# Patient Record
Sex: MALE | Race: WHITE | Employment: OTHER | URBAN - METROPOLITAN AREA
[De-identification: names, ages, dates, MRNs, and addresses within clinical notes are randomized per-mention and may not be internally consistent; named-entity substitution may affect disease eponyms.]

---

## 2021-08-03 ENCOUNTER — HOSPITAL ENCOUNTER (INPATIENT)
Age: 81
LOS: 10 days | Discharge: NURSING, REHAB OR LONG-TERM CARE | DRG: 981 | End: 2021-08-13
Attending: EMERGENCY MEDICINE | Admitting: INTERNAL MEDICINE
Payer: MEDICARE

## 2021-08-03 ENCOUNTER — APPOINTMENT (OUTPATIENT)
Dept: GENERAL RADIOLOGY | Age: 81
DRG: 981 | End: 2021-08-03
Payer: MEDICARE

## 2021-08-03 ENCOUNTER — APPOINTMENT (OUTPATIENT)
Dept: CT IMAGING | Age: 81
DRG: 981 | End: 2021-08-03
Payer: MEDICARE

## 2021-08-03 DIAGNOSIS — T79.6XXA TRAUMATIC RHABDOMYOLYSIS, INITIAL ENCOUNTER (HCC): ICD-10-CM

## 2021-08-03 DIAGNOSIS — E86.0 DEHYDRATION: ICD-10-CM

## 2021-08-03 DIAGNOSIS — S72.002A CLOSED FRACTURE OF LEFT HIP, INITIAL ENCOUNTER (HCC): ICD-10-CM

## 2021-08-03 DIAGNOSIS — I48.91 ATRIAL FIBRILLATION WITH RVR (HCC): Primary | ICD-10-CM

## 2021-08-03 LAB
ABSOLUTE EOS #: 0 K/UL (ref 0–0.4)
ABSOLUTE IMMATURE GRANULOCYTE: ABNORMAL K/UL (ref 0–0.3)
ABSOLUTE LYMPH #: 0.9 K/UL (ref 1–4.8)
ABSOLUTE MONO #: 0.8 K/UL (ref 0.1–1.3)
ALBUMIN SERPL-MCNC: 3.8 G/DL (ref 3.5–5.2)
ALBUMIN/GLOBULIN RATIO: ABNORMAL (ref 1–2.5)
ALP BLD-CCNC: 110 U/L (ref 40–129)
ALT SERPL-CCNC: 114 U/L (ref 5–41)
ANION GAP SERPL CALCULATED.3IONS-SCNC: 16 MMOL/L (ref 9–17)
AST SERPL-CCNC: 85 U/L
BASOPHILS # BLD: 0 % (ref 0–2)
BASOPHILS ABSOLUTE: 0 K/UL (ref 0–0.2)
BILIRUB SERPL-MCNC: 1.74 MG/DL (ref 0.3–1.2)
BNP INTERPRETATION: ABNORMAL
BUN BLDV-MCNC: 71 MG/DL (ref 8–23)
BUN/CREAT BLD: ABNORMAL (ref 9–20)
C-REACTIVE PROTEIN: 38.7 MG/L (ref 0–5)
CALCIUM SERPL-MCNC: 9.4 MG/DL (ref 8.6–10.4)
CHLORIDE BLD-SCNC: 100 MMOL/L (ref 98–107)
CO2: 21 MMOL/L (ref 20–31)
CREAT SERPL-MCNC: 1.11 MG/DL (ref 0.7–1.2)
DIFFERENTIAL TYPE: ABNORMAL
EKG ATRIAL RATE: 131 BPM
EKG Q-T INTERVAL: 278 MS
EKG QRS DURATION: 88 MS
EKG QTC CALCULATION (BAZETT): 470 MS
EKG R AXIS: -25 DEGREES
EKG T AXIS: 78 DEGREES
EKG VENTRICULAR RATE: 172 BPM
EOSINOPHILS RELATIVE PERCENT: 0 % (ref 0–4)
GFR AFRICAN AMERICAN: >60 ML/MIN
GFR NON-AFRICAN AMERICAN: >60 ML/MIN
GFR SERPL CREATININE-BSD FRML MDRD: ABNORMAL ML/MIN/{1.73_M2}
GFR SERPL CREATININE-BSD FRML MDRD: ABNORMAL ML/MIN/{1.73_M2}
GLUCOSE BLD-MCNC: 113 MG/DL (ref 70–99)
HCT VFR BLD CALC: 33.9 % (ref 41–53)
HCT VFR BLD CALC: 35.4 % (ref 41–53)
HCT VFR BLD CALC: 41 % (ref 41–53)
HEMOGLOBIN: 11.8 G/DL (ref 13.5–17.5)
HEMOGLOBIN: 12.3 G/DL (ref 13.5–17.5)
HEMOGLOBIN: 13.6 G/DL (ref 13.5–17.5)
IMMATURE GRANULOCYTES: ABNORMAL %
INR BLD: 1.1
INR BLD: 1.2
INR BLD: 1.2
LACTIC ACID, WHOLE BLOOD: ABNORMAL MMOL/L (ref 0.7–2.1)
LACTIC ACID, WHOLE BLOOD: ABNORMAL MMOL/L (ref 0.7–2.1)
LACTIC ACID, WHOLE BLOOD: NORMAL MMOL/L (ref 0.7–2.1)
LACTIC ACID: 1.7 MMOL/L (ref 0.5–2.2)
LACTIC ACID: 2.3 MMOL/L (ref 0.5–2.2)
LACTIC ACID: 2.3 MMOL/L (ref 0.5–2.2)
LYMPHOCYTES # BLD: 8 % (ref 24–44)
MCH RBC QN AUTO: 32.2 PG (ref 26–34)
MCH RBC QN AUTO: 33.7 PG (ref 26–34)
MCH RBC QN AUTO: 33.7 PG (ref 26–34)
MCHC RBC AUTO-ENTMCNC: 33.2 G/DL (ref 31–37)
MCHC RBC AUTO-ENTMCNC: 34.7 G/DL (ref 31–37)
MCHC RBC AUTO-ENTMCNC: 34.9 G/DL (ref 31–37)
MCV RBC AUTO: 96.6 FL (ref 80–100)
MCV RBC AUTO: 96.9 FL (ref 80–100)
MCV RBC AUTO: 97.1 FL (ref 80–100)
MONOCYTES # BLD: 7 % (ref 1–7)
MYOGLOBIN: 1409 NG/ML (ref 28–72)
NRBC AUTOMATED: ABNORMAL PER 100 WBC
PARTIAL THROMBOPLASTIN TIME: 28.1 SEC (ref 24–36)
PARTIAL THROMBOPLASTIN TIME: >150 SEC (ref 24–36)
PARTIAL THROMBOPLASTIN TIME: >150 SEC (ref 24–36)
PDW BLD-RTO: 14.1 % (ref 11.5–14.9)
PDW BLD-RTO: 14.1 % (ref 11.5–14.9)
PDW BLD-RTO: 14.3 % (ref 11.5–14.9)
PLATELET # BLD: 203 K/UL (ref 150–450)
PLATELET # BLD: 209 K/UL (ref 150–450)
PLATELET # BLD: 287 K/UL (ref 150–450)
PLATELET ESTIMATE: ABNORMAL
PMV BLD AUTO: 8.9 FL (ref 6–12)
PMV BLD AUTO: 9.2 FL (ref 6–12)
PMV BLD AUTO: 9.7 FL (ref 6–12)
POTASSIUM SERPL-SCNC: 3.6 MMOL/L (ref 3.7–5.3)
PRO-BNP: 5290 PG/ML
PROTHROMBIN TIME: 14.3 SEC (ref 11.8–14.6)
PROTHROMBIN TIME: 15.4 SEC (ref 11.8–14.6)
PROTHROMBIN TIME: 15.6 SEC (ref 11.8–14.6)
RBC # BLD: 3.5 M/UL (ref 4.5–5.9)
RBC # BLD: 3.65 M/UL (ref 4.5–5.9)
RBC # BLD: 4.23 M/UL (ref 4.5–5.9)
RBC # BLD: ABNORMAL 10*6/UL
SARS-COV-2, RAPID: NOT DETECTED
SEDIMENTATION RATE, ERYTHROCYTE: 7 MM (ref 0–20)
SEG NEUTROPHILS: 85 % (ref 36–66)
SEGMENTED NEUTROPHILS ABSOLUTE COUNT: 10 K/UL (ref 1.3–9.1)
SODIUM BLD-SCNC: 137 MMOL/L (ref 135–144)
SPECIMEN DESCRIPTION: NORMAL
TOTAL CK: 4071 U/L (ref 39–308)
TOTAL PROTEIN: 6.8 G/DL (ref 6.4–8.3)
TROPONIN INTERP: ABNORMAL
TROPONIN T: ABNORMAL NG/ML
TROPONIN, HIGH SENSITIVITY: 101 NG/L (ref 0–22)
TROPONIN, HIGH SENSITIVITY: 70 NG/L (ref 0–22)
TROPONIN, HIGH SENSITIVITY: 76 NG/L (ref 0–22)
TSH SERPL DL<=0.05 MIU/L-ACNC: 1.44 MIU/L (ref 0.3–5)
WBC # BLD: 11.7 K/UL (ref 3.5–11)
WBC # BLD: 9.5 K/UL (ref 3.5–11)
WBC # BLD: 9.9 K/UL (ref 3.5–11)
WBC # BLD: ABNORMAL 10*3/UL

## 2021-08-03 PROCEDURE — 2580000003 HC RX 258: Performed by: EMERGENCY MEDICINE

## 2021-08-03 PROCEDURE — 80053 COMPREHEN METABOLIC PANEL: CPT

## 2021-08-03 PROCEDURE — 93005 ELECTROCARDIOGRAM TRACING: CPT | Performed by: EMERGENCY MEDICINE

## 2021-08-03 PROCEDURE — 84484 ASSAY OF TROPONIN QUANT: CPT

## 2021-08-03 PROCEDURE — 84443 ASSAY THYROID STIM HORMONE: CPT

## 2021-08-03 PROCEDURE — 36415 COLL VENOUS BLD VENIPUNCTURE: CPT

## 2021-08-03 PROCEDURE — 85652 RBC SED RATE AUTOMATED: CPT

## 2021-08-03 PROCEDURE — 87635 SARS-COV-2 COVID-19 AMP PRB: CPT

## 2021-08-03 PROCEDURE — 0BH18EZ INSERTION OF ENDOTRACHEAL AIRWAY INTO TRACHEA, VIA NATURAL OR ARTIFICIAL OPENING ENDOSCOPIC: ICD-10-PCS | Performed by: INTERNAL MEDICINE

## 2021-08-03 PROCEDURE — 73502 X-RAY EXAM HIP UNI 2-3 VIEWS: CPT

## 2021-08-03 PROCEDURE — 96365 THER/PROPH/DIAG IV INF INIT: CPT

## 2021-08-03 PROCEDURE — 83880 ASSAY OF NATRIURETIC PEPTIDE: CPT

## 2021-08-03 PROCEDURE — 93010 ELECTROCARDIOGRAM REPORT: CPT | Performed by: INTERNAL MEDICINE

## 2021-08-03 PROCEDURE — 73700 CT LOWER EXTREMITY W/O DYE: CPT

## 2021-08-03 PROCEDURE — 96361 HYDRATE IV INFUSION ADD-ON: CPT

## 2021-08-03 PROCEDURE — 86140 C-REACTIVE PROTEIN: CPT

## 2021-08-03 PROCEDURE — 85027 COMPLETE CBC AUTOMATED: CPT

## 2021-08-03 PROCEDURE — 72125 CT NECK SPINE W/O DYE: CPT

## 2021-08-03 PROCEDURE — 6360000002 HC RX W HCPCS

## 2021-08-03 PROCEDURE — 99223 1ST HOSP IP/OBS HIGH 75: CPT | Performed by: INTERNAL MEDICINE

## 2021-08-03 PROCEDURE — 85610 PROTHROMBIN TIME: CPT

## 2021-08-03 PROCEDURE — 2500000003 HC RX 250 WO HCPCS: Performed by: EMERGENCY MEDICINE

## 2021-08-03 PROCEDURE — 83874 ASSAY OF MYOGLOBIN: CPT

## 2021-08-03 PROCEDURE — 85025 COMPLETE CBC W/AUTO DIFF WBC: CPT

## 2021-08-03 PROCEDURE — 87040 BLOOD CULTURE FOR BACTERIA: CPT

## 2021-08-03 PROCEDURE — 2060000000 HC ICU INTERMEDIATE R&B

## 2021-08-03 PROCEDURE — 83605 ASSAY OF LACTIC ACID: CPT

## 2021-08-03 PROCEDURE — 71045 X-RAY EXAM CHEST 1 VIEW: CPT

## 2021-08-03 PROCEDURE — 5A1945Z RESPIRATORY VENTILATION, 24-96 CONSECUTIVE HOURS: ICD-10-PCS | Performed by: INTERNAL MEDICINE

## 2021-08-03 PROCEDURE — 2580000003 HC RX 258

## 2021-08-03 PROCEDURE — 96367 TX/PROPH/DG ADDL SEQ IV INF: CPT

## 2021-08-03 PROCEDURE — 6370000000 HC RX 637 (ALT 250 FOR IP): Performed by: EMERGENCY MEDICINE

## 2021-08-03 PROCEDURE — 99285 EMERGENCY DEPT VISIT HI MDM: CPT

## 2021-08-03 PROCEDURE — 96366 THER/PROPH/DIAG IV INF ADDON: CPT

## 2021-08-03 PROCEDURE — 85730 THROMBOPLASTIN TIME PARTIAL: CPT

## 2021-08-03 PROCEDURE — 70450 CT HEAD/BRAIN W/O DYE: CPT

## 2021-08-03 PROCEDURE — 82550 ASSAY OF CK (CPK): CPT

## 2021-08-03 PROCEDURE — 2500000003 HC RX 250 WO HCPCS: Performed by: INTERNAL MEDICINE

## 2021-08-03 PROCEDURE — 6360000002 HC RX W HCPCS: Performed by: EMERGENCY MEDICINE

## 2021-08-03 RX ORDER — SODIUM CHLORIDE 9 MG/ML
INJECTION, SOLUTION INTRAVENOUS CONTINUOUS
Status: DISCONTINUED | OUTPATIENT
Start: 2021-08-03 | End: 2021-08-10

## 2021-08-03 RX ORDER — M-VIT,TX,IRON,MINS/CALC/FOLIC 27MG-0.4MG
1 TABLET ORAL DAILY
COMMUNITY

## 2021-08-03 RX ORDER — SODIUM CHLORIDE 9 MG/ML
25 INJECTION, SOLUTION INTRAVENOUS PRN
Status: DISCONTINUED | OUTPATIENT
Start: 2021-08-03 | End: 2021-08-13 | Stop reason: HOSPADM

## 2021-08-03 RX ORDER — HEPARIN SODIUM 1000 [USP'U]/ML
60 INJECTION, SOLUTION INTRAVENOUS; SUBCUTANEOUS PRN
Status: DISCONTINUED | OUTPATIENT
Start: 2021-08-03 | End: 2021-08-03

## 2021-08-03 RX ORDER — HEPARIN SODIUM 1000 [USP'U]/ML
80 INJECTION, SOLUTION INTRAVENOUS; SUBCUTANEOUS ONCE
Status: DISCONTINUED | OUTPATIENT
Start: 2021-08-03 | End: 2021-08-03

## 2021-08-03 RX ORDER — POTASSIUM CHLORIDE 7.45 MG/ML
10 INJECTION INTRAVENOUS ONCE
Status: COMPLETED | OUTPATIENT
Start: 2021-08-03 | End: 2021-08-03

## 2021-08-03 RX ORDER — HEPARIN SODIUM 1000 [USP'U]/ML
40 INJECTION, SOLUTION INTRAVENOUS; SUBCUTANEOUS PRN
Status: DISCONTINUED | OUTPATIENT
Start: 2021-08-03 | End: 2021-08-07

## 2021-08-03 RX ORDER — ACETAMINOPHEN 650 MG/1
650 SUPPOSITORY RECTAL EVERY 6 HOURS PRN
Status: DISCONTINUED | OUTPATIENT
Start: 2021-08-03 | End: 2021-08-10 | Stop reason: SDUPTHER

## 2021-08-03 RX ORDER — HEPARIN SODIUM 1000 [USP'U]/ML
80 INJECTION, SOLUTION INTRAVENOUS; SUBCUTANEOUS PRN
Status: DISCONTINUED | OUTPATIENT
Start: 2021-08-03 | End: 2021-08-07

## 2021-08-03 RX ORDER — HEPARIN SODIUM 10000 [USP'U]/100ML
5-30 INJECTION, SOLUTION INTRAVENOUS CONTINUOUS
Status: DISCONTINUED | OUTPATIENT
Start: 2021-08-03 | End: 2021-08-03

## 2021-08-03 RX ORDER — SODIUM CHLORIDE, SODIUM LACTATE, POTASSIUM CHLORIDE, AND CALCIUM CHLORIDE .6; .31; .03; .02 G/100ML; G/100ML; G/100ML; G/100ML
2000 INJECTION, SOLUTION INTRAVENOUS ONCE
Status: COMPLETED | OUTPATIENT
Start: 2021-08-03 | End: 2021-08-03

## 2021-08-03 RX ORDER — DIGOXIN 0.25 MG/ML
125 INJECTION INTRAMUSCULAR; INTRAVENOUS ONCE
Status: COMPLETED | OUTPATIENT
Start: 2021-08-03 | End: 2021-08-03

## 2021-08-03 RX ORDER — HEPARIN SODIUM 1000 [USP'U]/ML
30 INJECTION, SOLUTION INTRAVENOUS; SUBCUTANEOUS PRN
Status: DISCONTINUED | OUTPATIENT
Start: 2021-08-03 | End: 2021-08-03

## 2021-08-03 RX ORDER — ONDANSETRON 4 MG/1
4 TABLET, ORALLY DISINTEGRATING ORAL EVERY 8 HOURS PRN
Status: DISCONTINUED | OUTPATIENT
Start: 2021-08-03 | End: 2021-08-13 | Stop reason: HOSPADM

## 2021-08-03 RX ORDER — HEPARIN SODIUM 10000 [USP'U]/100ML
329-1000 INJECTION, SOLUTION INTRAVENOUS CONTINUOUS
Status: DISCONTINUED | OUTPATIENT
Start: 2021-08-03 | End: 2021-08-03

## 2021-08-03 RX ORDER — POLYETHYLENE GLYCOL 3350 17 G/17G
17 POWDER, FOR SOLUTION ORAL DAILY PRN
Status: DISCONTINUED | OUTPATIENT
Start: 2021-08-03 | End: 2021-08-13 | Stop reason: HOSPADM

## 2021-08-03 RX ORDER — ONDANSETRON 2 MG/ML
4 INJECTION INTRAMUSCULAR; INTRAVENOUS EVERY 6 HOURS PRN
Status: DISCONTINUED | OUTPATIENT
Start: 2021-08-03 | End: 2021-08-13 | Stop reason: HOSPADM

## 2021-08-03 RX ORDER — SODIUM CHLORIDE 0.9 % (FLUSH) 0.9 %
5-40 SYRINGE (ML) INJECTION EVERY 12 HOURS SCHEDULED
Status: DISCONTINUED | OUTPATIENT
Start: 2021-08-03 | End: 2021-08-13 | Stop reason: HOSPADM

## 2021-08-03 RX ORDER — POTASSIUM CHLORIDE 20 MEQ/1
40 TABLET, EXTENDED RELEASE ORAL PRN
Status: DISCONTINUED | OUTPATIENT
Start: 2021-08-03 | End: 2021-08-13 | Stop reason: HOSPADM

## 2021-08-03 RX ORDER — IBUPROFEN 200 MG
400 TABLET ORAL EVERY 6 HOURS PRN
Status: ON HOLD | COMMUNITY
End: 2021-08-26 | Stop reason: HOSPADM

## 2021-08-03 RX ORDER — DILTIAZEM HYDROCHLORIDE 5 MG/ML
10 INJECTION INTRAVENOUS ONCE
Status: COMPLETED | OUTPATIENT
Start: 2021-08-03 | End: 2021-08-03

## 2021-08-03 RX ORDER — METOPROLOL TARTRATE 5 MG/5ML
2.5 INJECTION INTRAVENOUS ONCE
Status: COMPLETED | OUTPATIENT
Start: 2021-08-03 | End: 2021-08-03

## 2021-08-03 RX ORDER — HEPARIN SODIUM 1000 [USP'U]/ML
60 INJECTION, SOLUTION INTRAVENOUS; SUBCUTANEOUS ONCE
Status: DISCONTINUED | OUTPATIENT
Start: 2021-08-03 | End: 2021-08-03

## 2021-08-03 RX ORDER — HEPARIN SODIUM 1000 [USP'U]/ML
60 INJECTION, SOLUTION INTRAVENOUS; SUBCUTANEOUS ONCE
Status: COMPLETED | OUTPATIENT
Start: 2021-08-03 | End: 2021-08-03

## 2021-08-03 RX ORDER — ACETAMINOPHEN 325 MG/1
650 TABLET ORAL EVERY 6 HOURS PRN
Status: DISCONTINUED | OUTPATIENT
Start: 2021-08-03 | End: 2021-08-10 | Stop reason: SDUPTHER

## 2021-08-03 RX ORDER — SODIUM CHLORIDE 0.9 % (FLUSH) 0.9 %
5-40 SYRINGE (ML) INJECTION PRN
Status: DISCONTINUED | OUTPATIENT
Start: 2021-08-03 | End: 2021-08-13 | Stop reason: HOSPADM

## 2021-08-03 RX ORDER — HEPARIN SODIUM 10000 [USP'U]/100ML
18 INJECTION, SOLUTION INTRAVENOUS CONTINUOUS
Status: DISCONTINUED | OUTPATIENT
Start: 2021-08-03 | End: 2021-08-05

## 2021-08-03 RX ORDER — POTASSIUM CHLORIDE 7.45 MG/ML
10 INJECTION INTRAVENOUS PRN
Status: DISCONTINUED | OUTPATIENT
Start: 2021-08-03 | End: 2021-08-13 | Stop reason: HOSPADM

## 2021-08-03 RX ORDER — ASPIRIN 81 MG/1
324 TABLET, CHEWABLE ORAL ONCE
Status: COMPLETED | OUTPATIENT
Start: 2021-08-03 | End: 2021-08-03

## 2021-08-03 RX ADMIN — HEPARIN SODIUM 12 UNITS/KG/HR: 10000 INJECTION, SOLUTION INTRAVENOUS at 14:23

## 2021-08-03 RX ADMIN — SODIUM CHLORIDE, POTASSIUM CHLORIDE, SODIUM LACTATE AND CALCIUM CHLORIDE 2000 ML: 600; 310; 30; 20 INJECTION, SOLUTION INTRAVENOUS at 11:28

## 2021-08-03 RX ADMIN — POTASSIUM CHLORIDE 10 MEQ: 7.46 INJECTION, SOLUTION INTRAVENOUS at 12:10

## 2021-08-03 RX ADMIN — DILTIAZEM HYDROCHLORIDE 10 MG: 5 INJECTION INTRAVENOUS at 11:34

## 2021-08-03 RX ADMIN — METOPROLOL TARTRATE 2.5 MG: 1 INJECTION, SOLUTION INTRAVENOUS at 21:30

## 2021-08-03 RX ADMIN — HEPARIN SODIUM 12 UNITS/KG/HR: 10000 INJECTION, SOLUTION INTRAVENOUS at 16:33

## 2021-08-03 RX ADMIN — HEPARIN SODIUM 3950 UNITS: 1000 INJECTION INTRAVENOUS; SUBCUTANEOUS at 14:19

## 2021-08-03 RX ADMIN — ASPIRIN 324 MG: 81 TABLET, CHEWABLE ORAL at 12:10

## 2021-08-03 RX ADMIN — SODIUM CHLORIDE, PRESERVATIVE FREE 10 ML: 5 INJECTION INTRAVENOUS at 21:30

## 2021-08-03 RX ADMIN — CEFAZOLIN 2000 MG: 10 INJECTION, POWDER, FOR SOLUTION INTRAVENOUS at 21:30

## 2021-08-03 RX ADMIN — SODIUM CHLORIDE: 9 INJECTION, SOLUTION INTRAVENOUS at 15:49

## 2021-08-03 RX ADMIN — DILTIAZEM HYDROCHLORIDE 5 MG/HR: 5 INJECTION INTRAVENOUS at 12:11

## 2021-08-03 RX ADMIN — HEPARIN SODIUM 18 UNITS/KG/HR: 10000 INJECTION, SOLUTION INTRAVENOUS at 20:02

## 2021-08-03 RX ADMIN — DIGOXIN 125 MCG: 0.25 INJECTION INTRAMUSCULAR; INTRAVENOUS at 15:55

## 2021-08-03 ASSESSMENT — PAIN DESCRIPTION - DESCRIPTORS
DESCRIPTORS: ACHING
DESCRIPTORS: ACHING;THROBBING

## 2021-08-03 ASSESSMENT — PAIN DESCRIPTION - ORIENTATION
ORIENTATION: RIGHT
ORIENTATION: LEFT

## 2021-08-03 ASSESSMENT — PAIN DESCRIPTION - LOCATION
LOCATION: HIP
LOCATION: HIP

## 2021-08-03 ASSESSMENT — PAIN SCALES - GENERAL
PAINLEVEL_OUTOF10: 8
PAINLEVEL_OUTOF10: 5

## 2021-08-03 ASSESSMENT — ENCOUNTER SYMPTOMS
ABDOMINAL PAIN: 0
BACK PAIN: 0
PHOTOPHOBIA: 0
NAUSEA: 0
COUGH: 0
CONSTIPATION: 0
DIARRHEA: 0
SORE THROAT: 0
CHEST TIGHTNESS: 0
COLOR CHANGE: 0
WHEEZING: 0
VOMITING: 0
EYE PAIN: 0
TROUBLE SWALLOWING: 0
RHINORRHEA: 0
BLOOD IN STOOL: 0
SHORTNESS OF BREATH: 0

## 2021-08-03 ASSESSMENT — PAIN DESCRIPTION - PAIN TYPE
TYPE: ACUTE PAIN
TYPE: ACUTE PAIN

## 2021-08-03 ASSESSMENT — PAIN DESCRIPTION - ONSET
ONSET: ON-GOING
ONSET: ON-GOING

## 2021-08-03 ASSESSMENT — PAIN - FUNCTIONAL ASSESSMENT: PAIN_FUNCTIONAL_ASSESSMENT: ACTIVITIES ARE NOT PREVENTED

## 2021-08-03 ASSESSMENT — PAIN DESCRIPTION - FREQUENCY
FREQUENCY: CONTINUOUS
FREQUENCY: CONTINUOUS

## 2021-08-03 ASSESSMENT — PAIN DESCRIPTION - PROGRESSION
CLINICAL_PROGRESSION: NOT CHANGED
CLINICAL_PROGRESSION: NOT CHANGED

## 2021-08-03 NOTE — ED NOTES
Comfort measures provided, patient denies any pain or discomfort at this time. Repositioned patient for comfort. Lights dimmed per request. External condom catheter put in place on continuous low suction. Bed in lowest position, wheels locks, IV lines patent, no s/s of infiltration, induration, or infection noted. Dressing clean dry and intact. Call bell within reach, patient verbalized understanding to use his call button for assistance.       Vasyl Glynn RN  08/03/21 7727

## 2021-08-03 NOTE — ED NOTES
Spoke with Resident, he wants to continue to Heparin infusion and to have it stop once he gets to the floor. Also asked resident to verify the digoxin order that was recently placed. He will look into this.       Emily Cabrera RN  08/03/21 [id number]

## 2021-08-03 NOTE — ED NOTES
Report called to [facility].  Patient is ready to go [address], RN  08/03/21 1919

## 2021-08-03 NOTE — PROGRESS NOTES
Medication History completed:    New medications: ibuprofen, multivitamin    Medications discontinued: none    Changes to dosing: none    Stated allergies: NKDA    Other pertinent information: Medications confirmed with patient. He does not take prescription medications.      Thank you,  John Tomas, PharmD, BCPS  [phone]

## 2021-08-03 NOTE — ED NOTES
Returned from [address]. Patient placed back on cardiac monitor, and zoll still on patient.       Vanessa Lazo RN  08/03/21 4859

## 2021-08-03 NOTE — ED PROVIDER NOTES
[address]  EMERGENCY DEPARTMENT ENCOUNTER    Pt Name: John Padron  MRN: [id number]  YOB: 1940  Date of evaluation:8/3/21  PCP: No primary care provider on file. CHIEF COMPLAINT       Chief Complaint   Patient presents with    Leg Pain    Irregular Heart Beat       HISTORY OF PRESENT ILLNESS    John Padron is a 79 y.o. male who presents with a chief complaint of left hip pain. Patient states 2 months ago he hurt his left hip. He has been having difficulty walking around on it at home. Several days ago he had a mechanical fall in his house. He apparently laid on the ground for about 5 days without any food or water. His neighbor who essentially is his only caretaker found him and brought him here. He denies any real specific complaints right now. He has no chest pain, difficulty breathing, recent cough, fevers or chills. Only complaint is left leg mostly in his left hip that is worse with ambulation. Pain is sharp. Nothing alleviates the pain. He states he has no past medical history. He does not go to the doctor. Has never been to the hospital.  No history of any cardiac abnormalities. Symptoms are acute. Symptoms are moderate. Nothing makes symptoms better or worse. Patient has no other complaints at this time. REVIEW OF SYSTEMS       Review of Systems   Constitutional: Positive for activity change and appetite change. Negative for chills, fatigue and fever. HENT: Negative for congestion, ear pain, sore throat and trouble swallowing. Eyes: Negative for visual disturbance. Respiratory: Negative for cough and shortness of breath. Cardiovascular: Negative for chest pain, palpitations and leg swelling. Gastrointestinal: Negative for abdominal pain, blood in stool, constipation, diarrhea, nausea and vomiting. Genitourinary: Negative for dysuria and flank pain. Musculoskeletal: Positive for arthralgias and myalgias. Negative for back pain and neck pain.    Skin: Negative for color change, rash and wound. Neurological: Negative for dizziness, weakness, light-headedness, numbness and headaches. Psychiatric/Behavioral: Negative for confusion. All other systems reviewed and are negative. Negative in 10 essential Systems except as mentioned above and in the HPI. PAST MEDICAL HISTORY   History reviewed. No pertinent past medical history. None    SURGICAL HISTORY      has no past surgical history on file. None    CURRENT MEDICATIONS       Previous Medications    No medications on file       ALLERGIES     has No Known Allergies. FAMILY HISTORY     has no family status information on file. family history is not on file. SOCIAL HISTORY      reports that he has never smoked. He has never used smokeless tobacco. He reports that he does not drink alcohol. PHYSICAL EXAM     INITIAL VITALS:  height is 5' 7\" (1.702 m) and weight is 145 lb (65.8 kg). His oral temperature is 97.7 °F (36.5 °C). His blood pressure is 110/85 and his pulse is 150. His respiration is 22 and oxygen saturation is 92%. Physical Exam  Vitals and nursing note reviewed. Constitutional:       General: He is not in acute distress. HENT:      Head: Normocephalic and atraumatic. Eyes:      Conjunctiva/sclera: Conjunctivae normal.      Pupils: Pupils are equal, round, and reactive to light. Cardiovascular:      Rate and Rhythm: Tachycardia present. Rhythm irregular. Heart sounds: Normal heart sounds. No murmur heard. Pulmonary:      Effort: Pulmonary effort is normal. No respiratory distress. Breath sounds: Normal breath sounds. Abdominal:      General: Bowel sounds are normal. There is no distension. Palpations: Abdomen is soft. Tenderness: There is no abdominal tenderness. Musculoskeletal:         General: Tenderness (Left hip) present. No swelling, deformity or signs of injury. Cervical back: Neck supple.    Lymphadenopathy:      Cervical: No cervical adenopathy. Skin:     General: Skin is warm and dry. Findings: Lesion (Chronic scabbed type lesions right lower leg) present. No rash. Neurological:      Mental Status: He is alert and oriented to person, place, and time. Psychiatric:         Judgment: Judgment normal.           DIFFERENTIAL DIAGNOSIS/MDM:   57-year-old male presents complaining about left leg pain that he originally injured about 2 months ago however was recently found on the ground and was on the floor for several days possibly up to 5 days without any food or water. Currently has very minimal complaints. Only complaint is in regards to his leg. When I was initially in the room patient was found to be significantly tachycardic in the 170s to 180s. EKG shows atrial fibrillation like with rapid ventricular response. No history of A. fib that he knows of. We will give large fluid bolus as I think he most likely is very dehydrated from laying on the ground for so long. We will get broad work-up including cardiac work-up, electrolytes, imaging of the left leg. Patient most likely will need to be admitted to the hospital.    DIAGNOSTIC RESULTS     EKG: All EKG's are interpreted by the Emergency Department Physician who either signs or Co-signs this chart in the absence of a cardiologist.    EKG Interpretation    Interpreted by me    Rhythm: Atrial fibrillation  Rate: 172  Axis: normal  Ectopy: none  Conduction: normal  ST Segments: Nonspecific changes  T Waves: Nonspecific changes  Q Waves: none    Clinical Impression: Atrial fibrillation with RVR    RADIOLOGY:   I directly visualized the following  images and reviewed the radiologist interpretations:  CT HIP LEFT WO CONTRAST   Final Result   1. Displaced fracture of the left transcervical femoral neck which is   subacute in appearance. 2. No additional fractures are identified. 3. Mild-to-moderate degenerative change of the left hip.   Chondrocalcinosis   also present. CT CERVICAL SPINE WO CONTRAST   Final Result   No acute abnormality of the cervical spine. CT HEAD WO CONTRAST   Final Result   No acute intracranial abnormality. XR HIP 2-3 VW W PELVIS LEFT   Final Result   Left femoral neck fracture with impaction, varus angulation, and proximal   migration of the greater trochanter. Possible lucency through the medial left acetabulum, though radiographic   evaluation is limited by osseous demineralization. Consider CT. XR CHEST PORTABLE   Final Result   No acute cardiopulmonary process suspected. ED BEDSIDE ULTRASOUND:      LABS:  Labs Reviewed   TROPONIN - Abnormal; Notable for the following components:       Result Value    Troponin, High Sensitivity 101 (*)     All other components within normal limits   TROPONIN - Abnormal; Notable for the following components:    Troponin, High Sensitivity 76 (*)     All other components within normal limits   CBC WITH AUTO DIFFERENTIAL - Abnormal; Notable for the following components:    WBC 11.7 (*)     RBC 4.23 (*)     Seg Neutrophils 85 (*)     Lymphocytes 8 (*)     Segs Absolute 10.00 (*)     Absolute Lymph # 0.90 (*)     All other components within normal limits   COMPREHENSIVE METABOLIC PANEL - Abnormal; Notable for the following components:    Glucose 113 (*)     BUN 71 (*)     Potassium 3.6 (*)      (*)     AST 85 (*)     Total Bilirubin 1.74 (*)     All other components within normal limits   BRAIN NATRIURETIC PEPTIDE - Abnormal; Notable for the following components:    Pro-BNP 5,290 (*)     All other components within normal limits   MYOGLOBIN, SERUM - Abnormal; Notable for the following components:    Myoglobin 1,409 (*)     All other components within normal limits   CK - Abnormal; Notable for the following components:     Total CK 4,071 (*)     All other components within normal limits   LACTIC ACID, PLASMA - Abnormal; Notable for the following components: Lactic Acid 2.3 (*)     All other components within normal limits   LACTIC ACID, PLASMA - Abnormal; Notable for the following components:    Lactic Acid 2.3 (*)     All other components within normal limits   COVID-19, RAPID   CULTURE, BLOOD 1   CULTURE, BLOOD 1   PROTIME-INR   APTT   TSH WITH REFLEX   HEPARIN LEVEL/ANTI-XA   HEPARIN LEVEL/ANTI-XA         EMERGENCY DEPARTMENT COURSE:   Vitals:    Vitals:    08/03/21 1414 08/03/21 1425 08/03/21 1430 08/03/21 1440   BP: (!) 132/110 92/79 117/82 110/85   Pulse: 156 169 137 150   Resp: 14 18 19 22   Temp:       TempSrc:       SpO2: 100% 100% 100% 92%   Weight:       Height:         12:21 PM EDT  Heart rate improving. I think he was severely dehydrated. His labs are resulting. Initial troponin is elevated at 100. Likely due to demand ischemia.      1:01 PM EDT  Patient's heart rate is improving somewhat on Cardizem infusion. Blood pressure is more normal at this time. Patient was found to have left-sided femoral neck fracture with questional medial left acetabulum fracture. We will contact orthopedic surgery, internal medicine and cardiology. We will hold off on anticoagulation until we talk with orthopedic surgery as well as cardiology. 2:00 PM EDT  Spoke with orthopedic surgery Dr. Chepe Reynolds and discussed case. He is recommending getting a CT scan of the left hip. If there is no acetabulum fracture he is recommending transfer to [facility] for higher level of care. I spoke with Dr. Eileen Ortiz who accepted patient for admission if orthopedic surgery is okay with it. She is recommending that we start heparin infusion. Also recommending if he gets admitted here to consult with cardiology Dr. Marce Garcia.      2:16 PM EDT  Troponin is in the 76s. Low suspicion for ACS with decreasing troponin. I think troponin leak likely secondary to demand ischemia from being in RVR.      2:36 PM EDT  CT left hip shows no evidence of acetabular fracture.   Will admit here under Dr. Keegan Enriquez with orthopedic consult. The CT scan does read the hip fracture is subacute. We will also consult cardiology. 2:52 PM EDT  Spoke with residents will place admission orders. 3:17 PM EDT  I spoke with Dr. Trip Dominguez cardiology. He is recommending giving a dose of IV digoxin at this time. Recommending holding off on any anticoagulation at this time. We will cancel heparin infusion. CRITICALCARE:  CRITICAL CARE: There was a high probability of clinically significant/life threatening deterioration in this patient's condition which required my urgent intervention. Total critical care time was 35 minutes. This excludes any time for separately reportable procedures. CONSULTS:  IP CONSULT TO ORTHOPEDIC SURGERY  IP CONSULT TO INTERNAL MEDICINE  IP CONSULT TO CARDIOLOGY      PROCEDURES:      FINAL IMPRESSION      1. Atrial fibrillation with RVR (HCC)    2. Dehydration    3. Traumatic rhabdomyolysis, initial encounter ([facility] [address]    4. Closed fracture of left hip, initial encounter ([facility] [address]            DISPOSITION/PLAN   DISPOSITION      Admission    PATIENT REFERRED TO:  No follow-up provider specified.     DISCHARGE MEDICATIONS:  New Prescriptions    No medications on file       (Please note that portions ofthis note were completed with a voice recognition program.  Efforts were made to edit the dictations but occasionally words are mis-transcribed.)    Joselyn Baltazar,   Attending Emergency Physician          Joselyn Baltazar, DO  08/03/21 [place],   08/03/21 [id number],   08/03/21 1605

## 2021-08-03 NOTE — H&P
[address] [place] [address]     HISTORY AND PHYSICAL EXAMINATION            Date:   8/3/2021  Patient name:  Charlie Biggs  Date of admission:  8/3/2021 10:59 AM  MRN:   [id number]  Account:  [de-identified]  YOB: 1940  PCP:    No primary care provider on file. Room:   [facility]  Code Status:    Full Code    Chief Complaint:     Chief Complaint   Patient presents with    Leg Pain    Irregular Heart Beat       History Obtained From:     patient    History of Present Illness: The patient is a 79 y.o. Non- / non  male who presents withLeg Pain and Irregular Heart Beat   and he is admitted to the hospital for the management of atrial fibrillation with RVR. The patient was brought to the ED by EMS. The patient says that he had leg pain and fell down, he said that he was laying on the floor for 5 days without eating or drinking. The patient denies chest pain, palpitation, dizziness, visual disturbances. Patient mentioned loss of appetite but has not noticed any change in weight. No abnormal movement, tremors, loss of consciousness. Patient has not seen any physician for long time and denies any heart diseases,  hypertension and diabetes. He was evaluated in the ED and had atrial fibrillation with RVR was given diltiazem, and heparin. Cardiology were consulted. The patient also had hip pain and his hip x-ray showed left femoral fracture and orthopedic surgery was consulted. HR: 150   CK: 4071   Myoglobin: 1409  K 3.6  BUN: 71  Lactic 2.3 -->1.2  CRP: 38.7  Troponin 76 --> 70  BNP: 5290      Past Medical History:     History reviewed. No pertinent past medical history. Past SurgicalHistory:     History reviewed. No pertinent surgical history.      Medications Prior to Admission:        Prior to Admission medications    Not on File Allergies:     Patient has no known allergies. Social History:     Tobacco:    reports that he has never smoked. He has never used smokeless tobacco.  Alcohol:      reports no history of alcohol use. Drug Use:  has no history on file for drug use. Family History:     History reviewed. No pertinent family history. Review of Systems:     Positive and Negative as described in HPI. Review of Systems   Constitutional: Positive for appetite change. Negative for fatigue, fever and unexpected weight change. HENT: Negative for ear discharge, ear pain, hearing loss, rhinorrhea and sore throat. Eyes: Negative for photophobia, pain and visual disturbance. Respiratory: Negative for cough, chest tightness, shortness of breath and wheezing. Cardiovascular: Negative for chest pain, palpitations and leg swelling. Gastrointestinal: Negative for abdominal pain, constipation, diarrhea, nausea and vomiting. Genitourinary: Negative for difficulty urinating, dysuria, enuresis and frequency. Neurological: Negative for dizziness, weakness, light-headedness, numbness and headaches. Physical Exam:   /69   Pulse 119   Temp 97.7 °F (36.5 °C) (Oral)   Resp 17   Ht 5' 7\" (1.702 m)   Wt 145 lb (65.8 kg)   SpO2 100%   BMI 22.71 kg/m²   Temp (24hrs), Av.7 °F (36.5 °C), Min:97.7 °F (36.5 °C), Max:97.7 °F (36.5 °C)    No results for input(s): POCGLU in the last 72 hours. Intake/Output Summary (Last 24 hours) at 8/3/2021 1807  Last data filed at 8/3/2021 1649  Gross per 24 hour   Intake 2000 ml   Output 450 ml   Net 1550 ml       Physical Exam  Constitutional:       General: He is in acute distress. Appearance: Normal appearance. He is not ill-appearing. HENT:      Head: Normocephalic. Mouth/Throat:      Mouth: Mucous membranes are dry. Pharynx: No oropharyngeal exudate or posterior oropharyngeal erythema. Eyes:      General: No visual field deficit or scleral icterus. Right eye: No discharge. Left eye: No discharge. Extraocular Movements: Extraocular movements intact. Conjunctiva/sclera: Conjunctivae normal.      Pupils: Pupils are equal, round, and reactive to light. Cardiovascular:      Rate and Rhythm: Tachycardia present. Rhythm irregular. Pulses:           Carotid pulses are 2+ on the right side and 2+ on the left side. Posterior tibial pulses are 2+ on the right side and 2+ on the left side. Heart sounds: No murmur heard. No friction rub. No gallop. Pulmonary:      Effort: Pulmonary effort is normal. No tachypnea, bradypnea or respiratory distress. Breath sounds: Normal breath sounds and air entry. No stridor or transmitted upper airway sounds. No wheezing, rhonchi or rales. Chest:      Chest wall: No tenderness. Abdominal:      General: Abdomen is scaphoid. Bowel sounds are normal. There is no abdominal bruit. There are no signs of injury. Palpations: Abdomen is soft. There is no shifting dullness, hepatomegaly, splenomegaly, mass or pulsatile mass. Tenderness: There is no abdominal tenderness. There is no guarding or rebound. Negative signs include Carrasco's sign. Musculoskeletal:      Right lower leg: No edema. Left lower leg: No edema. Skin:     General: Skin is cool and dry. Capillary Refill: Capillary refill takes 2 to 3 seconds. Findings: Signs of injury (circular lesion, 3cm diameter), rash and wound (Infected cercumfirential wound on the left leg) present. Rash is scaling (of both feet). Rash is not crusting. Neurological:      General: No focal deficit present. Mental Status: He is alert and oriented to person, place, and time. Cranial Nerves: No cranial nerve deficit, dysarthria or facial asymmetry. Sensory: Sensation is intact. No sensory deficit. Motor: Motor function is intact. No weakness, tremor, abnormal muscle tone or seizure activity.       Deep Tendon - 8.3 g/dL    Albumin 3.8 3.5 - 5.2 g/dL    Albumin/Globulin Ratio NOT REPORTED 1.0 - 2.5    GFR Non-African American >60 >60 mL/min    GFR African American >60 >60 mL/min    GFR Comment          GFR Staging NOT REPORTED    Brain Natriuretic Peptide    Collection Time: 08/03/21 11:13 AM   Result Value Ref Range    Pro-BNP 5,290 (H) <300 pg/mL    BNP Interpretation Pro-BNP Reference Range:    Protime-INR    Collection Time: 08/03/21 11:13 AM   Result Value Ref Range    Protime 14.3 11.8 - 14.6 sec    INR 1.1    APTT    Collection Time: 08/03/21 11:13 AM   Result Value Ref Range    PTT 28.1 24.0 - 36.0 sec   Myoglobin    Collection Time: 08/03/21 11:13 AM   Result Value Ref Range    Myoglobin 1,409 (H) 28 - 72 ng/mL   Creatine Kinase    Collection Time: 08/03/21 11:13 AM   Result Value Ref Range    Total CK 4,071 (H) 39 - 308 U/L   Lactic Acid, Plasma    Collection Time: 08/03/21 11:13 AM   Result Value Ref Range    Lactic Acid 2.3 (H) 0.5 - 2.2 mmol/L    Lactic Acid, Whole Blood NOT REPORTED 0.7 - 2.1 mmol/L   TSH w/reflex to FT4    Collection Time: 08/03/21 11:13 AM   Result Value Ref Range    TSH 1.44 0.30 - 5.00 mIU/L   EKG 12 Lead    Collection Time: 08/03/21 11:13 AM   Result Value Ref Range    Ventricular Rate 172 BPM    Atrial Rate 131 BPM    QRS Duration 88 ms    Q-T Interval 278 ms    QTc Calculation (Bazett) 470 ms    R Axis -25 degrees    T Axis 78 degrees   COVID-19, Rapid    Collection Time: 08/03/21 12:56 PM    Specimen: Nasopharyngeal Swab   Result Value Ref Range    Specimen Description . NASOPHARYNGEAL SWAB     SARS-CoV-2, Rapid Not Detected Not Detected   Troponin    Collection Time: 08/03/21  1:00 PM   Result Value Ref Range    Troponin, High Sensitivity 76 (HH) 0 - 22 ng/L    Troponin T NOT REPORTED <0.03 ng/mL    Troponin Interp NOT REPORTED    Lactic Acid, Plasma    Collection Time: 08/03/21  1:00 PM   Result Value Ref Range    Lactic Acid 2.3 (H) 0.5 - 2.2 mmol/L    Lactic Acid, Whole Blood NOT REPORTED 0.7 - 2.1 mmol/L   Lactic acid, plasma    Collection Time: 08/03/21  3:13 PM   Result Value Ref Range    Lactic Acid 1.7 0.5 - 2.2 mmol/L    Lactic Acid, Whole Blood NOT REPORTED 0.7 - 2.1 mmol/L   CBC    Collection Time: 08/03/21  4:50 PM   Result Value Ref Range    WBC 9.9 3.5 - 11.0 k/uL    RBC 3.65 (L) 4.5 - 5.9 m/uL    Hemoglobin 12.3 (L) 13.5 - 17.5 g/dL    Hematocrit 35.4 (L) 41 - 53 %    MCV 97.1 80 - 100 fL    MCH 33.7 26 - 34 pg    MCHC 34.7 31 - 37 g/dL    RDW 14.1 11.5 - 14.9 %    Platelets 153 985 - 241 k/uL    MPV 8.9 6.0 - 12.0 fL    NRBC Automated NOT REPORTED per 100 WBC   Protime-INR    Collection Time: 08/03/21  4:50 PM   Result Value Ref Range    Protime 15.4 (H) 11.8 - 14.6 sec    INR 1.2    CBC    Collection Time: 08/03/21  5:30 PM   Result Value Ref Range    WBC 9.5 3.5 - 11.0 k/uL    RBC 3.50 (L) 4.5 - 5.9 m/uL    Hemoglobin 11.8 (L) 13.5 - 17.5 g/dL    Hematocrit 33.9 (L) 41 - 53 %    MCV 96.6 80 - 100 fL    MCH 33.7 26 - 34 pg    MCHC 34.9 31 - 37 g/dL    RDW 14.1 11.5 - 14.9 %    Platelets 111 097 - 449 k/uL    MPV 9.2 6.0 - 12.0 fL    NRBC Automated NOT REPORTED per 100 WBC   Protime-INR    Collection Time: 08/03/21  5:30 PM   Result Value Ref Range    Protime 15.6 (H) 11.8 - 14.6 sec    INR 1.2        Imaging/Diagnostics:  CT HEAD WO CONTRAST    Result Date: 8/3/2021  EXAMINATION: CT OF THE HEAD WITHOUT CONTRAST  8/3/2021 1:55 pm TECHNIQUE: CT of the head was performed without the administration of intravenous contrast. Dose modulation, iterative reconstruction, and/or weight based adjustment of the mA/kV was utilized to reduce the radiation dose to as low as reasonably achievable. COMPARISON: None.  HISTORY: ORDERING SYSTEM PROVIDED HISTORY: Fall TECHNOLOGIST PROVIDED HISTORY: Fall Decision Support Exception - unselect if not a suspected or confirmed emergency medical condition->Emergency Medical Condition (MA) Reason for Exam: pt fell 5 days ago, in afib Acuity: Unknown Type of Exam: Unknown FINDINGS: BRAIN/VENTRICLES: There is no acute intracranial hemorrhage, mass effect or midline shift. No abnormal extra-axial fluid collection. The gray-white differentiation is maintained without evidence of an acute infarct. There is no evidence of hydrocephalus. ORBITS: The visualized portion of the orbits demonstrate no acute abnormality. SINUSES: The visualized paranasal sinuses and mastoid air cells demonstrate no acute abnormality. SOFT TISSUES/SKULL:  No acute abnormality of the visualized skull or soft tissues. No acute intracranial abnormality. CT CERVICAL SPINE WO CONTRAST    Result Date: 8/3/2021  EXAMINATION: CT OF THE CERVICAL SPINE WITHOUT CONTRAST 8/3/2021 1:55 pm TECHNIQUE: CT of the cervical spine was performed without the administration of intravenous contrast. Multiplanar reformatted images are provided for review. Dose modulation, iterative reconstruction, and/or weight based adjustment of the mA/kV was utilized to reduce the radiation dose to as low as reasonably achievable. COMPARISON: None. HISTORY: ORDERING SYSTEM PROVIDED HISTORY: Fall TECHNOLOGIST PROVIDED HISTORY: Fall Decision Support Exception - unselect if not a suspected or confirmed emergency medical condition->Emergency Medical Condition (MA) Reason for Exam: pt fell 5 days ago, in afib Acuity: Unknown Type of Exam: Unknown FINDINGS: BONES/ALIGNMENT: There is no acute fracture or traumatic malalignment. DEGENERATIVE CHANGES: No significant degenerative changes. SOFT TISSUES: There is no prevertebral soft tissue swelling. No acute abnormality of the cervical spine. XR CHEST PORTABLE    Result Date: 8/3/2021  EXAMINATION: ONE XRAY VIEW OF THE CHEST 8/3/2021 11:41 am COMPARISON: None. HISTORY: ORDERING SYSTEM PROVIDED HISTORY: AMS TECHNOLOGIST PROVIDED HISTORY: AMS Reason for Exam: AMS Acuity: Unknown Type of Exam: Unknown FINDINGS: Patient is slightly rotated.   Heart size within normal limits without evidence of vascular congestion. Aside from minor chronic appearing changes lungs are grossly clear. Probable skin fold projects in the left lower chest.  No focal lung consolidation is seen. No significant pleural effusions. Gas distended bowel loops beneath the left hemidiaphragm. Monitor leads overlie the chest.  The bones are osteopenic with degenerative changes; questionable age-indeterminate slight loss of height of lower thoracic vertebral bodies. No acute cardiopulmonary process suspected. CT HIP LEFT WO CONTRAST    Result Date: 8/3/2021  EXAMINATION: CT OF THE LEFT HIP WITHOUT CONTRAST 8/3/2021 1:55 pm TECHNIQUE: CT of the left hip was performed without the administration of intravenous contrast.  Multiplanar reformatted images are provided for review. Dose modulation, iterative reconstruction, and/or weight based adjustment of the mA/kV was utilized to reduce the radiation dose to as low as reasonably achievable. COMPARISON: Pelvis and left hip radiographs August 3, 2021 HISTORY ORDERING SYSTEM PROVIDED HISTORY: ?acetabulum fx TECHNOLOGIST PROVIDED HISTORY: ?acetabulum fx Decision Support Exception - unselect if not a suspected or confirmed emergency medical condition->Emergency Medical Condition (MA) Reason for Exam: pt fell 5 days ago, in afib, lt hip pain Acuity: Unknown Type of Exam: Unknown FINDINGS: Bones: Redemonstration of displaced transcervical left femoral neck fracture which is subacute in appearance. No additional fractures are identified. The bones are osteopenic. No suspicious lytic or sclerotic osseous lesions are evident. Soft Tissue: Subcutaneous and intramuscular edema adjacent to the fracture site. No organized collections are identified. No subcutaneous gas. Visualized intrapelvic structures appear grossly unremarkable. Moderate amount of stool present at the rectal vault. Joint: Anatomic alignment of the left hip.   Mild-to-moderate degenerative changes of the left hip.  Chondrocalcinosis noted. 1. Displaced fracture of the left transcervical femoral neck which is subacute in appearance. 2. No additional fractures are identified. 3. Mild-to-moderate degenerative change of the left hip. Chondrocalcinosis also present. XR HIP 2-3 VW W PELVIS LEFT    Result Date: 8/3/2021  EXAMINATION: ONE XRAY VIEW OF THE PELVIS AND TWO XRAY VIEWS LEFT HIP 8/3/2021 8:41 am COMPARISON: None. HISTORY: ORDERING SYSTEM PROVIDED HISTORY: fall TECHNOLOGIST PROVIDED HISTORY: fall Reason for Exam: fall, left hip pain Acuity: Unknown Type of Exam: Unknown FINDINGS: There is a left femoral neck fracture with impaction, varus angulation, and proximal migration of the greater trochanter which is essentially at the level of the acetabulum. The femoral head appears to remain appropriately seated in the acetabular fossa. Possible transverse lucency through the medial left acetabulum. 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 Left femoral neck fracture with impaction, varus angulation, and proximal migration of the greater trochanter. Possible lucency through the medial left acetabulum, though radiographic evaluation is limited by osseous demineralization. Consider CT. 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 Dehydration:  - CK 4071, Myoglobin 1409  -Patient was given lactated Ringer's bolus 2000 mL, and is on normal saline at 125 mL/h.     Hip pain:   -Hip x-ray was ordered and showed left femoral neck fracture  -Orthopedic surgery was consulted    Right foot wound:  - Ordered Wound ostomy evaluation   - Started patient on cefazolin  Consultations:   [address] [facility] CONSULT TO INTERNAL MEDICINE  IP CONSULT TO CARDIOLOGY  IP CONSULT TO SOCIAL WORK     Patient is admitted as inpatient status because of co-morbiditieslisted above, severity of signs and symptoms as outlined, requirement for current medical therapies and most importantly because of direct risk to patient if care not provided in a hospital setting. Dinorah Dominguez MD  8/3/2021  6:07 PM    Copy sent to Dr. Kirby primary care provider on file. Attestation and add on       I have discussed the care of John Padron , including pertinent history and exam findings,      8/3/21    with the resident. I have seen and examined the patient and the key elements of all parts of the encounter have been performed by me . I agree with the assessment, plan and orders as documented by the resident. --Active Problems:    A-fib ([address]    Closed fracture of left hip with routine healing  Resolved Problems:    * No resolved hospital problems. *    -- ;     MD ALIE Hanks [address], [address] [place] [address].    Phone [phone]   Fax: [phone]  Answering Service: [phone]

## 2021-08-03 NOTE — ED NOTES
Provider called into room d/t patien't HR. Patient is awake, alert and oriented, patient denies any CP, SOB, dizziness, or HA. VS stable, oxygen applied 2L NC. Patient's only C/O is left hip pain. Provider in at bedside to assess patient. IV lines established. Patient placed on zoll, crash cart at bedside.  Charge Nurse made aware     Ebony Padgett RN  08/03/21 1200

## 2021-08-04 ENCOUNTER — ANESTHESIA EVENT (OUTPATIENT)
Dept: OPERATING ROOM | Age: 81
DRG: 981 | End: 2021-08-04
Payer: MEDICARE

## 2021-08-04 PROBLEM — S72.002D CLOSED FRACTURE OF LEFT HIP WITH ROUTINE HEALING: Status: ACTIVE | Noted: 2021-08-04

## 2021-08-04 PROBLEM — M62.82 RHABDOMYOLYSIS: Status: ACTIVE | Noted: 2021-08-04

## 2021-08-04 LAB
ANTI-XA UNFRAC HEPARIN: 0.51 IU/L (ref 0.3–0.7)
ANTI-XA UNFRAC HEPARIN: 0.52 IU/L (ref 0.3–0.7)
ANTI-XA UNFRAC HEPARIN: 0.67 IU/L (ref 0.3–0.7)
LV EF: 55 %
LVEF MODALITY: NORMAL
TOTAL CK: 826 U/L (ref 39–308)
TROPONIN INTERP: ABNORMAL
TROPONIN T: ABNORMAL NG/ML
TROPONIN, HIGH SENSITIVITY: 91 NG/L (ref 0–22)

## 2021-08-04 PROCEDURE — 84484 ASSAY OF TROPONIN QUANT: CPT

## 2021-08-04 PROCEDURE — 99221 1ST HOSP IP/OBS SF/LOW 40: CPT | Performed by: ORTHOPAEDIC SURGERY

## 2021-08-04 PROCEDURE — 93306 TTE W/DOPPLER COMPLETE: CPT

## 2021-08-04 PROCEDURE — 6360000002 HC RX W HCPCS

## 2021-08-04 PROCEDURE — 99233 SBSQ HOSP IP/OBS HIGH 50: CPT | Performed by: INTERNAL MEDICINE

## 2021-08-04 PROCEDURE — 36415 COLL VENOUS BLD VENIPUNCTURE: CPT

## 2021-08-04 PROCEDURE — 2580000003 HC RX 258

## 2021-08-04 PROCEDURE — 85520 HEPARIN ASSAY: CPT

## 2021-08-04 PROCEDURE — 2060000000 HC ICU INTERMEDIATE R&B

## 2021-08-04 PROCEDURE — 82550 ASSAY OF CK (CPK): CPT

## 2021-08-04 PROCEDURE — 6370000000 HC RX 637 (ALT 250 FOR IP): Performed by: INTERNAL MEDICINE

## 2021-08-04 PROCEDURE — 2500000003 HC RX 250 WO HCPCS

## 2021-08-04 RX ORDER — METOPROLOL TARTRATE 50 MG/1
50 TABLET, FILM COATED ORAL 2 TIMES DAILY
Status: DISCONTINUED | OUTPATIENT
Start: 2021-08-04 | End: 2021-08-13 | Stop reason: HOSPADM

## 2021-08-04 RX ORDER — DIGOXIN 125 MCG
125 TABLET ORAL DAILY
Status: DISCONTINUED | OUTPATIENT
Start: 2021-08-04 | End: 2021-08-11

## 2021-08-04 RX ADMIN — DILTIAZEM HYDROCHLORIDE 5 MG/HR: 5 INJECTION INTRAVENOUS at 06:15

## 2021-08-04 RX ADMIN — HEPARIN SODIUM 18 UNITS/KG/HR: 10000 INJECTION, SOLUTION INTRAVENOUS at 16:05

## 2021-08-04 RX ADMIN — SODIUM CHLORIDE: 9 INJECTION, SOLUTION INTRAVENOUS at 20:43

## 2021-08-04 RX ADMIN — METOPROLOL TARTRATE 50 MG: 50 TABLET, FILM COATED ORAL at 12:32

## 2021-08-04 RX ADMIN — SODIUM CHLORIDE, PRESERVATIVE FREE 10 ML: 5 INJECTION INTRAVENOUS at 20:45

## 2021-08-04 RX ADMIN — DIGOXIN 125 MCG: 125 TABLET ORAL at 12:32

## 2021-08-04 RX ADMIN — SODIUM CHLORIDE: 9 INJECTION, SOLUTION INTRAVENOUS at 12:41

## 2021-08-04 RX ADMIN — SODIUM CHLORIDE: 9 INJECTION, SOLUTION INTRAVENOUS at 01:52

## 2021-08-04 RX ADMIN — CEFAZOLIN 2000 MG: 10 INJECTION, POWDER, FOR SOLUTION INTRAVENOUS at 05:57

## 2021-08-04 RX ADMIN — CEFAZOLIN 2000 MG: 10 INJECTION, POWDER, FOR SOLUTION INTRAVENOUS at 12:32

## 2021-08-04 RX ADMIN — CEFAZOLIN 2000 MG: 10 INJECTION, POWDER, FOR SOLUTION INTRAVENOUS at 21:18

## 2021-08-04 RX ADMIN — METOPROLOL TARTRATE 50 MG: 50 TABLET, FILM COATED ORAL at 20:44

## 2021-08-04 ASSESSMENT — ENCOUNTER SYMPTOMS
EYE PAIN: 0
EYE DISCHARGE: 0
WHEEZING: 0
VOMITING: 0
DIARRHEA: 0
SHORTNESS OF BREATH: 0
CONSTIPATION: 0
CHEST TIGHTNESS: 0
SORE THROAT: 0
NAUSEA: 0
COUGH: 0
ABDOMINAL PAIN: 0

## 2021-08-04 NOTE — PROGRESS NOTES
2301:  I called and spoke to Dr. Juan C Key and notified him that patient was admitted tonight with a  Left femoral neck fracture and that there is a consult paced to him, Dr. Juan C Key.   Dr. Corrie Leger said that he will add the patient to his list.

## 2021-08-04 NOTE — CONSULTS
Consult noted. Patient with left hip fracture and possible surgical intervention tomorrow, per notes. Will follow up regarding plan and therapy evaluations once appropriate.

## 2021-08-04 NOTE — PROGRESS NOTES
[address]   OCCUPATIONAL THERAPY MISSED TREATMENT NOTE   INPATIENT   Date: 21  Patient Name: Desiree Fishman       Room:   MRN: [id number]   Account #: [de-identified]    : 1940  (79 y.o.)  Gender: male                 REASON FOR MISSED TREATMENT:  Other   -   Hold Occupational therapy evaluation until patient is seen by ortho for left hip fracture. Dr. Lakshmi Lindo consulted. Occupational therapy will continue to follow.           Bruno Fuller OT

## 2021-08-04 NOTE — PLAN OF CARE
Acute A fib . Rate controlled . Ok to proceed with surgery 8/5/2021 . Stop heparin on day of surgery .

## 2021-08-04 NOTE — ANESTHESIA PRE PROCEDURE
Department of Anesthesiology  Preprocedure Note       Name:  Francois Ho   Age:  79 y.o.  :  1940                                          MRN:  [id number]         Date:  2021      Surgeon: Kelli Mendoza):  Nael Floyd MD    Procedure: Procedure(s):  HIP HEMIARTHROPLASTY    Medications prior to admission:   Prior to Admission medications    Medication Sig Start Date End Date Taking?  Authorizing Provider   ibuprofen (ADVIL;MOTRIN) 200 MG tablet Take 400 mg by mouth every 6 hours as needed for Pain   Yes Historical Provider, MD   Multiple Vitamins-Minerals (THERAPEUTIC MULTIVITAMIN-MINERALS) tablet Take 1 tablet by mouth daily   Yes Historical Provider, MD       Current medications:    Current Facility-Administered Medications   Medication Dose Route Frequency Provider Last Rate Last Admin    perflutren lipid microspheres (DEFINITY) injection 2.2 mg  2 mL Intravenous ONCE PRN Nico Yobani, DO        digoxin (LANOXIN) tablet 125 mcg  125 mcg Oral Daily Joi S Yobani, DO   125 mcg at 21 1232    metoprolol tartrate (LOPRESSOR) tablet 50 mg  50 mg Oral BID Joi S Yobani, DO   50 mg at 21 1232    dilTIAZem 125 mg in dextrose 5 % 125 mL infusion  5-15 mg/hr Intravenous Continuous Harvey Kwan MD 5 mL/hr at 21 0835 5 mg/hr at 21 0835    sodium chloride flush 0.9 % injection 5-40 mL  5-40 mL Intravenous 2 times per day Harvey Kwan MD   10 mL at 21 2130    sodium chloride flush 0.9 % injection 5-40 mL  5-40 mL Intravenous PRN Harvey Kwan MD        0.9 % sodium chloride infusion  25 mL Intravenous PRN Harvey Kwan MD        ondansetron (ZOFRAN-ODT) disintegrating tablet 4 mg  4 mg Oral Q8H PRN Harvey Kwan MD        Or    [facility] 4 mg  4 mg Intravenous Q6H PRN Harvey Kwan MD        polyethylene glycol (GLYCOLAX) packet 17 g  17 g Oral Daily PRN Harvey Kwan MD        acetaminophen (TYLENOL) tablet 650 mg  650 mg Oral Q6H PRN Harvey Kwan MD Or    acetaminophen (TYLENOL) suppository 650 mg  650 mg Rectal Q6H PRN Zoya Parkinson MD        0.9 % sodium chloride infusion   Intravenous Continuous Gracel MD Deann 125 mL/hr at 08/04/21 1241 New Bag at 08/04/21 1241    potassium chloride (KLOR-CON M) extended release tablet 40 mEq  40 mEq Oral PRN Zoya Parkinson MD        Or    potassium bicarb-citric acid (EFFER-K) effervescent tablet 40 mEq  40 mEq Oral PRN Zoya Parkinson MD        Or    potassium chloride 10 mEq/100 mL IVPB (Peripheral Line)  10 mEq Intravenous PRN Zoya Parkinson MD        heparin (porcine) injection 5,260 Units  80 Units/kg Intravenous PRN Zoya Parkinson MD        heparin (porcine) injection 2,630 Units  40 Units/kg Intravenous PRN Zoya Parkinson MD        heparin 25,000 units in dextrose 5% 250 mL (premix) infusion  18 Units/kg/hr Intravenous Continuous Zoya Parkinson MD 11.8 mL/hr at 08/03/21 2002 18 Units/kg/hr at 08/03/21 2002    ceFAZolin (ANCEF) 2000 mg in dextrose 5 % 50 mL IVPB  2,000 mg Intravenous Cleo Aaron MD   Stopped at 08/04/21 1302       Allergies:  No Known Allergies    Problem List:    Patient Active Problem List   Diagnosis Code    A-fib (RUST 75.) I48.91       Past Medical History:  History reviewed. No pertinent past medical history. Past Surgical History:  History reviewed. No pertinent surgical history.     Social History:    Social History     Tobacco Use    Smoking status: Never Smoker    Smokeless tobacco: Never Used   Substance Use Topics    Alcohol use: Never                                Counseling given: Not Answered      Vital Signs (Current):   Vitals:    08/04/21 1126 08/04/21 1145 08/04/21 1200 08/04/21 1215   BP: 109/62 117/71 103/74 (!) 111/55   Pulse:       Resp:       Temp:       TempSrc:       SpO2:       Weight:       Height:                                                  BP Readings from Last 3 Encounters:   08/04/21 (!) 111/55       NPO Status: BMI:   Wt Readings from Last 3 Encounters:   08/04/21 142 lb 10.2 oz (64.7 kg)     Body mass index is 22.34 kg/m². CBC:   Lab Results   Component Value Date    WBC 9.5 08/03/2021    RBC 3.50 08/03/2021    HGB 11.8 08/03/2021    HCT 33.9 08/03/2021    MCV 96.6 08/03/2021    RDW 14.1 08/03/2021     08/03/2021       CMP:   Lab Results   Component Value Date     08/03/2021    K 3.6 08/03/2021     08/03/2021    CO2 21 08/03/2021    BUN 71 08/03/2021    CREATININE 1.11 08/03/2021    GFRAA >60 08/03/2021    LABGLOM >60 08/03/2021    GLUCOSE 113 08/03/2021    PROT 6.8 08/03/2021    CALCIUM 9.4 08/03/2021    BILITOT 1.74 08/03/2021    ALKPHOS 110 08/03/2021    AST 85 08/03/2021     08/03/2021       POC Tests: No results for input(s): POCGLU, POCNA, POCK, POCCL, POCBUN, POCHEMO, POCHCT in the last 72 hours.     Coags:   Lab Results   Component Value Date    PROTIME 15.6 08/03/2021    INR 1.2 08/03/2021    APTT >150.0 08/03/2021       HCG (If Applicable): No results found for: PREGTESTUR, PREGSERUM, HCG, HCGQUANT     ABGs: No results found for: PHART, PO2ART, BRG9YOJ, XDU4ONZ, BEART, N3AYAUUK     Type & Screen (If Applicable):  No results found for: LABABO, LABRH    Drug/Infectious Status (If Applicable):  No results found for: HIV, HEPCAB    COVID-19 Screening (If Applicable):   Lab Results   Component Value Date    COVID19 Not Detected 08/03/2021           Anesthesia Evaluation  Patient summary reviewed and Nursing notes reviewed no history of anesthetic complications:   Airway: Mallampati: II  TM distance: >3 FB   Neck ROM: full  Mouth opening: > = 3 FB Dental:    (+) upper dentures and lower dentures      Pulmonary:Negative Pulmonary ROS and normal exam  breath sounds clear to auscultation                             Cardiovascular:    (+) valvular problems/murmurs: AI and MR, dysrhythmias: atrial fibrillation,       ECG reviewed  Rhythm: irregular  Rate: normal  Echocardiogram reviewed    Cleared by cardiology           ROS comment: Normal left ventricle size and function with an estimated EF > 55%. Mild left ventricular hypertrophy. Left atrial dilatation. Aortic valve was not well visualized. Mild to moderate aortic insufficiency. Mild mitral regurgitation. Mild tricuspid regurgitation. Normal right ventricular systolic pressure. IVC Increased diameter, but still has inspiratory variation. Neuro/Psych:   Negative Neuro/Psych ROS              GI/Hepatic/Renal: Neg GI/Hepatic/Renal ROS            Endo/Other:    (+) blood dyscrasia (heparin gtt): anticoagulation therapy and anemia:., .                  ROS comment: LEFT HIP FRACTURE   Rhabdomyolysis Abdominal:             Vascular: Other Findings:           Anesthesia Plan      general     ASA 3       Induction: intravenous. MIPS: Postoperative opioids intended and Prophylactic antiemetics administered. Anesthetic plan and risks discussed with patient.                       Suki Coto MD   8/4/2021

## 2021-08-04 NOTE — CARE COORDINATION
CASE MANAGEMENT NOTE:    Admission Date:  8/3/2021 Ebonie Pugh is a 79 y.o.  male    Admitted for : A-fib [facility]) [I48.91]    Met with:  Patient and neighbor Rosamaria Ayoub assists with all needs. PCP:  States he has not seen MD since being in the [facility] in [place] [id number]:  Medicare      Is patient alert and oriented at time of discussion:  Yes    Current Residence/ Living Arrangements:  independently at home             Current Services PTA:  No    Does patient go to outpatient dialysis: No  If yes, location and chair time: N/A    Is patient agreeable to VNS: Yes    Freedom of choice provided:  Yes    List of [address] provided: No    VNS chosen:  No    DME:  straight cane and walker    Home Oxygen: No    Nebulizer: No    CPAP/BIPAP: No    Supplier: N/A    Potential Assistance Needed: Yes    SNF needed: Yes    Freedom of choice and list provided: Yes - Interested in [address]:  Shireen        Does Patient want to use MEDS to BEDS? No    Is patient currently receiving oral anticoagulation therapy? No - Most likely not a candidate per cardiology    Is the Patient an ROSA MARIE [facility] with Readmission Risk Score greater than 14%? No  If yes, pt needs a follow up appointment made within 7 days. Family Members/Caregivers that pt would like involved in their care:    Yes    If yes, list name here:  Roya White and carmine Jin    Transportation Provider:  Lisa Almodovar             Discharge Plan:  8/4: MEDICARE - From 2-story home with 2nd floor bed/bath alone. He is normally independent and depends on neighbor/friend Nilton for transportation. DME - cane and walker. Has not seen MD since 1963 when in [employer]. Open to VNS, however most likely needs rehab - Open to Remy 9 notified. Possible hip fracture surgery - Awaiting ortho input. On IV Ancef, cardizem gtt, heparin gtt (Afib - Not a candidate for POAC per cardio).  SARA NEEDS SIGNED/COMPLETED. Lex Cobb                 Electronically signed by: Chace Hinds RN on 8/4/2021 at 12:19 PM

## 2021-08-04 NOTE — CONSULTS
[facility]  In Patient Cardiology Consult             Date:   8/4/2021  Patient name: Shy Sumner  Date of admission:  8/3/2021 10:59 AM  MRN:   [id number]  YOB: 1940    Reason for Admission: Fall, on floor for 5 days. CHIEF COMPLAINT:  Fall, on floor for 5 days. History Obtained From:  Pt and chart    HISTORY OF PRESENT ILLNESS:    This is a 65-year-old male who presented due to pain. Apparently had a fall. He was laying on the floor for 5 days. In the emergency room he was found to be in A. fib with RVR. He was given digoxin, Cardizem drip, heparin drip. He was diagnosed with left hip fracture. Orthopedics has been consulted. We are consulted for elevated troponins as well. He is seen today and he denies complaints of chest pain, shortness of breath orthopnea, PND, lower extremity edema. He remains on Cardizem 5 mg/h and a heparin drip. Past Medical History:  History reviewed. No pertinent past medical history. Past Surgical History:  History reviewed. No pertinent surgical history. Home Medications:    Outpatient Medications Marked as Taking for the 8/3/21 encounter [facility] Encounter)   Medication Sig Dispense Refill    ibuprofen (ADVIL;MOTRIN) 200 MG tablet Take 400 mg by mouth every 6 hours as needed for Pain      Multiple Vitamins-Minerals (THERAPEUTIC MULTIVITAMIN-MINERALS) tablet Take 1 tablet by mouth daily          Allergies:  Patient has no known allergies.     Social History:    Social History     Socioeconomic History    Marital status: Single     Spouse name: None    Number of children: None    Years of education: None    Highest education level: None   Occupational History    None   Tobacco Use    Smoking status: Never Smoker    Smokeless tobacco: Never Used   Substance and Sexual Activity    Alcohol use: Never    Drug use: None    Sexual activity: None   Other Topics Concern    None   Social History Narrative    None     Social Determinants of Health     Financial Resource Strain:     Difficulty of Paying Living Expenses:    Food Insecurity:     Worried About Running Out of Food in the Last Year:     [address] in the Last Year:    Transportation Needs:     Lack of Transportation (Medical):  Lack of Transportation (Non-Medical):    Physical Activity:     Days of Exercise per Week:     Minutes of Exercise per Session:    Stress:     Feeling of Stress :    Social Connections:     Frequency of Communication with Friends and Family:     Frequency of Social Gatherings with Friends and Family:     Attends [employer] Services:     Active Member of Clubs or Organizations:     Attends Club or Organization Meetings:     Marital Status:    Intimate Partner Violence:     Fear of Current or Ex-Partner:     Emotionally Abused:     Physically Abused:     Sexually Abused:         Family History:   History reviewed. No pertinent family history. REVIEW OF SYSTEMS:    · Constitutional: there has been no unanticipated weight loss. There's been No change in energy level, No change in activity level. · Eyes: No visual changes or diplopia. No scleral icterus. · ENT: No Headaches, hearing loss or vertigo. No mouth sores or sore throat. · Cardiovascular: As HPI  · Respiratory: As HPI  · Gastrointestinal: No abdominal pain, appetite loss, blood in stools. No change in bowel or bladder habits. · Genitourinary: No dysuria, trouble voiding, or hematuria. · Musculoskeletal:  No gait disturbance, No weakness or joint complaints. · Integumentary: No rash or pruritis. · Neurological: No headache, diplopia, change in muscle strength, numbness or tingling. No change in gait, balance, coordination, mood, affect, memory, mentation, behavior. · Psychiatric: No anxiety, or depression. · Endocrine: No temperature intolerance. No excessive thirst, fluid intake, or urination. No tremor.   · Hematologic/Lymphatic: No abnormal bruising or bleeding, blood clots or swollen lymph nodes. · Allergic/Immunologic: No nasal congestion or hives. PHYSICAL EXAM:    Physical Examination:    BP (!) 98/56   Pulse 109   Temp 97.3 °F (36.3 °C) (Oral)   Resp 20   Ht 5' 7\" (1.702 m)   Wt 142 lb 10.2 oz (64.7 kg)   SpO2 100%   BMI 22.34 kg/m²    Constitutional and General Appearance: alert, cooperative, no distress and appears stated age  HEENT: PERRL, no cervical lymphadenopathy. No masses palpable. Normal oral mucosa  Respiratory:  · Normal excursion and expansion without use of accessory muscles  · Resp Auscultation: Good respiratory effort. No for increased work of breathing. On auscultation: Clear  Cardiovascular:  · Heart tones are crisp and normal. irregular S1 and S2. Murmurs: none  · Jugular venous pulsation Normal  Abdomen:  · No masses or tenderness  · Bowel sounds present  Extremities:  ·  No Cyanosis or Clubbing  ·  Lower extremity edema: none  ·  Skin: Warm and dry  Neurological:  · Alert and oriented. · Moves all extremities well  · No abnormalities of mood, affect, memory, mentation, or behavior are noted    DATA:    Diagnostics:      EKG:   Results for orders placed or performed during the hospital encounter of 08/03/21   EKG 12 Lead   Result Value Ref Range    Ventricular Rate 172 BPM    Atrial Rate 131 BPM    QRS Duration 88 ms    Q-T Interval 278 ms    QTc Calculation (Bazett) 470 ms    R Axis -25 degrees    T Axis 78 degrees    Narrative    Atrial fibrillation with rapid ventricular response  Repolarization abnormalities  Septal infarct , age undetermined  Abnormal ECG  No previous ECGs available     Labs:     CBC:   Recent Labs     08/03/21  1650 08/03/21  1730   WBC 9.9 9.5   HGB 12.3* 11.8*   HCT 35.4* 33.9*    209     BMP:   Recent Labs     08/03/21  1113      K 3.6*   CO2 21   BUN 71*   CREATININE 1.11   LABGLOM >60   GLUCOSE 113*     BNP: No results for input(s): BNP in the last 72 hours.   PT/INR:   Recent Labs 08/03/21  1650 08/03/21  1730   PROTIME 15.4* 15.6*   INR 1.2 1.2     APTT:  Recent Labs     08/03/21  1650 08/03/21  1730   APTT >150.0* >150.0*     CARDIAC ENZYMES:  Recent Labs     08/03/21  1300 08/03/21  1842 08/04/21  0748   TROPHS 76* 70* 91*     FASTING LIPID PANEL:No results found for: HDL, LDLDIRECT, LDLCALC, TRIG  LIVER PROFILE:  Recent Labs     08/03/21  1113   AST 85*   *   LABALBU 3.8         IMPRESSION:    Patient Active Problem List   Diagnosis    A-fib (HCC)     - Elevated Troponin- due to Rhabdo  - Rhabdomyolysis with prolonged time spent on floor - 5 days  - New AF with RVR- now rate controlled  - Falls  - Acute Hip Fracture    RECOMMENDATIONS:  - agree with IVF  - on cardizem and heparin drips  - start digoxin 125 mcg qd  - start lopressor 50 mg po bid  - then wean cardizem  - likely not good candidate for long term AC (due to falls) unless he is placed in a NH  - check 2d Echo  - if 2d Echo is low risk, can proceed with orthopedic surgery at mod/high (but not prohibitive) risk  - further recs to follow     Discussed with patient and nursing. Thank you for allowing me to participate in the care of this patient, please do not hesitate to call if you have any questions. Robyn Mayo DO, [facility], [address], [address] [facility]  [email or web address]  [phone]

## 2021-08-04 NOTE — PROGRESS NOTES
Verbal orders received from Dr. Wendie Epps for Dig and Lopressor. See orders for details. As long ECHO is ok the pt is ok for surgery per Cardiology. Plan is also to try and wean the pt from the Cardizem drip.

## 2021-08-04 NOTE — CARE COORDINATION
ELIZABETH learned that this patient would be interested in the ARU. ELIZABETH noted that therapy still needs to see the patient but ELIZABETH did ask Mayi FLYNN Clinical lead for a PM&R consult. ELIZABETH called Lydia in the ARU and left message for a return call.

## 2021-08-04 NOTE — PROGRESS NOTES
Ayad iWlcox , [facility] resident (TY)    This 79 Y. O. male patient with no know medical history, presented with weakness  that caused him to fall down on minimal clear floor for for 5 days. Was evaluated in the ER and found to have atrial fibrillation with RVR. He was given IV fluid, diltiazem infusion, heparin. Orthopedic surgery was consulted for a left hip fracture. Cardiology consulted and recommended adding beta blocker, then digoxin if needed. Wound on right foot was order and patient was given cefazolin    Patient was hypotensive midnight, now is stable.    HR in ER was 150 now is 113     BUN 71  Cr 1.11   CK 4071  Myoglobin 1409  BNP   Troponin 76 ==>70     WBC 11.7 ==> 9.5  CRP 48.7  ESR 7        AST 85  BILIRUBIN 1.74  PT 15.6  PTT >150

## 2021-08-04 NOTE — PROGRESS NOTES
[facility] [employer]    PROGRESS NOTE             8/4/2021    4:50 PM    Name:   Shy Sumner  MRN:     [id number]     Acct:      [de-identified]   Room:   2087/2087-01  IP Day:  1  Admit Date:  8/3/2021 10:59 AM    PCP:  No primary care provider on file. Code Status:  Full Code    Subjective:     C/C:   Chief Complaint   Patient presents with    Leg Pain    Irregular Heart Beat     Interval History Status: Patient is doing better than yesterday    Patient was seen on bedside, no acute events overnight. Patient heart rate was fluctuating but this morning was 113, patient was given beta-blocker based on cardiology recommendation. Hold heparin if surgery was required. Brief History: This 79 Y. O. male patient with no know medical history, presented with lower limb weakness  that caused him to fall down. He says he stayed on floor for for 5 days. Was evaluated in the ER and found to have atrial fibrillation with RVR. He was given IV fluid, diltiazem infusion, heparin. Orthopedic surgery was consulted for a left hip fracture. Cardiology consulted and recommended adding beta blocker, then digoxin if needed. Wound on right foot evaluation was order and patient was given cefazolin.      Patient was hypotensive midnight, now is stable. HR in ER was 150 now is 113      BUN 71  Cr 1.11   CK 4071  Myoglobin 1409  BNP   Troponin 76 ==>70 ==> 91   Following Troponin     WBC 11.7 ==> 9.5  CRP 48.7  ESR 7          AST 85  BILIRUBIN 1.74  PT 15.6  PTT >150    Echo:  Normal left ventricle size and function with an estimated EF > 55%. Mild left ventricular hypertrophy. Left atrial dilatation. Aortic valve was not well visualized. Mild to moderate aortic insufficiency. Mild mitral regurgitation. Mild tricuspid regurgitation. Normal right ventricular systolic pressure. IVC Increased diameter, but still has inspiratory variation.         Review of °F (36.7 °C)    No results for input(s): POCGLU in the last 72 hours. I/O(24Hr):     Intake/Output Summary (Last 24 hours) at 8/4/2021 1650  Last data filed at 8/4/2021 0551  Gross per 24 hour   Intake --   Output 650 ml   Net -650 ml       Labs:    CBC with Differential:    Lab Results   Component Value Date    WBC 9.5 08/03/2021    RBC 3.50 08/03/2021    HGB 11.8 08/03/2021    HCT 33.9 08/03/2021     08/03/2021    MCV 96.6 08/03/2021    MCH 33.7 08/03/2021    MCHC 34.9 08/03/2021    RDW 14.1 08/03/2021    LYMPHOPCT 8 08/03/2021    MONOPCT 7 08/03/2021    BASOPCT 0 08/03/2021    MONOSABS 0.80 08/03/2021    LYMPHSABS 0.90 08/03/2021    EOSABS 0.00 08/03/2021    BASOSABS 0.00 08/03/2021    DIFFTYPE NOT REPORTED 08/03/2021     CMP:    Lab Results   Component Value Date     08/03/2021    K 3.6 08/03/2021     08/03/2021    CO2 21 08/03/2021    BUN 71 08/03/2021    CREATININE 1.11 08/03/2021    GFRAA >60 08/03/2021    LABGLOM >60 08/03/2021    GLUCOSE 113 08/03/2021    PROT 6.8 08/03/2021    LABALBU 3.8 08/03/2021    CALCIUM 9.4 08/03/2021    BILITOT 1.74 08/03/2021    ALKPHOS 110 08/03/2021    AST 85 08/03/2021     08/03/2021     BMP:    Lab Results   Component Value Date     08/03/2021    K 3.6 08/03/2021     08/03/2021    CO2 21 08/03/2021    BUN 71 08/03/2021    LABALBU 3.8 08/03/2021    CREATININE 1.11 08/03/2021    CALCIUM 9.4 08/03/2021    GFRAA >60 08/03/2021    LABGLOM >60 08/03/2021    GLUCOSE 113 08/03/2021     BUN/Creatinine:    Lab Results   Component Value Date    BUN 71 08/03/2021    CREATININE 1.11 08/03/2021     PT/INR:    Lab Results   Component Value Date    PROTIME 15.6 08/03/2021    INR 1.2 08/03/2021     Troponin:  No results found for: TROPONINI    Lab Results   Component Value Date/Time    SPECIAL NOT REPORTED 08/03/2021 11:41 AM     Lab Results   Component Value Date/Time    CULTURE NO GROWTH 1 DAY 08/03/2021 11:41 AM         Radiology:    ECHO Complete 2D W Doppler W Color    Result Date: 8/4/2021  [facility] Transthoracic Echocardiography Report (TTE)  Patient Name Radnor Lipoma  Date of Study                 08/04/2021   Date of      1940  Gender                        Male  Birth   Age          79 year(s)  Race                             Room Number  2087        Height:                       66.93 inch, 170 cm   Corporate ID [id number]    Weight:                       141 pounds, 64 kg  #   Patient Acct [de-identified]   BSA:           1.74 m^2       BMI:      22.15  #                                                                kg/m^2   MR #         [id number]      Sonographer                   Eva Gan   Accession #  [id number]  Interpreting Physician        [address] [place] [address]   Fellow                   Referring Nurse Practitioner   Interpreting             Referring Physician           [address] [place] [address]  Fellow  Type of Study   TTE procedure:2D Echocardiogram, M-Mode, Doppler, Color Doppler. Procedure Date Date: 08/04/2021 Start: 10:15 AM Study Location: [address] [place] [address] Technical Quality: Fair visualization Indications:Elevated troponin and Atrial fibrillation. Patient Status: Inpatient Height: 66.93 inches Weight: 141. 11 pounds BSA: 1.74 m^2 BMI: 22.15 kg/m^2 Rhythm: Atrial fibrillation HR: 129 bpm BP: 98/56 mmHg CONCLUSIONS Summary Normal left ventricle size and function with an estimated EF > 55%. Mild left ventricular hypertrophy. Left atrial dilatation. Aortic valve was not well visualized. Mild to moderate aortic insufficiency. Mild mitral regurgitation. Mild tricuspid regurgitation. Normal right ventricular systolic pressure. IVC Increased diameter, but still has inspiratory variation.  Signature ----------------------------------------------------------------------------  Electronically signed by Eva Gan(Sonographer) on 08/04/2021 10:48  AM ---------------------------------------------------------------------------- ----------------------------------------------------------------------------  Electronically signed by Cortes HiltonInterpreting physician) on 2021  11:46 AM ---------------------------------------------------------------------------- FINDINGS Left Atrium Left atrial dilatation. Left Ventricle Normal left ventricle size and function with an estimated EF > 55%. No segmental wall motion abnormalities seen. Mild left ventricular hypertrophy. Right Atrium Right atrium is normal in size. Right Ventricle Normal right ventricular size and function. Mitral Valve Normal mitral valve structure and function. Mild mitral regurgitation. Aortic Valve Aortic valve was not well visualized. Mild to moderate aortic insufficiency. No aortic stenosis. Tricuspid Valve Normal tricuspid valve structure and function. Mild tricuspid regurgitation. Normal right ventricular systolic pressure. Pulmonic Valve Pulmonic valve not well visualized but Doppler velocities are normal. No pulmonic insufficiency. Pericardial Effusion No significant pericardial effusion is seen. Miscellaneous Normal aortic root dimension. E/e' average 5.3 IVC Increased diameter, but still has inspiratory variation.  M-mode / 2D Measurements & Calculations:   LVIDd:4.82 cm(3.7 - 5.6 cm)      Diastolic GURZLY:455 ml  QCCZY:1.02 cm(2.2 - 4.0 cm)      Systolic SYJHBO:89.5 ml  WQSS:7.31 cm(0.6 - 1.1 cm)       Aortic Root:3.4 cm(2.0 - 3.7 cm)  LVPWd:1.22 cm(0.6 - 1.1 cm)      LA Dimension: 4.9 cm(1.9 - 4.0 cm)  Fractional Shortenin.72 %    LA volume/Index: 43.8 ml /25m^2  Calculated LVEF (%): 71.5 %      LVOT:2.4 cm   Mitral:                                Aortic   Peak E-Wave: 0.84 m/s                  Peak Velocity: 1.20 m/s                                         Mean Velocity: 0.88 m/s  Peak Gradient: 2.82 mmHg               Peak Gradient: 5.76 mmHg  Deceleration Time: 173 msec            Mean Gradient: 5 mmHg                                         AI P1/2t: 441 msec                                          Area (continuity): 1.83 cm^2                                         AV VTI: 24.9 cm   Tricuspid:                             Pulmonic:   Estimated RVSP: 19 mmHg  Peak TR Velocity: 1.69 m/s  Peak TR Gradient: 11.4244 mmHg  Estimated RA Pressure: 8 mmHg                                         Estimated PASP: 19.42 mmHg  Septal Wall E' velocity:0.12 m/s Lateral Wall E' velocity:0.12 m/s    CT HEAD WO CONTRAST    Result Date: 8/3/2021  EXAMINATION: CT OF THE HEAD WITHOUT CONTRAST  8/3/2021 1:55 pm TECHNIQUE: CT of the head was performed without the administration of intravenous contrast. Dose modulation, iterative reconstruction, and/or weight based adjustment of the mA/kV was utilized to reduce the radiation dose to as low as reasonably achievable. COMPARISON: None. HISTORY: ORDERING SYSTEM PROVIDED HISTORY: Fall TECHNOLOGIST PROVIDED HISTORY: Fall Decision Support Exception - unselect if not a suspected or confirmed emergency medical condition->Emergency Medical Condition (MA) Reason for Exam: pt fell 5 days ago, in afib Acuity: Unknown Type of Exam: Unknown FINDINGS: BRAIN/VENTRICLES: There is no acute intracranial hemorrhage, mass effect or midline shift. No abnormal extra-axial fluid collection. The gray-white differentiation is maintained without evidence of an acute infarct. There is no evidence of hydrocephalus. ORBITS: The visualized portion of the orbits demonstrate no acute abnormality. SINUSES: The visualized paranasal sinuses and mastoid air cells demonstrate no acute abnormality. SOFT TISSUES/SKULL:  No acute abnormality of the visualized skull or soft tissues. No acute intracranial abnormality.      CT CERVICAL SPINE WO CONTRAST    Result Date: 8/3/2021  EXAMINATION: CT OF THE CERVICAL SPINE WITHOUT CONTRAST 8/3/2021 1:55 pm TECHNIQUE: CT of the cervical spine was performed without the administration of intravenous contrast. Multiplanar reformatted images are provided for review. Dose modulation, iterative reconstruction, and/or weight based adjustment of the mA/kV was utilized to reduce the radiation dose to as low as reasonably achievable. COMPARISON: None. HISTORY: ORDERING SYSTEM PROVIDED HISTORY: Fall TECHNOLOGIST PROVIDED HISTORY: Fall Decision Support Exception - unselect if not a suspected or confirmed emergency medical condition->Emergency Medical Condition (MA) Reason for Exam: pt fell 5 days ago, in afib Acuity: Unknown Type of Exam: Unknown FINDINGS: BONES/ALIGNMENT: There is no acute fracture or traumatic malalignment. DEGENERATIVE CHANGES: No significant degenerative changes. SOFT TISSUES: There is no prevertebral soft tissue swelling. No acute abnormality of the cervical spine. XR CHEST PORTABLE    Result Date: 8/3/2021  EXAMINATION: ONE XRAY VIEW OF THE CHEST 8/3/2021 11:41 am COMPARISON: None. HISTORY: ORDERING SYSTEM PROVIDED HISTORY: AMS TECHNOLOGIST PROVIDED HISTORY: AMS Reason for Exam: AMS Acuity: Unknown Type of Exam: Unknown FINDINGS: Patient is slightly rotated. Heart size within normal limits without evidence of vascular congestion. Aside from minor chronic appearing changes lungs are grossly clear. Probable skin fold projects in the left lower chest.  No focal lung consolidation is seen. No significant pleural effusions. Gas distended bowel loops beneath the left hemidiaphragm. Monitor leads overlie the chest.  The bones are osteopenic with degenerative changes; questionable age-indeterminate slight loss of height of lower thoracic vertebral bodies. No acute cardiopulmonary process suspected. CT HIP LEFT WO CONTRAST    Result Date: 8/3/2021  EXAMINATION: CT OF THE LEFT HIP WITHOUT CONTRAST 8/3/2021 1:55 pm TECHNIQUE: CT of the left hip was performed without the administration of intravenous contrast.  Multiplanar reformatted images are provided for review.  Dose modulation, iterative reconstruction, and/or weight based adjustment of the mA/kV was utilized to reduce the radiation dose to as low as reasonably achievable. COMPARISON: Pelvis and left hip radiographs August 3, 2021 HISTORY ORDERING SYSTEM PROVIDED HISTORY: ?acetabulum fx TECHNOLOGIST PROVIDED HISTORY: ?acetabulum fx Decision Support Exception - unselect if not a suspected or confirmed emergency medical condition->Emergency Medical Condition (MA) Reason for Exam: pt fell 5 days ago, in afib, lt hip pain Acuity: Unknown Type of Exam: Unknown FINDINGS: Bones: Redemonstration of displaced transcervical left femoral neck fracture which is subacute in appearance. No additional fractures are identified. The bones are osteopenic. No suspicious lytic or sclerotic osseous lesions are evident. Soft Tissue: Subcutaneous and intramuscular edema adjacent to the fracture site. No organized collections are identified. No subcutaneous gas. Visualized intrapelvic structures appear grossly unremarkable. Moderate amount of stool present at the rectal vault. Joint: Anatomic alignment of the left hip. Mild-to-moderate degenerative changes of the left hip. Chondrocalcinosis noted. 1. Displaced fracture of the left transcervical femoral neck which is subacute in appearance. 2. No additional fractures are identified. 3. Mild-to-moderate degenerative change of the left hip. Chondrocalcinosis also present. XR HIP 2-3 VW W PELVIS LEFT    Result Date: 8/3/2021  EXAMINATION: ONE XRAY VIEW OF THE PELVIS AND TWO XRAY VIEWS LEFT HIP 8/3/2021 8:41 am COMPARISON: None. HISTORY: ORDERING SYSTEM PROVIDED HISTORY: fall TECHNOLOGIST PROVIDED HISTORY: fall Reason for Exam: fall, left hip pain Acuity: Unknown Type of Exam: Unknown FINDINGS: There is a left femoral neck fracture with impaction, varus angulation, and proximal migration of the greater trochanter which is essentially at the level of the acetabulum.   The femoral head appears to remain appropriately seated in the acetabular fossa. Possible transverse lucency through the medial left acetabulum. 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 Left femoral neck fracture with impaction, varus angulation, and proximal migration of the greater trochanter. Possible lucency through the medial left acetabulum, though radiographic evaluation is limited by osseous demineralization. Consider CT. Physical Examination:        Physical Exam  Constitutional:       General: He is not in acute distress. Appearance: Normal appearance. He is normal weight. He is not ill-appearing. HENT:      Head: Normocephalic and atraumatic. Mouth/Throat:      Mouth: Mucous membranes are dry. Eyes:      General: No scleral icterus. Extraocular Movements: Extraocular movements intact. Conjunctiva/sclera: Conjunctivae normal.      Pupils: Pupils are equal, round, and reactive to light. Neck:      Vascular: No carotid bruit. Cardiovascular:      Rate and Rhythm: Tachycardia present. Rhythm irregular. Heart sounds: Normal heart sounds. No murmur heard. No friction rub. No gallop. Pulmonary:      Effort: Pulmonary effort is normal. No respiratory distress. Breath sounds: Normal breath sounds. No stridor. No wheezing, rhonchi or rales. Chest:      Chest wall: No tenderness. Abdominal:      General: Bowel sounds are normal. There is no distension. Tenderness: There is no abdominal tenderness. There is no rebound. Musculoskeletal:         General: No swelling or signs of injury (of the right knee. ). Skin:     General: Skin is dry. Capillary Refill: Capillary refill takes less than 2 seconds. Coloration: Skin is not jaundiced. Neurological:      General: No focal deficit present. Mental Status: He is alert and oriented to person, place, and time.       Cranial Nerves: No cranial nerve deficit. Sensory: No sensory deficit. Motor: No weakness. Assessment:        Primary Problem  <principal problem not specified>    Active Hospital Problems    Diagnosis Date Noted    Closed fracture of left hip with routine healing [S72.002D] 08/04/2021    A-fib [facility]) [I48.91] 08/03/2021       Plan:        Atrial Fibrillation with RVR  -  HR was 150 in the ED and was given 10mg IV diltiazem and currently on 125 mg diltiazem infusion  -Heparin injection 3950 units, currently on heparin 25,000 units infusion.     Hip pain:   -Hip x-ray was ordered and showed left femoral neck fracture  -Orthopedic surgery was consulted  -Possible surgery tomorrow, Hold Heparin drip 6 hours before the surgery      Right foot wound:  - Ordered Wound ostomy evaluation   - Started patient on cefazolin    Rhabdomyolysis:   - CK 4071, Myoglobin 1409  -Patient was given lactated Ringer's bolus 2000 mL, and is on normal saline at 125 mL/h.  - Follow CK level     Shelly Houston MD  8/4/2021  4:50 PM     Attestation and add on       I have discussed the care of Iliana Serrano , including pertinent history and exam findings,      8/4/21    with the resident. I have seen and examined the patient and the key elements of all parts of the encounter have been performed by me . I agree with the assessment, plan and orders as documented by the resident.     ---- ;     Marjorie aPyne MD      [address], [address] [place] [address].    Phone [phone]   Fax: [phone]  Answering Service: [phone]

## 2021-08-04 NOTE — PLAN OF CARE
Problem: Falls - Risk of:  Goal: Will remain free from falls  Description: Will remain free from falls  Outcome: Ongoing     Problem: Falls - Risk of:  Goal: Absence of physical injury  Description: Absence of physical injury  Outcome: Ongoing     Problem: Skin Integrity:  Goal: Will show no infection signs and symptoms  Description: Will show no infection signs and symptoms  Outcome: Ongoing     Problem: Skin Integrity:  Goal: Absence of new skin breakdown  Description: Absence of new skin breakdown  Outcome: Ongoing     Problem: Cardiac:  Goal: Ability to maintain an adequate cardiac output will improve  Description: Ability to maintain an adequate cardiac output will improve  Outcome: Ongoing     Problem: Cardiac:  Goal: Hemodynamic stability will improve  Description: Hemodynamic stability will improve  Outcome: Ongoing     Problem: Fluid Volume:  Goal: Ability to achieve and maintain adequate urine output will improve  Description: Ability to achieve and maintain adequate urine output will improve  Outcome: Ongoing     Problem: Respiratory:  Goal: Respiratory status will improve  Description: Respiratory status will improve  Outcome: Ongoing

## 2021-08-04 NOTE — PROGRESS NOTES
Attestation and add on       I have discussed the care of Ebonie Pugh , including pertinent history and exam findings,    8/4/21   with the resident. I have seen and examined the patient and the key elements of all parts of the encounter have been performed by me . I agree with the assessment, plan and orders as documented by the resident. Active Problems:    A-fib (HCC)    Closed fracture of left hip with routine healing  Resolved Problems:    * No resolved hospital problems. *        --  This 79 Y. O. male patient with no know medical history, presented with weakness  that caused him to fall down on minimal clear floor for for 5 days. Was evaluated in the ER and found to have atrial fibrillation with RVR. He was given IV fluid, diltiazem infusion, heparin. Orthopedic surgery was consulted for a left hip fracture. Cardiology consulted and recommended adding beta blocker, then digoxin if needed. Wound on right foot was order and patient was given cefazolin     Patient was hypotensive midnight, now is stable. HR in ER was 150 now is 113      BUN 71  Cr 1.11   CK 4071  Myoglobin 1409  BNP   Troponin 76 ==>70     WBC 11.7 ==> 9.5  CRP 48.7  ESR 7          AST 85  BILIRUBIN 1.74  PT 15.6  PTT >150                 CLINICAL COURSE ---          clinical course has improved,    -   [x]Kai Florence MD    []Lidia for details  Acute A fib . Rate controlled . Ok to proceed with surgery 8/5/2021 . Stop heparin on day of surgery .                               Medications:      Allergies:  No Known Allergies    Current Meds:   Scheduled Meds:    digoxin  125 mcg Oral Daily    metoprolol tartrate  50 mg Oral BID    sodium chloride flush  5-40 mL Intravenous 2 times per day    ceFAZolin  2,000 mg Intravenous Q8H     Continuous Infusions:    dilTIAZem (CARDIZEM) 125 mg in dextrose 5% 125 mL infusion 5 mg/hr (08/04/21 0835)    sodium chloride      sodium chloride 125 mL/hr at 08/04/21 1241    heparin (PORCINE) Infusion 18 Units/kg/hr (08/03/21 2002)     PRN Meds: perflutren lipid microspheres, sodium chloride flush, sodium chloride, ondansetron **OR** ondansetron, polyethylene glycol, acetaminophen **OR** acetaminophen, potassium chloride **OR** potassium alternative oral replacement **OR** potassium chloride, heparin (porcine), heparin (porcine)      ---- ;     MD ALIE Crowe [address].    Phone [phone]   Fax: [phone]  Answering Service: [phone]

## 2021-08-04 NOTE — PROGRESS NOTES
Physical Therapy        Physical Therapy Cancel Note      DATE: 2021    NAME: Hailee Cuevas  MRN: [id number]   : 1940      Patient not seen this date for Physical Therapy due to:    2021 at 25- HOLD PT evaluation until pt is seen by ortho for left hip fracture. Dr. Marcio Mccoy consulted.       Electronically signed by Joann Ramos, PT on 2021 at 8:37 AM

## 2021-08-05 ENCOUNTER — APPOINTMENT (OUTPATIENT)
Dept: GENERAL RADIOLOGY | Age: 81
DRG: 981 | End: 2021-08-05
Payer: MEDICARE

## 2021-08-05 ENCOUNTER — ANESTHESIA (OUTPATIENT)
Dept: OPERATING ROOM | Age: 81
DRG: 981 | End: 2021-08-05
Payer: MEDICARE

## 2021-08-05 VITALS — DIASTOLIC BLOOD PRESSURE: 66 MMHG | TEMPERATURE: 97.5 F | SYSTOLIC BLOOD PRESSURE: 81 MMHG | OXYGEN SATURATION: 100 %

## 2021-08-05 LAB
ABSOLUTE EOS #: 0.1 K/UL (ref 0–0.4)
ABSOLUTE EOS #: 0.1 K/UL (ref 0–0.4)
ABSOLUTE IMMATURE GRANULOCYTE: ABNORMAL K/UL (ref 0–0.3)
ABSOLUTE IMMATURE GRANULOCYTE: ABNORMAL K/UL (ref 0–0.3)
ABSOLUTE LYMPH #: 0.6 K/UL (ref 1–4.8)
ABSOLUTE LYMPH #: 0.9 K/UL (ref 1–4.8)
ABSOLUTE MONO #: 0.3 K/UL (ref 0.1–1.3)
ABSOLUTE MONO #: 0.5 K/UL (ref 0.1–1.3)
ALLEN TEST: ABNORMAL
ANION GAP SERPL CALCULATED.3IONS-SCNC: 6 MMOL/L (ref 9–17)
ANION GAP SERPL CALCULATED.3IONS-SCNC: 8 MMOL/L (ref 9–17)
ANTI-XA UNFRAC HEPARIN: 0.6 IU/L (ref 0.3–0.7)
BASOPHILS # BLD: 0 % (ref 0–2)
BASOPHILS # BLD: 0 % (ref 0–2)
BASOPHILS ABSOLUTE: 0 K/UL (ref 0–0.2)
BASOPHILS ABSOLUTE: 0 K/UL (ref 0–0.2)
BILIRUBIN URINE: NEGATIVE
BUN BLDV-MCNC: 12 MG/DL (ref 8–23)
BUN BLDV-MCNC: 16 MG/DL (ref 8–23)
BUN/CREAT BLD: ABNORMAL (ref 9–20)
BUN/CREAT BLD: ABNORMAL (ref 9–20)
CALCIUM IONIZED: 1.15 MMOL/L (ref 1.13–1.33)
CALCIUM SERPL-MCNC: 7.7 MG/DL (ref 8.6–10.4)
CALCIUM SERPL-MCNC: 7.9 MG/DL (ref 8.6–10.4)
CARBOXYHEMOGLOBIN: 0.6 % (ref 0–5)
CHLORIDE BLD-SCNC: 109 MMOL/L (ref 98–107)
CHLORIDE BLD-SCNC: 110 MMOL/L (ref 98–107)
CO2: 26 MMOL/L (ref 20–31)
CO2: 27 MMOL/L (ref 20–31)
COLOR: YELLOW
COMMENT UA: ABNORMAL
CREAT SERPL-MCNC: 0.52 MG/DL (ref 0.7–1.2)
CREAT SERPL-MCNC: 0.58 MG/DL (ref 0.7–1.2)
DIFFERENTIAL TYPE: ABNORMAL
DIFFERENTIAL TYPE: ABNORMAL
EOSINOPHILS RELATIVE PERCENT: 1 % (ref 0–4)
EOSINOPHILS RELATIVE PERCENT: 2 % (ref 0–4)
FIO2: 100
GFR AFRICAN AMERICAN: >60 ML/MIN
GFR AFRICAN AMERICAN: >60 ML/MIN
GFR NON-AFRICAN AMERICAN: >60 ML/MIN
GFR NON-AFRICAN AMERICAN: >60 ML/MIN
GFR SERPL CREATININE-BSD FRML MDRD: ABNORMAL ML/MIN/{1.73_M2}
GLUCOSE BLD-MCNC: 102 MG/DL (ref 70–99)
GLUCOSE BLD-MCNC: 124 MG/DL (ref 70–99)
GLUCOSE URINE: NEGATIVE
HCO3 ARTERIAL: 24.1 MMOL/L (ref 22–26)
HCT VFR BLD CALC: 30.2 % (ref 41–53)
HCT VFR BLD CALC: 33.3 % (ref 41–53)
HEMOGLOBIN: 10.4 G/DL (ref 13.5–17.5)
HEMOGLOBIN: 11 G/DL (ref 13.5–17.5)
IMMATURE GRANULOCYTES: ABNORMAL %
IMMATURE GRANULOCYTES: ABNORMAL %
KETONES, URINE: ABNORMAL
LEUKOCYTE ESTERASE, URINE: NEGATIVE
LYMPHOCYTES # BLD: 15 % (ref 24–44)
LYMPHOCYTES # BLD: 5 % (ref 24–44)
MAGNESIUM: 1.7 MG/DL (ref 1.6–2.6)
MAGNESIUM: 1.7 MG/DL (ref 1.6–2.6)
MCH RBC QN AUTO: 32.4 PG (ref 26–34)
MCH RBC QN AUTO: 33.2 PG (ref 26–34)
MCHC RBC AUTO-ENTMCNC: 32.8 G/DL (ref 31–37)
MCHC RBC AUTO-ENTMCNC: 34.5 G/DL (ref 31–37)
MCV RBC AUTO: 96.3 FL (ref 80–100)
MCV RBC AUTO: 98.8 FL (ref 80–100)
METHEMOGLOBIN: 0.7 % (ref 0–1.9)
MODE: ABNORMAL
MONOCYTES # BLD: 3 % (ref 1–7)
MONOCYTES # BLD: 9 % (ref 1–7)
NEGATIVE BASE EXCESS, ART: 0.9 MMOL/L (ref 0–2)
NITRITE, URINE: NEGATIVE
NOTIFICATION TIME: ABNORMAL
NOTIFICATION: ABNORMAL
NRBC AUTOMATED: ABNORMAL PER 100 WBC
NRBC AUTOMATED: ABNORMAL PER 100 WBC
O2 DEVICE/FLOW/%: ABNORMAL
O2 SAT, ARTERIAL: 98.4 % (ref 95–98)
OXYHEMOGLOBIN: ABNORMAL % (ref 95–98)
PATIENT TEMP: 37
PCO2 ARTERIAL: 40.2 MMHG (ref 35–45)
PCO2, ART, TEMP ADJ: ABNORMAL (ref 35–45)
PDW BLD-RTO: 13.9 % (ref 11.5–14.9)
PDW BLD-RTO: 14.7 % (ref 11.5–14.9)
PEEP/CPAP: 5
PH ARTERIAL: 7.39 (ref 7.35–7.45)
PH UA: 6 (ref 5–8)
PH, ART, TEMP ADJ: ABNORMAL (ref 7.35–7.45)
PHOSPHORUS: 2.3 MG/DL (ref 2.5–4.5)
PLATELET # BLD: 175 K/UL (ref 150–450)
PLATELET # BLD: 220 K/UL (ref 150–450)
PLATELET ESTIMATE: ABNORMAL
PLATELET ESTIMATE: ABNORMAL
PMV BLD AUTO: 9.2 FL (ref 6–12)
PMV BLD AUTO: 9.6 FL (ref 6–12)
PO2 ARTERIAL: 311 MMHG (ref 80–100)
PO2, ART, TEMP ADJ: ABNORMAL MMHG (ref 80–100)
POSITIVE BASE EXCESS, ART: ABNORMAL MMOL/L (ref 0–2)
POTASSIUM SERPL-SCNC: 3.2 MMOL/L (ref 3.7–5.3)
POTASSIUM SERPL-SCNC: 3.6 MMOL/L (ref 3.7–5.3)
PROTEIN UA: NEGATIVE
PSV: ABNORMAL
PT. POSITION: ABNORMAL
RBC # BLD: 3.13 M/UL (ref 4.5–5.9)
RBC # BLD: 3.37 M/UL (ref 4.5–5.9)
RBC # BLD: ABNORMAL 10*6/UL
RBC # BLD: ABNORMAL 10*6/UL
RESPIRATORY RATE: 16
SAMPLE SITE: ABNORMAL
SEG NEUTROPHILS: 74 % (ref 36–66)
SEG NEUTROPHILS: 91 % (ref 36–66)
SEGMENTED NEUTROPHILS ABSOLUTE COUNT: 10.1 K/UL (ref 1.3–9.1)
SEGMENTED NEUTROPHILS ABSOLUTE COUNT: 4.5 K/UL (ref 1.3–9.1)
SET RATE: 16
SODIUM BLD-SCNC: 143 MMOL/L (ref 135–144)
SODIUM BLD-SCNC: 143 MMOL/L (ref 135–144)
SPECIFIC GRAVITY UA: 1.02 (ref 1–1.03)
TEXT FOR RESPIRATORY: ABNORMAL
TOTAL HB: ABNORMAL G/DL (ref 12–16)
TOTAL RATE: 19
TURBIDITY: CLEAR
URINE HGB: NEGATIVE
UROBILINOGEN, URINE: NORMAL
VT: 500
WBC # BLD: 11 K/UL (ref 3.5–11)
WBC # BLD: 6.1 K/UL (ref 3.5–11)
WBC # BLD: ABNORMAL 10*3/UL
WBC # BLD: ABNORMAL 10*3/UL

## 2021-08-05 PROCEDURE — 99233 SBSQ HOSP IP/OBS HIGH 50: CPT | Performed by: INTERNAL MEDICINE

## 2021-08-05 PROCEDURE — 0SRS019 REPLACEMENT OF LEFT HIP JOINT, FEMORAL SURFACE WITH METAL SYNTHETIC SUBSTITUTE, CEMENTED, OPEN APPROACH: ICD-10-PCS | Performed by: ORTHOPAEDIC SURGERY

## 2021-08-05 PROCEDURE — 2580000003 HC RX 258: Performed by: ANESTHESIOLOGY

## 2021-08-05 PROCEDURE — 3700000000 HC ANESTHESIA ATTENDED CARE: Performed by: ORTHOPAEDIC SURGERY

## 2021-08-05 PROCEDURE — 36600 WITHDRAWAL OF ARTERIAL BLOOD: CPT

## 2021-08-05 PROCEDURE — 2000000000 HC ICU R&B

## 2021-08-05 PROCEDURE — 6370000000 HC RX 637 (ALT 250 FOR IP)

## 2021-08-05 PROCEDURE — 94002 VENT MGMT INPAT INIT DAY: CPT

## 2021-08-05 PROCEDURE — 2580000003 HC RX 258: Performed by: ORTHOPAEDIC SURGERY

## 2021-08-05 PROCEDURE — 2500000003 HC RX 250 WO HCPCS: Performed by: ANESTHESIOLOGY

## 2021-08-05 PROCEDURE — 85025 COMPLETE CBC W/AUTO DIFF WBC: CPT

## 2021-08-05 PROCEDURE — 84100 ASSAY OF PHOSPHORUS: CPT

## 2021-08-05 PROCEDURE — 2500000003 HC RX 250 WO HCPCS: Performed by: INTERNAL MEDICINE

## 2021-08-05 PROCEDURE — 85520 HEPARIN ASSAY: CPT

## 2021-08-05 PROCEDURE — 82330 ASSAY OF CALCIUM: CPT

## 2021-08-05 PROCEDURE — 3600000004 HC SURGERY LEVEL 4 BASE: Performed by: ORTHOPAEDIC SURGERY

## 2021-08-05 PROCEDURE — 71045 X-RAY EXAM CHEST 1 VIEW: CPT

## 2021-08-05 PROCEDURE — 6360000002 HC RX W HCPCS: Performed by: ANESTHESIOLOGY

## 2021-08-05 PROCEDURE — 7100000000 HC PACU RECOVERY - FIRST 15 MIN: Performed by: ORTHOPAEDIC SURGERY

## 2021-08-05 PROCEDURE — C1776 JOINT DEVICE (IMPLANTABLE): HCPCS | Performed by: ORTHOPAEDIC SURGERY

## 2021-08-05 PROCEDURE — 2709999900 HC NON-CHARGEABLE SUPPLY: Performed by: ORTHOPAEDIC SURGERY

## 2021-08-05 PROCEDURE — 27236 TREAT THIGH FRACTURE: CPT | Performed by: ORTHOPAEDIC SURGERY

## 2021-08-05 PROCEDURE — 83735 ASSAY OF MAGNESIUM: CPT

## 2021-08-05 PROCEDURE — 36415 COLL VENOUS BLD VENIPUNCTURE: CPT

## 2021-08-05 PROCEDURE — 73501 X-RAY EXAM HIP UNI 1 VIEW: CPT

## 2021-08-05 PROCEDURE — 6370000000 HC RX 637 (ALT 250 FOR IP): Performed by: INTERNAL MEDICINE

## 2021-08-05 PROCEDURE — 7100000001 HC PACU RECOVERY - ADDTL 15 MIN: Performed by: ORTHOPAEDIC SURGERY

## 2021-08-05 PROCEDURE — 3600000014 HC SURGERY LEVEL 4 ADDTL 15MIN: Performed by: ORTHOPAEDIC SURGERY

## 2021-08-05 PROCEDURE — C1713 ANCHOR/SCREW BN/BN,TIS/BN: HCPCS | Performed by: ORTHOPAEDIC SURGERY

## 2021-08-05 PROCEDURE — 82805 BLOOD GASES W/O2 SATURATION: CPT

## 2021-08-05 PROCEDURE — 6360000002 HC RX W HCPCS: Performed by: INTERNAL MEDICINE

## 2021-08-05 PROCEDURE — 80048 BASIC METABOLIC PNL TOTAL CA: CPT

## 2021-08-05 PROCEDURE — 3700000001 HC ADD 15 MINUTES (ANESTHESIA): Performed by: ORTHOPAEDIC SURGERY

## 2021-08-05 PROCEDURE — 99213 OFFICE O/P EST LOW 20 MIN: CPT

## 2021-08-05 PROCEDURE — 89220 SPUTUM SPECIMEN COLLECTION: CPT

## 2021-08-05 PROCEDURE — 81003 URINALYSIS AUTO W/O SCOPE: CPT

## 2021-08-05 PROCEDURE — 2580000003 HC RX 258

## 2021-08-05 PROCEDURE — 6360000002 HC RX W HCPCS

## 2021-08-05 DEVICE — INSERT FEM NK L+3MM HIP CO CHROM TAPR ENDO II: Type: IMPLANTABLE DEVICE | Site: HIP | Status: FUNCTIONAL

## 2021-08-05 DEVICE — IMPLANTABLE DEVICE: Type: IMPLANTABLE DEVICE | Site: HIP | Status: FUNCTIONAL

## 2021-08-05 DEVICE — STEM FEM DIA9MM STD OFFSET HIP CO CHROM ECHO FX: Type: IMPLANTABLE DEVICE | Site: HIP | Status: FUNCTIONAL

## 2021-08-05 DEVICE — VERSYS DISTAL CENTRALIZER 11MM: Type: IMPLANTABLE DEVICE | Site: HIP | Status: FUNCTIONAL

## 2021-08-05 DEVICE — CEMENT BNE 40GM W/ GENT HI VISC RADPQ FOR REV SURG: Type: IMPLANTABLE DEVICE | Site: HIP | Status: FUNCTIONAL

## 2021-08-05 RX ORDER — ETOMIDATE 2 MG/ML
INJECTION INTRAVENOUS PRN
Status: DISCONTINUED | OUTPATIENT
Start: 2021-08-05 | End: 2021-08-05 | Stop reason: SDUPTHER

## 2021-08-05 RX ORDER — DIPHENHYDRAMINE HYDROCHLORIDE 50 MG/ML
12.5 INJECTION INTRAMUSCULAR; INTRAVENOUS
Status: COMPLETED | OUTPATIENT
Start: 2021-08-05 | End: 2021-08-05

## 2021-08-05 RX ORDER — MINERAL OIL AND WHITE PETROLATUM 150; 830 MG/G; MG/G
OINTMENT OPHTHALMIC EVERY 4 HOURS
Status: DISCONTINUED | OUTPATIENT
Start: 2021-08-06 | End: 2021-08-06

## 2021-08-05 RX ORDER — FENTANYL CITRATE 50 UG/ML
50 INJECTION, SOLUTION INTRAMUSCULAR; INTRAVENOUS EVERY 30 MIN PRN
Status: DISCONTINUED | OUTPATIENT
Start: 2021-08-05 | End: 2021-08-06

## 2021-08-05 RX ORDER — POLYVINYL ALCOHOL 14 MG/ML
1 SOLUTION/ DROPS OPHTHALMIC EVERY 4 HOURS
Status: DISCONTINUED | OUTPATIENT
Start: 2021-08-05 | End: 2021-08-06

## 2021-08-05 RX ORDER — PHENYLEPHRINE HYDROCHLORIDE 10 MG/ML
INJECTION INTRAVENOUS PRN
Status: DISCONTINUED | OUTPATIENT
Start: 2021-08-05 | End: 2021-08-05 | Stop reason: SDUPTHER

## 2021-08-05 RX ORDER — MORPHINE SULFATE 2 MG/ML
2 INJECTION, SOLUTION INTRAMUSCULAR; INTRAVENOUS EVERY 5 MIN PRN
Status: DISCONTINUED | OUTPATIENT
Start: 2021-08-05 | End: 2021-08-05 | Stop reason: HOSPADM

## 2021-08-05 RX ORDER — OXYCODONE HYDROCHLORIDE 5 MG/1
5 TABLET ORAL EVERY 4 HOURS PRN
Status: DISCONTINUED | OUTPATIENT
Start: 2021-08-05 | End: 2021-08-13

## 2021-08-05 RX ORDER — LIDOCAINE HYDROCHLORIDE 10 MG/ML
INJECTION, SOLUTION EPIDURAL; INFILTRATION; INTRACAUDAL; PERINEURAL PRN
Status: DISCONTINUED | OUTPATIENT
Start: 2021-08-05 | End: 2021-08-05 | Stop reason: SDUPTHER

## 2021-08-05 RX ORDER — MEPERIDINE HYDROCHLORIDE 25 MG/ML
12.5 INJECTION INTRAMUSCULAR; INTRAVENOUS; SUBCUTANEOUS EVERY 5 MIN PRN
Status: DISCONTINUED | OUTPATIENT
Start: 2021-08-05 | End: 2021-08-05 | Stop reason: HOSPADM

## 2021-08-05 RX ORDER — FENTANYL CITRATE 50 UG/ML
50 INJECTION, SOLUTION INTRAMUSCULAR; INTRAVENOUS EVERY 5 MIN PRN
Status: DISCONTINUED | OUTPATIENT
Start: 2021-08-05 | End: 2021-08-05 | Stop reason: HOSPADM

## 2021-08-05 RX ORDER — ESMOLOL HYDROCHLORIDE 10 MG/ML
50-300 INJECTION, SOLUTION INTRAVENOUS CONTINUOUS
Status: DISCONTINUED | OUTPATIENT
Start: 2021-08-05 | End: 2021-08-08

## 2021-08-05 RX ORDER — FENTANYL CITRATE 50 UG/ML
INJECTION, SOLUTION INTRAMUSCULAR; INTRAVENOUS PRN
Status: DISCONTINUED | OUTPATIENT
Start: 2021-08-05 | End: 2021-08-05 | Stop reason: SDUPTHER

## 2021-08-05 RX ORDER — DIGOXIN 0.25 MG/ML
125 INJECTION INTRAMUSCULAR; INTRAVENOUS ONCE
Status: COMPLETED | OUTPATIENT
Start: 2021-08-05 | End: 2021-08-05

## 2021-08-05 RX ORDER — HYDRALAZINE HYDROCHLORIDE 20 MG/ML
5 INJECTION INTRAMUSCULAR; INTRAVENOUS EVERY 10 MIN PRN
Status: DISCONTINUED | OUTPATIENT
Start: 2021-08-05 | End: 2021-08-05 | Stop reason: HOSPADM

## 2021-08-05 RX ORDER — FENTANYL CITRATE 50 UG/ML
25 INJECTION, SOLUTION INTRAMUSCULAR; INTRAVENOUS EVERY 5 MIN PRN
Status: DISCONTINUED | OUTPATIENT
Start: 2021-08-05 | End: 2021-08-05 | Stop reason: HOSPADM

## 2021-08-05 RX ORDER — ESMOLOL HYDROCHLORIDE 10 MG/ML
INJECTION INTRAVENOUS PRN
Status: DISCONTINUED | OUTPATIENT
Start: 2021-08-05 | End: 2021-08-05 | Stop reason: SDUPTHER

## 2021-08-05 RX ORDER — DEXAMETHASONE SODIUM PHOSPHATE 4 MG/ML
INJECTION, SOLUTION INTRA-ARTICULAR; INTRALESIONAL; INTRAMUSCULAR; INTRAVENOUS; SOFT TISSUE PRN
Status: DISCONTINUED | OUTPATIENT
Start: 2021-08-05 | End: 2021-08-05 | Stop reason: SDUPTHER

## 2021-08-05 RX ORDER — LABETALOL HYDROCHLORIDE 5 MG/ML
5 INJECTION, SOLUTION INTRAVENOUS EVERY 10 MIN PRN
Status: DISCONTINUED | OUTPATIENT
Start: 2021-08-05 | End: 2021-08-05 | Stop reason: HOSPADM

## 2021-08-05 RX ORDER — HYDROCODONE BITARTRATE AND ACETAMINOPHEN 5; 325 MG/1; MG/1
2 TABLET ORAL PRN
Status: DISCONTINUED | OUTPATIENT
Start: 2021-08-05 | End: 2021-08-05 | Stop reason: HOSPADM

## 2021-08-05 RX ORDER — PROPOFOL 10 MG/ML
10 INJECTION, EMULSION INTRAVENOUS CONTINUOUS
Status: DISCONTINUED | OUTPATIENT
Start: 2021-08-05 | End: 2021-08-06

## 2021-08-05 RX ORDER — SODIUM CHLORIDE, SODIUM LACTATE, POTASSIUM CHLORIDE, CALCIUM CHLORIDE 600; 310; 30; 20 MG/100ML; MG/100ML; MG/100ML; MG/100ML
INJECTION, SOLUTION INTRAVENOUS CONTINUOUS PRN
Status: DISCONTINUED | OUTPATIENT
Start: 2021-08-05 | End: 2021-08-05 | Stop reason: SDUPTHER

## 2021-08-05 RX ORDER — HEPARIN SODIUM 10000 [USP'U]/100ML
18 INJECTION, SOLUTION INTRAVENOUS CONTINUOUS
Status: DISCONTINUED | OUTPATIENT
Start: 2021-08-06 | End: 2021-08-07

## 2021-08-05 RX ORDER — MORPHINE SULFATE 2 MG/ML
2 INJECTION, SOLUTION INTRAMUSCULAR; INTRAVENOUS
Status: DISCONTINUED | OUTPATIENT
Start: 2021-08-05 | End: 2021-08-13 | Stop reason: HOSPADM

## 2021-08-05 RX ORDER — PROPOFOL 10 MG/ML
5-50 INJECTION, EMULSION INTRAVENOUS
Status: DISCONTINUED | OUTPATIENT
Start: 2021-08-05 | End: 2021-08-06

## 2021-08-05 RX ORDER — ONDANSETRON 2 MG/ML
INJECTION INTRAMUSCULAR; INTRAVENOUS PRN
Status: DISCONTINUED | OUTPATIENT
Start: 2021-08-05 | End: 2021-08-05 | Stop reason: SDUPTHER

## 2021-08-05 RX ORDER — METOCLOPRAMIDE HYDROCHLORIDE 5 MG/ML
10 INJECTION INTRAMUSCULAR; INTRAVENOUS
Status: DISCONTINUED | OUTPATIENT
Start: 2021-08-05 | End: 2021-08-05 | Stop reason: HOSPADM

## 2021-08-05 RX ORDER — CHLORHEXIDINE GLUCONATE 0.12 MG/ML
15 RINSE ORAL 2 TIMES DAILY
Status: DISCONTINUED | OUTPATIENT
Start: 2021-08-05 | End: 2021-08-06

## 2021-08-05 RX ORDER — ONDANSETRON 2 MG/ML
4 INJECTION INTRAMUSCULAR; INTRAVENOUS
Status: DISCONTINUED | OUTPATIENT
Start: 2021-08-05 | End: 2021-08-05 | Stop reason: HOSPADM

## 2021-08-05 RX ORDER — HEPARIN SODIUM 10000 [USP'U]/100ML
18 INJECTION, SOLUTION INTRAVENOUS CONTINUOUS
Status: DISCONTINUED | OUTPATIENT
Start: 2021-08-06 | End: 2021-08-05

## 2021-08-05 RX ORDER — CEFAZOLIN SODIUM 1 G/3ML
INJECTION, POWDER, FOR SOLUTION INTRAMUSCULAR; INTRAVENOUS PRN
Status: DISCONTINUED | OUTPATIENT
Start: 2021-08-05 | End: 2021-08-05 | Stop reason: SDUPTHER

## 2021-08-05 RX ORDER — ROCURONIUM BROMIDE 10 MG/ML
INJECTION, SOLUTION INTRAVENOUS PRN
Status: DISCONTINUED | OUTPATIENT
Start: 2021-08-05 | End: 2021-08-05 | Stop reason: SDUPTHER

## 2021-08-05 RX ORDER — ACETAMINOPHEN 500 MG
1000 TABLET ORAL EVERY 6 HOURS SCHEDULED
Status: DISCONTINUED | OUTPATIENT
Start: 2021-08-06 | End: 2021-08-13

## 2021-08-05 RX ORDER — DILTIAZEM HYDROCHLORIDE 5 MG/ML
20 INJECTION INTRAVENOUS ONCE
Status: COMPLETED | OUTPATIENT
Start: 2021-08-05 | End: 2021-08-05

## 2021-08-05 RX ORDER — HYDROCODONE BITARTRATE AND ACETAMINOPHEN 5; 325 MG/1; MG/1
1 TABLET ORAL PRN
Status: DISCONTINUED | OUTPATIENT
Start: 2021-08-05 | End: 2021-08-05 | Stop reason: HOSPADM

## 2021-08-05 RX ADMIN — POTASSIUM CHLORIDE 40 MEQ: 1500 TABLET, EXTENDED RELEASE ORAL at 07:57

## 2021-08-05 RX ADMIN — PHENYLEPHRINE HYDROCHLORIDE 200 MCG: 10 INJECTION INTRAVENOUS at 18:22

## 2021-08-05 RX ADMIN — DILTIAZEM HYDROCHLORIDE 20 MG: 5 INJECTION INTRAVENOUS at 20:40

## 2021-08-05 RX ADMIN — ESMOLOL HYDROCHLORIDE 10 MG: 10 INJECTION, SOLUTION INTRAVENOUS at 17:49

## 2021-08-05 RX ADMIN — SODIUM CHLORIDE: 9 INJECTION, SOLUTION INTRAVENOUS at 22:16

## 2021-08-05 RX ADMIN — ONDANSETRON 4 MG: 2 INJECTION, SOLUTION INTRAMUSCULAR; INTRAVENOUS at 19:16

## 2021-08-05 RX ADMIN — FENTANYL CITRATE 25 MCG: 50 INJECTION, SOLUTION INTRAMUSCULAR; INTRAVENOUS at 19:21

## 2021-08-05 RX ADMIN — ESMOLOL HYDROCHLORIDE 10 MG: 10 INJECTION, SOLUTION INTRAVENOUS at 19:16

## 2021-08-05 RX ADMIN — PHENYLEPHRINE HYDROCHLORIDE 200 MCG: 10 INJECTION INTRAVENOUS at 17:50

## 2021-08-05 RX ADMIN — PHENYLEPHRINE HYDROCHLORIDE 100 MCG: 10 INJECTION INTRAVENOUS at 18:16

## 2021-08-05 RX ADMIN — PHENYLEPHRINE HYDROCHLORIDE 200 MCG: 10 INJECTION INTRAVENOUS at 18:29

## 2021-08-05 RX ADMIN — ESMOLOL HYDROCHLORIDE 200 MCG/KG/MIN: 10 INJECTION INTRAVENOUS at 18:05

## 2021-08-05 RX ADMIN — PHENYLEPHRINE HYDROCHLORIDE 100 MCG: 10 INJECTION INTRAVENOUS at 17:27

## 2021-08-05 RX ADMIN — PHENYLEPHRINE HYDROCHLORIDE 100 MCG: 10 INJECTION INTRAVENOUS at 19:22

## 2021-08-05 RX ADMIN — SODIUM CHLORIDE, POTASSIUM CHLORIDE, SODIUM LACTATE AND CALCIUM CHLORIDE: 600; 310; 30; 20 INJECTION, SOLUTION INTRAVENOUS at 19:14

## 2021-08-05 RX ADMIN — CEFAZOLIN 2000 MG: 10 INJECTION, POWDER, FOR SOLUTION INTRAVENOUS at 05:49

## 2021-08-05 RX ADMIN — PHENYLEPHRINE HYDROCHLORIDE 100 MCG: 10 INJECTION INTRAVENOUS at 18:37

## 2021-08-05 RX ADMIN — FENTANYL CITRATE 25 MCG: 50 INJECTION, SOLUTION INTRAMUSCULAR; INTRAVENOUS at 17:47

## 2021-08-05 RX ADMIN — PHENYLEPHRINE HYDROCHLORIDE 100 MCG: 10 INJECTION INTRAVENOUS at 18:49

## 2021-08-05 RX ADMIN — SODIUM CHLORIDE, POTASSIUM CHLORIDE, SODIUM LACTATE AND CALCIUM CHLORIDE: 600; 310; 30; 20 INJECTION, SOLUTION INTRAVENOUS at 17:22

## 2021-08-05 RX ADMIN — DIGOXIN 125 MCG: 125 TABLET ORAL at 07:57

## 2021-08-05 RX ADMIN — LIDOCAINE HYDROCHLORIDE 50 MG: 10 INJECTION, SOLUTION EPIDURAL; INFILTRATION; INTRACAUDAL; PERINEURAL at 17:28

## 2021-08-05 RX ADMIN — PROPOFOL 20 MCG/KG/MIN: 10 INJECTION, EMULSION INTRAVENOUS at 19:53

## 2021-08-05 RX ADMIN — ESMOLOL HYDROCHLORIDE 50 MCG/KG/MIN: 10 INJECTION INTRAVENOUS at 14:31

## 2021-08-05 RX ADMIN — ESMOLOL HYDROCHLORIDE 10 MG: 10 INJECTION, SOLUTION INTRAVENOUS at 17:37

## 2021-08-05 RX ADMIN — ROCURONIUM BROMIDE 30 MG: 10 INJECTION, SOLUTION INTRAVENOUS at 19:20

## 2021-08-05 RX ADMIN — CEFAZOLIN 2000 MG: 10 INJECTION, POWDER, FOR SOLUTION INTRAVENOUS at 14:56

## 2021-08-05 RX ADMIN — DIPHENHYDRAMINE HYDROCHLORIDE 12.5 MG: 50 INJECTION, SOLUTION INTRAMUSCULAR; INTRAVENOUS at 20:10

## 2021-08-05 RX ADMIN — PHENYLEPHRINE HYDROCHLORIDE 100 MCG: 10 INJECTION INTRAVENOUS at 17:59

## 2021-08-05 RX ADMIN — PHENYLEPHRINE HYDROCHLORIDE 100 MCG: 10 INJECTION INTRAVENOUS at 18:27

## 2021-08-05 RX ADMIN — FENTANYL CITRATE 25 MCG: 50 INJECTION, SOLUTION INTRAMUSCULAR; INTRAVENOUS at 18:03

## 2021-08-05 RX ADMIN — FENTANYL CITRATE 50 MCG: 0.05 INJECTION, SOLUTION INTRAMUSCULAR; INTRAVENOUS at 20:37

## 2021-08-05 RX ADMIN — PHENYLEPHRINE HYDROCHLORIDE 200 MCG: 10 INJECTION INTRAVENOUS at 18:42

## 2021-08-05 RX ADMIN — FENTANYL CITRATE 25 MCG: 50 INJECTION, SOLUTION INTRAMUSCULAR; INTRAVENOUS at 18:12

## 2021-08-05 RX ADMIN — ESMOLOL HYDROCHLORIDE 10 MG: 10 INJECTION, SOLUTION INTRAVENOUS at 17:44

## 2021-08-05 RX ADMIN — DIGOXIN 125 MCG: 0.25 INJECTION INTRAMUSCULAR; INTRAVENOUS at 08:57

## 2021-08-05 RX ADMIN — DEXAMETHASONE SODIUM PHOSPHATE 4 MG: 4 INJECTION, SOLUTION INTRAMUSCULAR; INTRAVENOUS at 18:54

## 2021-08-05 RX ADMIN — METOPROLOL TARTRATE 50 MG: 50 TABLET, FILM COATED ORAL at 07:57

## 2021-08-05 RX ADMIN — CEFAZOLIN 2000 MG: 1 INJECTION, POWDER, FOR SOLUTION INTRAMUSCULAR; INTRAVENOUS at 17:54

## 2021-08-05 RX ADMIN — ROCURONIUM BROMIDE 50 MG: 10 INJECTION, SOLUTION INTRAVENOUS at 17:28

## 2021-08-05 RX ADMIN — PHENYLEPHRINE HYDROCHLORIDE 100 MCG: 10 INJECTION INTRAVENOUS at 18:14

## 2021-08-05 RX ADMIN — PHENYLEPHRINE HYDROCHLORIDE 100 MCG: 10 INJECTION INTRAVENOUS at 19:25

## 2021-08-05 RX ADMIN — ROCURONIUM BROMIDE 20 MG: 10 INJECTION, SOLUTION INTRAVENOUS at 18:14

## 2021-08-05 RX ADMIN — SODIUM CHLORIDE, PRESERVATIVE FREE 10 ML: 5 INJECTION INTRAVENOUS at 08:03

## 2021-08-05 RX ADMIN — PHENYLEPHRINE HYDROCHLORIDE 100 MCG: 10 INJECTION INTRAVENOUS at 18:54

## 2021-08-05 RX ADMIN — FENTANYL CITRATE 25 MCG: 50 INJECTION, SOLUTION INTRAMUSCULAR; INTRAVENOUS at 19:09

## 2021-08-05 RX ADMIN — FENTANYL CITRATE 50 MCG: 50 INJECTION, SOLUTION INTRAMUSCULAR; INTRAVENOUS at 17:28

## 2021-08-05 RX ADMIN — DILTIAZEM HYDROCHLORIDE 5 MG/HR: 5 INJECTION INTRAVENOUS at 18:43

## 2021-08-05 RX ADMIN — FENTANYL CITRATE 25 MCG: 50 INJECTION, SOLUTION INTRAMUSCULAR; INTRAVENOUS at 19:04

## 2021-08-05 RX ADMIN — ETOMIDATE INJECTION 20 MG: 2 SOLUTION INTRAVENOUS at 17:28

## 2021-08-05 ASSESSMENT — ENCOUNTER SYMPTOMS
RHINORRHEA: 0
SHORTNESS OF BREATH: 0
CONSTIPATION: 0
NAUSEA: 0
VOMITING: 0
EYE DISCHARGE: 0
CHEST TIGHTNESS: 0
EYE PAIN: 0
SORE THROAT: 0
ABDOMINAL PAIN: 0
COUGH: 0
WHEEZING: 0
DIARRHEA: 0

## 2021-08-05 ASSESSMENT — PULMONARY FUNCTION TESTS
PIF_VALUE: 24
PIF_VALUE: 18
PIF_VALUE: 14
PIF_VALUE: 10
PIF_VALUE: 17
PIF_VALUE: 18
PIF_VALUE: 14
PIF_VALUE: 18
PIF_VALUE: 14
PIF_VALUE: 10
PIF_VALUE: 18
PIF_VALUE: 17
PIF_VALUE: 18
PIF_VALUE: 9
PIF_VALUE: 18
PIF_VALUE: 18
PIF_VALUE: 17
PIF_VALUE: 18
PIF_VALUE: 17
PIF_VALUE: 18
PIF_VALUE: 17
PIF_VALUE: 18
PIF_VALUE: 11
PIF_VALUE: 15
PIF_VALUE: 13
PIF_VALUE: 17
PIF_VALUE: 17
PIF_VALUE: 18
PIF_VALUE: 18
PIF_VALUE: 23
PIF_VALUE: 17
PIF_VALUE: 14
PIF_VALUE: 18
PIF_VALUE: 10
PIF_VALUE: 1
PIF_VALUE: 14
PIF_VALUE: 17
PIF_VALUE: 19
PIF_VALUE: 10
PIF_VALUE: 17
PIF_VALUE: 17
PIF_VALUE: 31
PIF_VALUE: 18
PIF_VALUE: 17
PIF_VALUE: 19
PIF_VALUE: 18
PIF_VALUE: 16
PIF_VALUE: 10
PIF_VALUE: 18
PIF_VALUE: 18
PIF_VALUE: 10
PIF_VALUE: 18
PIF_VALUE: 8
PIF_VALUE: 6
PIF_VALUE: 15
PIF_VALUE: 10
PIF_VALUE: 9
PIF_VALUE: 18
PIF_VALUE: 14
PIF_VALUE: 18
PIF_VALUE: 1
PIF_VALUE: 18
PIF_VALUE: 11
PIF_VALUE: 18
PIF_VALUE: 1
PIF_VALUE: 18
PIF_VALUE: 19
PIF_VALUE: 10
PIF_VALUE: 18
PIF_VALUE: 10
PIF_VALUE: 18
PIF_VALUE: 17
PIF_VALUE: 13
PIF_VALUE: 18
PIF_VALUE: 15
PIF_VALUE: 18
PIF_VALUE: 17
PIF_VALUE: 17
PIF_VALUE: 2
PIF_VALUE: 17
PIF_VALUE: 12
PIF_VALUE: 17
PIF_VALUE: 18
PIF_VALUE: 2
PIF_VALUE: 14
PIF_VALUE: 17
PIF_VALUE: 17
PIF_VALUE: 19
PIF_VALUE: 18
PIF_VALUE: 18
PIF_VALUE: 0
PIF_VALUE: 10
PIF_VALUE: 18
PIF_VALUE: 18
PIF_VALUE: 10
PIF_VALUE: 13
PIF_VALUE: 16
PIF_VALUE: 10
PIF_VALUE: 1
PIF_VALUE: 3
PIF_VALUE: 17
PIF_VALUE: 17
PIF_VALUE: 18
PIF_VALUE: 14
PIF_VALUE: 18
PIF_VALUE: 22
PIF_VALUE: 19
PIF_VALUE: 1
PIF_VALUE: 23
PIF_VALUE: 18
PIF_VALUE: 17
PIF_VALUE: 18
PIF_VALUE: 17
PIF_VALUE: 12
PIF_VALUE: 14
PIF_VALUE: 0
PIF_VALUE: 10
PIF_VALUE: 10
PIF_VALUE: 14
PIF_VALUE: 18
PIF_VALUE: 0
PIF_VALUE: 17
PIF_VALUE: 18
PIF_VALUE: 14
PIF_VALUE: 17
PIF_VALUE: 1
PIF_VALUE: 10
PIF_VALUE: 19
PIF_VALUE: 11
PIF_VALUE: 22
PIF_VALUE: 14
PIF_VALUE: 1
PIF_VALUE: 17
PIF_VALUE: 33
PIF_VALUE: 18
PIF_VALUE: 17
PIF_VALUE: 14
PIF_VALUE: 20
PIF_VALUE: 19
PIF_VALUE: 1
PIF_VALUE: 10

## 2021-08-05 ASSESSMENT — PAIN DESCRIPTION - PROGRESSION
CLINICAL_PROGRESSION: NOT CHANGED

## 2021-08-05 ASSESSMENT — PAIN DESCRIPTION - ONSET: ONSET: ON-GOING

## 2021-08-05 ASSESSMENT — PAIN DESCRIPTION - LOCATION
LOCATION: HIP
LOCATION: HIP

## 2021-08-05 ASSESSMENT — PAIN SCALES - GENERAL
PAINLEVEL_OUTOF10: 7
PAINLEVEL_OUTOF10: 3
PAINLEVEL_OUTOF10: 3

## 2021-08-05 ASSESSMENT — PAIN DESCRIPTION - DESCRIPTORS: DESCRIPTORS: ACHING;THROBBING

## 2021-08-05 ASSESSMENT — PAIN DESCRIPTION - PAIN TYPE
TYPE: ACUTE PAIN
TYPE: ACUTE PAIN

## 2021-08-05 ASSESSMENT — PAIN SCALES - WONG BAKER: WONGBAKER_NUMERICALRESPONSE: 0

## 2021-08-05 ASSESSMENT — PAIN DESCRIPTION - ORIENTATION
ORIENTATION: LEFT
ORIENTATION: LEFT

## 2021-08-05 ASSESSMENT — PAIN DESCRIPTION - FREQUENCY: FREQUENCY: CONTINUOUS

## 2021-08-05 NOTE — PROGRESS NOTES
RN notified Dr. Linnea aBptiste that pts heparin gtt is still running. Dr. Linnea Baptiste requested we stop it as soon as possible. Heparin gtt stopped at this time.  Electronically signed by Natasha Monique RN on 8/5/2021 at 7:47 AM

## 2021-08-05 NOTE — PROGRESS NOTES
RN rounded with Dr. Fermin Calvin. He requests RN ask Dr. Ricardo Mendoza if there is anything more we can do for rate control as pt is still getting up into the 140s. Dr. Fermin Calvin said 100-110s would suffice. RN spoke with Dr. Ricardo Mendoza and informed him of Dr. Uriel Zimmer request. Dr. Ricardo Mendoza would like to start an esmolol gtt and keep SBP>90 and HR around 100. RN informed him that we cannot administer that gtt on PCU. Dr. Ricardo Mendoza said to transfer pt to higher level of care and start esmolol gtt.  Electronically signed by Ruben Faria RN on 8/5/2021 at 12:57 PM

## 2021-08-05 NOTE — OP NOTE
OPERATIVE REPORT    Date of Surgery: 8/5/2021     Pre-operative Diagnosis: Left femoral neck fracture (S72.002A)    Post-operative Diagnosis: Left femoral neck fracture (S72.002A)    Procedure: Left hip hemiarthroplasty (29930)     Surgeon(s): Alexei Gómez MD    Assistant(s): Wilbur Gunter DO (PGY 4); Denae Woodson    Anesthesia: General    Fluids: See anesthesia record    Urine output: See anesthesia record    Estimated blood loss: 100 mL    Findings: Subacute transcervical femoral neck fractuer    Specimen: None    Tourniquet time: NA    Implants: [employer] size 9 cemented Echo fracture stem with 52 mm head, - 3 neck adapter and size 11 cement restrictor    Surgical Indications:  Desiree Fishamn is a 79 y.o. old male who presented with a left femoral neck fracture. Following a discussion with the patient regarding both non-operative and operative treatment options, they consented to proceed with a left hip hemiarthrosplasty. he came to this decision after demonstrating a good understanding of our discussion regarding details of the procedure, risks and benefits, expected outcome, and postoperative course. Operative technique: Following appropriate identification of the patient and his operative extremity, consent was reviewed with the patient and his operative extremity was signed. he was wheeled to the operating room where he finished a course of pre-operative antibiotic prophylaxis by way of 2 G IV ancef. The anesthesia service administered a general anesthetic and established/secured his airway using an endotracheal tube. All bony prominences were appropriately padded and the patient was secured to the operative table in a Lateral decubitus position with the operative side up. An axillary roll and a peroneal nerve pad were placed.  The patient's operative extremity was prepped and draped in a standard sterile fashion and a time out was performed during which the correct patient, operative extremity, and procedure were verified. A longitudinal incision centered on the patient's greater trochanter was made sharply through skin. Dissection was carried through subcutaneous tissue and the IT band was identified and split in line with the skin incision starting distally and carrying the split proximally to and through the gluteus ginger raphe. The greater trochanteric bursa was excised. A standard anterolateral approach to the hip was performed taking care to limit proximal incision through the gluteus medius. Once the arthrotomy was made, and the anterior 3rd of the vastus lateralis elevated, the hip was dislocated through the fracture in the femoral neck. Measured resection of the femoral neck was performed referencing the lesser trochanter, and the femoral head and neck excised. Debris was evacuated from the acetabulum. The femoral head was sized to be 52 mm. A trial 52 mm head was placed in the acetabulum and demonstrated appropriate fit. Attention was then redirected to the proximal femur and a box osteotome and then canal finder were used to create a path and starting  hole for canal reaming and broaching. The proximal femur was then reamed and then broached sequentially up to a size 11 broach which demonstrated appropriate fit and fill. Care was taken during the broaching process to follow the patient's native femoral neck anteversion and lateralize each broach as much as possible. With a size 11 broach in place, a standard trial femoral neck and a size 52 mm femoral head and -3 neck adapter were assembled on the broach and the hip reduced gently. The hip was then taken through a range of motion. The patient's leg length was appropriately restored and the hip was shown to be stable with a standard offset neck, 52 mm head, and -3 neck adapter. Consequently the hip was dislocated and all trial implants removed. The femoral canal was sized and a cement restrictor was inserted at appropriate depth.  The femoral canal was prepped by thoroughly irrigating/brushing and drying. Once cement was mixed on the back table it was inserted under pressure in retrograde fashion. The final size 9 femoral stem was inserted to appropriate depth and in appropriate rotation. It was held in place while the cement cured. Excess cement was removed. Once the cement had cured, the final femoral head with neck adapter as determined by previous trialing were assembled on the stem and the hip was then reduced and again taken through a range of motion. It demonstrated appropriate stability with no impingement. The hip was thoroughly irrigated once more, evacuating all debris. Layered closure was performed using #2 ethibond for capsule closure and repair of the gluteus medius and vastus lateralis to the greater trochanter. This repair was over sewn with a running #1 vicryl stitch. The IT band and Gluteus ginger were repaired similarly with a running #1-vicryl stitch and deep subcutaneous tissue repair with interrupted 0-vicryl stitches. Superficial subQ tissue was repaired with interrupted buried 2-0 vicryl and staples were used on skin. Sterile dressings were then applied using xeroform, 4x4 gauze, ABD pads and tape. he was then transferred to his bed and wheeled to recovery in stable condition.     Complications: None    Condition: Stable

## 2021-08-05 NOTE — PROGRESS NOTES
[address]   OCCUPATIONAL THERAPY MISSED TREATMENT NOTE   INPATIENT   Date: 21  Patient Name: Olimpia Medina       Room:   MRN: [id number]   Account #: [de-identified]    : 1940  (79 y.o.)  Gender: male                 REASON FOR MISSED TREATMENT:  Patient unable to participate   -   Hold occupational therapy eval at this time due to left hip fracture. Pt is scheduled for left hip hemiarthroplasty surgery later today. Occupational therapy will continue to follow.           Mirian Brandon OT

## 2021-08-05 NOTE — CONSULTS
[facility] Wound Ostomy Continence Nurse  Consult Note       NAME:  Johnnye Schirmer  MEDICAL RECORD NUMBER:  [id number]  AGE: 79 y.o. GENDER: male  : 1940  TODAY'S DATE:  2021    Subjective:     Reason for Wound Aaron Carter Care and Assessment: multiple wounds      Johnnye Schirmer is a 79 y.o. male referred by:   [x] Physician  [] Nursing  [] Other:       Wound Identification:  Wound Type: pressure and traumatic  Contributing Factors: chronic pressure, decreased mobility, shear force and decreased tissue oxygenation    Wound History: pt had fall at home and laid on floor for several days before he was able to get help; pressure injuries to sacrum/buttocks, traumatic injuries to elbows and knees from dragging self across floor for help  Current Wound Care Treatment:  foam    Patient Goal of Care:  [x] Wound Healing  [] Odor Control  [] Palliative Care  [] Pain Control   [] Other:         PAST MEDICAL HISTORY    History reviewed. No pertinent past medical history. PAST SURGICAL HISTORY    History reviewed. No pertinent surgical history. FAMILY HISTORY    History reviewed. No pertinent family history.     SOCIAL HISTORY    Social History     Tobacco Use    Smoking status: Never Smoker    Smokeless tobacco: Never Used   Substance Use Topics    Alcohol use: Never    Drug use: Not on file       ALLERGIES    No Known Allergies        Objective:      BP (!) 107/59   Pulse 118   Temp 97.6 °F (36.4 °C) (Oral)   Resp 16   Ht 5' 7\" (1.702 m)   Wt 142 lb 10.2 oz (64.7 kg)   SpO2 100%   BMI 22.34 kg/m²       LABS    CBC:   Lab Results   Component Value Date    WBC 6.1 2021    RBC 3.13 2021    HGB 10.4 2021     CMP:  Albumin:    Lab Results   Component Value Date    LABALBU 3.8 2021     PT/INR:    Lab Results   Component Value Date    PROTIME 15.6 2021    INR 1.2 2021     HgBA1c:  No results found for: LABA1C  PTT: No components found for: LABPTT    Review of Systems    Assessment: Physical Exam      Marcos Risk Score: Marcos Scale Score: 16    Patient Active Problem List   Diagnosis Code    A-fib [facility]) I48.91    Closed fracture of left hip with routine healing S72.002D    Rhabdomyolysis M62.82       Patient Active Problem List   Diagnosis    A-fib ([facility] [address]    Closed fracture of left hip with routine healing    Rhabdomyolysis       Measurements:  Wound 08/05/21 Buttocks Right (Active)   Wound Image   08/05/21 1033   Wound Etiology Pressure Stage  3 08/05/21 1033   Dressing Status Old drainage noted;New dressing applied 08/05/21 1033   Wound Cleansed Cleansed with saline 08/05/21 1033   Wound Length (cm) 2.8 cm 08/05/21 1033   Wound Width (cm) 0.9 cm 08/05/21 1033   Wound Depth (cm) 0.1 cm 08/05/21 1033   Wound Surface Area (cm^2) 2.52 cm^2 08/05/21 1033   Wound Volume (cm^3) 0.252 cm^3 08/05/21 1033   Wound Assessment Pink/red;Slough 08/05/21 1033   Drainage Amount Moderate 08/05/21 1033   Drainage Description Serosanguinous 08/05/21 1033   Odor None 08/05/21 1033   Shira-wound Assessment Blanchable erythema 08/05/21 1033   Margins Defined edges 08/05/21 1033   Number of days: 0       Wound 08/05/21 Sacrum (Active)   Wound Image   08/05/21 1033   Wound Etiology Pressure Unstageable 08/05/21 1033   Dressing Status Old drainage noted;New dressing applied 08/05/21 1033   Wound Cleansed Cleansed with saline 08/05/21 1033   Wound Length (cm) 3.7 cm 08/05/21 1033   Wound Width (cm) 3 cm 08/05/21 1033   Wound Depth (cm) 0.1 cm 08/05/21 1033   Wound Surface Area (cm^2) 11.1 cm^2 08/05/21 1033   Wound Volume (cm^3) 1.11 cm^3 08/05/21 1033   Wound Assessment Eschar dry;Slough;Pink/red 08/05/21 1033   Drainage Amount Moderate 08/05/21 1033   Drainage Description Serosanguinous 08/05/21 1033   Odor None 08/05/21 1033   Shira-wound Assessment Blanchable erythema;Fragile 08/05/21 1033   Margins Defined edges 08/05/21 1033   Number of days: 0       Wound 08/05/21 Elbow Right cluster (Active)   Wound Image   08/05/21 1033   Wound Etiology Traumatic 08/05/21 1033   Dressing Status Old drainage noted 08/05/21 1033   Dressing/Treatment Foam 08/05/21 1033   Wound Length (cm) 7 cm 08/05/21 1033   Wound Width (cm) 7 cm 08/05/21 1033   Wound Depth (cm) 0.1 cm 08/05/21 1033   Wound Surface Area (cm^2) 49 cm^2 08/05/21 1033   Wound Volume (cm^3) 4.9 cm^3 08/05/21 1033   Wound Assessment Pink/red 08/05/21 1033   Drainage Amount Moderate 08/05/21 1033   Drainage Description Serosanguinous 08/05/21 1033   Odor None 08/05/21 1033   Shira-wound Assessment Blanchable erythema;Fragile 08/05/21 1033   Margins Defined edges 08/05/21 1033   Number of days: 0       Wound 08/05/21 Elbow Left cluster (Active)   Wound Image   08/05/21 1033   Wound Etiology Traumatic 08/05/21 1033   Dressing Status Old drainage noted 08/05/21 1033   Dressing/Treatment Foam 08/05/21 1033   Wound Length (cm) 5 cm 08/05/21 1033   Wound Width (cm) 3.5 cm 08/05/21 1033   Wound Depth (cm) 0.1 cm 08/05/21 1033   Wound Surface Area (cm^2) 17.5 cm^2 08/05/21 1033   Wound Volume (cm^3) 1.75 cm^3 08/05/21 1033   Wound Assessment Pink/red 08/05/21 1033   Drainage Amount Moderate 08/05/21 1033   Drainage Description Serosanguinous 08/05/21 1033   Odor None 08/05/21 1033   Shira-wound Assessment Blanchable erythema;Fragile 08/05/21 1033   Margins Defined edges 08/05/21 1033   Number of days: 0       Wound 08/05/21 Knee Right (Active)   Wound Image   08/05/21 1033   Wound Etiology Traumatic 08/05/21 1033   Dressing Status Old drainage noted 08/05/21 1033   Dressing/Treatment Foam 08/05/21 1033   Wound Length (cm) 3.5 cm 08/05/21 1033   Wound Width (cm) 6 cm 08/05/21 1033   Wound Depth (cm) 0.1 cm 08/05/21 1033   Wound Surface Area (cm^2) 21 cm^2 08/05/21 1033   Wound Volume (cm^3) 2.1 cm^3 08/05/21 1033   Wound Assessment Pink/red;Fibrin 08/05/21 1033   Drainage Amount Moderate 08/05/21 1033   Drainage Description Serosanguinous 08/05/21 1033   Odor None 08/05/21 1033 Shira-wound Assessment Blanchable erythema;Fragile 08/05/21 1033   Margins Defined edges 08/05/21 1033   Number of days: 0       Wound 08/05/21 Knee Left (Active)   Wound Image   08/05/21 1033   Wound Etiology Traumatic 08/05/21 1033   Dressing Status Old drainage noted 08/05/21 1033   Dressing/Treatment Foam 08/05/21 1033   Wound Length (cm) 2.8 cm 08/05/21 1033   Wound Width (cm) 1.1 cm 08/05/21 1033   Wound Depth (cm) 0.1 cm 08/05/21 1033   Wound Surface Area (cm^2) 3.08 cm^2 08/05/21 1033   Wound Volume (cm^3) 0.308 cm^3 08/05/21 1033   Wound Assessment Pink/red;Fibrin 08/05/21 1033   Drainage Amount Moderate 08/05/21 1033   Drainage Description Serosanguinous 08/05/21 1033   Odor None 08/05/21 1033   Shira-wound Assessment Blanchable erythema;Fragile 08/05/21 1033   Margins Defined edges 08/05/21 1033   Number of days: 0     WOC nurse consult for multiple wounds. Pt had a fall at home and laid on the floor for several days before he was able to crawl across the floor and get help. He has open areas to bilateral elbows and knees from dragging himself across the floor in order to get help. All areas to elbows/knees superficial and healthy looking. Some fibrinous tissue noted. Recommend foam dressings to protect. Buttocks red and blanchable. Pt has very prominent sacrum, and unstageable wound is present to the sacrum. There is dry black eschar to the middle of the wound and the edges are red. The eschar is firmly adherent to the wound base. Pt also has pressure/moisture injury to right buttock. The left buttock is closed, but red, and appears as though it was also open. There is some slough to the right buttock wound. Recommend triad cream to sacral and buttock wounds to help soften eschar and assist in autolytic debridement. Response to treatment:  Well tolerated by patient. Plan:     Plan of Care:     Buttocks/sacrum: Cleanse with foam cleanser, pat dry.  Apply triad cream to wounds, cover with foam dressing. Change daily and as needed if loose or soiled. Bilateral elbows/knees: Cleanse with saline, pat dry. Apply foam dressing. Change every 3 days and as needed if loose or soiled. -Turn every 2 hours    -Float heels off of bed with pillows under calves    -Sacral foam dressing to sacrococcygeal area. Peel back dressing, inspect skin beneath, re-secure. Change every 72 hours and prn wrinkles, soilage. Discontinue if repeatedly soiled by incontinence.     -Static air overlay. Check inflation each shift by sliding hand under the air overlay. Feel for the patient's heaviest/ most dependant body part. Ideally 1/2 to 1\" of air will be between your hand and the patient's body. Add air prn.     Specialty Bed Required : Yes   [] Low Air Loss   [x] Pressure Redistribution  [] Fluid Immersion  [] Bariatric  [] Total Pressure Relief  [] Other:     Current Diet: Diet NPO  Dietician consult:  Yes    Discharge Plan:  Placement for patient upon discharge: unsure at this time- possibly acute rehab  Patient appropriate for Outpatient [address]: Yes    Patient/Caregiver Teaching:  Level of patientunderstanding able to:     [x] Indicates understanding       [] Needs reinforcement  [] Unsuccessful      [x] Verbal Understanding  [] Demonstrated understanding       [] No evidence of learning  [] Refused teaching         [] N/A       Electronically signed by Cate Montano RN on  8/5/2021 at 10:40 AM

## 2021-08-05 NOTE — FLOWSHEET NOTE
Marcio Rodgers from the lab called to clarify when the Myoglobin should have been drawn. It was supposed to be drawn 8/4/21 @ 0600. Sample is greater thank 8 hours old, so it cannot be added to existing blood from this a.m. A new order for 8/6/21 @ 0600 was entered into Epic. The residents for Dr. Jimenez Police notified.

## 2021-08-05 NOTE — PLAN OF CARE
Problem: Falls - Risk of:  Goal: Will remain free from falls  Description: Will remain free from falls  8/5/2021 1547 by Oksana Coleman RN  Outcome: Met This Shift  8/5/2021 1547 by Oksana Coleman RN  Outcome: Met This Shift  8/5/2021 0408 by Apolinar Tavarez RN  Outcome: Ongoing  Goal: Absence of physical injury  Description: Absence of physical injury  8/5/2021 1547 by Oksana Coleman RN  Outcome: Met This Shift  8/5/2021 1547 by Oksana Coleman RN  Outcome: Met This Shift  8/5/2021 0408 by Apolinar Tavarez RN  Outcome: Ongoing     Problem: Skin Integrity:  Goal: Will show no infection signs and symptoms  Description: Will show no infection signs and symptoms  8/5/2021 1547 by Oksana Coleman RN  Outcome: Met This Shift  8/5/2021 1547 by Oksana Coleman RN  Outcome: Met This Shift  8/5/2021 0408 by Apolinar Tavarez RN  Outcome: Ongoing  Goal: Absence of new skin breakdown  Description: Absence of new skin breakdown  8/5/2021 1547 by Oksana Coleman RN  Outcome: Met This Shift  8/5/2021 1547 by Oksana Coleman RN  Outcome: Met This Shift  8/5/2021 0408 by Apolinar Tavarez RN  Outcome: Ongoing     Problem: Fluid Volume:  Goal: Ability to achieve and maintain adequate urine output will improve  Description: Ability to achieve and maintain adequate urine output will improve  8/5/2021 1547 by Oksana Coleman RN  Outcome: Met This Shift  8/5/2021 1547 by Oksana Coleman RN  Outcome: Met This Shift  8/5/2021 0408 by Apolinar Tavarez RN  Outcome: Ongoing     Problem: Respiratory:  Goal: Respiratory status will improve  Description: Respiratory status will improve  8/5/2021 1547 by Oksana Coleman RN  Outcome: Met This Shift  8/5/2021 1547 by Oksana Coleman RN  Outcome: Met This Shift  8/5/2021 0408 by Apolinar Tavarez RN  Outcome: Ongoing     Problem: Pain:  Goal: Pain level will decrease  Description: Pain level will decrease  8/5/2021 1547 by Oksana Coleman RN  Outcome: Met This Shift  8/5/2021 1547 by Wanda Dent Tanisha Nguyen RN  Outcome: Met This Shift  Goal: Control of chronic pain  Description: Control of chronic pain  8/5/2021 1547 by Aneesh Galeas, RN  Outcome: Met This Shift  8/5/2021 1547 by Aneesh Galeas RN  Outcome: Met This Shift

## 2021-08-05 NOTE — PROGRESS NOTES
Pt arrived to bed 2013 from PCU via bed, staff present. Vitals taken. Continuous monitoring applied. Bed in lowest position, wheels locked. Bed alarm on. Call light in reach. See flowsheet for assessment details. Safety  Maintained.

## 2021-08-05 NOTE — PROGRESS NOTES
[address] [facility]    PROGRESS NOTE             8/5/2021    9:23 AM    Name:   Robbie Padilal  MRN:     [id number]     Acct:      [de-identified]   Room:   2087/2087-01   Day:  2  Admit Date:  8/3/2021 10:59 AM    PCP:  No primary care provider on file. Code Status:  Full Code    Subjective:     C/C:   Chief Complaint   Patient presents with    Leg Pain    Irregular Heart Beat     Interval History Status: is doing better than yesterday. Patient was seen on bedside, no acute events overnight, he is on O2 NC satting at 100, , His CK is 826. Heparin was stopped for hip arthroplasty later today. Brief History:     58-year-old male patient presented to the ED via EMS after falling down. The patient was on floor for 5 days. ED evaluation showed atrial fibrillation with RVR was given diltiazem drip and admitted to progressive unit. An x-ray was done for the left hip and showed a fracture and chronic orthopedics was consulted. Patient also has a wound on his right leg and foot, patient says he has been using topical OTC cortisone for 10 years, wound evaluation has not been done yet, wound seems less erythematous than seen first day, patient currently is on cefazolin. Patient CK initially was in the 4071 and currently 826 is on IV fluids. Orthopedics approved for our performing left hip arthroplasty on the patient later today. Heparin is was stopped at 8 AM.   Heart rate today fluctuating last seen was 120, patient was not on diltiazem drip, cardiology stopped Lopressor and started digoxin 125 mcg           BUN 71 ==> 16  Cr 1.11 ==> 0.58  CK 4071 ==> 826  Myoglobin 1409  BNP   Troponin 76 ==>70 ==> 91        WBC 11.7 ==> 9.5 ==> 6  HGB today : 10.4           Review of Systems:     Review of Systems   Constitutional: Negative for fatigue and fever. HENT: Negative for congestion, ear discharge, ear pain, rhinorrhea and sore throat.     Eyes: Negative for pain, discharge and visual disturbance. Respiratory: Negative for cough, chest tightness, shortness of breath and wheezing. Cardiovascular: Negative for chest pain, palpitations and leg swelling. Gastrointestinal: Negative for abdominal pain, constipation, diarrhea, nausea and vomiting. Genitourinary: Negative for difficulty urinating, dysuria and hematuria. Skin: Positive for wound (Right leg and foot). Neurological: Negative for dizziness, light-headedness, numbness and headaches. Medications: Allergies:  No Known Allergies    Current Meds:   Scheduled Meds:    digoxin  125 mcg Oral Daily    metoprolol tartrate  50 mg Oral BID    sodium chloride flush  5-40 mL Intravenous 2 times per day    ceFAZolin  2,000 mg Intravenous Q8H     Continuous Infusions:    dilTIAZem (CARDIZEM) 125 mg in dextrose 5% 125 mL infusion 5 mg/hr (21 0835)    sodium chloride      sodium chloride 125 mL/hr at 21    heparin (PORCINE) Infusion Stopped (21 0746)     PRN Meds: perflutren lipid microspheres, sodium chloride flush, sodium chloride, ondansetron **OR** ondansetron, polyethylene glycol, acetaminophen **OR** acetaminophen, potassium chloride **OR** potassium alternative oral replacement **OR** potassium chloride, heparin (porcine), heparin (porcine)    Data:     Past Medical History:   has no past medical history on file. Social History:   reports that he has never smoked. He has never used smokeless tobacco. He reports that he does not drink alcohol. Family History: History reviewed. No pertinent family history. Vitals:  BP (!) 107/59   Pulse 118   Temp 97.6 °F (36.4 °C) (Oral)   Resp 16   Ht 5' 7\" (1.702 m)   Wt 142 lb 10.2 oz (64.7 kg)   SpO2 100%   BMI 22.34 kg/m²   Temp (24hrs), Av.7 °F (36.5 °C), Min:97.3 °F (36.3 °C), Max:98.2 °F (36.8 °C)    No results for input(s): POCGLU in the last 72 hours. I/O(24Hr):     Intake/Output Summary (Last 24 hours) at 8/5/2021 6014  Last data filed at 8/5/2021 0836  Gross per 24 hour   Intake 3431 ml   Output 200 ml   Net 3231 ml       Labs:    CBC with Differential:    Lab Results   Component Value Date    WBC 6.1 08/05/2021    RBC 3.13 08/05/2021    HGB 10.4 08/05/2021    HCT 30.2 08/05/2021     08/05/2021    MCV 96.3 08/05/2021    MCH 33.2 08/05/2021    MCHC 34.5 08/05/2021    RDW 13.9 08/05/2021    LYMPHOPCT 15 08/05/2021    MONOPCT 9 08/05/2021    BASOPCT 0 08/05/2021    MONOSABS 0.50 08/05/2021    LYMPHSABS 0.90 08/05/2021    EOSABS 0.10 08/05/2021    BASOSABS 0.00 08/05/2021    DIFFTYPE NOT REPORTED 08/05/2021     CMP:    Lab Results   Component Value Date     08/05/2021    K 3.2 08/05/2021     08/05/2021    CO2 27 08/05/2021    BUN 16 08/05/2021    CREATININE 0.58 08/05/2021    GFRAA >60 08/05/2021    LABGLOM >60 08/05/2021    GLUCOSE 102 08/05/2021    PROT 6.8 08/03/2021    LABALBU 3.8 08/03/2021    CALCIUM 7.7 08/05/2021    BILITOT 1.74 08/03/2021    ALKPHOS 110 08/03/2021    AST 85 08/03/2021     08/03/2021       Lab Results   Component Value Date/Time    SPECIAL NOT REPORTED 08/03/2021 11:41 AM     Lab Results   Component Value Date/Time    CULTURE NO GROWTH 2 DAYS 08/03/2021 11:41 AM         Radiology:    ECHO Complete 2D W Doppler W Color    Result Date: 8/4/2021  [facility] Transthoracic Echocardiography Report (TTE)  Patient Name Trey No  Date of Study                 08/04/2021   Date of      1940  Gender                        Male  Birth   Age          79 year(s)  Race                             Room Number  2087        Height:                       66.93 inch, 170 cm   Corporate ID [id number]    Weight:                       141 pounds, 64 kg  #   Patient Acct [de-identified]   BSA:           1.74 m^2       BMI:      22.15  #                                                                kg/m^2   MR #         [id number]      Sonographer Eva Gan   Accession #  [id number]  Interpreting Physician        [address]   Fellow                   Referring Nurse Practitioner   Interpreting             Referring Physician           [address] [facility]  Fellow  Type of Study   TTE procedure:2D Echocardiogram, M-Mode, Doppler, Color Doppler. Procedure Date Date: 08/04/2021 Start: 10:15 AM Study Location: [facility] Technical Quality: Fair visualization Indications:Elevated troponin and Atrial fibrillation. Patient Status: Inpatient Height: 66.93 inches Weight: 141. 11 pounds BSA: 1.74 m^2 BMI: 22.15 kg/m^2 Rhythm: Atrial fibrillation HR: 129 bpm BP: 98/56 mmHg CONCLUSIONS Summary Normal left ventricle size and function with an estimated EF > 55%. Mild left ventricular hypertrophy. Left atrial dilatation. Aortic valve was not well visualized. Mild to moderate aortic insufficiency. Mild mitral regurgitation. Mild tricuspid regurgitation. Normal right ventricular systolic pressure. IVC Increased diameter, but still has inspiratory variation. Signature ----------------------------------------------------------------------------  Electronically signed by Eva Gan(Sonographer) on 08/04/2021 10:48  AM ---------------------------------------------------------------------------- ----------------------------------------------------------------------------  Electronically signed by Nico Hilton(Interpreting physician) on 08/04/2021  11:46 AM ---------------------------------------------------------------------------- FINDINGS Left Atrium Left atrial dilatation. Left Ventricle Normal left ventricle size and function with an estimated EF > 55%. No segmental wall motion abnormalities seen. Mild left ventricular hypertrophy. Right Atrium Right atrium is normal in size. Right Ventricle Normal right ventricular size and function. Mitral Valve Normal mitral valve structure and function. Mild mitral regurgitation.  Aortic Valve Aortic valve was not well visualized. Mild to moderate aortic insufficiency. No aortic stenosis. Tricuspid Valve Normal tricuspid valve structure and function. Mild tricuspid regurgitation. Normal right ventricular systolic pressure. Pulmonic Valve Pulmonic valve not well visualized but Doppler velocities are normal. No pulmonic insufficiency. Pericardial Effusion No significant pericardial effusion is seen. Miscellaneous Normal aortic root dimension. E/e' average 5.3 IVC Increased diameter, but still has inspiratory variation.  M-mode / 2D Measurements & Calculations:   LVIDd:4.82 cm(3.7 - 5.6 cm)      Diastolic YHBFSA:544 ml  ACBLK:8.39 cm(2.2 - 4.0 cm)      Systolic ISOKSE:75.6 ml  UHBJ:5.79 cm(0.6 - 1.1 cm)       Aortic Root:3.4 cm(2.0 - 3.7 cm)  LVPWd:1.22 cm(0.6 - 1.1 cm)      LA Dimension: 4.9 cm(1.9 - 4.0 cm)  Fractional Shortenin.72 %    LA volume/Index: 43.8 ml /25m^2  Calculated LVEF (%): 71.5 %      LVOT:2.4 cm   Mitral:                                Aortic   Peak E-Wave: 0.84 m/s                  Peak Velocity: 1.20 m/s                                         Mean Velocity: 0.88 m/s  Peak Gradient: 2.82 mmHg               Peak Gradient: 5.76 mmHg  Deceleration Time: 173 msec            Mean Gradient: 5 mmHg                                         AI P1/2t: 441 msec                                          Area (continuity): 1.83 cm^2                                         AV VTI: 24.9 cm   Tricuspid:                             Pulmonic:   Estimated RVSP: 19 mmHg  Peak TR Velocity: 1.69 m/s  Peak TR Gradient: 11.4244 mmHg  Estimated RA Pressure: 8 mmHg                                         Estimated PASP: 19.42 mmHg  Septal Wall E' velocity:0.12 m/s Lateral Wall E' velocity:0.12 m/s    CT HEAD WO CONTRAST    Result Date: 8/3/2021  EXAMINATION: CT OF THE HEAD WITHOUT CONTRAST  8/3/2021 1:55 pm TECHNIQUE: CT of the head was performed without the administration of intravenous contrast. Dose modulation, iterative reconstruction, and/or weight based adjustment of the mA/kV was utilized to reduce the radiation dose to as low as reasonably achievable. COMPARISON: None. HISTORY: ORDERING SYSTEM PROVIDED HISTORY: Fall TECHNOLOGIST PROVIDED HISTORY: Fall Decision Support Exception - unselect if not a suspected or confirmed emergency medical condition->Emergency Medical Condition (MA) Reason for Exam: pt fell 5 days ago, in afib Acuity: Unknown Type of Exam: Unknown FINDINGS: BRAIN/VENTRICLES: There is no acute intracranial hemorrhage, mass effect or midline shift. No abnormal extra-axial fluid collection. The gray-white differentiation is maintained without evidence of an acute infarct. There is no evidence of hydrocephalus. ORBITS: The visualized portion of the orbits demonstrate no acute abnormality. SINUSES: The visualized paranasal sinuses and mastoid air cells demonstrate no acute abnormality. SOFT TISSUES/SKULL:  No acute abnormality of the visualized skull or soft tissues. No acute intracranial abnormality. CT CERVICAL SPINE WO CONTRAST    Result Date: 8/3/2021  EXAMINATION: CT OF THE CERVICAL SPINE WITHOUT CONTRAST 8/3/2021 1:55 pm TECHNIQUE: CT of the cervical spine was performed without the administration of intravenous contrast. Multiplanar reformatted images are provided for review. Dose modulation, iterative reconstruction, and/or weight based adjustment of the mA/kV was utilized to reduce the radiation dose to as low as reasonably achievable. COMPARISON: None. HISTORY: ORDERING SYSTEM PROVIDED HISTORY: Fall TECHNOLOGIST PROVIDED HISTORY: Fall Decision Support Exception - unselect if not a suspected or confirmed emergency medical condition->Emergency Medical Condition (MA) Reason for Exam: pt fell 5 days ago, in afib Acuity: Unknown Type of Exam: Unknown FINDINGS: BONES/ALIGNMENT: There is no acute fracture or traumatic malalignment. DEGENERATIVE CHANGES: No significant degenerative changes.  SOFT TISSUES: There is no prevertebral soft tissue swelling. No acute abnormality of the cervical spine. XR CHEST PORTABLE    Result Date: 8/3/2021  EXAMINATION: ONE XRAY VIEW OF THE CHEST 8/3/2021 11:41 am COMPARISON: None. HISTORY: ORDERING SYSTEM PROVIDED HISTORY: AMS TECHNOLOGIST PROVIDED HISTORY: AMS Reason for Exam: AMS Acuity: Unknown Type of Exam: Unknown FINDINGS: Patient is slightly rotated. Heart size within normal limits without evidence of vascular congestion. Aside from minor chronic appearing changes lungs are grossly clear. Probable skin fold projects in the left lower chest.  No focal lung consolidation is seen. No significant pleural effusions. Gas distended bowel loops beneath the left hemidiaphragm. Monitor leads overlie the chest.  The bones are osteopenic with degenerative changes; questionable age-indeterminate slight loss of height of lower thoracic vertebral bodies. No acute cardiopulmonary process suspected. CT HIP LEFT WO CONTRAST    Result Date: 8/3/2021  EXAMINATION: CT OF THE LEFT HIP WITHOUT CONTRAST 8/3/2021 1:55 pm TECHNIQUE: CT of the left hip was performed without the administration of intravenous contrast.  Multiplanar reformatted images are provided for review. Dose modulation, iterative reconstruction, and/or weight based adjustment of the mA/kV was utilized to reduce the radiation dose to as low as reasonably achievable. COMPARISON: Pelvis and left hip radiographs August 3, 2021 HISTORY ORDERING SYSTEM PROVIDED HISTORY: ?acetabulum fx TECHNOLOGIST PROVIDED HISTORY: ?acetabulum fx Decision Support Exception - unselect if not a suspected or confirmed emergency medical condition->Emergency Medical Condition (MA) Reason for Exam: pt fell 5 days ago, in afib, lt hip pain Acuity: Unknown Type of Exam: Unknown FINDINGS: Bones: Redemonstration of displaced transcervical left femoral neck fracture which is subacute in appearance. No additional fractures are identified.  The bones are osteopenic. No suspicious lytic or sclerotic osseous lesions are evident. Soft Tissue: Subcutaneous and intramuscular edema adjacent to the fracture site. No organized collections are identified. No subcutaneous gas. Visualized intrapelvic structures appear grossly unremarkable. Moderate amount of stool present at the rectal vault. Joint: Anatomic alignment of the left hip. Mild-to-moderate degenerative changes of the left hip. Chondrocalcinosis noted. 1. Displaced fracture of the left transcervical femoral neck which is subacute in appearance. 2. No additional fractures are identified. 3. Mild-to-moderate degenerative change of the left hip. Chondrocalcinosis also present. XR HIP 2-3 VW W PELVIS LEFT    Result Date: 8/3/2021  EXAMINATION: ONE XRAY VIEW OF THE PELVIS AND TWO XRAY VIEWS LEFT HIP 8/3/2021 8:41 am COMPARISON: None. HISTORY: ORDERING SYSTEM PROVIDED HISTORY: fall TECHNOLOGIST PROVIDED HISTORY: fall Reason for Exam: fall, left hip pain Acuity: Unknown Type of Exam: Unknown FINDINGS: There is a left femoral neck fracture with impaction, varus angulation, and proximal migration of the greater trochanter which is essentially at the level of the acetabulum. The femoral head appears to remain appropriately seated in the acetabular fossa. Possible transverse lucency through the medial left acetabulum. 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 Left femoral neck fracture with impaction, varus angulation, and proximal migration of the greater trochanter. Possible lucency through the medial left acetabulum, though radiographic evaluation is limited by osseous demineralization. Consider CT. Physical Examination:        Physical Exam  Constitutional:       General: He is not in acute distress. Appearance: Normal appearance.  He is normal weight. He is not ill-appearing, toxic-appearing or diaphoretic. HENT:      Head: Normocephalic and atraumatic. Eyes:      General: No scleral icterus. Right eye: No discharge. Left eye: No discharge. Extraocular Movements: Extraocular movements intact. Conjunctiva/sclera: Conjunctivae normal.      Pupils: Pupils are equal, round, and reactive to light. Neck:      Vascular: No carotid bruit. Cardiovascular:      Rate and Rhythm: Rhythm irregular. Pulses: Normal pulses. Heart sounds: Normal heart sounds. No murmur heard. No friction rub. No gallop. Pulmonary:      Effort: Pulmonary effort is normal. No respiratory distress. Breath sounds: Normal breath sounds. No wheezing or rhonchi. Chest:      Chest wall: No tenderness. Abdominal:      General: Abdomen is flat. Bowel sounds are normal. There is no distension. Palpations: Abdomen is soft. Tenderness: There is no abdominal tenderness. There is no guarding or rebound. Skin:     General: Skin is warm and dry. Capillary Refill: Capillary refill takes less than 2 seconds. Coloration: Skin is not pale. Findings: Lesion (Scalling and erythematous lesion on right leg and dorsal aspect of foot.) present. Neurological:      General: No focal deficit present. Mental Status: He is alert and oriented to person, place, and time. Mental status is at baseline. Sensory: No sensory deficit. Motor: No weakness. Psychiatric:         Mood and Affect: Mood normal.         Behavior: Behavior normal.         Thought Content:  Thought content normal.           Assessment:        Primary Problem  <principal problem not specified>    Active Hospital Problems    Diagnosis Date Noted    Closed fracture of left hip with routine healing [S72.002D] 08/04/2021    Rhabdomyolysis [M62.82] 08/04/2021    [facility] [I48.91] 08/03/2021       Plan:        Atrial Fibrillation with RVR  -  HR was 150 in the ED and was given 10mg IV diltiazem and currently on 125 mg diltiazem infusion  -Heparin injection 3950 units, currently on heparin 25,000 units infusion.  - Cardiology stopped Lopressor and started Digoxin 125mcg   -Heparin was stopped at 4:47     Hip pain:   -Hip x-ray was ordered and showed left femoral neck fracture  -Orthopedic surgery was consulted  - Left hip arthroplasty to be done later today. - Hold Heparin drip 6 hours before the surgery    Right foot wound:  - Ordered Wound ostomy evaluation   - Started patient on cefazolin  - wound evaluation not done yet    Rhabdomyolysis:   - CK 4071 ==> 826  - Myoglobin 1409  -Patient was given lactated Ringer's bolus 2000 mL, and is on normal saline at 125 mL/h. Pavan Zacarias MD  8/5/2021  9:23 AM     Attestation and add on       I have discussed the care of Ebonie Pugh , including pertinent history and exam findings,      8/5/21    with the resident. I have seen and examined the patient and the key elements of all parts of the encounter have been performed by me .    I agree with the assessment, plan and orders as documented by the resident.     ----Cardiology input noted from yesterday  ;   IMPRESSION:        Patient Active Problem List   Diagnosis    A[facility]fib ([facility]      - Elevated Troponin- due to Rhabdo  - Rhabdomyolysis with prolonged time spent on floor - 5 days  - New AF with RVR- now rate controlled  - Falls  - Acute Hip Fracture     RECOMMENDATIONS:  - agree with IVF  - on cardizem and heparin drips  - start digoxin 125 mcg qd  - start lopressor 50 mg po bid  - then wean cardizem  - likely not good candidate for long term AC (due to falls) unless he is placed in a NH  - check 2d Echo  - if 2d Echo is low risk, can proceed with orthopedic surgery at mod/high (but not prohibitive) risk          [] PICU         [x] ICU  Condition  ;            [x] ill ,           [x] critical                  [] improving [] stable [] worsening     [x] labile [] septic ,    [] delirium [] on vent        [] dshock     Complexity of decision making ;           [x] Moderate  to High   Risk status M/M ;           [x] Moderate  to High       --Patient with A. fib with rapid ventricular response  History of fall yesterday  Acute hip fracture    Patient had to be transferred to ICU so he could be started on IV beta-blocker for control of arrhythmia--                     MD ALIE Romero [address], [address] [place] [address].    Phone [phone]   Fax: [phone]  Answering Service: [phone]

## 2021-08-05 NOTE — DISCHARGE INSTR - COC
1033   Wound Etiology Pressure Stage  3 08/05/21 1033   Dressing Status Old drainage noted;New dressing applied 08/05/21 1033   Wound Cleansed Cleansed with saline 08/05/21 1033   Wound Length (cm) 2.8 cm 08/05/21 1033   Wound Width (cm) 0.9 cm 08/05/21 1033   Wound Depth (cm) 0.1 cm 08/05/21 1033   Wound Surface Area (cm^2) 2.52 cm^2 08/05/21 1033   Wound Volume (cm^3) 0.252 cm^3 08/05/21 1033   Wound Assessment Pink/red;Slough 08/05/21 1033   Drainage Amount Moderate 08/05/21 1033   Drainage Description Serosanguinous 08/05/21 1033   Odor None 08/05/21 1033   Shira-wound Assessment Blanchable erythema 08/05/21 1033   Margins Defined edges 08/05/21 1033   Number of days: 0       Wound 08/05/21 Sacrum (Active)   Wound Image   08/05/21 1033   Wound Etiology Pressure Unstageable 08/05/21 1033   Dressing Status Old drainage noted;New dressing applied 08/05/21 1033   Wound Cleansed Cleansed with saline 08/05/21 1033   Wound Length (cm) 3.7 cm 08/05/21 1033   Wound Width (cm) 3 cm 08/05/21 1033   Wound Depth (cm) 0.1 cm 08/05/21 1033   Wound Surface Area (cm^2) 11.1 cm^2 08/05/21 1033   Wound Volume (cm^3) 1.11 cm^3 08/05/21 1033   Wound Assessment Eschar dry;Slough;Pink/red 08/05/21 1033   Drainage Amount Moderate 08/05/21 1033   Drainage Description Serosanguinous 08/05/21 1033   Odor None 08/05/21 1033   Shira-wound Assessment Blanchable erythema;Fragile 08/05/21 1033   Margins Defined edges 08/05/21 1033   Number of days: 0       Wound 08/05/21 Elbow Right cluster (Active)   Wound Image   08/05/21 1033   Wound Etiology Traumatic 08/05/21 1033   Dressing Status Old drainage noted 08/05/21 1033   Dressing/Treatment Foam 08/05/21 1033   Wound Length (cm) 7 cm 08/05/21 1033   Wound Width (cm) 7 cm 08/05/21 1033   Wound Depth (cm) 0.1 cm 08/05/21 1033   Wound Surface Area (cm^2) 49 cm^2 08/05/21 1033   Wound Volume (cm^3) 4.9 cm^3 08/05/21 1033   Wound Assessment Pink/red 08/05/21 1033   Drainage Amount Moderate 08/05/21 1033   Drainage Description Serosanguinous 08/05/21 1033   Odor None 08/05/21 1033   Shira-wound Assessment Blanchable erythema;Fragile 08/05/21 1033   Margins Defined edges 08/05/21 1033   Number of days: 0       Wound 08/05/21 Elbow Left cluster (Active)   Wound Image   08/05/21 1033   Wound Etiology Traumatic 08/05/21 1033   Dressing Status Old drainage noted 08/05/21 1033   Dressing/Treatment Foam 08/05/21 1033   Wound Length (cm) 5 cm 08/05/21 1033   Wound Width (cm) 3.5 cm 08/05/21 1033   Wound Depth (cm) 0.1 cm 08/05/21 1033   Wound Surface Area (cm^2) 17.5 cm^2 08/05/21 1033   Wound Volume (cm^3) 1.75 cm^3 08/05/21 1033   Wound Assessment Pink/red 08/05/21 1033   Drainage Amount Moderate 08/05/21 1033   Drainage Description Serosanguinous 08/05/21 1033   Odor None 08/05/21 1033   Shira-wound Assessment Blanchable erythema;Fragile 08/05/21 1033   Margins Defined edges 08/05/21 1033   Number of days: 0       Wound 08/05/21 Knee Right (Active)   Wound Image   08/05/21 1033   Wound Etiology Traumatic 08/05/21 1033   Dressing Status Old drainage noted 08/05/21 1033   Dressing/Treatment Foam 08/05/21 1033   Wound Length (cm) 3.5 cm 08/05/21 1033   Wound Width (cm) 6 cm 08/05/21 1033   Wound Depth (cm) 0.1 cm 08/05/21 1033   Wound Surface Area (cm^2) 21 cm^2 08/05/21 1033   Wound Volume (cm^3) 2.1 cm^3 08/05/21 1033   Wound Assessment Pink/red;Fibrin 08/05/21 1033   Drainage Amount Moderate 08/05/21 1033   Drainage Description Serosanguinous 08/05/21 1033   Odor None 08/05/21 1033   Shira-wound Assessment Blanchable erythema;Fragile 08/05/21 1033   Margins Defined edges 08/05/21 1033   Number of days: 0       Wound 08/05/21 Knee Left (Active)   Wound Image   08/05/21 1033   Wound Etiology Traumatic 08/05/21 1033   Dressing Status Old drainage noted 08/05/21 1033   Dressing/Treatment Foam 08/05/21 1033   Wound Length (cm) 2.8 cm 08/05/21 1033   Wound Width (cm) 1.1 cm 08/05/21 1033   Wound Depth (cm) 0.1 cm 08/05/21 1033   Wound Surface Area (cm^2) 3.08 cm^2 21 1033   Wound Volume (cm^3) 0.308 cm^3 21 1033   Wound Assessment Pink/red;Fibrin 21 1033   Drainage Amount Moderate 21 1033   Drainage Description Serosanguinous 21 1033   Odor None 21 1033   Shira-wound Assessment Blanchable erythema;Fragile 21 1033   Margins Defined edges 21 1033   Number of days: 0     Inpatient wound care orders:     Buttocks/sacrum: Cleanse with foam cleanser, pat dry. Apply triad cream to sacral wounds- dime thickness to cover entire wound to the edges, cover with foam dressing. Change daily and as needed if loose or soiled. May use Triad cream to buttock wound, no need for secondary dressing to cover. Bilateral elbows/knees/right hip: Cleanse with saline, pat dry. Apply foam dressing. Change every 3 days and as needed if loose or soiled. Elimination:  Continence:   · Bowel: {YES / FL:82164}  · Bladder: {YES / OK:58562}  Urinary Catheter: {Urinary Catheter:256796025}   Colostomy/Ileostomy/Ileal Conduit: {YES / C}       Date of Last BM: ***    Intake/Output Summary (Last 24 hours) at 2021 1057  Last data filed at 2021 1011  Gross per 24 hour   Intake 3431 ml   Output 750 ml   Net 2681 ml     I/O last 3 completed shifts:   In: 3431 [I.V.:3431]  Out: 200 [Urine:200]    Safety Concerns:     [address] Safety Concerns:853803265}    Impairments/Disabilities:      [address] Impairments/Disabilities:550611821}    Nutrition Therapy:  Current Nutrition Therapy:   [address] Diet List:744490878}    Routes of Feeding: {CHP DME Other Feedings:925778380}  Liquids: {Slp liquid thickness:01769}  Daily Fluid Restriction: {CHP DME Yes amt example:496929545}  Last Modified Barium Swallow with Video (Video Swallowing Test): {Done Not Done NTGD:929670109}    Treatments at the Time of Hospital Discharge:   Respiratory Treatments: ***  Oxygen Therapy:  {Therapy; copd oxygen:86414}  Ventilator:    { CC Vent BWNV:965622033}    Rehab Therapies: Physical Therapy and Occupational Therapy  Weight Bearing Status/Restrictions: Other Medical Equipment (for information only, NOT a DME order): Other Treatments: skilled nursing assessment per protocol medication education     Patient's personal belongings (please select all that are sent with patient):  {[facility] DME Belongings:907346803}    RN SIGNATURE:  {Esignature:697912692}    CASE MANAGEMENT/SOCIAL WORK SECTION    Inpatient Status Date: 8/3/21    Readmission Risk Assessment Score:  Readmission Risk              Risk of Unplanned Readmission:  10           Discharging to Facility/ Agency   · [address] (if applicable)   · Name:  · Address:  · Dialysis Schedule:  · Phone:  · Fax:    / signature: Electronically signed by Sera Cordon RN on 8/12/21 at 8:44 AM EDT    PHYSICIAN SECTION    Prognosis: {Prognosis:4905485407}    Condition at Discharge: [facility] Patient Condition:663999511}    Rehab Potential (if transferring to Rehab): {Prognosis:6082822642}    Recommended Labs or Other Treatments After Discharge: ***    Physician Certification: I certify the above information and transfer of [place]  is necessary for the continuing treatment of the diagnosis listed and that he requires {Admit to Appropriate Level of Care:85980} for {GREATER/LESS:430383350} 30 days.      Update Admission H&P: {CHP DME Changes in [facility]:627254451}    PHYSICIAN SIGNATURE:  {Esignature:771266116}

## 2021-08-05 NOTE — PROGRESS NOTES
Physical Therapy        Physical Therapy Cancel Note      DATE: 2021    NAME: Lore Marie  MRN: [id number]   : 1940      Patient not seen this date for Physical Therapy due to:    2021 at 35- HOLD PT evaluation due to left hip fracture. Pt scheduled for surgery for left hip hemiarthroplasty later today. Will await postop orders.        Electronically signed by Ric Dinh PT on 2021 at 8:07 AM

## 2021-08-05 NOTE — PLAN OF CARE
Problem: Falls - Risk of:  Goal: Will remain free from falls  Description: Will remain free from falls  8/5/2021 0408 by Gem Enrique RN  Outcome: Ongoing  8/4/2021 1926 by Lindsey Sanches RN  Outcome: Ongoing  8/4/2021 1923 by Lindsey Sanches RN  Outcome: Ongoing  Goal: Absence of physical injury  Description: Absence of physical injury  8/5/2021 0408 by Gem Enrique RN  Outcome: Ongoing  8/4/2021 1926 by Lindsey Sanches RN  Outcome: Ongoing  8/4/2021 1923 by Lindsey Sanches RN  Outcome: Ongoing     Problem: Skin Integrity:  Goal: Will show no infection signs and symptoms  Description: Will show no infection signs and symptoms  8/5/2021 0408 by Gem Enrique RN  Outcome: Ongoing  8/4/2021 1926 by Lindsey aSnches RN  Outcome: Ongoing  8/4/2021 1923 by Lindsey Sanches RN  Outcome: Ongoing  Goal: Absence of new skin breakdown  Description: Absence of new skin breakdown  8/5/2021 0408 by Gem Enrique RN  Outcome: Ongoing  8/4/2021 1926 by Lindsey Sanches RN  Outcome: Ongoing  8/4/2021 1923 by Lindsey Sanches RN  Outcome: Ongoing     Problem: Cardiac:  Goal: Ability to maintain an adequate cardiac output will improve  Description: Ability to maintain an adequate cardiac output will improve  8/5/2021 0408 by Gem Enrique RN  Outcome: Ongoing  8/4/2021 1926 by Lindsey Sanches RN  Outcome: Ongoing  8/4/2021 1923 by Lindsey Sanches RN  Outcome: Ongoing  Goal: Hemodynamic stability will improve  Description: Hemodynamic stability will improve  8/5/2021 0408 by Gem Enrique RN  Outcome: Ongoing  8/4/2021 1926 by Lindsey Sanches RN  Outcome: Ongoing  8/4/2021 1923 by Lindsey Sanches RN  Outcome: Ongoing

## 2021-08-05 NOTE — PROGRESS NOTES
Intake Outcomes:  Diet Advancement/Tolerance  Physical Signs/Symptoms Outcomes:  Biochemical Data, GI Status, Skin, Weight, Fluid Status or Edema     Discharge Planning:     Too soon to determine     Electronically signed by Laurel Schulz RD, LD on 8/5/21 at 2:57 PM EDT    Contact: [phone]

## 2021-08-05 NOTE — BRIEF OP NOTE
Brief Postoperative Note      Patient: Nelida Chambers  YOB: 1940  MRN: [id number]    Date of Procedure: 8/5/2021    Pre-Op Diagnosis: LEFT FEMORAL NECK FRACTURE    Post-Op Diagnosis: Same       Procedure(s):  HIP HEMIARTHROPLASTY    Surgeon(s):  Daniel Mohan MD    Assistant:  Resident: Юлия Perea DO    Anesthesia: General    Estimated Blood Loss (mL): 948 mL    Complications: None    Specimens:   ID Type Source Tests Collected by Time Destination   1 : URINE CULTURE POST DICKERSON PLACEMENT Urine Urine, indwelling catheter URINE RT REFLEX TO CULTURE Daniel Mohan MD 8/5/2021 1824        Implants:  Implant Name Type Inv.  Item Serial No.  Lot No. LRB No. Used Action   STEM FEM DIA9MM STD OFFSET HIP CO CHROM ECHO FX  STEM FEM DIA9MM STD OFFSET HIP CO CHROM ECHO FX  [facility] [id number] Left 1 Implanted   VERSYS DISTAL CENTRALIZER 11MM  VERSYS DISTAL CENTRALIZER 11MM  [facility] [id number] Left 1 Implanted   COMPONENT UPLR [id number] HIP CO CHROM MOLYBDENUM ALLY MOD  COMPONENT UPLR [id number] HIP CO CHROM MOLYBDENUM ALLY MOD  [email or web address] [id number] Left 1 Implanted   INSERT FEM NK L+3MM HIP CO CHROM TAPR ENDO II  INSERT FEM NK L+3MM HIP CO CHROM TAPR ENDO II  [facility] [id number] Left 1 Implanted         Drains:   Urethral Catheter Straight-tip 16 fr (Active)       [REMOVED] External Urinary Catheter (Removed)   Catheter changed  No 08/05/21 1200   Urine Color Danielle 08/05/21 1600   Urine Appearance Clear 08/05/21 1011   Urine Odor Malodorous 08/05/21 1011   Output (mL) 550 mL 08/05/21 1011   Suction 40 mmgHg continuous 08/04/21 0839   Placement Replaced 08/04/21 0417   Skin Assessment No Injury 08/04/21 6388       Findings: See operative report    Electronically signed by Daniel Mohan MD on 8/5/2021 at 7:13 PM

## 2021-08-06 ENCOUNTER — APPOINTMENT (OUTPATIENT)
Dept: GENERAL RADIOLOGY | Age: 81
DRG: 981 | End: 2021-08-06
Payer: MEDICARE

## 2021-08-06 LAB
ABSOLUTE BANDS #: 0.36 K/UL (ref 0–1)
ABSOLUTE EOS #: 0 K/UL (ref 0–0.4)
ABSOLUTE IMMATURE GRANULOCYTE: ABNORMAL K/UL (ref 0–0.3)
ABSOLUTE LYMPH #: 0.27 K/UL (ref 1–4.8)
ABSOLUTE MONO #: 0.18 K/UL (ref 0.1–1.3)
ALLEN TEST: ABNORMAL
ANION GAP SERPL CALCULATED.3IONS-SCNC: 9 MMOL/L (ref 9–17)
ANTI-XA UNFRAC HEPARIN: 0.45 IU/L (ref 0.3–0.7)
ANTI-XA UNFRAC HEPARIN: <0.1 IU/L (ref 0.3–0.7)
BANDS: 4 % (ref 0–10)
BASOPHILS # BLD: 0 % (ref 0–2)
BASOPHILS ABSOLUTE: 0 K/UL (ref 0–0.2)
BNP INTERPRETATION: ABNORMAL
BUN BLDV-MCNC: 12 MG/DL (ref 8–23)
BUN/CREAT BLD: ABNORMAL (ref 9–20)
CALCIUM SERPL-MCNC: 7.9 MG/DL (ref 8.6–10.4)
CARBOXYHEMOGLOBIN: 0.6 % (ref 0–5)
CHLORIDE BLD-SCNC: 110 MMOL/L (ref 98–107)
CO2: 23 MMOL/L (ref 20–31)
CREAT SERPL-MCNC: <0.4 MG/DL (ref 0.7–1.2)
DIFFERENTIAL TYPE: ABNORMAL
EOSINOPHILS RELATIVE PERCENT: 0 % (ref 0–4)
FIO2: 30
GFR AFRICAN AMERICAN: ABNORMAL ML/MIN
GFR NON-AFRICAN AMERICAN: ABNORMAL ML/MIN
GFR SERPL CREATININE-BSD FRML MDRD: ABNORMAL ML/MIN/{1.73_M2}
GFR SERPL CREATININE-BSD FRML MDRD: ABNORMAL ML/MIN/{1.73_M2}
GLUCOSE BLD-MCNC: 115 MG/DL (ref 70–99)
HCO3 ARTERIAL: 24.9 MMOL/L (ref 22–26)
HCT VFR BLD CALC: 30.5 % (ref 41–53)
HEMOGLOBIN: 10.1 G/DL (ref 13.5–17.5)
IMMATURE GRANULOCYTES: ABNORMAL %
LYMPHOCYTES # BLD: 3 % (ref 24–44)
MCH RBC QN AUTO: 33.2 PG (ref 26–34)
MCHC RBC AUTO-ENTMCNC: 33.1 G/DL (ref 31–37)
MCV RBC AUTO: 100.4 FL (ref 80–100)
METHEMOGLOBIN: 1.2 % (ref 0–1.9)
MODE: ABNORMAL
MONOCYTES # BLD: 2 % (ref 1–7)
MORPHOLOGY: ABNORMAL
MORPHOLOGY: ABNORMAL
MYOGLOBIN: 431 NG/ML (ref 28–72)
NEGATIVE BASE EXCESS, ART: ABNORMAL MMOL/L (ref 0–2)
NOTIFICATION TIME: ABNORMAL
NOTIFICATION: ABNORMAL
NRBC AUTOMATED: ABNORMAL PER 100 WBC
O2 DEVICE/FLOW/%: ABNORMAL
O2 SAT, ARTERIAL: 97.2 % (ref 95–98)
OXYHEMOGLOBIN: ABNORMAL % (ref 95–98)
PATIENT TEMP: 37
PCO2 ARTERIAL: 39.1 MMHG (ref 35–45)
PCO2, ART, TEMP ADJ: ABNORMAL (ref 35–45)
PDW BLD-RTO: 14.2 % (ref 11.5–14.9)
PEEP/CPAP: 5
PH ARTERIAL: 7.41 (ref 7.35–7.45)
PH, ART, TEMP ADJ: ABNORMAL (ref 7.35–7.45)
PLATELET # BLD: 176 K/UL (ref 150–450)
PLATELET ESTIMATE: ABNORMAL
PMV BLD AUTO: 10.1 FL (ref 6–12)
PO2 ARTERIAL: 117 MMHG (ref 80–100)
PO2, ART, TEMP ADJ: ABNORMAL MMHG (ref 80–100)
POSITIVE BASE EXCESS, ART: 0.3 MMOL/L (ref 0–2)
POTASSIUM SERPL-SCNC: 4 MMOL/L (ref 3.7–5.3)
PRO-BNP: 2651 PG/ML
PSV: ABNORMAL
PT. POSITION: ABNORMAL
RBC # BLD: 3.04 M/UL (ref 4.5–5.9)
RBC # BLD: ABNORMAL 10*6/UL
RESPIRATORY RATE: 17
SAMPLE SITE: ABNORMAL
SEG NEUTROPHILS: 91 % (ref 36–66)
SEGMENTED NEUTROPHILS ABSOLUTE COUNT: 8.09 K/UL (ref 1.3–9.1)
SET RATE: 16
SODIUM BLD-SCNC: 142 MMOL/L (ref 135–144)
TEXT FOR RESPIRATORY: ABNORMAL
TOTAL HB: ABNORMAL G/DL (ref 12–16)
TOTAL RATE: 17
VT: 500
WBC # BLD: 8.9 K/UL (ref 3.5–11)
WBC # BLD: ABNORMAL 10*3/UL

## 2021-08-06 PROCEDURE — 99233 SBSQ HOSP IP/OBS HIGH 50: CPT | Performed by: INTERNAL MEDICINE

## 2021-08-06 PROCEDURE — 2580000003 HC RX 258: Performed by: ORTHOPAEDIC SURGERY

## 2021-08-06 PROCEDURE — 6370000000 HC RX 637 (ALT 250 FOR IP): Performed by: INTERNAL MEDICINE

## 2021-08-06 PROCEDURE — 83880 ASSAY OF NATRIURETIC PEPTIDE: CPT

## 2021-08-06 PROCEDURE — 80048 BASIC METABOLIC PNL TOTAL CA: CPT

## 2021-08-06 PROCEDURE — 36415 COLL VENOUS BLD VENIPUNCTURE: CPT

## 2021-08-06 PROCEDURE — 6360000002 HC RX W HCPCS: Performed by: ORTHOPAEDIC SURGERY

## 2021-08-06 PROCEDURE — 71045 X-RAY EXAM CHEST 1 VIEW: CPT

## 2021-08-06 PROCEDURE — 36600 WITHDRAWAL OF ARTERIAL BLOOD: CPT

## 2021-08-06 PROCEDURE — 83874 ASSAY OF MYOGLOBIN: CPT

## 2021-08-06 PROCEDURE — 97162 PT EVAL MOD COMPLEX 30 MIN: CPT

## 2021-08-06 PROCEDURE — 97530 THERAPEUTIC ACTIVITIES: CPT

## 2021-08-06 PROCEDURE — 2500000003 HC RX 250 WO HCPCS: Performed by: ORTHOPAEDIC SURGERY

## 2021-08-06 PROCEDURE — 85520 HEPARIN ASSAY: CPT

## 2021-08-06 PROCEDURE — 99221 1ST HOSP IP/OBS SF/LOW 40: CPT | Performed by: STUDENT IN AN ORGANIZED HEALTH CARE EDUCATION/TRAINING PROGRAM

## 2021-08-06 PROCEDURE — 94003 VENT MGMT INPAT SUBQ DAY: CPT

## 2021-08-06 PROCEDURE — 6370000000 HC RX 637 (ALT 250 FOR IP): Performed by: ORTHOPAEDIC SURGERY

## 2021-08-06 PROCEDURE — 2500000003 HC RX 250 WO HCPCS: Performed by: INTERNAL MEDICINE

## 2021-08-06 PROCEDURE — 85025 COMPLETE CBC W/AUTO DIFF WBC: CPT

## 2021-08-06 PROCEDURE — 2000000000 HC ICU R&B

## 2021-08-06 PROCEDURE — 82805 BLOOD GASES W/O2 SATURATION: CPT

## 2021-08-06 PROCEDURE — 6360000002 HC RX W HCPCS: Performed by: INTERNAL MEDICINE

## 2021-08-06 RX ADMIN — SODIUM CHLORIDE: 9 INJECTION, SOLUTION INTRAVENOUS at 06:11

## 2021-08-06 RX ADMIN — CEFAZOLIN 2000 MG: 10 INJECTION, POWDER, FOR SOLUTION INTRAVENOUS at 12:55

## 2021-08-06 RX ADMIN — CEFAZOLIN 2000 MG: 10 INJECTION, POWDER, FOR SOLUTION INTRAVENOUS at 04:33

## 2021-08-06 RX ADMIN — SODIUM CHLORIDE: 9 INJECTION, SOLUTION INTRAVENOUS at 15:48

## 2021-08-06 RX ADMIN — HEPARIN SODIUM 11.6 ML/HR: 10000 INJECTION, SOLUTION INTRAVENOUS at 08:42

## 2021-08-06 RX ADMIN — ACETAMINOPHEN 1000 MG: 500 TABLET, FILM COATED ORAL at 17:38

## 2021-08-06 RX ADMIN — DILTIAZEM HYDROCHLORIDE 15 MG/HR: 5 INJECTION INTRAVENOUS at 02:09

## 2021-08-06 RX ADMIN — CEFAZOLIN 2000 MG: 10 INJECTION, POWDER, FOR SOLUTION INTRAVENOUS at 21:18

## 2021-08-06 RX ADMIN — FAMOTIDINE 20 MG: 10 INJECTION, SOLUTION INTRAVENOUS at 00:15

## 2021-08-06 RX ADMIN — DILTIAZEM HYDROCHLORIDE 7.5 MG/HR: 5 INJECTION INTRAVENOUS at 08:41

## 2021-08-06 RX ADMIN — PROPOFOL 25 MCG/KG/MIN: 10 INJECTION, EMULSION INTRAVENOUS at 00:14

## 2021-08-06 RX ADMIN — ACETAMINOPHEN 1000 MG: 500 TABLET, FILM COATED ORAL at 12:55

## 2021-08-06 RX ADMIN — ACETAMINOPHEN 1000 MG: 500 TABLET, FILM COATED ORAL at 23:56

## 2021-08-06 RX ADMIN — SODIUM CHLORIDE, PRESERVATIVE FREE 10 ML: 5 INJECTION INTRAVENOUS at 08:52

## 2021-08-06 RX ADMIN — CHLORHEXIDINE GLUCONATE 0.12% ORAL RINSE 15 ML: 1.2 LIQUID ORAL at 00:15

## 2021-08-06 RX ADMIN — POLYVINYL ALCOHOL 1 DROP: 14 SOLUTION/ DROPS OPHTHALMIC at 02:10

## 2021-08-06 RX ADMIN — MINERAL OIL, PETROLATUM: 425; 568 OINTMENT OPHTHALMIC at 04:32

## 2021-08-06 RX ADMIN — SODIUM CHLORIDE: 9 INJECTION, SOLUTION INTRAVENOUS at 23:49

## 2021-08-06 RX ADMIN — FAMOTIDINE 20 MG: 10 INJECTION, SOLUTION INTRAVENOUS at 08:41

## 2021-08-06 RX ADMIN — CHLORHEXIDINE GLUCONATE 0.12% ORAL RINSE 15 ML: 1.2 LIQUID ORAL at 08:41

## 2021-08-06 ASSESSMENT — PULMONARY FUNCTION TESTS
PIF_VALUE: 14
PIF_VALUE: 13
PIF_VALUE: 15
PIF_VALUE: 14
PIF_VALUE: 9
PIF_VALUE: 12
PIF_VALUE: 11
PIF_VALUE: 11
PIF_VALUE: 14
PIF_VALUE: 9
PIF_VALUE: 14
PIF_VALUE: 14
PIF_VALUE: 12
PIF_VALUE: 14
PIF_VALUE: 9
PIF_VALUE: 14
PIF_VALUE: 14
PIF_VALUE: 11
PIF_VALUE: 13
PIF_VALUE: 12
PIF_VALUE: 13
PIF_VALUE: 7
PIF_VALUE: 10
PIF_VALUE: 5
PIF_VALUE: 15
PIF_VALUE: 13
PIF_VALUE: 13
PIF_VALUE: 11
PIF_VALUE: 14
PIF_VALUE: 13
PIF_VALUE: 16

## 2021-08-06 ASSESSMENT — PAIN SCALES - GENERAL
PAINLEVEL_OUTOF10: 0
PAINLEVEL_OUTOF10: 2
PAINLEVEL_OUTOF10: 3
PAINLEVEL_OUTOF10: 2
PAINLEVEL_OUTOF10: 1

## 2021-08-06 ASSESSMENT — PAIN DESCRIPTION - LOCATION: LOCATION: HIP

## 2021-08-06 ASSESSMENT — PAIN DESCRIPTION - ORIENTATION: ORIENTATION: LEFT

## 2021-08-06 ASSESSMENT — PAIN DESCRIPTION - PROGRESSION
CLINICAL_PROGRESSION: NOT CHANGED

## 2021-08-06 ASSESSMENT — PAIN DESCRIPTION - PAIN TYPE: TYPE: SURGICAL PAIN

## 2021-08-06 ASSESSMENT — ENCOUNTER SYMPTOMS
SHORTNESS OF BREATH: 0
DOUBLE VISION: 0
BLURRED VISION: 0
ABDOMINAL PAIN: 0
COUGH: 0

## 2021-08-06 NOTE — PROGRESS NOTES
Pt arrived to ICU 2004 from PACU on vent. Pt pulled over to ICU bed. Bedside telemetry applied and lined changed.

## 2021-08-06 NOTE — PROGRESS NOTES
Order obtained for extubation. SpO2 of 100% on 30% FiO2. Patient extubated and placed on room air. Post extubation SpO2 is 100% with HR  95 bpm and RR 20 breaths/min. Patient had strong cough that was productive of yellow sputum. Extubation Well tolerated by patient. .   Breath Sounds: diminished    Ezra Mcgregor RCP   8:46 AM

## 2021-08-06 NOTE — PROGRESS NOTES
[address]    PROGRESS NOTE             8/6/2021    8:23 AM    Name:   Francois Ho  MRN:     [id number]     Acct:      [de-identified]   Room:   2004/2004-01  IP Day:  3  Admit Date:  8/3/2021 10:59 AM    PCP:  No primary care provider on file. Code Status:  Full Code    Subjective:     C/C:   Chief Complaint   Patient presents with    Leg Pain    Irregular Heart Beat     Interval History Status: improved. No acute events overnight. Afebrile. Patient is currently resting in bed with endotracheal tube still in place. Currently undergoing weaning trial, nurse reports patient expected to be extubated this morning. Patient is alert and oriented, is able to answer questions yes or no and follow commands. Propofol on hold. Currently in atrial fibrillation, with regular rate. Brief History:     Copied from prior:   79-year-old male patient presented to the ED via EMS after falling down. The patient was on floor for 5 days. ED evaluation showed atrial fibrillation with RVR was given diltiazem drip and admitted to progressive unit. An x-ray was done for the left hip and showed a fracture and chronic orthopedics was consulted. Patient also has a wound on his right leg and foot, patient says he has been using topical OTC cortisone for 10 years, wound evaluation has not been done yet, wound seems less erythematous than seen first day, patient currently is on cefazolin. Patient CK initially was in the 4071 and currently 826 is on IV fluids. Orthopedics approved for our performing left hip arthroplasty on the patient later today.   Heparin is was stopped at 8 AM.   Heart rate today fluctuating last seen was 120, patient was not on diltiazem drip, cardiology stopped Lopressor and started digoxin 125 mcg     BUN 71 ==> 16  Cr 1.11 ==> 0.58  CK 4071 ==> 826  Myoglobin 1409  BNP   Troponin 76 ==>70 ==> 91      WBC 11.7 ==> 9.5 ==> 6  HGB : 10.4       Review of Systems:     Review of Systems   Constitutional: Negative for chills and fever. Respiratory: Negative for cough and shortness of breath. Cardiovascular: Negative for chest pain and leg swelling. Gastrointestinal: Negative for abdominal pain. Limited due to patient intubation      Medications: Allergies:  No Known Allergies    Current Meds:   Scheduled Meds:    acetaminophen  1,000 mg Oral 4 times per day    polyvinyl alcohol  1 drop Both Eyes Q4H    And    artificial tears   Both Eyes Q4H    chlorhexidine  15 mL Mouth/Throat BID    famotidine (PEPCID) injection  20 mg Intravenous BID    digoxin  125 mcg Oral Daily    metoprolol tartrate  50 mg Oral BID    sodium chloride flush  5-40 mL Intravenous 2 times per day    ceFAZolin  2,000 mg Intravenous Q8H     Continuous Infusions:    esmolol Stopped (21 184)    dilTIAZem (CARDIZEM) 125 mg in dextrose 5% 125 mL infusion 7.5 mg/hr (21 0606)    propofol 25 mcg/kg/min (21 2217)    heparin (PORCINE) Infusion      propofol Stopped (21 0746)    sodium chloride      sodium chloride 125 mL/hr at 21 0611     PRN Meds: oxyCODONE, morphine, fentanNYL, perflutren lipid microspheres, sodium chloride flush, sodium chloride, ondansetron **OR** ondansetron, polyethylene glycol, acetaminophen **OR** acetaminophen, potassium chloride **OR** potassium alternative oral replacement **OR** potassium chloride, heparin (porcine), heparin (porcine)    Data:     Past Medical History:   has no past medical history on file. Social History:   reports that he has never smoked. He has never used smokeless tobacco. He reports that he does not drink alcohol. Family History: History reviewed. No pertinent family history.     Vitals:  BP (!) 116/54   Pulse 87   Temp 97 °F (36.1 °C) (Axillary)   Resp 15   Ht 5' 7\" (1.702 m)   Wt 148 lb 5.9 oz (67.3 kg)   SpO2 100%   BMI 23.24 kg/m²   Temp (24hrs), Av.3 °F (36.3 °C), Min:93.7 °F (34.3 °C), Max:98.2 °F (36.8 °C)    No results for input(s): POCGLU in the last 72 hours. I/O(24Hr): Intake/Output Summary (Last 24 hours) at 8/6/2021 0823  Last data filed at 8/6/2021 0615  Gross per 24 hour   Intake 4520.16 ml   Output 1700 ml   Net 2820.16 ml       Labs:  [unfilled]    Lab Results   Component Value Date/Time    SPECIAL NOT REPORTED 08/03/2021 11:41 AM     Lab Results   Component Value Date/Time    CULTURE NO GROWTH 3 DAYS 08/03/2021 11:41 AM       [unfilled]    Radiology:    ECHO Complete 2D W Doppler W Color    Result Date: 8/4/2021  [facility] Transthoracic Echocardiography Report (TTE)  Patient Name Oliver Rodrigues  Date of Study                 08/04/2021   Date of      1940  Gender                        Male  Birth   Age          79 year(s)  Race                             Room Number  2087        Height:                       66.93 inch, 170 cm   Corporate ID [id number]    Weight:                       141 pounds, 64 kg  #   Patient Acct [de-identified]   BSA:           1.74 m^2       BMI:      22.15  #                                                                kg/m^2   MR #         [id number]      Sonographer                   Eva Gan   Accession #  [id number]  Interpreting Physician        [address] [place] [address]   Fellow                   Referring Nurse Practitioner   Interpreting             Referring Physician           [address] [place] [address]  Fellow  Type of Study   TTE procedure:2D Echocardiogram, M-Mode, Doppler, Color Doppler. Procedure Date Date: 08/04/2021 Start: 10:15 AM Study Location: [address] [place] [address] Technical Quality: Fair visualization Indications:Elevated troponin and Atrial fibrillation. Patient Status: Inpatient Height: 66.93 inches Weight: 141. 11 pounds BSA: 1.74 m^2 BMI: 22.15 kg/m^2 Rhythm: Atrial fibrillation HR: 129 bpm BP: 98/56 mmHg CONCLUSIONS Summary Normal left ventricle size and function with an estimated EF > 55%. Mild left ventricular hypertrophy. Left atrial dilatation. Aortic valve was not well visualized. Mild to moderate aortic insufficiency. Mild mitral regurgitation. Mild tricuspid regurgitation. Normal right ventricular systolic pressure. IVC Increased diameter, but still has inspiratory variation. Signature ----------------------------------------------------------------------------  Electronically signed by Eva Gan(Sonographer) on 08/04/2021 10:48  AM ---------------------------------------------------------------------------- ----------------------------------------------------------------------------  Electronically signed by Nico Hilton(Interpreting physician) on 08/04/2021  11:46 AM ---------------------------------------------------------------------------- FINDINGS Left Atrium Left atrial dilatation. Left Ventricle Normal left ventricle size and function with an estimated EF > 55%. No segmental wall motion abnormalities seen. Mild left ventricular hypertrophy. Right Atrium Right atrium is normal in size. Right Ventricle Normal right ventricular size and function. Mitral Valve Normal mitral valve structure and function. Mild mitral regurgitation. Aortic Valve Aortic valve was not well visualized. Mild to moderate aortic insufficiency. No aortic stenosis. Tricuspid Valve Normal tricuspid valve structure and function. Mild tricuspid regurgitation. Normal right ventricular systolic pressure. Pulmonic Valve Pulmonic valve not well visualized but Doppler velocities are normal. No pulmonic insufficiency. Pericardial Effusion No significant pericardial effusion is seen. Miscellaneous Normal aortic root dimension. E/e' average 5.3 IVC Increased diameter, but still has inspiratory variation.  M-mode / 2D Measurements & Calculations:   LVIDd:4.82 cm(3.7 - 5.6 cm)      Diastolic DXKYWC:976 ml  KCUJJ:2.90 cm(2.2 - 4.0 cm)      Systolic JGYVQA:99.0 ml  EOPQ:9.40 cm(0.6 - 1.1 cm)       Aortic Root:3.4 cm(2.0 - 3.7 cm) LVPWd:1.22 cm(0.6 - 1.1 cm)      LA Dimension: 4.9 cm(1.9 - 4.0 cm)  Fractional Shortenin.72 %    LA volume/Index: 43.8 ml /25m^2  Calculated LVEF (%): 71.5 %      LVOT:2.4 cm   Mitral:                                Aortic   Peak E-Wave: 0.84 m/s                  Peak Velocity: 1.20 m/s                                         Mean Velocity: 0.88 m/s  Peak Gradient: 2.82 mmHg               Peak Gradient: 5.76 mmHg  Deceleration Time: 173 msec            Mean Gradient: 5 mmHg                                         AI P1/2t: 441 msec                                          Area (continuity): 1.83 cm^2                                         AV VTI: 24.9 cm   Tricuspid:                             Pulmonic:   Estimated RVSP: 19 mmHg  Peak TR Velocity: 1.69 m/s  Peak TR Gradient: 11.4244 mmHg  Estimated RA Pressure: 8 mmHg                                         Estimated PASP: 19.42 mmHg  Septal Wall E' velocity:0.12 m/s Lateral Wall E' velocity:0.12 m/s    XR HIP LEFT (1 VIEW)    Result Date: 2021  EXAMINATION: ONE XRAY VIEW OF THE LEFT HIP 2021 8:31 pm COMPARISON: 2021 HISTORY: ORDERING SYSTEM PROVIDED HISTORY: sp left hip hemiarthroplasty TECHNOLOGIST PROVIDED HISTORY: sp left hip hemiarthroplasty Reason for Exam: post op Acuity: Unknown Type of Exam: Unknown FINDINGS: There is interval left hip arthroplasty for repair of the left femoral neck fracture. There is edema and emphysema in the overlying soft tissues, consistent with recent surgery. Diffuse bone demineralization. Left hip arthroplasty for fixation of the left femoral neck fracture.      CT HEAD WO CONTRAST    Result Date: 8/3/2021  EXAMINATION: CT OF THE HEAD WITHOUT CONTRAST  8/3/2021 1:55 pm TECHNIQUE: CT of the head was performed without the administration of intravenous contrast. Dose modulation, iterative reconstruction, and/or weight based adjustment of the mA/kV was utilized to reduce the radiation dose to as low as reasonably achievable. COMPARISON: None. HISTORY: ORDERING SYSTEM PROVIDED HISTORY: Fall TECHNOLOGIST PROVIDED HISTORY: Fall Decision Support Exception - unselect if not a suspected or confirmed emergency medical condition->Emergency Medical Condition (MA) Reason for Exam: pt fell 5 days ago, in afib Acuity: Unknown Type of Exam: Unknown FINDINGS: BRAIN/VENTRICLES: There is no acute intracranial hemorrhage, mass effect or midline shift. No abnormal extra-axial fluid collection. The gray-white differentiation is maintained without evidence of an acute infarct. There is no evidence of hydrocephalus. ORBITS: The visualized portion of the orbits demonstrate no acute abnormality. SINUSES: The visualized paranasal sinuses and mastoid air cells demonstrate no acute abnormality. SOFT TISSUES/SKULL:  No acute abnormality of the visualized skull or soft tissues. No acute intracranial abnormality. CT CERVICAL SPINE WO CONTRAST    Result Date: 8/3/2021  EXAMINATION: CT OF THE CERVICAL SPINE WITHOUT CONTRAST 8/3/2021 1:55 pm TECHNIQUE: CT of the cervical spine was performed without the administration of intravenous contrast. Multiplanar reformatted images are provided for review. Dose modulation, iterative reconstruction, and/or weight based adjustment of the mA/kV was utilized to reduce the radiation dose to as low as reasonably achievable. COMPARISON: None. HISTORY: ORDERING SYSTEM PROVIDED HISTORY: Fall TECHNOLOGIST PROVIDED HISTORY: Fall Decision Support Exception - unselect if not a suspected or confirmed emergency medical condition->Emergency Medical Condition (MA) Reason for Exam: pt fell 5 days ago, in afib Acuity: Unknown Type of Exam: Unknown FINDINGS: BONES/ALIGNMENT: There is no acute fracture or traumatic malalignment. DEGENERATIVE CHANGES: No significant degenerative changes. SOFT TISSUES: There is no prevertebral soft tissue swelling. No acute abnormality of the cervical spine.      XR CHEST PORTABLE    Result Date: 8/6/2021  EXAMINATION: ONE XRAY VIEW OF THE CHEST 8/6/2021 5:44 am COMPARISON: 5 August 2021 HISTORY: ORDERING SYSTEM PROVIDED HISTORY: ETT placement TECHNOLOGIST PROVIDED HISTORY: ETT placement Reason for Exam: ETT placement Acuity: Unknown Type of Exam: Ongoing Additional signs and symptoms: ETT placement Relevant Medical/Surgical History: ETT placement FINDINGS: AP portable view of the chest electronically labeled regarding sides. Tracheal tube 5 cm proximal to the kassidy. Extreme lung apices not included on this radiograph. Patchy opacity within the right lower lung. No effusions or pneumothoraces. Cardiomediastinal silhouette is within normal limits. Remaining visualized osseous and soft tissues are unchanged. Stable tracheal tube tip at the level of upper T4 vertebra. Faint right lower lung opacity, most likely infectious or inflammatory in the appropriate clinical setting. XR CHEST PORTABLE    Result Date: 8/5/2021  EXAMINATION: ONE XRAY VIEW OF THE CHEST 8/5/2021 8:31 pm COMPARISON: 08/03/2021 HISTORY: ORDERING SYSTEM PROVIDED HISTORY: ETT placement TECHNOLOGIST PROVIDED HISTORY: ETT placement Reason for Exam: ET tube placement Acuity: Unknown Type of Exam: Unknown FINDINGS: Endotracheal tube terminates 5.5 cm above the kassidy. Cardiomediastinal silhouette is unchanged in size. Aortic atherosclerosis. No large pleural effusion or pneumothorax. There are faint bibasilar pulmonary opacities. No acute osseous abnormality. 1. Endotracheal tube in expected position. 2. Faint bibasilar opacities, which may be due to atelectasis. XR CHEST PORTABLE    Result Date: 8/3/2021  EXAMINATION: ONE XRAY VIEW OF THE CHEST 8/3/2021 11:41 am COMPARISON: None. HISTORY: ORDERING SYSTEM PROVIDED HISTORY: AMS TECHNOLOGIST PROVIDED HISTORY: AMS Reason for Exam: AMS Acuity: Unknown Type of Exam: Unknown FINDINGS: Patient is slightly rotated.   Heart size within normal limits without evidence of vascular congestion. Aside from minor chronic appearing changes lungs are grossly clear. Probable skin fold projects in the left lower chest.  No focal lung consolidation is seen. No significant pleural effusions. Gas distended bowel loops beneath the left hemidiaphragm. Monitor leads overlie the chest.  The bones are osteopenic with degenerative changes; questionable age-indeterminate slight loss of height of lower thoracic vertebral bodies. No acute cardiopulmonary process suspected. CT HIP LEFT WO CONTRAST    Result Date: 8/3/2021  EXAMINATION: CT OF THE LEFT HIP WITHOUT CONTRAST 8/3/2021 1:55 pm TECHNIQUE: CT of the left hip was performed without the administration of intravenous contrast.  Multiplanar reformatted images are provided for review. Dose modulation, iterative reconstruction, and/or weight based adjustment of the mA/kV was utilized to reduce the radiation dose to as low as reasonably achievable. COMPARISON: Pelvis and left hip radiographs August 3, 2021 HISTORY ORDERING SYSTEM PROVIDED HISTORY: ?acetabulum fx TECHNOLOGIST PROVIDED HISTORY: ?acetabulum fx Decision Support Exception - unselect if not a suspected or confirmed emergency medical condition->Emergency Medical Condition (MA) Reason for Exam: pt fell 5 days ago, in afib, lt hip pain Acuity: Unknown Type of Exam: Unknown FINDINGS: Bones: Redemonstration of displaced transcervical left femoral neck fracture which is subacute in appearance. No additional fractures are identified. The bones are osteopenic. No suspicious lytic or sclerotic osseous lesions are evident. Soft Tissue: Subcutaneous and intramuscular edema adjacent to the fracture site. No organized collections are identified. No subcutaneous gas. Visualized intrapelvic structures appear grossly unremarkable. Moderate amount of stool present at the rectal vault. Joint: Anatomic alignment of the left hip.   Mild-to-moderate degenerative changes of the left hip.  Chondrocalcinosis noted. 1. Displaced fracture of the left transcervical femoral neck which is subacute in appearance. 2. No additional fractures are identified. 3. Mild-to-moderate degenerative change of the left hip. Chondrocalcinosis also present. XR HIP 2-3 VW W PELVIS LEFT    Result Date: 8/3/2021  EXAMINATION: ONE XRAY VIEW OF THE PELVIS AND TWO XRAY VIEWS LEFT HIP 8/3/2021 8:41 am COMPARISON: None. HISTORY: ORDERING SYSTEM PROVIDED HISTORY: fall TECHNOLOGIST PROVIDED HISTORY: fall Reason for Exam: fall, left hip pain Acuity: Unknown Type of Exam: Unknown FINDINGS: There is a left femoral neck fracture with impaction, varus angulation, and proximal migration of the greater trochanter which is essentially at the level of the acetabulum. The femoral head appears to remain appropriately seated in the acetabular fossa. Possible transverse lucency through the medial left acetabulum. 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 Left femoral neck fracture with impaction, varus angulation, and proximal migration of the greater trochanter. Possible lucency through the medial left acetabulum, though radiographic evaluation is limited by osseous demineralization. Consider CT. Physical Examination:        Physical Exam  Vitals reviewed. Constitutional:       General: He is not in acute distress. Appearance: Normal appearance. He is not ill-appearing, toxic-appearing or diaphoretic. HENT:      Head: Normocephalic. Eyes:      Extraocular Movements: Extraocular movements intact. Cardiovascular:      Rate and Rhythm: Normal rate. Rhythm irregular. Heart sounds: No murmur heard. Pulmonary:      Effort: Pulmonary effort is normal. No respiratory distress. Breath sounds: Normal breath sounds.       Comments: intubated  Musculoskeletal: General: No swelling. Cervical back: Normal range of motion. Skin:     General: Skin is warm and dry. Findings: Lesion present. Comments: Right lower extremity, scaling and discoloration of the dorsal aspect of the foot up to the anterior aspect of the lower leg   Neurological:      Mental Status: He is alert. Assessment:        Primary Problem  A-fib [facility])    Active Hospital Problems    Diagnosis Date Noted    Closed fracture of left hip with routine healing [S72.002D] 08/04/2021    Rhabdomyolysis [M62.82] 08/04/2021    A-fib ([facility] [address] [I48.91] 08/03/2021       Plan:        Atrial fibrillation with RVR-still currently in atrial fibrillation, regular rate  -Heart rate 90 at my visit today, currently on diltiazem infusion  -Heparin injection, held for surgery yesterday. Anticipate restart today.  -Cardiology on board, on digoxin    Hip pain, due to left femoral neck fracture  -Orthopedic surgery on board  -Status post left hip hemiarthroplasty on 8/5/2021, postop day 1    Right foot wound:  -Wound ostomy evaluation 8/5/2021, recommendations provided for multiple wounds including buttocks/sacral injuries, and bilateral elbows and knee injuries. Recommendations not provided for right foot wound.  -Patient on cefazolin    Rhabdomyolysis, due to fall at home and prolonged time down  - from 8/4/2021  Myoglobin 1409 -> 431  -Patient on normal saline at 125 mL an hour      Victoriano Sawant MD  8/6/2021  8:23 AM     Attestation and add on       I have discussed the care of Johnnye Schirmer , including pertinent history and exam findings,      8/6/21    with the resident. I have seen and examined the patient and the key elements of all parts of the encounter have been performed by me . I agree with the assessment, plan and orders as documented by the resident.      Principal Problem:    A-fib ([address]  Active Problems:    Closed fracture of left hip with routine healing Rhabdomyolysis  Resolved Problems:    * No resolved hospital problems. *         --Patient was admitted s/p fall  Unable to get up  Was on the floor for days  Rhabdomyolysis  Fracture hip  Atrial fibrillation with rapid ventricular response    Patient was stabilized and went through hip surgery and tolerated  Patient  Lives alone    -- ;        [] PICU         [x] ICU  Condition  ;            [x] ill ,           [] critical                  [x] improving [] stable [] worsening     [] labile             [] septic ,    [] delirium [] on vent        [] dshock     Complexity of decision making ;           [x] Moderate  to High   Risk status M/M ;           [] Moderate  to High       ----                     MD ALIE Bedoya[address], [address] [place] [address].    Phone [phone]   Fax: [phone]  Answering Service: [phone]

## 2021-08-06 NOTE — PLAN OF CARE
Problem: Non-Violent Restraints  Goal: No harm/injury to patient while restraints in use  Outcome: Met This Shift     Problem: Non-Violent Restraints  Goal: Patient's dignity will be maintained  Outcome: Met This Shift     Problem: Falls - Risk of:  Goal: Will remain free from falls  Description: Will remain free from falls  8/6/2021 0501 by Lakesha Wolff RN  Outcome: Ongoing     Problem: Falls - Risk of:  Goal: Absence of physical injury  Description: Absence of physical injury  8/6/2021 0501 by Lakesha Wolff RN  Outcome: Ongoing     Problem: Skin Integrity:  Goal: Will show no infection signs and symptoms  Description: Will show no infection signs and symptoms  8/6/2021 0501 by Lakesha Wolff RN  Outcome: Ongoing     Problem: Skin Integrity:  Goal: Absence of new skin breakdown  Description: Absence of new skin breakdown  8/6/2021 0501 by Lakesha Wolff RN  Outcome: Ongoing     Problem: Cardiac:  Goal: Ability to maintain an adequate cardiac output will improve  Description: Ability to maintain an adequate cardiac output will improve  8/6/2021 0501 by Lakesha Wolff RN  Outcome: Ongoing     Problem: Cardiac:  Goal: Hemodynamic stability will improve  Description: Hemodynamic stability will improve  8/6/2021 0501 by Lakesha Wolff RN  Outcome: Ongoing     Problem: Fluid Volume:  Goal: Ability to achieve and maintain adequate urine output will improve  Description: Ability to achieve and maintain adequate urine output will improve  8/6/2021 0501 by Lakesha Wolff RN  Outcome: Ongoing     Problem: Respiratory:  Goal: Respiratory status will improve  Description: Respiratory status will improve  8/6/2021 0501 by Lakesha Wolff RN  Outcome: Ongoing     Problem: Pain:  Goal: Pain level will decrease  Description: Pain level will decrease  8/6/2021 0501 by Lakesha Wolff RN  Outcome: Ongoing     Problem: Pain:  Goal: Control of acute pain  Description: Control of acute pain  8/6/2021 0501 by Lakesha Wolff RN  Outcome: Ongoing     Problem: Pain:  Goal: Control of chronic pain  Description: Control of chronic pain  8/6/2021 0501 by Elkin Gregory RN  Outcome: Ongoing     Problem: Nutrition  Goal: Optimal nutrition therapy  8/6/2021 0501 by Elkin Gregory RN  Outcome: Ongoing     Problem: Non-Violent Restraints  Goal: Removal from restraints as soon as assessed to be safe  Outcome: Ongoing     Problem: MECHANICAL VENTILATION  Goal: Patient will maintain patent airway  8/6/2021 0501 by Elkin Gregory RN  Outcome: Ongoing     Problem: MECHANICAL VENTILATION  Goal: ET tube will be managed safely  8/6/2021 0501 by Elkin Gregory RN  Outcome: Ongoing

## 2021-08-06 NOTE — CONSULTS
Physical Medicine & Rehabilitation  Consult Note      Admitting Physician:  Anupam Chavez MD    Primary Care Provider:  No primary care provider on file. Reason for Consult:  Acute Inpatient Rehabilitation    Chief Complaint:  Fall    History of Present Illness:  Referring Provider is requesting an evaluation for appropriate placement upon discharge from acute care. History from chart review and patient. Gregory Pizano is a 79 y.o. male with no known past medical history admitted to [facility] on 8/3/2021. He initially presented after a fall from standing at home. He reportedly was laying on the floor for 5 days before he was able to call for help. He was found to have atrial fibrillation with RVR in the ED and was started on diltiazem and heparin drips. He was also found to have left hip fracture and rhabdomylolysis. He has multiple wounds, including buttock, sacrum, bilateral elbow, and bilateral knee wounds. He underwent left hip hemiarthroplasty on 8/5/21 (Dr. Valente Acuna). Extubated 8/6/21. He remains on diltiazem and heparin drips at this time. He currently reports doing pretty well. He is sitting up in the bedside chair and rates pain in the left hip as 1 out of 10. He denies any other acute concerns. Review of Systems:  Review of Systems   Constitutional: Negative for fever. HENT: Positive for hearing loss. Eyes: Negative for blurred vision and double vision. Respiratory: Negative for shortness of breath. Cardiovascular: Negative for chest pain. Gastrointestinal: Negative for abdominal pain. No change in bowel control   Genitourinary:        No change in bladder control   Musculoskeletal: Positive for joint pain. Skin:        +multiple wounds   Neurological: Positive for weakness. Negative for sensory change and headaches.         Premorbid function:  Independent    Current function:    PT:  Restrictions/Precautions: Weight Bearing, Fall Risk  Left Lower Extremity Weight Bearing: Weight Bearing As Tolerated   Transfers  Sit to Stand: Moderate Assistance  Stand to sit: Moderate Assistance  Bed to Chair: Minimal assistance (with RW)  WB Status: WBAT    Transfers  Sit to Stand: Moderate Assistance  Stand to sit: Moderate Assistance  Bed to Chair: Minimal assistance (with RW)  Ambulation  WB Status: WBAT     Bed mobility  Supine to Sit: Moderate assistance  Sit to Supine:  (pt left up in chair)  Scooting:  Moderate assistance            OT:                                        SLP:      Past Medical History:    No known past medical history    Past Surgical History:        Procedure Laterality Date    HIP SURGERY Left 8/5/2021    HIP HEMIARTHROPLASTY performed by Kelli Sanches MD at [address] [facility]        Allergies:    No Known Allergies     Current Medications:   Current Facility-Administered Medications: esmolol (BREVIBLOC) 2.5gm/250ml NS infusion,  mcg/kg/min, Intravenous, Continuous  dilTIAZem 125 mg in dextrose 5 % 125 mL infusion, 5-15 mg/hr, Intravenous, Continuous  oxyCODONE (ROXICODONE) immediate release tablet 5 mg, 5 mg, Oral, Q4H PRN  acetaminophen (TYLENOL) tablet 1,000 mg, 1,000 mg, Oral, 4 times per day  morphine (PF) injection 2 mg, 2 mg, Intravenous, Q2H PRN  heparin 25,000 units in dextrose 5% 250 mL (premix) infusion, 18 Units/kg/hr, Intravenous, Continuous  perflutren lipid microspheres (DEFINITY) injection 2.2 mg, 2 mL, Intravenous, ONCE PRN  digoxin (LANOXIN) tablet 125 mcg, 125 mcg, Oral, Daily  metoprolol tartrate (LOPRESSOR) tablet 50 mg, 50 mg, Oral, BID  sodium chloride flush 0.9 % injection 5-40 mL, 5-40 mL, Intravenous, 2 times per day  sodium chloride flush 0.9 % injection 5-40 mL, 5-40 mL, Intravenous, PRN  0.9 % sodium chloride infusion, 25 mL, Intravenous, PRN  ondansetron (ZOFRAN-ODT) disintegrating tablet 4 mg, 4 mg, Oral, Q8H PRN **OR** ondansetron (ZOFRAN) injection 4 mg, 4 mg, Intravenous, Q6H PRN  polyethylene glycol (GLYCOLAX) packet 17 g, in the Last Year:    [address] in the Last Year:    Transportation Needs:     Lack of Transportation (Medical):  Lack of Transportation (Non-Medical):    Physical Activity:     Days of Exercise per Week:     Minutes of Exercise per Session:    Stress:     Feeling of Stress :    Social Connections:     Frequency of Communication with Friends and Family:     Frequency of Social Gatherings with Friends and Family:     Attends Oriental orthodox Services:     Active Member of Clubs or Organizations:     Attends Club or Organization Meetings:     Marital Status:    Intimate Partner Violence:     Fear of Current or Ex-Partner:     Emotionally Abused:     Physically Abused:     Sexually Abused:        Physical Exam:  BP (!) 100/43   Pulse 115   Temp 97.2 °F (36.2 °C) (Oral)   Resp 19   Ht 5' 7\" (1.702 m)   Wt 148 lb 5.9 oz (67.3 kg)   SpO2 98%   BMI 23.24 kg/m²     GEN: Well developed, well nourished, no acute distress  HEENT: NCAT. EOM grossly intact. Hard of hearing. Mucous membranes pink and moist.  RESP: Normal breath sounds with no wheezing, rales, or rhonchi. Respirations WNL and unlabored. CV: Irregularly irregular rhythm, regular rate. No murmurs, rubs, or gallops. ABD: Soft, non-distended, BS+ and equal.  NEURO: Alert. Speech fluent. Sensation to light touch intact. MSK:  Muscle tone and bulk are normal bilaterally. Strength 4/5 in bilateral upper limbs. Strength 4/5 in right lower limb. Able to extend left knee slightly against gravity. Strength 4/5 with left ankle dorsiflexion. LIMBS:  Edema present in all limbs. SKIN: Warm and dry with good turgor. PSYCH: Mood WNL. Affect WNL. Appropriately interactive.     Diagnostics:    CBC:   Recent Labs     08/05/21  0531 08/05/21  2044 08/06/21  0500   WBC 6.1 11.0 8.9   RBC 3.13* 3.37* 3.04*   HGB 10.4* 11.0* 10.1*   HCT 30.2* 33.3* 30.5*   MCV 96.3 98.8 100.4*   RDW 13.9 14.7 14.2    220 176     BMP:   Recent Labs 08/05/21  0531 08/05/21  2044 08/06/21  0500    143 142   K 3.2* 3.6* 4.0   * 109* 110*   CO2 27 26 23   PHOS  --  2.3*  --    BUN 16 12 12   CREATININE 0.58* 0.52* <0.40*   GLUCOSE 102* 124* 115*      HbA1c: No results found for: LABA1C  BNP: No results for input(s): BNP in the last 72 hours. PT/INR:   Recent Labs     08/03/21  1650 08/03/21  1730   PROTIME 15.4* 15.6*   INR 1.2 1.2     APTT:   Recent Labs     08/03/21  1650 08/03/21  1730   APTT >150.0* >150.0*     CARDIAC ENZYMES:   Recent Labs     08/03/21  1842 08/04/21  0748   TROPONINT NOT REPORTED NOT REPORTED     FASTING LIPID PANEL:No results found for: CHOL, HDL, TRIG  LIVER PROFILE: No results for input(s): AST, ALT, ALB, BILIDIR, BILITOT, ALKPHOS in the last 72 hours. Radiology:  XR CHEST PORTABLE   Final Result   Stable tracheal tube tip at the level of upper T4 vertebra. Faint right lower lung opacity, most likely infectious or inflammatory in the   appropriate clinical setting. XR HIP LEFT (1 VIEW)   Final Result   Left hip arthroplasty for fixation of the left femoral neck fracture. XR CHEST PORTABLE   Final Result   1. Endotracheal tube in expected position. 2. Faint bibasilar opacities, which may be due to atelectasis. CT HIP LEFT WO CONTRAST   Final Result   1. Displaced fracture of the left transcervical femoral neck which is   subacute in appearance. 2. No additional fractures are identified. 3. Mild-to-moderate degenerative change of the left hip. Chondrocalcinosis   also present. CT CERVICAL SPINE WO CONTRAST   Final Result   No acute abnormality of the cervical spine. CT HEAD WO CONTRAST   Final Result   No acute intracranial abnormality. XR HIP 2-3 VW W PELVIS LEFT   Final Result   Left femoral neck fracture with impaction, varus angulation, and proximal   migration of the greater trochanter.       Possible lucency through the medial left acetabulum, though radiographic   evaluation is limited by osseous demineralization. Consider CT. XR CHEST PORTABLE   Final Result   No acute cardiopulmonary process suspected. Impression:    1. Left hip fracture s/p hemiarthroplasty 8/5  2. Atrial fibrillation with RVR - improving  3. Rhabdomyolysis - improving  4. Multiple wound/abrasions  5. Anemia  6. Hypokalemia - improved    Recommendations:    1. Diagnosis:  Left hip fracture  2. Therapy: Has PT needs, OT evaluation pending  3. Medical Necessity: As above  4. Support: Lives alone, has a friend/neighbor that provides some assistance  5. Rehab Recommendation:  Will follow up once OT evaluation is completed. 6. DVT Prophylaxis: On heparin drip    It was my pleasure to evaluate Reynaldo Lux today. Please call with questions.     Megan Mcdonald MD

## 2021-08-06 NOTE — PLAN OF CARE
Problem: MECHANICAL VENTILATION  Goal: Patient will maintain patent airway  Outcome: Ongoing     Problem: MECHANICAL VENTILATION  Goal: ET tube will be managed safely  Outcome: Ongoing

## 2021-08-06 NOTE — PLAN OF CARE
Problem: Falls - Risk of:  Goal: Will remain free from falls  Description: Will remain free from falls  8/6/2021 1536 by Vickey Blankenship RN  Outcome: Ongoing  Note: Bed remains in lowest position, call light within reach. Patient remains free of falls at this time. RN will continue to monitor. 8/6/2021 0501 by Smooth Higgins RN  Outcome: Ongoing  Goal: Absence of physical injury  Description: Absence of physical injury  8/6/2021 1536 by Vickey Blankenship RN  Outcome: Ongoing  8/6/2021 0501 by Smooth Higgins RN  Outcome: Ongoing     Problem: Skin Integrity:  Goal: Will show no infection signs and symptoms  Description: Will show no infection signs and symptoms  8/6/2021 1536 by Vickey Blankenship RN  Outcome: Ongoing  Note: Patient turned and repositioned every 2 hours and as needed for comfort. Skin remains dry and intact. No new skin breakdown noted.    8/6/2021 0501 by Smooth Higgins RN  Outcome: Ongoing  Goal: Absence of new skin breakdown  Description: Absence of new skin breakdown  8/6/2021 1536 by Vickey Blankenship RN  Outcome: Ongoing  8/6/2021 0501 by Smooth Higgins RN  Outcome: Ongoing     Problem: Cardiac:  Goal: Ability to maintain an adequate cardiac output will improve  Description: Ability to maintain an adequate cardiac output will improve  8/6/2021 1536 by Vickey Blankenship RN  Outcome: Ongoing  Note: Vitals within normal range  8/6/2021 0501 by Smooth Higgins RN  Outcome: Ongoing  Goal: Hemodynamic stability will improve  Description: Hemodynamic stability will improve  8/6/2021 1536 by Vickey Blankenship RN  Outcome: Ongoing  8/6/2021 0501 by Smooth Higgins RN  Outcome: Ongoing     Problem: Fluid Volume:  Goal: Ability to achieve and maintain adequate urine output will improve  Description: Ability to achieve and maintain adequate urine output will improve  8/6/2021 1536 by Vickey Blankenship RN  Outcome: Ongoing  8/6/2021 0501 by Smooth Higgins RN  Outcome: Ongoing     Problem: Respiratory:  Goal: Respiratory status will improve  Description: Respiratory status will improve  8/6/2021 1536 by Juan Higginbotham RN  Outcome: Ongoing  8/6/2021 0501 by Elkin Gregory RN  Outcome: Ongoing     Problem: Pain:  Goal: Pain level will decrease  Description: Pain level will decrease  8/6/2021 1536 by Juan Higginbotham RN  Outcome: Ongoing  Note: Pain assessed at regular intervals. Medications administered as requested for comfort. Pain remains at a tolerable level.    8/6/2021 0501 by Elkin Gregory RN  Outcome: Ongoing  Goal: Control of acute pain  Description: Control of acute pain  8/6/2021 1536 by Juan Higginbotham RN  Outcome: Ongoing  8/6/2021 0501 by Elkin Gregory RN  Outcome: Ongoing  Goal: Control of chronic pain  Description: Control of chronic pain  8/6/2021 1536 by Juan Higginbotham RN  Outcome: Ongoing  8/6/2021 0501 by Elkin Gregory RN  Outcome: Ongoing     Problem: Nutrition  Goal: Optimal nutrition therapy  8/6/2021 1536 by Juan Higginbotham RN  Outcome: Ongoing  8/6/2021 0501 by Elkin Gregory RN  Outcome: Ongoing     Problem: Musculor/Skeletal Functional Status  Goal: Highest potential functional level  Outcome: Ongoing

## 2021-08-06 NOTE — PROGRESS NOTES
Messaged Dr. Edin Gallagher regarding pt's heparin orders. Dr. Edin Gallagher would like heparin restarted at 0800 tomorrow morning.

## 2021-08-06 NOTE — PROGRESS NOTES
Physical Therapy    Facility/Department: [facility] ICU  Initial Assessment    NAME: Mariza Pichardo  : 1940  MRN: [id number]    Date of Service: 2021    Discharge Recommendations:  Patient would benefit from continued therapy after discharge   PT Equipment Recommendations  Equipment Needed: No    Assessment   Body structures, Functions, Activity limitations: Decreased functional mobility ; Decreased strength;Decreased balance;Decreased endurance  Assessment: Impaired mobility due to decreased tolerance to activity and fall risk due to safety issues of decreased strength and balance  Decision Making: Medium Complexity  History: Hip fx; Rabdo  Exam: decreased endurance, strength, balance, mobility  Clinical Presentation: evolving  PT Education: Weight-bearing Education;Gait Training;Transfer Training  REQUIRES PT FOLLOW UP: Yes  Activity Tolerance  Activity Tolerance: Patient limited by endurance       Patient Diagnosis(es): The primary encounter diagnosis was Atrial fibrillation with RVR ([facility] [address] Diagnoses of Dehydration, Traumatic rhabdomyolysis, initial encounter [facility]), and Closed fracture of left hip, initial encounter [facility]) were also pertinent to this visit. has no past medical history on file. has a past surgical history that includes hip surgery (Left, 2021).     Restrictions  Restrictions/Precautions  Restrictions/Precautions: Weight Bearing, Fall Risk  Lower Extremity Weight Bearing Restrictions  Left Lower Extremity Weight Bearing: Weight Bearing As Tolerated        Subjective  General  Family / Caregiver Present: Yes (friend)  Follows Commands: Within Functional Limits  Pain Screening  Patient Currently in Pain: Yes  Pain Assessment  Pain Assessment: 0-10  Pain Level: 1  Pain Type: Surgical pain  Pain Location: Hip  Pain Orientation: Left  Vital Signs  Patient Currently in Pain: Yes       Orientation  Orientation  Overall Orientation Status: Within Normal Limits  Social/Functional Ebb Self, PT

## 2021-08-06 NOTE — ANESTHESIA POSTPROCEDURE EVALUATION
Department of Anesthesiology  Postprocedure Note    Patient: Jaymie West  MRN: [id number]  YOB: 1940  Date of evaluation: 8/6/2021  Time:  12:35 PM     Procedure Summary     Date: 08/05/21 Room / Location: [address] [place] [address] / [facility]: KIRSTIE MOYA    Anesthesia Start: 1722 Anesthesia Stop: 1942    Procedure: HIP HEMIARTHROPLASTY (Left Hip) Diagnosis:       (LEFT HIP FRACTURE)      (PATIENT VACCINATED)    Surgeons: Gayle Mohamud MD Responsible Provider: Josiah Pandey MD    Anesthesia Type: general ASA Status: 3          Anesthesia Type: general    Carina Phase I: [place] Score: 7    Carina Phase II:      Last vitals: Reviewed and per EMR flowsheets. Anesthesia Post Evaluation    Comments: POD #1. Patient seen at bedside. No anesthesia complications reported. Extubated today. A fib rate controlled. No complaints except for pain in left hip.
POST- ANESTHESIA EVALUATION       Pt Name: Tammie Barrios  MRN: [id number]  YOB: 1940  Date of evaluation: 8/5/2021  Time:  8:37 PM      /60   Pulse 89   Temp 97.7 °F (36.5 °C) (Infrared)   Resp 15   Ht 5' 7\" (1.702 m)   Wt 142 lb 10.2 oz (64.7 kg)   SpO2 99%   BMI 22.34 kg/m²      Consciousness Level  Awake  Cardiopulmonary Status  Stable  Pain Adequately Treated YES  Nausea / Vomiting  NO  Adequate Hydration  YES  Anesthesia Related Complications NONE      Electronically signed by Leonard Wayne MD on 8/5/2021 at 8:37 PM       Department of Anesthesiology  Postprocedure Note    Patient: Tammie Barrios  MRN: [id number]  YOB: 1940  Date of evaluation: 8/5/2021  Time:  8:37 PM     Procedure Summary     Date: 08/05/21 Room / Location: [address] [place] [address] / [facility]     Anesthesia Start: 1722 Anesthesia Stop: 1942    Procedure: HIP HEMIARTHROPLASTY (Left Hip) Diagnosis:       (LEFT HIP FRACTURE)      (PATIENT VACCINATED)    Surgeons: Rex Arvizu MD Responsible Provider: Leonard Wayne MD    Anesthesia Type: general ASA Status: 3          Anesthesia Type: general    Carina Phase I: Carina Score: 4    Carina Phase II:      Last vitals: Reviewed and per EMR flowsheets.    Patient still intubated due to A Fib with RVR, placed on Cardizem gtt, and propofol for sedation and patient will be transported to ICU vital signs as per RN flow sheet    Anesthesia Post Evaluation
Patient/Caregiver provided printed discharge information.

## 2021-08-06 NOTE — PROGRESS NOTES
Patient Diego Salgado at bedside, updated on surgery and post op care. Proper documentation secured and copies made.

## 2021-08-06 NOTE — FLOWSHEET NOTE
Advance Care Planning     Advance Care Planning Inpatient Note  [facility] Department    Today's Date: 8/6/2021  Unit: [facility] ICU    Received request from patient. Upon review of chart and communication with care team, patient's decision making abilities are not in question. Healthcare Decision Makerwas/were present in the room during visit. Goals of ACP Conversation:  Discuss advance care planning documents    Health Care Decision Makers:      Primary Decision Maker (Active): Bridget Castro Other - [phone]  Click here to complete [place] including selection of the Healthcare Decision Maker Relationship (ie \"Primary\")     Today we:  [place] [address] (Patient Wishes):  Living Will/Advance Directive     Assessment:    I [place] How spoke with pt Osmel Denise and his POA his friend Jose Goldman and this is who he wants to make health care decisions for him if he is unable. both present pt Reynaldo alert and oriented. Interventions:  Returned original document(s) to patient, as well as copies for distribution to appointed agents. Outcomes/Plan:  New advance directive completed.     Electronically signed by Emanuel Espinoza on 8/6/2021 at 2:03 PM

## 2021-08-06 NOTE — PROGRESS NOTES
No events O/N. Extubated this AM. Was in bed side chair for a couple of hours today. Pain controlled. Blood pressure (!) 100/43, pulse 115, temperature 97.2 °F (36.2 °C), temperature source Oral, resp. rate 19, height 5' 7\" (1.702 m), weight 148 lb 5.9 oz (67.3 kg), SpO2 98 %. Left hip dressing is clean, dry and intact. 2+ pedal pulses. Sensation is grossly intact to light touch diffusely. +marta DF and PF of toes and foot. Impression and plan: Mr. Tesha Shaikh is an 79year old sp left hip hemiarthroplasty POD 1  - Pain control  - Mobilize with PT.  WBAT LLE  - DVT ppx  - Dressing change daily starting POD 2 (8/7/21)

## 2021-08-06 NOTE — PROGRESS NOTES
[facility]    OCCUPATIONAL THERAPY MISSED TREATMENT NOTE   INPATIENT   Date: 21  Patient Name: Candi Alfaro       Room:   MRN: [id number]   Account #: [de-identified]    : 1940  (79 y.o.)  Gender: male                 REASON FOR MISSED TREATMENT:  Patient unable to participate   -    Needs Evaluation  s/p Hip fracture using hemiarthroplasty repair        Adelfo Romero, OT

## 2021-08-06 NOTE — CONSULTS
[facility]   CONSULT NOTE:      DATE OF CONSULT 8/6/2021    REASON FOR CONSULTATION:  Pulmonary and critical care management      PCP No primary care provider on file. CHIEF COMPLAINT: Left femoral neck fracture    HISTORY OF PRESENT ILLNESS:     Mr. Barbara Page is a 27-year-old white male who underwent repair of a left femoral hip fracture after presenting August 3 with complaints of leg pain after he had fallen down. According to the chart, apparently he had fallen down for 5 days without eating or drinking. He was also found to be in atrial fibrillation with rapid ventricular response. Was given Cardizem drip heparin drip, and underwent surgery yesterday. The procedure was a left hip hemiarthroplasty. It was fairly uncomplicated, there was only 100 mL of blood loss. However, he continued to be in atrial fibrillation with rapid ventricular response on Cardizem drip and he was kept on the ventilator overnight. This a.m., sedation is off he is alert he seems to be following commands and understands what I am saying. Denies any chest pain or any dyspnea. Patient apparently has never seen a doctor in years and prior to coming to the hospital was not on any medications. From the chart, it has been document that he was a non-smoker. ALLERGIES:  No Known Allergies    HOME MEDICATIONS:  Medications Prior to Admission: ibuprofen (ADVIL;MOTRIN) 200 MG tablet, Take 400 mg by mouth every 6 hours as needed for Pain  Multiple Vitamins-Minerals (THERAPEUTIC MULTIVITAMIN-MINERALS) tablet, Take 1 tablet by mouth daily      PAST MEDICAL HISTORY:  History reviewed. No pertinent past medical history. PAST SURGICAL HISTORY:  History reviewed. No pertinent surgical history.        SOCIAL HISTORY:  Social History     Socioeconomic History    Marital status: Single     Spouse name: Not on file    Number of children: Not on file    Years of education: Not on file    Highest education level: Not on file   Occupational History    Not on file   Tobacco Use    Smoking status: Never Smoker    Smokeless tobacco: Never Used   Substance and Sexual Activity    Alcohol use: Never    Drug use: Not on file    Sexual activity: Not on file   Other Topics Concern    Not on file   Social History Narrative    Not on file     Social Determinants of Health     Financial Resource Strain:     Difficulty of Paying Living Expenses:    Food Insecurity:     Worried About Running Out of Food in the Last Year:     [address] in the Last Year:    Transportation Needs:     Lack of Transportation (Medical):  Lack of Transportation (Non-Medical):    Physical Activity:     Days of Exercise per Week:     Minutes of Exercise per Session:    Stress:     Feeling of Stress :    Social Connections:     Frequency of Communication with Friends and Family:     Frequency of Social Gatherings with Friends and Family:     Attends Ep[employer] Services:     Active Member of Clubs or Organizations:     Attends Club or Organization Meetings:     Marital Status:    Intimate Partner Violence:     Fear of Current or Ex-Partner:     Emotionally Abused:     Physically Abused:     Sexually Abused:        FAMILY HISTORY:  History reviewed. No pertinent family history. REVIEW OF SYSTEMS:  All other systems reviewed and are negative. PHYSICAL EXAM:  Vital Signs Blood pressure (!) 106/55, pulse 101, temperature 97 °F (36.1 °C), temperature source Axillary, resp. rate 13, height 5' 7\" (1.702 m), weight 148 lb 5.9 oz (67.3 kg), SpO2 100 %. Oxygen Amount and Delivery: O2 Flow Rate (L/min): 2 L/min    Admission Weight Weight: 145 lb (65.8 kg)    General Appearance   Elderly gentleman alert, in no obvious distress  Head  Normocephalic, without obvious abnormality, atraumatic    Eyes  conjunctivae/corneas clear. PERRL, EOM's intact.     ENT intubated  Neck  no adenopathy, no carotid bruit, no JVD, Impression    Status post left hip hemiarthroplasty  Atrial fibrillation with rapid ventricular response  No history of underlying lung disease  Non-smoker    Plan:      He is still on a Cardizem drip, but his heart rates in the low 90s at this time. He appears to be hemodynamically stable. We will proceed with a fairly rapid wean and observe him in terms of his heart rate oxygen saturation etc.     Post extubation, he will need mobilization and aggressive incentive spirometry    Pain control seems adequate.     Continue GI prophylaxis with IV Pepcid    DVT prophylaxis when okay with Ortho    Management of A. fib per cardiology

## 2021-08-06 NOTE — PROGRESS NOTES
[facility]  Progress Note                   Date:   8/6/2021  Patient name: Ida Atkinson  Date of admission:  8/3/2021 10:59 AM  MRN:   [id number]  YOB: 1940  PCP: No primary care provider on file. Reason for Admission: A-fib [facility]) [I48.91]    Subjective:       Clinical Changes /Abnormalities: Seen & examined in room with cardiology resident after discussing with RN. Extubated this AM and doing well. Presently on RA. Labs, vitals, & tele reviewed.  Remains Afib with rate 80-110s, per RN when Cardizem gtt was decreased to 5mg last PM his HR jumped into the 120's    Review of Systems    Medications:   Scheduled Meds:   acetaminophen  1,000 mg Oral 4 times per day    polyvinyl alcohol  1 drop Both Eyes Q4H    And    artificial tears   Both Eyes Q4H    chlorhexidine  15 mL Mouth/Throat BID    famotidine (PEPCID) injection  20 mg Intravenous BID    digoxin  125 mcg Oral Daily    metoprolol tartrate  50 mg Oral BID    sodium chloride flush  5-40 mL Intravenous 2 times per day    ceFAZolin  2,000 mg Intravenous Q8H     Continuous Infusions:   esmolol Stopped (08/05/21 1842)    dilTIAZem (CARDIZEM) 125 mg in dextrose 5% 125 mL infusion 7.5 mg/hr (08/06/21 0841)    propofol 25 mcg/kg/min (08/05/21 2217)    heparin (PORCINE) Infusion 11.6 mL/hr (08/06/21 0842)    propofol Stopped (08/06/21 0746)    sodium chloride      sodium chloride 125 mL/hr at 08/06/21 0611     CBC:   Recent Labs     08/05/21 0531 08/05/21 2044 08/06/21  0500   WBC 6.1 11.0 8.9   HGB 10.4* 11.0* 10.1*    220 176     BMP:    Recent Labs     08/05/21 0531 08/05/21 2044 08/06/21  0500    143 142   K 3.2* 3.6* 4.0   * 109* 110*   CO2 27 26 23   BUN 16 12 12   CREATININE 0.58* 0.52* <0.40*   GLUCOSE 102* 124* 115*     Hepatic:  Recent Labs     08/03/21  1113   AST 85*   *   BILITOT 1.74*   ALKPHOS 110     Troponin:   Recent Labs     08/03/21  1300 08/03/21  1842 08/04/21  0748 TROPHS 76* 70* 91*     BNP: No results for input(s): BNP in the last 72 hours. Lipids: No results for input(s): CHOL, HDL in the last 72 hours. Invalid input(s): LDLCALCU  INR:   Recent Labs     08/03/21  1113 08/03/21  1650 08/03/21  1730   INR 1.1 1.2 1.2       Objective:   Vitals: BP (!) 134/44   Pulse 95   Temp 97 °F (36.1 °C) (Axillary)   Resp 21   Ht 5' 7\" (1.702 m)   Wt 148 lb 5.9 oz (67.3 kg)   SpO2 100%   BMI 23.24 kg/m²   General appearance: alert and cooperative with exam  HEENT: Head: Normocephalic, no lesions, without obvious abnormality. Neck:no JVD, trachea midline, no adenopathy  Lungs: Dim to auscultation but clear throughout. On RA without distress post extubation  Heart: Irregular rate and rhythm, s1/s2 auscultated, no murmurs, Afib 80-110s  Abdomen: soft, non-tender, bowel sounds active  Extremities: no edema - see surgical evaluation. Denies pain presently   Neurologic: not done    Echo 8/4/21  Summary  Normal left ventricle size and function with an estimated EF > 55%. Mild left ventricular hypertrophy. Left atrial dilatation. Aortic valve was not well visualized. Mild to moderate aortic insufficiency. Mild mitral regurgitation. Mild tricuspid regurgitation. Normal right ventricular systolic pressure. IVC Increased diameter, but still has inspiratory variation. Assessment / Acute Cardiac Problems:   1. Afib RVR- rate improved  2. Elevated Troponin- due to Rhabdo  3. Rhabdomyolysis with prolonged time spent on floor - 5 days  4. Falls  5. Acute Hip Fracture  6. Acute respiratory failure  7. Preserved LVEF with Mild-mod AI, mild MR, mild TR    Patient Active Problem List:     A-fib ([address]     Closed fracture of left hip with routine healing     Rhabdomyolysis      Plan of Treatment:   1. Remains stable from CV standpoint. Continue rate controlled. BB & Dig on hold d/t NPO, on Cardizem gtt.  Once able to take PO resume BB & Dig and will likely switch Cardizem gtt to PO tomorrow. Not candidate for [facility] d/t falls unless goes to NH and can be monitoring. On Heparin gtt now - will follow for oral [facility] as discharge gets closer  2. Echo reviewed as above. No plans for further ischemic work-up at this time. 3. Keep K >4 and Mg >2. Will give some Mg today as was 1.7 yesterday.     Electronically signed by ANDRES Leyva CNP on 8/6/2021 at 8:55 AM  [address]  [phone]

## 2021-08-07 LAB
ABSOLUTE EOS #: 0.1 K/UL (ref 0–0.4)
ABSOLUTE IMMATURE GRANULOCYTE: ABNORMAL K/UL (ref 0–0.3)
ABSOLUTE LYMPH #: 0.9 K/UL (ref 1–4.8)
ABSOLUTE MONO #: 0.6 K/UL (ref 0.1–1.3)
ANION GAP SERPL CALCULATED.3IONS-SCNC: 5 MMOL/L (ref 9–17)
ANTI-XA UNFRAC HEPARIN: 0.53 IU/L (ref 0.3–0.7)
ANTI-XA UNFRAC HEPARIN: 0.58 IU/L (ref 0.3–0.7)
BASOPHILS # BLD: 0 % (ref 0–2)
BASOPHILS ABSOLUTE: 0 K/UL (ref 0–0.2)
BUN BLDV-MCNC: 15 MG/DL (ref 8–23)
BUN/CREAT BLD: ABNORMAL (ref 9–20)
CALCIUM IONIZED: 1.13 MMOL/L (ref 1.13–1.33)
CALCIUM SERPL-MCNC: 7.4 MG/DL (ref 8.6–10.4)
CHLORIDE BLD-SCNC: 112 MMOL/L (ref 98–107)
CO2: 25 MMOL/L (ref 20–31)
CREAT SERPL-MCNC: 0.47 MG/DL (ref 0.7–1.2)
DIFFERENTIAL TYPE: ABNORMAL
EOSINOPHILS RELATIVE PERCENT: 2 % (ref 0–4)
GFR AFRICAN AMERICAN: >60 ML/MIN
GFR NON-AFRICAN AMERICAN: >60 ML/MIN
GFR SERPL CREATININE-BSD FRML MDRD: ABNORMAL ML/MIN/{1.73_M2}
GFR SERPL CREATININE-BSD FRML MDRD: ABNORMAL ML/MIN/{1.73_M2}
GLUCOSE BLD-MCNC: 115 MG/DL (ref 70–99)
HCT VFR BLD CALC: 24.2 % (ref 41–53)
HEMOGLOBIN: 8.2 G/DL (ref 13.5–17.5)
IMMATURE GRANULOCYTES: ABNORMAL %
LYMPHOCYTES # BLD: 12 % (ref 24–44)
MAGNESIUM: 1.6 MG/DL (ref 1.6–2.6)
MCH RBC QN AUTO: 33 PG (ref 26–34)
MCHC RBC AUTO-ENTMCNC: 33.8 G/DL (ref 31–37)
MCV RBC AUTO: 97.5 FL (ref 80–100)
MONOCYTES # BLD: 8 % (ref 1–7)
NRBC AUTOMATED: ABNORMAL PER 100 WBC
PDW BLD-RTO: 14.3 % (ref 11.5–14.9)
PLATELET # BLD: 154 K/UL (ref 150–450)
PLATELET ESTIMATE: ABNORMAL
PMV BLD AUTO: 9.7 FL (ref 6–12)
POTASSIUM SERPL-SCNC: 3.3 MMOL/L (ref 3.7–5.3)
POTASSIUM SERPL-SCNC: 3.5 MMOL/L (ref 3.7–5.3)
RBC # BLD: 2.48 M/UL (ref 4.5–5.9)
RBC # BLD: ABNORMAL 10*6/UL
SEG NEUTROPHILS: 78 % (ref 36–66)
SEGMENTED NEUTROPHILS ABSOLUTE COUNT: 5.6 K/UL (ref 1.3–9.1)
SODIUM BLD-SCNC: 142 MMOL/L (ref 135–144)
WBC # BLD: 7.3 K/UL (ref 3.5–11)
WBC # BLD: ABNORMAL 10*3/UL

## 2021-08-07 PROCEDURE — 2580000003 HC RX 258: Performed by: STUDENT IN AN ORGANIZED HEALTH CARE EDUCATION/TRAINING PROGRAM

## 2021-08-07 PROCEDURE — 97165 OT EVAL LOW COMPLEX 30 MIN: CPT

## 2021-08-07 PROCEDURE — 85018 HEMOGLOBIN: CPT

## 2021-08-07 PROCEDURE — 2500000003 HC RX 250 WO HCPCS: Performed by: INTERNAL MEDICINE

## 2021-08-07 PROCEDURE — 6360000002 HC RX W HCPCS: Performed by: ORTHOPAEDIC SURGERY

## 2021-08-07 PROCEDURE — 2580000003 HC RX 258: Performed by: ORTHOPAEDIC SURGERY

## 2021-08-07 PROCEDURE — 85025 COMPLETE CBC W/AUTO DIFF WBC: CPT

## 2021-08-07 PROCEDURE — 97530 THERAPEUTIC ACTIVITIES: CPT

## 2021-08-07 PROCEDURE — 6370000000 HC RX 637 (ALT 250 FOR IP): Performed by: ORTHOPAEDIC SURGERY

## 2021-08-07 PROCEDURE — 82330 ASSAY OF CALCIUM: CPT

## 2021-08-07 PROCEDURE — 99233 SBSQ HOSP IP/OBS HIGH 50: CPT | Performed by: INTERNAL MEDICINE

## 2021-08-07 PROCEDURE — 99212 OFFICE O/P EST SF 10 MIN: CPT

## 2021-08-07 PROCEDURE — 85520 HEPARIN ASSAY: CPT

## 2021-08-07 PROCEDURE — 6360000002 HC RX W HCPCS: Performed by: STUDENT IN AN ORGANIZED HEALTH CARE EDUCATION/TRAINING PROGRAM

## 2021-08-07 PROCEDURE — 97110 THERAPEUTIC EXERCISES: CPT

## 2021-08-07 PROCEDURE — 2500000003 HC RX 250 WO HCPCS: Performed by: ORTHOPAEDIC SURGERY

## 2021-08-07 PROCEDURE — 2580000003 HC RX 258: Performed by: INTERNAL MEDICINE

## 2021-08-07 PROCEDURE — 85014 HEMATOCRIT: CPT

## 2021-08-07 PROCEDURE — 83735 ASSAY OF MAGNESIUM: CPT

## 2021-08-07 PROCEDURE — 97535 SELF CARE MNGMENT TRAINING: CPT

## 2021-08-07 PROCEDURE — 36415 COLL VENOUS BLD VENIPUNCTURE: CPT

## 2021-08-07 PROCEDURE — 2000000000 HC ICU R&B

## 2021-08-07 PROCEDURE — 80048 BASIC METABOLIC PNL TOTAL CA: CPT

## 2021-08-07 PROCEDURE — 84132 ASSAY OF SERUM POTASSIUM: CPT

## 2021-08-07 RX ORDER — MAGNESIUM SULFATE 1 G/100ML
1000 INJECTION INTRAVENOUS
Status: DISPENSED | OUTPATIENT
Start: 2021-08-07 | End: 2021-08-07

## 2021-08-07 RX ORDER — MAGNESIUM SULFATE 1 G/100ML
1000 INJECTION INTRAVENOUS PRN
Status: DISCONTINUED | OUTPATIENT
Start: 2021-08-07 | End: 2021-08-13 | Stop reason: HOSPADM

## 2021-08-07 RX ADMIN — SODIUM CHLORIDE, PRESERVATIVE FREE 10 ML: 5 INJECTION INTRAVENOUS at 09:00

## 2021-08-07 RX ADMIN — HEPARIN SODIUM 18 UNITS/KG/HR: 10000 INJECTION, SOLUTION INTRAVENOUS at 04:51

## 2021-08-07 RX ADMIN — METOPROLOL TARTRATE 50 MG: 50 TABLET, FILM COATED ORAL at 21:27

## 2021-08-07 RX ADMIN — ACETAMINOPHEN 1000 MG: 500 TABLET, FILM COATED ORAL at 18:00

## 2021-08-07 RX ADMIN — OXYCODONE HYDROCHLORIDE 5 MG: 5 TABLET ORAL at 09:21

## 2021-08-07 RX ADMIN — ACETAMINOPHEN 1000 MG: 500 TABLET, FILM COATED ORAL at 11:50

## 2021-08-07 RX ADMIN — METOPROLOL TARTRATE 50 MG: 50 TABLET, FILM COATED ORAL at 12:07

## 2021-08-07 RX ADMIN — MAGNESIUM SULFATE HEPTAHYDRATE 1000 MG: 1 INJECTION, SOLUTION INTRAVENOUS at 09:26

## 2021-08-07 RX ADMIN — DILTIAZEM HYDROCHLORIDE 7.5 MG/HR: 5 INJECTION INTRAVENOUS at 04:46

## 2021-08-07 RX ADMIN — POTASSIUM CHLORIDE 40 MEQ: 1500 TABLET, EXTENDED RELEASE ORAL at 16:48

## 2021-08-07 RX ADMIN — DILTIAZEM HYDROCHLORIDE 10 MG/HR: 5 INJECTION, SOLUTION INTRAVENOUS at 16:48

## 2021-08-07 RX ADMIN — ACETAMINOPHEN 1000 MG: 500 TABLET, FILM COATED ORAL at 05:31

## 2021-08-07 RX ADMIN — CEFAZOLIN 2000 MG: 10 INJECTION, POWDER, FOR SOLUTION INTRAVENOUS at 05:30

## 2021-08-07 RX ADMIN — SODIUM CHLORIDE: 9 INJECTION, SOLUTION INTRAVENOUS at 16:48

## 2021-08-07 RX ADMIN — SODIUM CHLORIDE: 9 INJECTION, SOLUTION INTRAVENOUS at 04:55

## 2021-08-07 RX ADMIN — MAGNESIUM SULFATE HEPTAHYDRATE 1000 MG: 1 INJECTION, SOLUTION INTRAVENOUS at 11:51

## 2021-08-07 RX ADMIN — POTASSIUM CHLORIDE 40 MEQ: 1500 TABLET, EXTENDED RELEASE ORAL at 06:03

## 2021-08-07 RX ADMIN — CEFAZOLIN 1000 MG: 1 INJECTION, POWDER, FOR SOLUTION INTRAMUSCULAR; INTRAVENOUS at 14:11

## 2021-08-07 RX ADMIN — DIGOXIN 125 MCG: 125 TABLET ORAL at 09:18

## 2021-08-07 RX ADMIN — CEFAZOLIN 1000 MG: 1 INJECTION, POWDER, FOR SOLUTION INTRAMUSCULAR; INTRAVENOUS at 21:35

## 2021-08-07 ASSESSMENT — PAIN SCALES - GENERAL
PAINLEVEL_OUTOF10: 2
PAINLEVEL_OUTOF10: 4
PAINLEVEL_OUTOF10: 2
PAINLEVEL_OUTOF10: 0
PAINLEVEL_OUTOF10: 4
PAINLEVEL_OUTOF10: 4

## 2021-08-07 NOTE — PLAN OF CARE
Problem: Falls - Risk of:  Goal: Will remain free from falls  Description: Will remain free from falls  8/7/2021 0808 by Ivory Barroso RN  Outcome: Met This Shift  8/7/2021 0353 by Hattie Tilley RN  Outcome: Ongoing  Goal: Absence of physical injury  Description: Absence of physical injury  8/7/2021 0808 by Ivory Barroso RN  Outcome: Met This Shift  8/7/2021 0353 by Hattie Tilley RN  Outcome: Ongoing     Problem: Skin Integrity:  Goal: Will show no infection signs and symptoms  Description: Will show no infection signs and symptoms  8/7/2021 0808 by Ivory Barroso RN  Outcome: Met This Shift  8/7/2021 0353 by Hattie Tilley RN  Outcome: Ongoing  Goal: Absence of new skin breakdown  Description: Absence of new skin breakdown  8/7/2021 0808 by Ivory Barroso RN  Outcome: Met This Shift  8/7/2021 0353 by Hattie Tilley RN  Outcome: Ongoing     Problem: Cardiac:  Goal: Hemodynamic stability will improve  Description: Hemodynamic stability will improve  8/7/2021 0808 by Ivory Barroso RN  Outcome: Met This Shift  8/7/2021 0353 by Hattie Tilley RN  Outcome: Ongoing     Problem: Fluid Volume:  Goal: Ability to achieve and maintain adequate urine output will improve  Description: Ability to achieve and maintain adequate urine output will improve  8/7/2021 0808 by Ivory Barroso RN  Outcome: Met This Shift  8/7/2021 0353 by Hattie Tilley RN  Outcome: Ongoing     Problem: Respiratory:  Goal: Respiratory status will improve  Description: Respiratory status will improve  8/7/2021 0808 by Ivory Barroso RN  Outcome: Met This Shift  8/7/2021 0353 by Hattie Tilley RN  Outcome: Ongoing     Problem: Pain:  Goal: Pain level will decrease  Description: Pain level will decrease  8/7/2021 0808 by Ivory Barroso RN  Outcome: Met This Shift  8/7/2021 0353 by Hattie Tilley RN  Outcome: Ongoing  Goal: Control of acute pain  Description: Control of acute pain  8/7/2021 0808 by Ivory Barroso RN  Outcome: Met This Shift  8/7/2021 0353 by Hattie Tilley RN  Outcome: Ongoing  Goal: Control of chronic pain  Description: Control of chronic pain  8/7/2021 0808 by Ivory Barroso RN  Outcome: Met This Shift  8/7/2021 0353 by Hattie Tilley RN  Outcome: Ongoing     Problem: Nutrition  Goal: Optimal nutrition therapy  8/7/2021 0808 by Ivory Barroso RN  Outcome: Met This Shift  8/7/2021 0353 by Hattie Tilley RN  Outcome: Ongoing

## 2021-08-07 NOTE — PROGRESS NOTES
Pulmonary Progress Note  [facility]      Patient - Baron Walsh,  Age - 79 y.o.    - 1940      Room Number -    [facility]  [id number]   [facility]  Date of Admission -  8/3/2021 10:59 Katarina Frias MD  Primary Care Physician - No primary care provider on file. SUBJECTIVE   Patient appears to be doing well. He told me, \"I am doing okay. \"       OBJECTIVE   VITALS    height is 5' 7\" (1.702 m) and weight is 148 lb 5.9 oz (67.3 kg). His oral temperature is 97.4 °F (36.3 °C). His blood pressure is 121/52 (abnormal) and his pulse is 104. His respiration is 15 and oxygen saturation is 98%. Body mass index is 23.24 kg/m². Temperature Range: Temp: 97.4 °F (36.3 °C) Temp  Av °F (36.7 °C)  Min: 97.2 °F (36.2 °C)  Max: 98.9 °F (37.2 °C)  BP Range:  Systolic (24XEM), PXL:423 , Min:87 , MDN:622     Diastolic (17XRA), UAT:04, Min:34, Max:83    Pulse Range: Pulse  Av.2  Min: 85  Max: 160  Respiration Range: Resp  Av.1  Min: 11  Max: 23  Current Pulse Ox[de-identified]  SpO2: 98 %  24HR Pulse Ox Range:  SpO2  Av.3 %  Min: 89 %  Max: 99 %  Oxygen Amount and Delivery: O2 Flow Rate (L/min): 2 L/min    Wt Readings from Last 3 Encounters:   21 148 lb 5.9 oz (67.3 kg)       I/O (24 Hours)    Intake/Output Summary (Last 24 hours) at 2021 1455  Last data filed at 2021 1200  Gross per 24 hour   Intake 3424.42 ml   Output 1450 ml   Net 1974.42 ml     EXAM     General Appearance  Awake, alert, oriented, in no acute distress  HEENT - normocephalic, atraumatic. Neck - Supple,  trachea midline   Lungs -coarse no crackles rales or wheezes  Heart Exam:PMI normal. No lifts, heaves, or thrills. RRR. No murmurs, clicks, gallops, or rubs  Abdomen Exam: Abdomen soft, non-tender.  BS normal.   Extremity Exam: No signs of cyanosis    MEDS      ceFAZolin  1,000 mg Intravenous Q8H    acetaminophen  1,000 mg Oral 4 times per day    digoxin  125 mcg Oral Daily    metoprolol tartrate  50 mg Oral BID    sodium chloride flush  5-40 mL Intravenous 2 times per day      dilTIAZem (CARDIZEM) 125 mg in dextrose 5% 125 mL infusion      esmolol Stopped (08/05/21 1842)    sodium chloride      sodium chloride 125 mL/hr at 08/07/21 0455     magnesium sulfate, oxyCODONE, morphine, perflutren lipid microspheres, sodium chloride flush, sodium chloride, ondansetron **OR** ondansetron, polyethylene glycol, acetaminophen **OR** acetaminophen, potassium chloride **OR** potassium alternative oral replacement **OR** potassium chloride    LABS   CBC   Recent Labs     08/07/21  0521   WBC 7.3   HGB 8.2*   HCT 24.2*   MCV 97.5        BMP:   Lab Results   Component Value Date     08/07/2021    K 3.5 08/07/2021     08/07/2021    CO2 25 08/07/2021    BUN 15 08/07/2021    LABALBU 3.8 08/03/2021    CREATININE 0.47 08/07/2021    CALCIUM 7.4 08/07/2021    GFRAA >60 08/07/2021    LABGLOM >60 08/07/2021     ABGs:  Lab Results   Component Value Date    PHART 7.412 08/06/2021    PO2ART 117.0 08/06/2021    QXX9LGH 39.1 08/06/2021      Lab Results   Component Value Date    MODE PRVC 08/06/2021     Ionized Calcium:  No results found for: IONCA  Magnesium:    Lab Results   Component Value Date    MG 1.6 08/07/2021     Phosphorus:    Lab Results   Component Value Date    PHOS 2.3 08/05/2021        LIVER PROFILE No results for input(s): AST, ALT, LIPASE, BILIDIR, BILITOT, ALKPHOS in the last 72 hours. Invalid input(s): AMYLASE,  ALB  INR No results for input(s): INR in the last 72 hours.   PTT   Lab Results   Component Value Date    APTT >150.0 () 08/03/2021         RADIOLOGY     (See actual reports for details)    ASSESSMENT/PLAN     Patient Active Problem List   Diagnosis    A-fib ([facility] [address]    Closed fracture of left hip with routine healing    Rhabdomyolysis     Impression     Status post left hip hemiarthroplasty  Atrial fibrillation with rapid ventricular response  No history of underlying lung disease  Non-smoker    Encouraging incentive spirometry  On Ancef  On heparin drip  On Cardizem drip  Cardiology following.     We will continue to follow with other services    Electronically signed by Hattie Tilley MD on 8/7/2021 at 2:55 PM

## 2021-08-07 NOTE — PROGRESS NOTES
Physical Therapy        Physical Therapy Cancel Note      DATE: 2021    NAME: Jonathan Bean  MRN: [id number]   : 1940      Patient not seen this date for Physical Therapy due to: Other: Attempted BID tx @ 1600 , Pt sleeping and RINA smalls tx.        Electronically signed by Lakesha Loya PTA on 2021 at 4:07 PM

## 2021-08-07 NOTE — PROGRESS NOTES
Attestation and add on       I have discussed the care of Sruthi Orozco , including pertinent history and exam findings,    8/7/21   with the resident. I have seen and examined the patient and the key elements of all parts of the encounter have been performed by me . I agree with the assessment, plan and orders as documented by the resident. Principal Problem:    A-fib ([address]  Active Problems:    Closed fracture of left hip with routine healing    Rhabdomyolysis  Resolved Problems:    * No resolved hospital problems. *        -Patient atrial fibrillation rate controlled  Cardizem drip discontinued  Cardiology note noted  On beta-blocker and digoxin    Assessment / Acute Cardiac Problems: 1. Atrial fibrillation, suboptimal rate control  2. Rhabdomyolysis with prolonged time spent on floor - 5 days  3. Elevated troponins, type II MI secondary to rhabdomyolysis/tachycradia/hypoxia  4. Preserved LV systolic function   5. Mechanical fall with hip fracture status post left hip hemiarthroplasty (POD # 1)   6. Acute respiratory failure, resolved   7. Preserved LVEF with Mild-mod AI, mild MR, mild TR     Patient Active Problem List:     A-fib [facility])     Closed fracture of left hip with routine healing     Rhabdomyolysis        Plan of Treatment: 1. D/c Cardizem infusion  2. Continue lopressor and Digoxin   3. Not a suitable candidate for long term AC d/t fall risk unless goes to NH and can be monitoring. 4. On Heparin gtt now - will follow for oral AC as discharge gets closer  5. Echo reviewed as above. No plans for further ischemic work-up at this time. 6. Keep K >4 and Mg >2.   7.   It is to be noted that patient has multiple stage III pressure ulcers on both buttocks as well as on heels  He was on the floor for a few days after a fall and hip fracture from A.  Fib    Patient is more alert able to get able to understand and communicate      -   CLINICAL COURSE ---          clinical course has improved,    - Condition    [x] ill ,     [x] high risk , [] critical ,        [] improved but still labile                                        [] delirium ,      [] -----,                 [] I----     Unit  [x] ICU           [] PICU       [] MED_SRG             []  Other  Prognosis -              Medications: Allergies:  No Known Allergies    Current Meds:   Scheduled Meds:    ceFAZolin  1,000 mg Intravenous Q8H    acetaminophen  1,000 mg Oral 4 times per day    digoxin  125 mcg Oral Daily    metoprolol tartrate  50 mg Oral BID    sodium chloride flush  5-40 mL Intravenous 2 times per day     Continuous Infusions:    dilTIAZem (CARDIZEM) 125 mg in dextrose 5% 125 mL infusion      esmolol Stopped (08/05/21 1842)    sodium chloride      sodium chloride 125 mL/hr at 08/07/21 0455     PRN Meds: magnesium sulfate, oxyCODONE, morphine, perflutren lipid microspheres, sodium chloride flush, sodium chloride, ondansetron **OR** ondansetron, polyethylene glycol, acetaminophen **OR** acetaminophen, potassium chloride **OR** potassium alternative oral replacement **OR** potassium chloride      ---- ;     MD ALIE Baca [address], [address] [place] [address].    Phone [phone]   Fax: [phone]  Answering Service: [phone]

## 2021-08-07 NOTE — PROGRESS NOTES
for dizziness, tremors, seizures, weakness, light-headedness and headaches. Medications: Allergies:  No Known Allergies    Current Meds:   Scheduled Meds:    acetaminophen  1,000 mg Oral 4 times per day    digoxin  125 mcg Oral Daily    metoprolol tartrate  50 mg Oral BID    sodium chloride flush  5-40 mL Intravenous 2 times per day    ceFAZolin  2,000 mg Intravenous Q8H     Continuous Infusions:    esmolol Stopped (21 1842)    heparin (PORCINE) Infusion 18 Units/kg/hr (21)    sodium chloride      sodium chloride 125 mL/hr at 21 0455     PRN Meds: magnesium sulfate, oxyCODONE, morphine, perflutren lipid microspheres, sodium chloride flush, sodium chloride, ondansetron **OR** ondansetron, polyethylene glycol, acetaminophen **OR** acetaminophen, potassium chloride **OR** potassium alternative oral replacement **OR** potassium chloride, heparin (porcine), heparin (porcine)    Data:     Past Medical History:   has no past medical history on file. Social History:   reports that he has never smoked. He has never used smokeless tobacco. He reports that he does not drink alcohol. Family History: History reviewed. No pertinent family history. Vitals:  /83   Pulse 154   Temp 97.2 °F (36.2 °C) (Oral)   Resp 22   Ht 5' 7\" (1.702 m)   Wt 148 lb 5.9 oz (67.3 kg)   SpO2 92%   BMI 23.24 kg/m²   Temp (24hrs), Av °F (36.7 °C), Min:97.2 °F (36.2 °C), Max:98.9 °F (37.2 °C)    No results for input(s): POCGLU in the last 72 hours. I/O(24Hr):     Intake/Output Summary (Last 24 hours) at 2021 1212  Last data filed at 2021 0800  Gross per 24 hour   Intake 3424.42 ml   Output 1225 ml   Net 2199.42 ml       Labs:    CBC with Differential:    Lab Results   Component Value Date    WBC 7.3 2021    RBC 2.48 2021    HGB 8.2 2021    HCT 24.2 2021     2021    MCV 97.5 2021    MCH 33.0 2021    MCHC 33.8 2021    RDW 14.3 08/07/2021    LYMPHOPCT 12 08/07/2021    MONOPCT 8 08/07/2021    BASOPCT 0 08/07/2021    MONOSABS 0.60 08/07/2021    LYMPHSABS 0.90 08/07/2021    EOSABS 0.10 08/07/2021    BASOSABS 0.00 08/07/2021    DIFFTYPE NOT REPORTED 08/07/2021     BMP:    Lab Results   Component Value Date     08/07/2021    K 3.5 08/07/2021     08/07/2021    CO2 25 08/07/2021    BUN 15 08/07/2021    LABALBU 3.8 08/03/2021    CREATININE 0.47 08/07/2021    CALCIUM 7.4 08/07/2021    GFRAA >60 08/07/2021    LABGLOM >60 08/07/2021    GLUCOSE 115 08/07/2021       Lab Results   Component Value Date/Time    SPECIAL NOT REPORTED 08/03/2021 11:41 AM     Lab Results   Component Value Date/Time    CULTURE NO GROWTH 4 DAYS 08/03/2021 11:41 AM         Radiology:    ECHO Complete 2D W Doppler W Color    Result Date: 8/4/2021  [facility] Transthoracic Echocardiography Report (TTE)  Patient Name Iris Minus  Date of Study                 08/04/2021   Date of      1940  Gender                        Male  Birth   Age          79 year(s)  Race                             Room Number  2087        Height:                       66.93 inch, 170 cm   Corporate ID [id number]    Weight:                       141 pounds, 64 kg  #   Patient Acct [de-identified]   BSA:           1.74 m^2       BMI:      22.15  #                                                                kg/m^2   MR #         [id number]      Sonographer                   Eva Gan   Accession #  [id number]  Interpreting Physician        [address] [place] [address]   Fellow                   Referring Nurse Practitioner   Interpreting             Referring Physician           [address] [place] [address]  Fellow  Type of Study   TTE procedure:2D Echocardiogram, M-Mode, Doppler, Color Doppler. Procedure Date Date: 08/04/2021 Start: 10:15 AM Study Location: [facility] Technical Quality: Fair visualization Indications:Elevated troponin and Atrial fibrillation.  Patient Status: Inpatient Height: 66.93 inches Weight: 141. 11 pounds BSA: 1.74 m^2 BMI: 22.15 kg/m^2 Rhythm: Atrial fibrillation HR: 129 bpm BP: 98/56 mmHg CONCLUSIONS Summary Normal left ventricle size and function with an estimated EF > 55%. Mild left ventricular hypertrophy. Left atrial dilatation. Aortic valve was not well visualized. Mild to moderate aortic insufficiency. Mild mitral regurgitation. Mild tricuspid regurgitation. Normal right ventricular systolic pressure. IVC Increased diameter, but still has inspiratory variation. Signature ----------------------------------------------------------------------------  Electronically signed by Eva Gan(Sonographer) on 08/04/2021 10:48  AM ---------------------------------------------------------------------------- ----------------------------------------------------------------------------  Electronically signed by Nico Hilton(Interpreting physician) on 08/04/2021  11:46 AM ---------------------------------------------------------------------------- FINDINGS Left Atrium Left atrial dilatation. Left Ventricle Normal left ventricle size and function with an estimated EF > 55%. No segmental wall motion abnormalities seen. Mild left ventricular hypertrophy. Right Atrium Right atrium is normal in size. Right Ventricle Normal right ventricular size and function. Mitral Valve Normal mitral valve structure and function. Mild mitral regurgitation. Aortic Valve Aortic valve was not well visualized. Mild to moderate aortic insufficiency. No aortic stenosis. Tricuspid Valve Normal tricuspid valve structure and function. Mild tricuspid regurgitation. Normal right ventricular systolic pressure. Pulmonic Valve Pulmonic valve not well visualized but Doppler velocities are normal. No pulmonic insufficiency. Pericardial Effusion No significant pericardial effusion is seen. Miscellaneous Normal aortic root dimension.  E/e' average 5.3 IVC Increased diameter, but still has inspiratory variation. M-mode / 2D Measurements & Calculations:   LVIDd:4.82 cm(3.7 - 5.6 cm)      Diastolic DWCTJM:486 ml  OFINS:5.71 cm(2.2 - 4.0 cm)      Systolic WGJGOW:99.1 ml  KWSX:9.75 cm(0.6 - 1.1 cm)       Aortic Root:3.4 cm(2.0 - 3.7 cm)  LVPWd:1.22 cm(0.6 - 1.1 cm)      LA Dimension: 4.9 cm(1.9 - 4.0 cm)  Fractional Shortenin.72 %    LA volume/Index: 43.8 ml /25m^2  Calculated LVEF (%): 71.5 %      LVOT:2.4 cm   Mitral:                                Aortic   Peak E-Wave: 0.84 m/s                  Peak Velocity: 1.20 m/s                                         Mean Velocity: 0.88 m/s  Peak Gradient: 2.82 mmHg               Peak Gradient: 5.76 mmHg  Deceleration Time: 173 msec            Mean Gradient: 5 mmHg                                         AI P1/2t: 441 msec                                          Area (continuity): 1.83 cm^2                                         AV VTI: 24.9 cm   Tricuspid:                             Pulmonic:   Estimated RVSP: 19 mmHg  Peak TR Velocity: 1.69 m/s  Peak TR Gradient: 11.4244 mmHg  Estimated RA Pressure: 8 mmHg                                         Estimated PASP: 19.42 mmHg  Septal Wall E' velocity:0.12 m/s Lateral Wall E' velocity:0.12 m/s    XR HIP LEFT (1 VIEW)    Result Date: 2021  EXAMINATION: ONE XRAY VIEW OF THE LEFT HIP 2021 8:31 pm COMPARISON: 2021 HISTORY: ORDERING SYSTEM PROVIDED HISTORY: sp left hip hemiarthroplasty TECHNOLOGIST PROVIDED HISTORY: sp left hip hemiarthroplasty Reason for Exam: post op Acuity: Unknown Type of Exam: Unknown FINDINGS: There is interval left hip arthroplasty for repair of the left femoral neck fracture. There is edema and emphysema in the overlying soft tissues, consistent with recent surgery. Diffuse bone demineralization. Left hip arthroplasty for fixation of the left femoral neck fracture.      CT HEAD WO CONTRAST    Result Date: 8/3/2021  EXAMINATION: CT OF THE HEAD WITHOUT CONTRAST  8/3/2021 1:55 pm TECHNIQUE: CT of the head was performed without the administration of intravenous contrast. Dose modulation, iterative reconstruction, and/or weight based adjustment of the mA/kV was utilized to reduce the radiation dose to as low as reasonably achievable. COMPARISON: None. HISTORY: ORDERING SYSTEM PROVIDED HISTORY: Fall TECHNOLOGIST PROVIDED HISTORY: Fall Decision Support Exception - unselect if not a suspected or confirmed emergency medical condition->Emergency Medical Condition (MA) Reason for Exam: pt fell 5 days ago, in afib Acuity: Unknown Type of Exam: Unknown FINDINGS: BRAIN/VENTRICLES: There is no acute intracranial hemorrhage, mass effect or midline shift. No abnormal extra-axial fluid collection. The gray-white differentiation is maintained without evidence of an acute infarct. There is no evidence of hydrocephalus. ORBITS: The visualized portion of the orbits demonstrate no acute abnormality. SINUSES: The visualized paranasal sinuses and mastoid air cells demonstrate no acute abnormality. SOFT TISSUES/SKULL:  No acute abnormality of the visualized skull or soft tissues. No acute intracranial abnormality. CT CERVICAL SPINE WO CONTRAST    Result Date: 8/3/2021  EXAMINATION: CT OF THE CERVICAL SPINE WITHOUT CONTRAST 8/3/2021 1:55 pm TECHNIQUE: CT of the cervical spine was performed without the administration of intravenous contrast. Multiplanar reformatted images are provided for review. Dose modulation, iterative reconstruction, and/or weight based adjustment of the mA/kV was utilized to reduce the radiation dose to as low as reasonably achievable. COMPARISON: None.  HISTORY: ORDERING SYSTEM PROVIDED HISTORY: Fall TECHNOLOGIST PROVIDED HISTORY: Fall Decision Support Exception - unselect if not a suspected or confirmed emergency medical condition->Emergency Medical Condition (MA) Reason for Exam: pt fell 5 days ago, in afib Acuity: Unknown Type of Exam: Unknown FINDINGS: BONES/ALIGNMENT: There is no acute fracture or traumatic malalignment. DEGENERATIVE CHANGES: No significant degenerative changes. SOFT TISSUES: There is no prevertebral soft tissue swelling. No acute abnormality of the cervical spine. XR CHEST PORTABLE    Result Date: 8/6/2021  EXAMINATION: ONE XRAY VIEW OF THE CHEST 8/6/2021 5:44 am COMPARISON: 5 August 2021 HISTORY: ORDERING SYSTEM PROVIDED HISTORY: ETT placement TECHNOLOGIST PROVIDED HISTORY: ETT placement Reason for Exam: ETT placement Acuity: Unknown Type of Exam: Ongoing Additional signs and symptoms: ETT placement Relevant Medical/Surgical History: ETT placement FINDINGS: AP portable view of the chest electronically labeled regarding sides. Tracheal tube 5 cm proximal to the kassidy. Extreme lung apices not included on this radiograph. Patchy opacity within the right lower lung. No effusions or pneumothoraces. Cardiomediastinal silhouette is within normal limits. Remaining visualized osseous and soft tissues are unchanged. Stable tracheal tube tip at the level of upper T4 vertebra. Faint right lower lung opacity, most likely infectious or inflammatory in the appropriate clinical setting. XR CHEST PORTABLE    Result Date: 8/5/2021  EXAMINATION: ONE XRAY VIEW OF THE CHEST 8/5/2021 8:31 pm COMPARISON: 08/03/2021 HISTORY: ORDERING SYSTEM PROVIDED HISTORY: ETT placement TECHNOLOGIST PROVIDED HISTORY: ETT placement Reason for Exam: ET tube placement Acuity: Unknown Type of Exam: Unknown FINDINGS: Endotracheal tube terminates 5.5 cm above the kassidy. Cardiomediastinal silhouette is unchanged in size. Aortic atherosclerosis. No large pleural effusion or pneumothorax. There are faint bibasilar pulmonary opacities. No acute osseous abnormality. 1. Endotracheal tube in expected position. 2. Faint bibasilar opacities, which may be due to atelectasis.      XR CHEST PORTABLE    Result Date: 8/3/2021  EXAMINATION: ONE XRAY VIEW OF THE CHEST 8/3/2021 11:41 am COMPARISON: None. HISTORY: ORDERING SYSTEM PROVIDED HISTORY: AMS TECHNOLOGIST PROVIDED HISTORY: AMS Reason for Exam: AMS Acuity: Unknown Type of Exam: Unknown FINDINGS: Patient is slightly rotated. Heart size within normal limits without evidence of vascular congestion. Aside from minor chronic appearing changes lungs are grossly clear. Probable skin fold projects in the left lower chest.  No focal lung consolidation is seen. No significant pleural effusions. Gas distended bowel loops beneath the left hemidiaphragm. Monitor leads overlie the chest.  The bones are osteopenic with degenerative changes; questionable age-indeterminate slight loss of height of lower thoracic vertebral bodies. No acute cardiopulmonary process suspected. CT HIP LEFT WO CONTRAST    Result Date: 8/3/2021  EXAMINATION: CT OF THE LEFT HIP WITHOUT CONTRAST 8/3/2021 1:55 pm TECHNIQUE: CT of the left hip was performed without the administration of intravenous contrast.  Multiplanar reformatted images are provided for review. Dose modulation, iterative reconstruction, and/or weight based adjustment of the mA/kV was utilized to reduce the radiation dose to as low as reasonably achievable. COMPARISON: Pelvis and left hip radiographs August 3, 2021 HISTORY ORDERING SYSTEM PROVIDED HISTORY: ?acetabulum fx TECHNOLOGIST PROVIDED HISTORY: ?acetabulum fx Decision Support Exception - unselect if not a suspected or confirmed emergency medical condition->Emergency Medical Condition (MA) Reason for Exam: pt fell 5 days ago, in afib, lt hip pain Acuity: Unknown Type of Exam: Unknown FINDINGS: Bones: Redemonstration of displaced transcervical left femoral neck fracture which is subacute in appearance. No additional fractures are identified. The bones are osteopenic. No suspicious lytic or sclerotic osseous lesions are evident. Soft Tissue: Subcutaneous and intramuscular edema adjacent to the fracture site. No organized collections are identified.   No subcutaneous gas. Visualized intrapelvic structures appear grossly unremarkable. Moderate amount of stool present at the rectal vault. Joint: Anatomic alignment of the left hip. Mild-to-moderate degenerative changes of the left hip. Chondrocalcinosis noted. 1. Displaced fracture of the left transcervical femoral neck which is subacute in appearance. 2. No additional fractures are identified. 3. Mild-to-moderate degenerative change of the left hip. Chondrocalcinosis also present. XR HIP 2-3 VW W PELVIS LEFT    Result Date: 8/3/2021  EXAMINATION: ONE XRAY VIEW OF THE PELVIS AND TWO XRAY VIEWS LEFT HIP 8/3/2021 8:41 am COMPARISON: None. HISTORY: ORDERING SYSTEM PROVIDED HISTORY: fall TECHNOLOGIST PROVIDED HISTORY: fall Reason for Exam: fall, left hip pain Acuity: Unknown Type of Exam: Unknown FINDINGS: There is a left femoral neck fracture with impaction, varus angulation, and proximal migration of the greater trochanter which is essentially at the level of the acetabulum. The femoral head appears to remain appropriately seated in the acetabular fossa. Possible transverse lucency through the medial left acetabulum. 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 Left femoral neck fracture with impaction, varus angulation, and proximal migration of the greater trochanter. Possible lucency through the medial left acetabulum, though radiographic evaluation is limited by osseous demineralization. Consider CT. Physical Examination:        Physical Exam  Constitutional:       General: He is not in acute distress. Appearance: Normal appearance. He is normal weight. He is not ill-appearing. HENT:      Head: Normocephalic and atraumatic. Eyes:      General: No scleral icterus. Extraocular Movements: Extraocular movements intact.       Conjunctiva/sclera: Conjunctivae normal.   Cardiovascular:      Rate and Rhythm: Normal rate. Rhythm irregular. Pulses: Normal pulses. Heart sounds: Normal heart sounds. No murmur heard. No friction rub. No gallop. Pulmonary:      Effort: Pulmonary effort is normal.      Breath sounds: Normal breath sounds. No stridor. No wheezing or rhonchi. Chest:      Chest wall: No tenderness. Skin:     Findings: Lesion (Stage III pressure ulcers on the sacrum and buttocks. Injuries of the elbows and knees.) present. Comments: Chronic dermatitis on right foot. Neurological:      General: No focal deficit present. Mental Status: He is oriented to person, place, and time. Mental status is at baseline. Motor: No weakness. Psychiatric:         Mood and Affect: Mood normal.         Behavior: Behavior normal.         Thought Content:  Thought content normal.           Assessment:        Primary Problem  A-fib [facility])    Active Hospital Problems    Diagnosis Date Noted    Closed fracture of left hip with routine healing [S72.002D] 08/04/2021    Rhabdomyolysis [M62.82] 08/04/2021    [facility] [I48.91] 08/03/2021       Plan:        Atrial fibrillation with RVR-still currently in atrial fibrillation, regular rate - No change   -Heart rate 90 at my visit today  - Diltiazem infusion D/C  -Heparin injection, held for surgery yesterday- restated today  -Cardiology on board, on digoxin and lopressor      Hip pain, due to left femoral neck fracture  -Orthopedic surgery on board  -Status post left hip hemiarthroplasty on 8/5/2021, postop day 2     Right foot wound:  -Wound ostomy evaluation 8/5/2021, recommendations provided for multiple wounds including buttocks/sacral injuries, and bilateral elbows and knee injuries.    -Patient on cefazolin  Chronic Dermatitis of Right foot: Recommendations not provided for right foot lesion, scales are pealing off and erythema looks the same as last time.       Rhabdomyolysis, due to fall at home and prolonged time down - Improving   BUN 15  Cr 0.47  - from 8/4/2021  Myoglobin 1409 -> 431  -Patient on normal saline at 125 mL an hour  - K 3.3: Potassium chloride 40 mEq was given  - Mg 1.2 and was given IV Magnesium sulfate 1000 mg       Jennifer Bahena MD  8/7/2021  12:12 PM

## 2021-08-07 NOTE — PLAN OF CARE
Problem: Falls - Risk of:  Goal: Will remain free from falls  Description: Will remain free from falls  8/7/2021 0353 by Hattie Tilley RN  Outcome: Ongoing     Problem: Falls - Risk of:  Goal: Absence of physical injury  Description: Absence of physical injury  8/7/2021 0353 by Hattie Tilley RN  Outcome: Ongoing     Problem: Skin Integrity:  Goal: Will show no infection signs and symptoms  Description: Will show no infection signs and symptoms  8/7/2021 0353 by Hattie Tilley RN  Outcome: Ongoing     Problem: Skin Integrity:  Goal: Absence of new skin breakdown  Description: Absence of new skin breakdown  8/7/2021 0353 by Hattie Tilley RN  Outcome: Ongoing     Problem: Cardiac:  Goal: Ability to maintain an adequate cardiac output will improve  Description: Ability to maintain an adequate cardiac output will improve  8/7/2021 0353 by Hattie Tilley RN  Outcome: Ongoing     Problem: Cardiac:  Goal: Hemodynamic stability will improve  Description: Hemodynamic stability will improve  8/7/2021 0353 by Hattie Tilley RN  Outcome: Ongoing     Problem: Fluid Volume:  Goal: Ability to achieve and maintain adequate urine output will improve  Description: Ability to achieve and maintain adequate urine output will improve  8/7/2021 0353 by Hattie Tilley RN  Outcome: Ongoing     Problem: Respiratory:  Goal: Respiratory status will improve  Description: Respiratory status will improve  8/7/2021 0353 by Hattie Tilley RN  Outcome: Ongoing     Problem: Pain:  Goal: Pain level will decrease  Description: Pain level will decrease  8/7/2021 0353 by Hattie Tilley RN  Outcome: Ongoing     Problem: Pain:  Goal: Control of acute pain  Description: Control of acute pain  8/7/2021 0353 by Hattie Tilley RN  Outcome: Ongoing     Problem: Pain:  Goal: Control of chronic pain  Description: Control of chronic pain  8/7/2021 0353 by Hattie Tilley RN  Outcome: Ongoing     Problem: Nutrition  Goal: Optimal nutrition therapy  8/7/2021 0353 by Sarita La RN  Outcome: Ongoing     Problem: Musculor/Skeletal Functional Status  Goal: Highest potential functional level  8/7/2021 0353 by Sarita La RN  Outcome: Ongoing

## 2021-08-07 NOTE — PROGRESS NOTES
[address] [facility]    Occupational Therapy Evaluation  Date: 21  Patient Name: Kayla Islas       Room:   MRN: [id number]  Account: [de-identified]   : 1940  (79 y.o.) Gender: male     Discharge Recommendations: The patient would benefit from an intensive level of therapy after discharge from the facility. They should be able to tolerate 3-hours of Combined OT/PT/ST over 5 days/week or at least 900 minutes of  Combined Therapy over 7 days. if able to have adequate support for home d/c  OT Equipment Recommendations  Equipment Needed: Yes  Other: walker, reachers, sock aide, long sponge, call alert necklace vs keep phone on person, [facility], twin bed to main floor- needed for d/c to home, when able to access upstairs- recommend extended tub bench and use of pitcher to pour water vs obtain shower nozzle. Diagnosis: Left hip fracture s/p hemiarthroplasty . A fib, Rhabdomyolosis, anemia, Multiple wound/abrasions  Additional Pertinent Hx: injury 2 months ago- pt reports was carrying laundry upstairs with sudden pain L thigh and legs gave out and fell to knees, since then decline in mobility but still ambulated in house with cane, switched to living mainly on main floor. pt with scales on skin R ankle + foot, per pt has decreased in past 2 months post injury as he was unable to apply hydrocortisol cream, OT alerted RN to ask MD re treatment. He initially presented after a fall from standing at home. He reportedly was laying on the floor for 5 days before he was able to call for help. He was found to have atrial fibrillation with RVR in the ED and was started on diltiazem and heparin drips. He was also found to have left hip fracture and rhabdomylolysis. He has multiple wounds, including buttock, sacrum, bilateral elbow, and bilateral knee wounds. He underwent left hip hemiarthroplasty on 21 (Dr. Nora Rubio). Extubated 21.   He remains on diltiazem and heparin drips at this time.    Treatment Diagnosis: severe impaired adl level post L hip fx and surgery limiting mobility and work socrates  Past Medical History:  has no past medical history on file. Past Surgical History:   has a past surgical history that includes hip surgery (Left, 8/5/2021). Restrictions  Restrictions/Precautions: Weight Bearing, Fall Risk  Left Lower Extremity Weight Bearing: Weight Bearing As Tolerated     Vitals  Temp: 98.7 °F (37.1 °C)  Pulse: 89  Resp: 13  BP: (!) 102/48  Height: 5' 7\" (170.2 cm)  Weight: 148 lb 5.9 oz (67.3 kg)  BMI (Calculated): 23.3  Oxygen Therapy  SpO2: 95 %  Pulse Oximeter Device Mode: Continuous  Pulse Oximeter Device Location: Finger  O2 Device: None (Room air)  Skin Assessment: Clean, dry, & intact  FiO2 : 30 %  O2 Flow Rate (L/min): 2 L/min  End Tidal CO2: 21 (%)  Blood Gas  Performed?: Yes  Ted's Test #1: Collateral flow confirmed  Site #1: Right Radial  Site Prepped #1: Yes  Number of Attempts #1: 1  Pressure Held #1: Yes  Complications #1: None  Post-procedure #1: Standard  Specimen Status #1: Point of care  How Tolerated?: Tolerated well  Level of Consciousness: Alert (0)    Subjective  Subjective: \"I stopped using the bath tub for that reason because it was difficult to get in and out of.\"  (after injury 2 months ago) ed re obtain extended tub bench and use pitcher to pour water- do not access bottom of tub until MD permits.      Vision  Vision: Within Functional Limits  Hearing  Hearing: Within functional limits  Social/Functional History  Lives With: Alone  Type of Home: House  Home Layout: Two level, Bed/Bath upstairs  Home Access: Stairs to enter with rails  Entrance Stairs - Number of Steps: 4  Entrance Stairs - Rails: Both  Bathroom Shower/Tub: Tub only (upstairs, had stopped tub bathe,unable access- tony )  Bathroom Toilet: Standard (upstairs- no toilet on main- used urinal)  Home Equipment: Heraclio Rasmussen walker  Receives Help From: Neighbor  ADL Assistance: Independent (could not reach feet to bathe, don socks)  Homemaking Assistance: Needs assistance  Homemaking Responsibilities: Yes (ordered food online, did own cook, dishes, needed A laundry,)  Ambulation Assistance: Independent (with cane in house only)  Transfer Assistance: Independent  Active : No  IADL Comments: injury 2 months ago- prior indep all advanced adls, insufficient skin care of R foot since injury- skin is scaley- was no longer able to apply cream. unable to reach B feet- unable to wear socks, able to don pants, wear slip on shoes. Additional Comments: Friend lives across the street and is not working at this time. - mowed grass since injury 2 months ago- pt reports was carrying laundry upstairs with sudden pain L thigh and legs gave out and fell to knees, since then mobility has been limited. Objective              ADL  Feeding: Setup  Grooming: Setup, Minimal assistance  UE Bathing: Setup, Maximum assistance  LE Bathing: Dependent/Total  UE Dressing: Setup, Maximum assistance  LE Dressing: Dependent/Total  Toileting: Dependent/Total  Additional Comments: per clinical reasoning and mobility, work socrates assessment. UE Function           LUE Strength  Gross LUE Strength: WFL           LUE AROM (degrees)  LUE AROM : WFL        RUE Strength  Gross RUE Strength: WFL            RUE AROM (degrees)  RUE AROM : WFL          Fine Motor Skills  Coordination  Movements Are Fluid And Coordinated: Yes   Comment: min edema B  hands, abraisions B elbows where pt crawled for help               Quality of Movement Other  Comment: min edema B  hands, abraisions B elbows where pt crawled for help       Mobility  Supine to Sit: Moderate assistance       Balance  Sitting Balance: Supervision (static EOB 10 min)  Standing Balance:  Moderate assistance  Standing Balance  Time: 4 min  Activity: transfer with RW bed to chair     Bed mobility  Supine to Sit: Moderate assistance     Transfers  Stand Step Transfers: Moderate assistance (RW)  Sit to stand: Moderate assistance  Stand to sit: Moderate assistance  Transfer Comments: assist of 2nd person for multiple medical lines      Assessment  Performance deficits / Impairments: Decreased functional mobility , Decreased high-level IADLs, Decreased ADL status, Decreased endurance, Decreased balance, Decreased safe awareness  Assessment: has no bed/bath on main and only neighbor friend for support, was using urinal and sleep on couch, neighbor mowing grass (since injury 2 months ago) OT ed pt need for inpt therapy and to obtain [facility] and twin bed for main floor and have assist with laundry, cleaning, empty BSC in addition to already order food online and have neighbor do yardwork. Treatment Diagnosis: severe impaired adl level post L hip fx and surgery limiting mobility and work socrates  Prognosis: Good  Decision Making: Low Complexity  History: Left hip fracture s/p hemiarthroplasty 8/5. A fib, Rhabdomyolosis, anemia, Multiple wound/abrasions  Exam: 6 performance deficits  Assistance / Modification: low  REQUIRES OT FOLLOW UP: Yes  Activity Tolerance: Patient Tolerated treatment well, Patient limited by fatigue  Activity Tolerance: tachycardia to 122 and low sats to 89 with exertion. pt with scales on skin R ankle + foot, per pt has decreased in past 2 months post injury as he was unable to apply hydrocortisol cream, OT alerted RN to ask MD re treatment.                  Goals  Patient Goals   Patient goals : return home indep  Short term goals  Time Frame for Short term goals: 1 week  Short term goal 1: set up UE bathe and dress and groom  Short term goal 2: max LE bathe and dress  Short term goal 3: socrates 5-6 min stand, amb with RW and min A for adls  Short term goal 4: min A adl transfers and toileting    Plan  Safety Devices  Safety Devices in place: Yes  Type of devices: Left in chair, Nurse notified, Call light within reach (ed do not stand alone, call for assist) Plan  Times per week: 4-5  Times per day: Daily (BID if possible)  Current Treatment Recommendations: Safety Education & Training, Balance Training, Patient/Caregiver Education & Training, Self-Care / ADL, Functional Mobility Training, Equipment Evaluation, Education, & procurement, Home Management Training, Endurance Training  OT Education  OT Education: OT Role, Plan of Care, Equipment, ADL Adaptive Strategies, Transfer Training, Precautions  Patient Education: ed pt re need for inpt therapy and to obtain [facility] and twin bed for main floor and have assist with laundry, cleaning, empty BSC in addition to already order food online and have neighbor do yardwork. ed re obtain extended tub bench and use pitcher to pour water- do not access bottom of tub until MD permits. ed re when to call MD, ed re have a system to call for assist in emergency. ed re L hip WBAT, transfer train. OT Equipment Recommendations  Equipment Needed: Yes  Other: walker, reachers, sock aide, long sponge, call alert necklace vs keep phone on person, BSC, twin bed to main floor- needed for d/c to home, when able to access upstairs- recommend extended tub bench and use of pitcher to pour water vs obtain shower nozzle.   OT Individual Minutes  Time In: 6926  Time Out: 3437  Minutes: 52    Electronically signed by Brown Milton OT on 8/7/21 at 9:29 AM EDT

## 2021-08-07 NOTE — PROGRESS NOTES
[facility]  Progress Note                   Date:   8/7/2021  Patient name: Candi Alfaro  Date of admission:  8/3/2021 10:59 AM  MRN:   [id number]  YOB: 1940  PCP: No primary care provider on file. Reason for Admission: A-fib [facility]) [I48.91]    Subjective:       Clinical Changes /Abnormalities:     Patient seen and examined, awake and oriented, denies any chest pain, dyspnea or palpitations. Remains in atrial fibrillation with HR    BP borderline   On IV Cardizem drip at 7.5 mg/hour. Medications:   Scheduled Meds:   magnesium sulfate  1,000 mg Intravenous Q1H    acetaminophen  1,000 mg Oral 4 times per day    digoxin  125 mcg Oral Daily    metoprolol tartrate  50 mg Oral BID    sodium chloride flush  5-40 mL Intravenous 2 times per day    ceFAZolin  2,000 mg Intravenous Q8H     Continuous Infusions:   esmolol Stopped (08/05/21 1842)    dilTIAZem (CARDIZEM) 125 mg in dextrose 5% 125 mL infusion 10 mg/hr (08/07/21 0929)    heparin (PORCINE) Infusion 18 Units/kg/hr (08/07/21 0451)    sodium chloride      sodium chloride 125 mL/hr at 08/07/21 0455     CBC:   Recent Labs     08/05/21 2044 08/06/21  0500 08/07/21  0521   WBC 11.0 8.9 7.3   HGB 11.0* 10.1* 8.2*    176 154     BMP:    Recent Labs     08/05/21 2044 08/06/21  0500 08/07/21  0521    142 142   K 3.6* 4.0 3.3*   * 110* 112*   CO2 26 23 25   BUN 12 12 15   CREATININE 0.52* <0.40* 0.47*   GLUCOSE 124* 115* 115*     Hepatic:  No results for input(s): AST, ALT, ALB, BILITOT, ALKPHOS in the last 72 hours. Troponin:   No results for input(s): TROPHS in the last 72 hours. BNP: No results for input(s): BNP in the last 72 hours. Lipids: No results for input(s): CHOL, HDL in the last 72 hours. Invalid input(s): LDLCALCU  INR:   No results for input(s): INR in the last 72 hours.     Objective:   Vitals: /66   Pulse 128   Temp 98.7 °F (37.1 °C) (Oral)   Resp 11   Ht 5' 7\" (1.702 m) [address]  [phone]

## 2021-08-07 NOTE — PROGRESS NOTES
Progress Note    Patient:  Mark Isaac  YOB: 1940     79 y.o. male      Subjective:  Patient seen and examined in the hospital. Pain controlled. No complaints or concerns. Sitting in a chair eating breakfast.      Objective:   Vitals:    08/07/21 0925   BP: 113/66   Pulse: 160   Resp: 11   Temp:    SpO2: 99%     Gen: NAD, awake, alert  MSK:  Dressing/splint in place, clean/dry/intact  Grossly NVI        Labs:    Recent Labs     08/07/21  0521   WBC 7.3   HGB 8.2*   HCT 24.2*         K 3.3*   BUN 15   CREATININE 0.47*   GLUCOSE 115*      Lab Results   Component Value Date    CREATININE 0.47 (L) 08/07/2021     Lab Results   Component Value Date    WBC 7.3 08/07/2021     Lab Results   Component Value Date    CRP 38.7 (H) 08/03/2021     Lab Results   Component Value Date    SEDRATE 7 08/03/2021       No results for input(s): SPECDESC, SPECDESC, SPECIAL, CULTURE, CULTURE, STATUS, ORG, CDIFFTOXPCR, CAMPYLOBPCR, SALMONELLAPC, SHIGAPCR, SHIGELLAPCR in the last 72 hours.       Impression/plan:   79 y.o. male 2 Days Post-Op, doing well overall    -Medical management in the ICU  -Incentive Spirometry use  -PT/OT  -Pain control:    -DVT ppx:  Early mobilization  -continue previous/current regimen

## 2021-08-07 NOTE — CARE COORDINATION
ONGOING DISCHARGE PLANNING NOTE:    Writer reviewed LSW notes, and discharge plan is ARU. PT/OT rec ARU. PM&R consulted.     Electronically signed by Nikunj Valencia RN on 8/7/2021 at 2:40 PM

## 2021-08-07 NOTE — PROGRESS NOTES
Physical Therapy  [facility]  Daily Treatment Note  NAME: Olimpia Medina  : 1940  MRN: [id number]    Date of Service: 2021    Discharge Recommendations:  Patient would benefit from continued therapy after discharge   PT Equipment Recommendations  Equipment Needed: No    Assessment   History: Hip fx; [place]  REQUIRES PT FOLLOW UP: Yes  Activity Tolerance  Activity Tolerance: Patient limited by endurance; Patient limited by pain     Patient Diagnosis(es): The primary encounter diagnosis was Atrial fibrillation with RVR ([address] Diagnoses of Dehydration, Traumatic rhabdomyolysis, initial encounter [facility]), and Closed fracture of left hip, initial encounter [facility]) were also pertinent to this visit. has no past medical history on file. has a past surgical history that includes hip surgery (Left, 2021). Restrictions  Restrictions/Precautions  Restrictions/Precautions: Weight Bearing, Fall Risk  Lower Extremity Weight Bearing Restrictions  Left Lower Extremity Weight Bearing: Weight Bearing As Tolerated  Subjective   General  Family / Caregiver Present: Yes (Friend - Sanjay)  Subjective  Subjective: pt positioned in bedside chair, agreeable to tx. General Comment  Comments: RINA Alfaro present and ok's tx. Attempted PM tx, Pt resting and RINA Alfaro differed tx. Pain Screening  Patient Currently in Pain: Denies (RN states pt recently reports 2/10 pain )  Vital Signs  Pulse: 160 (w/ activity )  Resp: 11  BP: 113/66  BP Location: Left upper arm  Patient Currently in Pain: Denies (RN states pt recently reports 2/10 pain )  Oxygen Therapy  SpO2: 99 %  Pulse Oximeter Device Mode: Continuous  Pulse Oximeter Device Location: Finger  O2 Device: None (Room air)  Patient Observation  Observations: R ankle w/ dark brawny and flaking skin skin, bandage on B elbows, Scab opening on L lateral knee opening w/ activity and scabbing. RN notified and bandage applied to area.         Orientation  Orientation  Overall Orientation Status: Within Normal Limits  Objective   Transfers  Sit to Stand: Moderate Assistance  Stand to sit: Moderate Assistance  Comment: Pt completes transfer w/ RW, Verbal cuing provided for sequencing w/ good follow through on second stand   Ambulation  WB Status: WBAT  Stairs/Curb  Stairs?: No     Balance  Sitting - Static: Good  Sitting - Dynamic: Good  Standing - Static: Fair (CGA with RW)  Standing - Dynamic: Fair;- (Gabriel with RW)  Comments: Standing w/ RW   Other exercises  Other exercises 1: B LE seated ex's x10  (AM )  Other exercises 2: STS x2 in AM   Other exercises 3: Static Standing: ~3min x1 , ~3min 30sec x1 in AM          Comment:  Frequent rest breaks PRN to allow HR to stabilize.     Goals  Short term goals  Time Frame for Short term goals: 3-4 days  Short term goal 1: bed mobility with Gabriel  Short term goal 2: transfers with Gabriel  Short term goal 3: gait with RW/SBA x 20ft  Patient Goals   Patient goals : go home    Plan    Plan  Times per week: BID  Times per day: Twice a day  Safety Devices  Type of devices: Left in chair, Call light within reach, Nurse notified, Patient at risk for falls (Friend present in AM .)     Therapy Time   Individual Concurrent Group Co-treatment   Time In 0925         Time Out 1005         Minutes 1233 [address]

## 2021-08-07 NOTE — PROGRESS NOTES
[facility]  Follow Up      NAME:  John Padron  MEDICAL RECORD NUMBER:  [id number]  AGE: 79 y.o. GENDER: male  : 1940  TODAY'S DATE:  2021      Wound care follow up visit attempted. The patient just got up to chair, will defer visit until a later time. Per RN, the patient is doing much better. POD #2 for left hip hemiarthoplasty. Considering ARU at discharge.     Maxwell JAYN, RN, [facility] [place] [address]  Wound, Ostomy, and Continence Nursing  [phone]

## 2021-08-08 PROBLEM — D64.9 ANEMIA: Status: ACTIVE | Noted: 2021-08-08

## 2021-08-08 LAB
ABSOLUTE EOS #: 0.23 K/UL (ref 0–0.4)
ABSOLUTE IMMATURE GRANULOCYTE: ABNORMAL K/UL (ref 0–0.3)
ABSOLUTE LYMPH #: 0.91 K/UL (ref 1–4.8)
ABSOLUTE MONO #: 0.53 K/UL (ref 0.1–1.3)
ANION GAP SERPL CALCULATED.3IONS-SCNC: 4 MMOL/L (ref 9–17)
BASOPHILS # BLD: 0 % (ref 0–2)
BASOPHILS ABSOLUTE: 0 K/UL (ref 0–0.2)
BUN BLDV-MCNC: 22 MG/DL (ref 8–23)
BUN/CREAT BLD: ABNORMAL (ref 9–20)
CALCIUM SERPL-MCNC: 6.8 MG/DL (ref 8.6–10.4)
CHLORIDE BLD-SCNC: 112 MMOL/L (ref 98–107)
CO2: 25 MMOL/L (ref 20–31)
CREAT SERPL-MCNC: 0.47 MG/DL (ref 0.7–1.2)
DATE, STOOL #1: ABNORMAL
DATE, STOOL #2: ABNORMAL
DATE, STOOL #3: ABNORMAL
DIFFERENTIAL TYPE: ABNORMAL
EOSINOPHILS RELATIVE PERCENT: 3 % (ref 0–4)
GFR AFRICAN AMERICAN: >60 ML/MIN
GFR NON-AFRICAN AMERICAN: >60 ML/MIN
GFR SERPL CREATININE-BSD FRML MDRD: ABNORMAL ML/MIN/{1.73_M2}
GFR SERPL CREATININE-BSD FRML MDRD: ABNORMAL ML/MIN/{1.73_M2}
GLUCOSE BLD-MCNC: 121 MG/DL (ref 70–99)
HAPTOGLOBIN: 87 MG/DL (ref 30–200)
HCT VFR BLD CALC: 21.4 % (ref 41–53)
HCT VFR BLD CALC: 21.8 % (ref 41–53)
HCT VFR BLD CALC: 22.3 % (ref 41–53)
HCT VFR BLD CALC: 22.9 % (ref 41–53)
HEMOCCULT SP1 STL QL: POSITIVE
HEMOCCULT SP2 STL QL: ABNORMAL
HEMOCCULT SP3 STL QL: ABNORMAL
HEMOGLOBIN: 7.2 G/DL (ref 13.5–17.5)
HEMOGLOBIN: 7.2 G/DL (ref 13.5–17.5)
HEMOGLOBIN: 7.4 G/DL (ref 13.5–17.5)
HEMOGLOBIN: 7.6 G/DL (ref 13.5–17.5)
IMMATURE GRANULOCYTES: ABNORMAL %
LACTATE DEHYDROGENASE: 367 U/L (ref 135–225)
LYMPHOCYTES # BLD: 12 % (ref 24–44)
MCH RBC QN AUTO: 32.9 PG (ref 26–34)
MCHC RBC AUTO-ENTMCNC: 33.7 G/DL (ref 31–37)
MCV RBC AUTO: 97.7 FL (ref 80–100)
MONOCYTES # BLD: 7 % (ref 1–7)
MORPHOLOGY: NORMAL
NRBC AUTOMATED: ABNORMAL PER 100 WBC
PDW BLD-RTO: 14.9 % (ref 11.5–14.9)
PLATELET # BLD: 174 K/UL (ref 150–450)
PLATELET ESTIMATE: ABNORMAL
PMV BLD AUTO: 9.5 FL (ref 6–12)
POTASSIUM SERPL-SCNC: 4.4 MMOL/L (ref 3.7–5.3)
RBC # BLD: 2.2 M/UL (ref 4.5–5.9)
RBC # BLD: ABNORMAL 10*6/UL
SEG NEUTROPHILS: 78 % (ref 36–66)
SEGMENTED NEUTROPHILS ABSOLUTE COUNT: 5.93 K/UL (ref 1.3–9.1)
SODIUM BLD-SCNC: 141 MMOL/L (ref 135–144)
TIME, STOOL #1: ABNORMAL
TIME, STOOL #2: ABNORMAL
TIME, STOOL #3: ABNORMAL
WBC # BLD: 7.6 K/UL (ref 3.5–11)
WBC # BLD: ABNORMAL 10*3/UL

## 2021-08-08 PROCEDURE — 83010 ASSAY OF HAPTOGLOBIN QUANT: CPT

## 2021-08-08 PROCEDURE — 2580000003 HC RX 258: Performed by: ORTHOPAEDIC SURGERY

## 2021-08-08 PROCEDURE — 85018 HEMOGLOBIN: CPT

## 2021-08-08 PROCEDURE — G0328 FECAL BLOOD SCRN IMMUNOASSAY: HCPCS

## 2021-08-08 PROCEDURE — 85025 COMPLETE CBC W/AUTO DIFF WBC: CPT

## 2021-08-08 PROCEDURE — 80048 BASIC METABOLIC PNL TOTAL CA: CPT

## 2021-08-08 PROCEDURE — 6370000000 HC RX 637 (ALT 250 FOR IP): Performed by: ORTHOPAEDIC SURGERY

## 2021-08-08 PROCEDURE — 83615 LACTATE (LD) (LDH) ENZYME: CPT

## 2021-08-08 PROCEDURE — 99233 SBSQ HOSP IP/OBS HIGH 50: CPT | Performed by: INTERNAL MEDICINE

## 2021-08-08 PROCEDURE — 6370000000 HC RX 637 (ALT 250 FOR IP)

## 2021-08-08 PROCEDURE — 6360000002 HC RX W HCPCS: Performed by: STUDENT IN AN ORGANIZED HEALTH CARE EDUCATION/TRAINING PROGRAM

## 2021-08-08 PROCEDURE — 99213 OFFICE O/P EST LOW 20 MIN: CPT

## 2021-08-08 PROCEDURE — 97110 THERAPEUTIC EXERCISES: CPT

## 2021-08-08 PROCEDURE — 85014 HEMATOCRIT: CPT

## 2021-08-08 PROCEDURE — 97116 GAIT TRAINING THERAPY: CPT

## 2021-08-08 PROCEDURE — 36415 COLL VENOUS BLD VENIPUNCTURE: CPT

## 2021-08-08 PROCEDURE — 97530 THERAPEUTIC ACTIVITIES: CPT

## 2021-08-08 PROCEDURE — 2580000003 HC RX 258: Performed by: INTERNAL MEDICINE

## 2021-08-08 PROCEDURE — 2500000003 HC RX 250 WO HCPCS: Performed by: INTERNAL MEDICINE

## 2021-08-08 PROCEDURE — 2000000000 HC ICU R&B

## 2021-08-08 PROCEDURE — 2580000003 HC RX 258: Performed by: STUDENT IN AN ORGANIZED HEALTH CARE EDUCATION/TRAINING PROGRAM

## 2021-08-08 RX ORDER — LANOLIN ALCOHOL/MO/W.PET/CERES
3 CREAM (GRAM) TOPICAL NIGHTLY PRN
Status: DISCONTINUED | OUTPATIENT
Start: 2021-08-08 | End: 2021-08-13 | Stop reason: HOSPADM

## 2021-08-08 RX ORDER — POLYETHYLENE GLYCOL 3350 17 G/17G
17 POWDER, FOR SOLUTION ORAL DAILY
Status: DISCONTINUED | OUTPATIENT
Start: 2021-08-08 | End: 2021-08-13 | Stop reason: HOSPADM

## 2021-08-08 RX ORDER — PANTOPRAZOLE SODIUM 40 MG/1
40 TABLET, DELAYED RELEASE ORAL
Status: DISCONTINUED | OUTPATIENT
Start: 2021-08-09 | End: 2021-08-10

## 2021-08-08 RX ORDER — DILTIAZEM HYDROCHLORIDE 120 MG/1
120 CAPSULE, COATED, EXTENDED RELEASE ORAL DAILY
Status: DISCONTINUED | OUTPATIENT
Start: 2021-08-08 | End: 2021-08-08

## 2021-08-08 RX ADMIN — Medication 3 MG: at 19:09

## 2021-08-08 RX ADMIN — CEFAZOLIN 1000 MG: 1 INJECTION, POWDER, FOR SOLUTION INTRAMUSCULAR; INTRAVENOUS at 05:40

## 2021-08-08 RX ADMIN — METOPROLOL TARTRATE 50 MG: 50 TABLET, FILM COATED ORAL at 19:09

## 2021-08-08 RX ADMIN — CEFAZOLIN 1000 MG: 1 INJECTION, POWDER, FOR SOLUTION INTRAMUSCULAR; INTRAVENOUS at 20:55

## 2021-08-08 RX ADMIN — ACETAMINOPHEN 1000 MG: 500 TABLET, FILM COATED ORAL at 00:42

## 2021-08-08 RX ADMIN — ACETAMINOPHEN 1000 MG: 500 TABLET, FILM COATED ORAL at 11:08

## 2021-08-08 RX ADMIN — AMIODARONE HYDROCHLORIDE 0.5 MG/MIN: 1.8 INJECTION, SOLUTION INTRAVENOUS at 16:31

## 2021-08-08 RX ADMIN — METOPROLOL TARTRATE 50 MG: 50 TABLET, FILM COATED ORAL at 08:38

## 2021-08-08 RX ADMIN — AMIODARONE HYDROCHLORIDE 1 MG/MIN: 1.8 INJECTION, SOLUTION INTRAVENOUS at 11:02

## 2021-08-08 RX ADMIN — SODIUM CHLORIDE, PRESERVATIVE FREE 10 ML: 5 INJECTION INTRAVENOUS at 19:09

## 2021-08-08 RX ADMIN — DIGOXIN 125 MCG: 125 TABLET ORAL at 08:38

## 2021-08-08 RX ADMIN — AMIODARONE HYDROCHLORIDE 150 MG: 1.5 INJECTION, SOLUTION INTRAVENOUS at 10:44

## 2021-08-08 RX ADMIN — ACETAMINOPHEN 1000 MG: 500 TABLET, FILM COATED ORAL at 18:17

## 2021-08-08 RX ADMIN — SODIUM CHLORIDE: 9 INJECTION, SOLUTION INTRAVENOUS at 10:45

## 2021-08-08 RX ADMIN — CEFAZOLIN 1000 MG: 1 INJECTION, POWDER, FOR SOLUTION INTRAMUSCULAR; INTRAVENOUS at 13:01

## 2021-08-08 RX ADMIN — DILTIAZEM HYDROCHLORIDE 2.5 MG/HR: 5 INJECTION, SOLUTION INTRAVENOUS at 13:20

## 2021-08-08 RX ADMIN — SODIUM CHLORIDE: 9 INJECTION, SOLUTION INTRAVENOUS at 00:58

## 2021-08-08 RX ADMIN — ACETAMINOPHEN 1000 MG: 500 TABLET, FILM COATED ORAL at 06:18

## 2021-08-08 RX ADMIN — SODIUM CHLORIDE: 9 INJECTION, SOLUTION INTRAVENOUS at 19:16

## 2021-08-08 RX ADMIN — POLYETHYLENE GLYCOL 3350 17 G: 17 POWDER, FOR SOLUTION ORAL at 08:39

## 2021-08-08 ASSESSMENT — ENCOUNTER SYMPTOMS
RHINORRHEA: 0
SHORTNESS OF BREATH: 0
VOMITING: 0
DIARRHEA: 0
COUGH: 0
ABDOMINAL PAIN: 0
SORE THROAT: 0
EYE PAIN: 0
CHEST TIGHTNESS: 0
NAUSEA: 0
CONSTIPATION: 1
STRIDOR: 0
SINUS PRESSURE: 0
CHOKING: 0
PHOTOPHOBIA: 0
WHEEZING: 0
CONSTIPATION: 0

## 2021-08-08 ASSESSMENT — PAIN SCALES - GENERAL
PAINLEVEL_OUTOF10: 3
PAINLEVEL_OUTOF10: 0
PAINLEVEL_OUTOF10: 3
PAINLEVEL_OUTOF10: 3
PAINLEVEL_OUTOF10: 0
PAINLEVEL_OUTOF10: 3
PAINLEVEL_OUTOF10: 3

## 2021-08-08 ASSESSMENT — PAIN DESCRIPTION - ORIENTATION: ORIENTATION: LEFT

## 2021-08-08 ASSESSMENT — PAIN DESCRIPTION - PAIN TYPE: TYPE: SURGICAL PAIN

## 2021-08-08 ASSESSMENT — PAIN DESCRIPTION - LOCATION: LOCATION: HIP

## 2021-08-08 NOTE — CARE COORDINATION
ONGOING DISCHARGE PLANNING NOTE:     Writer reviewed LSW notes, and discharge plan is ARU. PT/OT rec ARU. PM&R consulted.     Electronically signed by Selina Angeles RN on 8/8/2021 at 8:59 AM

## 2021-08-08 NOTE — PROGRESS NOTES
CHOL, HDL in the last 72 hours. Invalid input(s): LDLCALCU  INR:   No results for input(s): INR in the last 72 hours. Objective:   Vitals: BP (!) 108/52   Pulse 129   Temp 98.8 °F (37.1 °C) (Oral)   Resp 11   Ht 5' 7\" (1.702 m)   Wt 148 lb 5.9 oz (67.3 kg)   SpO2 95%   BMI 23.24 kg/m²   General appearance: alert and cooperative with exam, lethargic on exam   HEENT: Head: Normocephalic, no lesions, without obvious abnormality. Neck:no JVD, trachea midline, no adenopathy  Lungs: Diminished to auscultation but clear . On RA without distress   Heart: Irregular rate and rhythm, s1/s2 auscultated, no murmurs, -120  Abdomen: soft, non-tender, bowel sounds active  Extremities: no edema - see surgical evaluation. Denies pain presently   Neurologic: not done    Echo 8/4/21  Summary  Normal left ventricle size and function with an estimated EF > 55%. Mild left ventricular hypertrophy. Left atrial dilatation. Aortic valve was not well visualized. Mild to moderate aortic insufficiency. Mild mitral regurgitation. Mild tricuspid regurgitation. Normal right ventricular systolic pressure. IVC Increased diameter, but still has inspiratory variation. Assessment / Acute Cardiac Problems:   1. Atrial fibrillation with RVR, suboptimal rate control   2. Rhabdomyolysis with prolonged time spent on floor - 5 days  3. Elevated troponins, type II MI secondary to rhabdomyolysis/tachycradia/hypoxia  4. Preserved LV systolic function   5. Mechanical fall with hip fracture status post left hip hemiarthroplasty (POD # 2)   6. Acute respiratory failure, resolved   7. Preserved LVEF with Mild-mod AI, mild MR, mild TR    Patient Active Problem List:     A-fib ([facility] [address]     Closed fracture of left hip with routine healing     Rhabdomyolysis      Plan of Treatment:   1. Start Iv amiodarone bolus and infusion for better rate control (inability to increase BB or CCB due to hypotension)   2.  Continue lopressor and Digoxin at current dose   3. Heparin drip is held due to drop in Hb, will monitor H and H   4. Not a suitable candidate for long term AC d/t fall risk unless goes to NH and can be monitoring. 5. Echo reviewed as above. No plans for further ischemic work-up at this time. 6. Keep K >4 and Mg >2.        Electronically signed by Mayra Terry MD on 8/8/2021 at [address] [place] [address].  [phone]

## 2021-08-08 NOTE — PROGRESS NOTES
Progress Note    Patient:  Mark Isaac  YOB: 1940     79 y.o. male      Subjective:  Patient seen and examined in the hospital. Pain controlled. No complaints or concerns. Objective:   Vitals:    08/08/21 0900   BP: 102/68   Pulse: 116   Resp: 16   Temp:    SpO2: (!) 89%     Gen: NAD, awake, alert  MSK:  Dressing/splint in place, clean/dry/intact  Grossly NVI        Labs:    Recent Labs     08/08/21  0446   WBC 7.6   HGB 7.2*   HCT 21.4*         K 4.4   BUN 22   CREATININE 0.47*   GLUCOSE 121*      Lab Results   Component Value Date    CREATININE 0.47 (L) 08/08/2021     Lab Results   Component Value Date    WBC 7.6 08/08/2021     Lab Results   Component Value Date    CRP 38.7 (H) 08/03/2021     Lab Results   Component Value Date    SEDRATE 7 08/03/2021       No results for input(s): SPECDESC, SPECDESC, SPECIAL, CULTURE, CULTURE, STATUS, ORG, CDIFFTOXPCR, CAMPYLOBPCR, SALMONELLAPC, SHIGAPCR, SHIGELLAPCR in the last 72 hours.       Impression/plan:   79 y.o. male Post-Op, doing well overall    -Medical management in the ICU  -Incentive Spirometry use  -PT/OT  -Pain control:    -DVT ppx:  Early mobilization  -continue previous/current regimen

## 2021-08-08 NOTE — PROGRESS NOTES
leg swelling (Bilateral lower limbs). Negative for chest pain and palpitations. Gastrointestinal: Positive for constipation. Negative for abdominal pain, nausea and vomiting. Genitourinary: Negative for difficulty urinating (Jimenes catheter). Musculoskeletal: Negative for arthralgias (Left hip joint pain). Neurological: Negative for tremors, seizures, weakness, light-headedness, numbness and headaches. Psychiatric/Behavioral: Negative for agitation, behavioral problems and confusion. Medications: Allergies:  No Known Allergies    Current Meds:   Scheduled Meds:    polyethylene glycol  17 g Oral Daily    ceFAZolin  1,000 mg Intravenous Q8H    acetaminophen  1,000 mg Oral 4 times per day    digoxin  125 mcg Oral Daily    metoprolol tartrate  50 mg Oral BID    sodium chloride flush  5-40 mL Intravenous 2 times per day     Continuous Infusions:    dilTIAZem (CARDIZEM) 125 mg in dextrose 5% 125 mL infusion 5 mg/hr (21 1707)    esmolol Stopped (21 1842)    sodium chloride      sodium chloride 125 mL/hr at 21 0058     PRN Meds: magnesium sulfate, oxyCODONE, morphine, perflutren lipid microspheres, sodium chloride flush, sodium chloride, ondansetron **OR** ondansetron, polyethylene glycol, acetaminophen **OR** acetaminophen, potassium chloride **OR** potassium alternative oral replacement **OR** potassium chloride    Data:     Past Medical History:   has no past medical history on file. Social History:   reports that he has never smoked. He has never used smokeless tobacco. He reports that he does not drink alcohol. Family History: History reviewed. No pertinent family history.     Vitals:  BP (!) 96/52   Pulse 126   Temp 97.5 °F (36.4 °C) (Oral)   Resp 18   Ht 5' 7\" (1.702 m)   Wt 148 lb 5.9 oz (67.3 kg)   SpO2 95%   BMI 23.24 kg/m²   Temp (24hrs), Av.7 °F (36.5 °C), Min:97.3 °F (36.3 °C), Max:98.8 °F (37.1 °C)    No results for input(s): POCGLU in the last 72 hours. I/O(24Hr):     Intake/Output Summary (Last 24 hours) at 8/8/2021 0810  Last data filed at 8/8/2021 0650  Gross per 24 hour   Intake --   Output 1060 ml   Net -1060 ml       Labs:    CBC with Differential:    Lab Results   Component Value Date    WBC 7.6 08/08/2021    RBC 2.20 08/08/2021    HGB 7.2 08/08/2021    HCT 21.8 08/08/2021     08/08/2021    MCV 97.7 08/08/2021    MCH 32.9 08/08/2021    MCHC 33.7 08/08/2021    RDW 14.9 08/08/2021    LYMPHOPCT 12 08/08/2021    MONOPCT 7 08/08/2021    BASOPCT 0 08/08/2021    MONOSABS 0.53 08/08/2021    LYMPHSABS 0.91 08/08/2021    EOSABS 0.23 08/08/2021    BASOSABS 0.00 08/08/2021    DIFFTYPE NOT REPORTED 08/08/2021     BMP:    Lab Results   Component Value Date     08/08/2021    K 4.4 08/08/2021     08/08/2021    CO2 25 08/08/2021    BUN 22 08/08/2021    LABALBU 3.8 08/03/2021    CREATININE 0.47 08/08/2021    CALCIUM 6.8 08/08/2021    GFRAA >60 08/08/2021    LABGLOM >60 08/08/2021    GLUCOSE 121 08/08/2021       Lab Results   Component Value Date/Time    SPECIAL NOT REPORTED 08/03/2021 11:41 AM     Lab Results   Component Value Date/Time    CULTURE NO GROWTH 5 DAYS 08/03/2021 11:41 AM         Radiology:    ECHO Complete 2D W Doppler W Color    Result Date: 8/4/2021  1604 [facility] Transthoracic Echocardiography Report (TTE)  Patient Name Lourdes Jose  Date of Study                 08/04/2021   Date of      1940  Gender                        Male  Birth   Age          79 year(s)  Race                             Room Number  2087        Height:                       66.93 inch, 170 cm   Corporate ID [id number]    Weight:                       141 pounds, 64 kg  #   Patient Acct [de-identified]   BSA:           1.74 m^2       BMI:      22.15  #                                                                kg/m^2   MR #         [id number]      Sonographer                   Eva Gan   Accession #  [id number]  Interpreting Physician        [address] [place] [address]   Fellow                   Referring Nurse Practitioner   Interpreting             Referring Physician           [address] [place] [address]  Fellow  Type of Study   TTE procedure:2D Echocardiogram, M-Mode, Doppler, Color Doppler. Procedure Date Date: 08/04/2021 Start: 10:15 AM Study Location: [address] [place] [address] Technical Quality: Fair visualization Indications:Elevated troponin and Atrial fibrillation. Patient Status: Inpatient Height: 66.93 inches Weight: 141. 11 pounds BSA: 1.74 m^2 BMI: 22.15 kg/m^2 Rhythm: Atrial fibrillation HR: 129 bpm BP: 98/56 mmHg CONCLUSIONS Summary Normal left ventricle size and function with an estimated EF > 55%. Mild left ventricular hypertrophy. Left atrial dilatation. Aortic valve was not well visualized. Mild to moderate aortic insufficiency. Mild mitral regurgitation. Mild tricuspid regurgitation. Normal right ventricular systolic pressure. IVC Increased diameter, but still has inspiratory variation. Signature ----------------------------------------------------------------------------  Electronically signed by Eva Gan(Sonographer) on 08/04/2021 10:48  AM ---------------------------------------------------------------------------- ----------------------------------------------------------------------------  Electronically signed by Nico Hilton(Interpreting physician) on 08/04/2021  11:46 AM ---------------------------------------------------------------------------- FINDINGS Left Atrium Left atrial dilatation. Left Ventricle Normal left ventricle size and function with an estimated EF > 55%. No segmental wall motion abnormalities seen. Mild left ventricular hypertrophy. Right Atrium Right atrium is normal in size. Right Ventricle Normal right ventricular size and function. Mitral Valve Normal mitral valve structure and function. Mild mitral regurgitation. Aortic Valve Aortic valve was not well visualized. Mild to moderate aortic insufficiency.  No aortic stenosis. Tricuspid Valve Normal tricuspid valve structure and function. Mild tricuspid regurgitation. Normal right ventricular systolic pressure. Pulmonic Valve Pulmonic valve not well visualized but Doppler velocities are normal. No pulmonic insufficiency. Pericardial Effusion No significant pericardial effusion is seen. Miscellaneous Normal aortic root dimension. E/e' average 5.3 IVC Increased diameter, but still has inspiratory variation.  M-mode / 2D Measurements & Calculations:   LVIDd:4.82 cm(3.7 - 5.6 cm)      Diastolic LUMKWP:878 ml  QNUNN:9.67 cm(2.2 - 4.0 cm)      Systolic OTCRB.2 ml  TXEW:0.51 cm(0.6 - 1.1 cm)       Aortic Root:3.4 cm(2.0 - 3.7 cm)  LVPWd:1.22 cm(0.6 - 1.1 cm)      LA Dimension: 4.9 cm(1.9 - 4.0 cm)  Fractional Shortenin.72 %    LA volume/Index: 43.8 ml /25m^2  Calculated LVEF (%): 71.5 %      LVOT:2.4 cm   Mitral:                                Aortic   Peak E-Wave: 0.84 m/s                  Peak Velocity: 1.20 m/s                                         Mean Velocity: 0.88 m/s  Peak Gradient: 2.82 mmHg               Peak Gradient: 5.76 mmHg  Deceleration Time: 173 msec            Mean Gradient: 5 mmHg                                         AI P1/2t: 441 msec                                          Area (continuity): 1.83 cm^2                                         AV VTI: 24.9 cm   Tricuspid:                             Pulmonic:   Estimated RVSP: 19 mmHg  Peak TR Velocity: 1.69 m/s  Peak TR Gradient: 11.4244 mmHg  Estimated RA Pressure: 8 mmHg                                         Estimated PASP: 19.42 mmHg  Septal Wall E' velocity:0.12 m/s Lateral Wall E' velocity:0.12 m/s    XR HIP LEFT (1 VIEW)    Result Date: 2021  EXAMINATION: ONE XRAY VIEW OF THE LEFT HIP 2021 8:31 pm COMPARISON: 2021 HISTORY: ORDERING SYSTEM PROVIDED HISTORY: sp left hip hemiarthroplasty TECHNOLOGIST PROVIDED HISTORY: sp left hip hemiarthroplasty Reason for Exam: post op Acuity: Unknown Type of Exam: Unknown FINDINGS: There is interval left hip arthroplasty for repair of the left femoral neck fracture. There is edema and emphysema in the overlying soft tissues, consistent with recent surgery. Diffuse bone demineralization. Left hip arthroplasty for fixation of the left femoral neck fracture. CT HEAD WO CONTRAST    Result Date: 8/3/2021  EXAMINATION: CT OF THE HEAD WITHOUT CONTRAST  8/3/2021 1:55 pm TECHNIQUE: CT of the head was performed without the administration of intravenous contrast. Dose modulation, iterative reconstruction, and/or weight based adjustment of the mA/kV was utilized to reduce the radiation dose to as low as reasonably achievable. COMPARISON: None. HISTORY: ORDERING SYSTEM PROVIDED HISTORY: Fall TECHNOLOGIST PROVIDED HISTORY: Fall Decision Support Exception - unselect if not a suspected or confirmed emergency medical condition->Emergency Medical Condition (MA) Reason for Exam: pt fell 5 days ago, in afib Acuity: Unknown Type of Exam: Unknown FINDINGS: BRAIN/VENTRICLES: There is no acute intracranial hemorrhage, mass effect or midline shift. No abnormal extra-axial fluid collection. The gray-white differentiation is maintained without evidence of an acute infarct. There is no evidence of hydrocephalus. ORBITS: The visualized portion of the orbits demonstrate no acute abnormality. SINUSES: The visualized paranasal sinuses and mastoid air cells demonstrate no acute abnormality. SOFT TISSUES/SKULL:  No acute abnormality of the visualized skull or soft tissues. No acute intracranial abnormality. CT CERVICAL SPINE WO CONTRAST    Result Date: 8/3/2021  EXAMINATION: CT OF THE CERVICAL SPINE WITHOUT CONTRAST 8/3/2021 1:55 pm TECHNIQUE: CT of the cervical spine was performed without the administration of intravenous contrast. Multiplanar reformatted images are provided for review.  Dose modulation, iterative reconstruction, and/or weight based adjustment of the mA/kV was utilized to reduce the radiation dose to as low as reasonably achievable. COMPARISON: None. HISTORY: ORDERING SYSTEM PROVIDED HISTORY: Fall TECHNOLOGIST PROVIDED HISTORY: Fall Decision Support Exception - unselect if not a suspected or confirmed emergency medical condition->Emergency Medical Condition (MA) Reason for Exam: pt fell 5 days ago, in afib Acuity: Unknown Type of Exam: Unknown FINDINGS: BONES/ALIGNMENT: There is no acute fracture or traumatic malalignment. DEGENERATIVE CHANGES: No significant degenerative changes. SOFT TISSUES: There is no prevertebral soft tissue swelling. No acute abnormality of the cervical spine. XR CHEST PORTABLE    Result Date: 8/6/2021  EXAMINATION: ONE XRAY VIEW OF THE CHEST 8/6/2021 5:44 am COMPARISON: 5 August 2021 HISTORY: ORDERING SYSTEM PROVIDED HISTORY: ETT placement TECHNOLOGIST PROVIDED HISTORY: ETT placement Reason for Exam: ETT placement Acuity: Unknown Type of Exam: Ongoing Additional signs and symptoms: ETT placement Relevant Medical/Surgical History: ETT placement FINDINGS: AP portable view of the chest electronically labeled regarding sides. Tracheal tube 5 cm proximal to the kassidy. Extreme lung apices not included on this radiograph. Patchy opacity within the right lower lung. No effusions or pneumothoraces. Cardiomediastinal silhouette is within normal limits. Remaining visualized osseous and soft tissues are unchanged. Stable tracheal tube tip at the level of upper T4 vertebra. Faint right lower lung opacity, most likely infectious or inflammatory in the appropriate clinical setting.      XR CHEST PORTABLE    Result Date: 8/5/2021  EXAMINATION: ONE XRAY VIEW OF THE CHEST 8/5/2021 8:31 pm COMPARISON: 08/03/2021 HISTORY: ORDERING SYSTEM PROVIDED HISTORY: ETT placement TECHNOLOGIST PROVIDED HISTORY: ETT placement Reason for Exam: ET tube placement Acuity: Unknown Type of Exam: Unknown FINDINGS: Endotracheal tube terminates 5.5 cm above the kassidy. Cardiomediastinal silhouette is unchanged in size. Aortic atherosclerosis. No large pleural effusion or pneumothorax. There are faint bibasilar pulmonary opacities. No acute osseous abnormality. 1. Endotracheal tube in expected position. 2. Faint bibasilar opacities, which may be due to atelectasis. XR CHEST PORTABLE    Result Date: 8/3/2021  EXAMINATION: ONE XRAY VIEW OF THE CHEST 8/3/2021 11:41 am COMPARISON: None. HISTORY: ORDERING SYSTEM PROVIDED HISTORY: AMS TECHNOLOGIST PROVIDED HISTORY: AMS Reason for Exam: AMS Acuity: Unknown Type of Exam: Unknown FINDINGS: Patient is slightly rotated. Heart size within normal limits without evidence of vascular congestion. Aside from minor chronic appearing changes lungs are grossly clear. Probable skin fold projects in the left lower chest.  No focal lung consolidation is seen. No significant pleural effusions. Gas distended bowel loops beneath the left hemidiaphragm. Monitor leads overlie the chest.  The bones are osteopenic with degenerative changes; questionable age-indeterminate slight loss of height of lower thoracic vertebral bodies. No acute cardiopulmonary process suspected. CT HIP LEFT WO CONTRAST    Result Date: 8/3/2021  EXAMINATION: CT OF THE LEFT HIP WITHOUT CONTRAST 8/3/2021 1:55 pm TECHNIQUE: CT of the left hip was performed without the administration of intravenous contrast.  Multiplanar reformatted images are provided for review. Dose modulation, iterative reconstruction, and/or weight based adjustment of the mA/kV was utilized to reduce the radiation dose to as low as reasonably achievable.  COMPARISON: Pelvis and left hip radiographs August 3, 2021 HISTORY ORDERING SYSTEM PROVIDED HISTORY: ?acetabulum fx TECHNOLOGIST PROVIDED HISTORY: ?acetabulum fx Decision Support Exception - unselect if not a suspected or confirmed emergency medical condition->Emergency Medical Condition (MA) Reason for Exam: pt fell 5 days ago, in afib, lt hip pain Acuity: Unknown Type of Exam: Unknown FINDINGS: Bones: Redemonstration of displaced transcervical left femoral neck fracture which is subacute in appearance. No additional fractures are identified. The bones are osteopenic. No suspicious lytic or sclerotic osseous lesions are evident. Soft Tissue: Subcutaneous and intramuscular edema adjacent to the fracture site. No organized collections are identified. No subcutaneous gas. Visualized intrapelvic structures appear grossly unremarkable. Moderate amount of stool present at the rectal vault. Joint: Anatomic alignment of the left hip. Mild-to-moderate degenerative changes of the left hip. Chondrocalcinosis noted. 1. Displaced fracture of the left transcervical femoral neck which is subacute in appearance. 2. No additional fractures are identified. 3. Mild-to-moderate degenerative change of the left hip. Chondrocalcinosis also present. XR HIP 2-3 VW W PELVIS LEFT    Result Date: 8/3/2021  EXAMINATION: ONE XRAY VIEW OF THE PELVIS AND TWO XRAY VIEWS LEFT HIP 8/3/2021 8:41 am COMPARISON: None. HISTORY: ORDERING SYSTEM PROVIDED HISTORY: fall TECHNOLOGIST PROVIDED HISTORY: fall Reason for Exam: fall, left hip pain Acuity: Unknown Type of Exam: Unknown FINDINGS: There is a left femoral neck fracture with impaction, varus angulation, and proximal migration of the greater trochanter which is essentially at the level of the acetabulum. The femoral head appears to remain appropriately seated in the acetabular fossa. Possible transverse lucency through the medial left acetabulum. 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 Left femoral neck fracture with impaction, varus angulation, and proximal migration of the greater trochanter.  Possible lucency through the medial left acetabulum, though radiographic evaluation is limited by osseous demineralization. Consider CT. Physical Examination:        Physical Exam  Constitutional:       General: He is not in acute distress. Appearance: Normal appearance. He is not ill-appearing. HENT:      Head: Normocephalic and atraumatic. Eyes:      General: No scleral icterus. Extraocular Movements: Extraocular movements intact. Conjunctiva/sclera: Conjunctivae normal.   Cardiovascular:      Rate and Rhythm: Tachycardia present. Rhythm irregular. Pulses: Normal pulses. Heart sounds: Normal heart sounds. No murmur heard. No friction rub. No gallop. Pulmonary:      Effort: Pulmonary effort is normal. No respiratory distress. Breath sounds: Normal breath sounds. No stridor. No wheezing, rhonchi or rales. Chest:      Chest wall: No tenderness. Genitourinary:     Penis: Swelling present. No erythema, tenderness or discharge. Testes: Normal.         Right: Mass or swelling not present. Left: Mass or swelling not present. Musculoskeletal:      Right lower le+ Pitting Edema present. Left lower le+ Pitting Edema present. Neurological:      Mental Status: He is alert.            Assessment:        Primary Problem  A-fib [facility])    Active Hospital Problems    Diagnosis Date Noted    Closed fracture of left hip with routine healing [S72.002D] 2021    Rhabdomyolysis [M62.82] 2021    [facility] [I48[facility]91[facility] 2021       Plan:        Atrial fibrillation with RVR-still currently in atrial fibrillation, regular rate - No change   -Heart [id number]  - Diltiazem infusion D/C  -Heparin injection was stopped   -Cardiology on board, on digoxin and lopressor      Anemia  Hgb 10==>8.2 ==>7.2  Hemeoccult was order and was positive        Hip pain, due to left femoral neck fracture  -Orthopedic surgery on board  -Status post left hip hemiarthroplasty on 2021, postop day 2          Rhabdomyolysis, due to fall at home and prolonged time down   BUN 15 ==> 22  Cr 0.47   - from 8/4/2021  Myoglobin 1409 -> 431  -Patient on normal saline at 125 mL an hour  - K 4.4      Right foot wound:  -Wound ostomy evaluation 8/5/2021, recommendations provided for multiple wounds including buttocks/sacral injuries, and bilateral elbows and knee injuries.    -Patient on cefazolin  Chronic Dermatitis of Right foot: Recommendations not provided for right foot lesion, scales are pealing off and erythema looks the same as last time.     Mitch Johnson MD  8/8/2021  8:10 AM

## 2021-08-08 NOTE — PROGRESS NOTES
Pulmonary Progress Note  [facility]      Patient - Cydney Florentino,  Age - Yaa [place] [age] y.o.    - 1940      Room Number -    MRN -  [id number]   [facility]  Date of Admission -  8/3/2021 10:59 Clara Dia MD  Primary Care Physician - No primary care provider on file. SUBJECTIVE   Patient resting quietly. Denies any increase shortness of breath no chest pain  No wheezing. OBJECTIVE   VITALS    height is 5' 7\" (1.702 m) and weight is 148 lb 5.9 oz (67.3 kg). His oral temperature is 97.7 °F (36.5 °C). His blood pressure is 110/85 and his pulse is 142. His respiration is 24 and oxygen saturation is 91%. Body mass index is 23.24 kg/m². Temperature Range: Temp: 97.7 °F (36.5 °C) Temp  Av.8 °F (36.6 °C)  Min: 97.3 °F (36.3 °C)  Max: 98.8 °F (37.1 °C)  BP Range:  Systolic (15ETV), MARKIE:808 , Min:85 , ZTP:325     Diastolic (95EJG), SHV:27, Min:34, Max:85    Pulse Range: Pulse  Av.1  Min: 88  Max: 142  Respiration Range: Resp  Av  Min: 11  Max: 33  Current Pulse Ox[de-identified]  SpO2: 91 %  24HR Pulse Ox Range:  SpO2  Av.7 %  Min: 81 %  Max: 96 %  Oxygen Amount and Delivery: O2 Flow Rate (L/min): 2 L/min    Wt Readings from Last 3 Encounters:   21 148 lb 5.9 oz (67.3 kg)       I/O (24 Hours)    Intake/Output Summary (Last 24 hours) at 2021 1652  Last data filed at 2021 0650  Gross per 24 hour   Intake --   Output 960 ml   Net -960 ml       EXAM     General Appearance  Awake, alert, oriented, in no acute distress  HEENT - normocephalic, atraumatic. Neck - Supple,  trachea midline   Lungs -coarse breath sounds no crackles rales or wheezes  Heart Exam:PMI normal. No lifts, heaves, or thrills. RRR. No murmurs, clicks, gallops, or rubs  Abdomen Exam: Abdomen soft, non-tender.  BS normal.   Extremity Exam: No signs of cyanosis    MEDS      polyethylene glycol  17 g Oral Daily    [START ON 2021] pantoprazole  40 mg Oral QAM AC    ceFAZolin  1,000 mg Intravenous Q8H    acetaminophen  1,000 mg Oral 4 times per day    digoxin  125 mcg Oral Daily    metoprolol tartrate  50 mg Oral BID    sodium chloride flush  5-40 mL Intravenous 2 times per day      amiodarone 0.5 mg/min (08/08/21 1631)    dilTIAZem (CARDIZEM) 125 mg in dextrose 5% 125 mL infusion 7.5 mg/hr (08/08/21 1632)    sodium chloride      sodium chloride 125 mL/hr at 08/08/21 1045     melatonin, magnesium sulfate, oxyCODONE, morphine, perflutren lipid microspheres, sodium chloride flush, sodium chloride, ondansetron **OR** ondansetron, polyethylene glycol, acetaminophen **OR** acetaminophen, potassium chloride **OR** potassium alternative oral replacement **OR** potassium chloride    LABS   CBC   Recent Labs     08/08/21  0446 08/08/21  0446 08/08/21  1426   WBC 7.6  --   --    HGB 7.2*   < > 7.4*   HCT 21.4*   < > 22.3*   MCV 97.7  --   --      --   --     < > = values in this interval not displayed. BMP:   Lab Results   Component Value Date     08/08/2021    K 4.4 08/08/2021     08/08/2021    CO2 25 08/08/2021    BUN 22 08/08/2021    LABALBU 3.8 08/03/2021    CREATININE 0.47 08/08/2021    CALCIUM 6.8 08/08/2021    GFRAA >60 08/08/2021    LABGLOM >60 08/08/2021     ABGs:  Lab Results   Component Value Date    PHART 7.412 08/06/2021    PO2ART 117.0 08/06/2021    ZKB9YJA 39.1 08/06/2021      Lab Results   Component Value Date    MODE PRVC 08/06/2021     Ionized Calcium:  No results found for: IONCA  Magnesium:    Lab Results   Component Value Date    MG 1.6 08/07/2021     Phosphorus:    Lab Results   Component Value Date    PHOS 2.3 08/05/2021        LIVER PROFILE No results for input(s): AST, ALT, LIPASE, BILIDIR, BILITOT, ALKPHOS in the last 72 hours. Invalid input(s): AMYLASE,  ALB  INR No results for input(s): INR in the last 72 hours.   PTT   Lab Results   Component Value Date    APTT >150.0 () 08/03/2021 RADIOLOGY     (See actual reports for details)    ASSESSMENT/PLAN     Patient Active Problem List   Diagnosis    [facility]    Closed fracture of left hip with routine healing    Rhabdomyolysis    Anemia     Impression     Status post left hip hemiarthroplasty  Atrial fibrillation with rapid ventricular response  No history of underlying lung disease  Non-smoker    Currently off the heparin drip reported there was stool for occult blood. I was told that cardiology and primary team wants to hold off on any anticoagulation. EPC cuffs have been ordered and are on  On diltiazem and amiodarone drip.   We will follow with other services      Electronically signed by Guerda Wahl MD on 8/8/2021 at 4:52 PM

## 2021-08-08 NOTE — PROGRESS NOTES
[facility]  Follow Up      NAME:  Mariza Pichardo  MEDICAL RECORD NUMBER:  [id number]  AGE: 79 y.o. GENDER: male  : 1940  TODAY'S DATE:  2021    Follow up visit. The patient is alert and oriented and tells the story of falling at home and not being able to get up until his neighbor came looking for him several days later. Wound re-evluated today. All appear to be improving. The sacral wound remains eschar-covered. The wound does not appear to have Triad cream on it and no Triad is found in room. It does not appear as though the Triad was being used under the foam dressing. A sacral foam dressing was covering both the buttock and sacral wounds. Triad cream was placed on the wound separately and a smaller 4x4 foam was placed over the sacral wound, but the buttock wound was left with just the Triad cream which can be reapplied as needed. The buttock wound is closing nicely and with continued care, will likely continue improving. The sacral wound requires debridement, which will be accomplished with consistent use and covering of the Triad cream to encourage autolytic environment. A new tube of Triad was left in the room. Specific orders for wound care exist.    Will cont to follow case. SARA updated,    Measurements:  Wound 21 Buttocks Right (Active)   Wound Image   21   Wound Etiology Pressure Stage  3 21 07   Dressing Status New dressing applied; Old drainage noted 21   Wound Cleansed Soap and water 21   Dressing/Treatment Triad hydro/zinc oxide-based hydrophilic paste  8467   Wound Length (cm) 1.2 cm 21   Wound Width (cm) 1 cm 21   Wound Depth (cm) 0.1 cm 21   Wound Surface Area (cm^2) 1.2 cm^2 21   Change in Wound Size % (l*w) 52.38 21 07   Wound Volume (cm^3) 0.12 cm^3 21   Wound Healing % 52 21   Wound Assessment Pink/red;Granulation tissue 21 Drainage Amount Moderate 08/08/21 0739   Drainage Description Serosanguinous 08/08/21 0739   Odor None 08/08/21 0739   Shira-wound Assessment Blanchable erythema 08/08/21 0739   Margins Attached edges; Defined edges 08/08/21 0739   Wound Thickness Description not for Pressure Injury Full thickness 08/08/21 0739   Number of days: 3       Wound 08/05/21 Sacrum (Active)   Wound Image   08/08/21 0739   Wound Etiology Pressure Unstageable 08/08/21 0739   Dressing Status New dressing applied; Old drainage noted 08/08/21 0739   Wound Cleansed Soap and water 08/08/21 0739   Dressing/Treatment Triad hydro/zinc oxide-based hydrophilic paste; Foam 08/08/21 0739   Dressing Change Due 08/09/21 08/08/21 0739   Wound Length (cm) 3.4 cm 08/08/21 0739   Wound Width (cm) 2.6 cm 08/08/21 0739   Wound Depth (cm) 0.1 cm 08/08/21 0739   Wound Surface Area (cm^2) 8.84 cm^2 08/08/21 0739   Change in Wound Size % (l*w) 20.36 08/08/21 0739   Wound Volume (cm^3) 0.884 cm^3 08/08/21 0739   Wound Healing % 20 08/08/21 0739   Wound Assessment Eschar moist;Slough 08/08/21 0739   Drainage Amount Small 08/08/21 0739   Drainage Description Serosanguinous 08/08/21 0739   Odor None 08/08/21 0739   Shira-wound Assessment Blanchable erythema 08/08/21 0739   Margins Defined edges 08/06/21 0400   Wound Thickness Description not for Pressure Injury Full thickness 08/08/21 0739   Number of days: 3       Wound 08/05/21 Elbow Right cluster (Active)   Wound Image   08/08/21 0739   Wound Etiology Traumatic 08/08/21 0739   Dressing Status New dressing applied; Old drainage noted 08/08/21 0739   Wound Cleansed Soap and water 08/08/21 0739   Dressing/Treatment Foam 08/08/21 0739   Dressing Change Due 08/11/21 08/08/21 0739   Wound Length (cm) 8 cm 08/08/21 0739   Wound Width (cm) 7.5 cm 08/08/21 0739   Wound Depth (cm) 0.1 cm 08/08/21 0739   Wound Surface Area (cm^2) 60 cm^2 08/08/21 0739   Change in Wound Size % (l*w) -22.45 08/08/21 0739   Wound Volume (cm^3) 6 cm^3 08/08/21 0739   Wound Healing % -22 08/08/21 0739   Wound Assessment Pink/red 08/08/21 0739   Drainage Amount Small 08/08/21 0739   Drainage Description Serosanguinous 08/08/21 0739   Odor None 08/08/21 0739   Shira-wound Assessment Hyperpigmented 08/08/21 0739   Margins Attached edges; Defined edges 08/08/21 0739   Number of days: 3       Wound 08/05/21 Elbow Left cluster (Active)   Wound Image   08/05/21 1033   Wound Etiology Traumatic 08/08/21 0739   Dressing Status Old drainage noted; Reinforced dressing 08/08/21 0739   Dressing/Treatment Foam 08/08/21 0739   Wound Length (cm) 4.7 cm 08/08/21 0739   Wound Width (cm) 3.4 cm 08/08/21 0739   Wound Depth (cm) 0.1 cm 08/08/21 0739   Wound Surface Area (cm^2) 15.98 cm^2 08/08/21 0739   Change in Wound Size % (l*w) 8.69 08/08/21 0739   Wound Volume (cm^3) 1.598 cm^3 08/08/21 0739   Wound Healing % 9 08/08/21 0739   Wound Assessment Pink/red 08/08/21 0739   Drainage Amount Moderate 08/08/21 0739   Drainage Description Serosanguinous 08/08/21 0739   Odor None 08/08/21 0739   Shira-wound Assessment Blanchable erythema 08/08/21 0739   Margins Attached edges; Defined edges 08/08/21 0739   Number of days: 3       Wound 08/05/21 Knee Right (Active)   Wound Image   08/08/21 0739   Wound Etiology Traumatic 08/08/21 0739   Dressing Status New dressing applied; Old drainage noted 08/08/21 0739   Wound Cleansed Soap and water 08/08/21 0739   Dressing/Treatment Foam 08/08/21 0739   Dressing Change Due 08/11/21 08/08/21 0739   Wound Length (cm) 3.5 cm 08/08/21 0739   Wound Width (cm) 5.5 cm 08/08/21 0739   Wound Depth (cm) 0.1 cm 08/08/21 0739   Wound Surface Area (cm^2) 19.25 cm^2 08/08/21 0739   Change in Wound Size % (l*w) 8.33 08/08/21 0739   Wound Volume (cm^3) 1.925 cm^3 08/08/21 0739   Wound Healing % 8 08/08/21 0739   Wound Assessment Eschar less than 20%; Pink/red 08/08/21 0739   Drainage Amount Moderate 08/08/21 0739   Drainage Description Serosanguinous 08/08/21 0739   Odor None 08/08/21 0739   Shira-wound Assessment Blanchable erythema 08/08/21 0739   Margins Attached edges; Defined edges 08/08/21 0739   Number of days: 3       Wound 08/05/21 Knee Left (Active)   Wound Image   08/08/21 0739   Wound Etiology Traumatic 08/08/21 0739   Dressing Status New dressing applied; Old drainage noted 08/08/21 0739   Wound Cleansed Soap and water 08/08/21 0739   Dressing/Treatment Foam 08/08/21 0739   Dressing Change Due 08/11/21 08/08/21 0739   Wound Length (cm) 2.3 cm 08/08/21 0739   Wound Width (cm) 1.8 cm 08/08/21 0739   Wound Depth (cm) 0.1 cm 08/08/21 0739   Wound Surface Area (cm^2) 4.14 cm^2 08/08/21 0739   Change in Wound Size % (l*w) -34.42 08/08/21 0739   Wound Volume (cm^3) 0.414 cm^3 08/08/21 0739   Wound Healing % -34 08/08/21 0739   Wound Assessment Pink/red;Granulation tissue 08/08/21 0739   Drainage Amount Moderate 08/08/21 0739   Drainage Description Sanguinous; Serosanguinous 08/08/21 0739   Odor None 08/08/21 0739   Shira-wound Assessment Intact 08/08/21 0739   Margins Attached edges; Defined edges 08/08/21 0739   Wound Thickness Description not for Pressure Injury Full thickness 08/08/21 0739   Number of days: 3     Kiara JAYN, RN, [facility] and [facility], Ostomy, and Continence Nursing  [phone]

## 2021-08-08 NOTE — PROGRESS NOTES
[facility]: KIRSTIE RON W   INPATIENT OCCUPATIONAL THERAPY  PROGRESS NOTE  Date: 2021  Patient Name: Kayla Islas      Room:   MRN: [id number]    : 1940  (79 y.o.) Gender: male     Discharge Recommendations:  Further Occupational Therapy is recommended upon facility discharge. OT Equipment Recommendations  Equipment Needed: Yes  Other: walker, reachers, sock aide, long sponge, call alert necklace vs keep phone on person, BSC, twin bed to main floor- needed for d/c to home, when able to access upstairs- recommend extended tub bench and use of pitcher to pour water vs obtain shower nozzle. Equipment Needed:  (TBD)       Diagnosis: Left hip fracture s/p hemiarthroplasty . A fib, Rhabdomyolosis, anemia, Multiple wound/abrasions  General  Additional Pertinent Hx: injury 2 months ago- pt reports was carrying laundry upstairs with sudden pain L thigh and legs gave out and fell to knees, since then decline in mobility but still ambulated in house with cane, switched to living mainly on main floor. pt with scales on skin R ankle + foot, per pt has decreased in past 2 months post injury as he was unable to apply hydrocortisol cream, OT alerted RN to ask MD re treatment. He initially presented after a fall from standing at home. He reportedly was laying on the floor for 5 days before he was able to call for help. He was found to have atrial fibrillation with RVR in the ED and was started on diltiazem and heparin drips. He was also found to have left hip fracture and rhabdomylolysis. He has multiple wounds, including buttock, sacrum, bilateral elbow, and bilateral knee wounds. He underwent left hip hemiarthroplasty on 21 (Dr. Nora Rubio). Extubated 21. He remains on diltiazem and heparin drips at this time. Family / Caregiver Present: No  Diagnosis: Left hip fracture s/p hemiarthroplasty .    A fib, Rhabdomyolosis, anemia, Multiple wound/abrasions    Restrictions  Restrictions/Precautions: Weight Bearing, Fall Risk  Left Lower Extremity Weight Bearing: Weight Bearing As Tolerated      Subjective  Comments: Pt pleasant and agreeable to session. Co-tx with HALIMA Estrada for increased functional pt tolerance. RN Young Trujillo treatment. Patient Currently in Pain: Yes  Pain Level: 3  Pain Location: Hip  Pain Orientation: Left  Overall Orientation Status: Within Functional Limits     Pain Assessment  Pain Assessment: 0-10  Pain Level: 3  Pain Type: Surgical pain  Pain Location: Hip  Pain Orientation: Left    Objective  Cognition  Overall Cognitive Status: WFL  Bed mobility  Rolling to Left: Minimal assistance  Rolling to Right: Minimal assistance  Supine to Sit: Moderate assistance  Sit to Supine: Moderate assistance  Scooting: Moderate assistance  Balance  Sitting Balance: Supervision (static sitting EOB)  Standing Balance: Minimal assistance (CGA-min A; 2nd person SBA for safety. )  Standing Balance  Time: 5 min, 4 min   Activity: static/dynamic standing EOB, forward/backward steps bed<>window. Comment: increased to min A with dynamic standing; intermittent UE raises to tap writer hands to address standing balance with decreased uE support. Functional Mobility  Functional - Mobility Device: Rolling Walker  Activity: Other (~5 steps forwad/back bed<>window. )  Assist Level: Contact guard assistance (Assist x2 for safety and multiple line mgmt. )   ADL  LE Dressing: Dependent/Total (footlevel dressing only addressed)     Transfers  Sit to stand: Minimal assistance;2 Person assistance  Stand to sit: Minimal assistance;2 Person assistance  Transfer Comments: cueing for hand placement with good return. Assessment  Performance deficits / Impairments: Decreased functional mobility ; Decreased high-level IADLs;Decreased ADL status; Decreased endurance;Decreased balance;Decreased safe awareness  Prognosis: Good  Activity Tolerance: Patient Tolerated treatment well  Safety Devices in place: Yes  Type of devices: Nurse notified;Call light within reach; Left in bed; All fall risk precautions in place;Gait belt;Bed alarm in place  Equipment Recommendations  Equipment Needed:  (TBD)       Plan  Safety Devices  Safety Devices in place: Yes  Type of devices: Nurse notified, Call light within reach, Left in bed, All fall risk precautions in place, Gait belt, Bed alarm in place  Plan  Times per week: 4-5  Times per day: Daily (BID if possible )  Current Treatment Recommendations: Safety Education & Training, Balance Training, Patient/Caregiver Education & Training, Self-Care / ADL, Functional Mobility Training, Equipment Evaluation, Education, & procurement, Home Management Training, Endurance Training      Goals  Short term goals  Time Frame for Short term goals: 1 week  Short term goal 1: set up UE bathe and dress and groom  Short term goal 2: max LE bathe and dress  Short term goal 3: socrates 5-6 min stand, amb with RW and min A for adls  Short term goal 4: min A adl transfers and toileting    OT Individual Minutes  Time In: 0189  Time Out: 4968  Minutes: 25    Electronically signed by SHANELLE Cortez on 8/8/21 at 3:58 PM EDT

## 2021-08-08 NOTE — PROGRESS NOTES
Primary team ordered Cardizem 120 daily extended release. Dr Maday Schafer paged regarding mediation change and plan for continued Cardizem gtt. BP 95/64 HR  average Afib RVR. Orders received to start amio gtt and bolus and to hold the PO Cardizem at this time for low blood pressure. See new orders.

## 2021-08-08 NOTE — PROGRESS NOTES
Physical Therapy  Facility/Department: [facility] ICU  Daily Treatment Note  NAME: Favian Bowers  : 1940  MRN: [id number]    Date of Service: 2021    Discharge Recommendations:  Patient would benefit from continued therapy after discharge   PT Equipment Recommendations  Equipment Needed: No    Assessment   Body structures, Functions, Activity limitations: Decreased functional mobility ; Decreased strength;Decreased balance;Decreased endurance  History: Hip fx; Rabdo  REQUIRES PT FOLLOW UP: Yes  Activity Tolerance  Activity Tolerance: Patient limited by endurance; Patient limited by pain     Patient Diagnosis(es): The primary encounter diagnosis was Atrial fibrillation with RVR ([facility] [address] Diagnoses of Dehydration, Traumatic rhabdomyolysis, initial encounter [facility]), and Closed fracture of left hip, initial encounter [facility]) were also pertinent to this visit. has no past medical history on file. has a past surgical history that includes hip surgery (Left, 2021). Restrictions  Restrictions/Precautions  Restrictions/Precautions: Weight Bearing, Fall Risk  Lower Extremity Weight Bearing Restrictions  Left Lower Extremity Weight Bearing: Weight Bearing As Tolerated  Subjective   General  Chart Reviewed: Yes  Response To Previous Treatment: Patient with no complaints from previous session.   Family / Caregiver Present: No  Subjective  Subjective: Patient laying supine in bed upon arrival, agreeable to therapy   General Comment  Comments: RINA Li approved therapy; co-treat with ASHLEE Lopez  Pain Screening  Patient Currently in Pain: Denies  Vital Signs  Patient Currently in Pain: Denies  Oxygen Therapy  SpO2: 94 %  Pulse Oximeter Device Mode: Continuous  Pulse Oximeter Device Location: Finger  O2 Device: Nasal cannula  O2 Flow Rate (L/min): 2 L/min       Orientation  Orientation  Overall Orientation Status: Within Normal Limits  Objective   Bed mobility  Rolling to Left: Minimal assistance  Rolling to Right: Minimal assistance  Supine to Sit: Moderate assistance  Sit to Supine: Moderate assistance  Scooting: Moderate assistance  Transfers  Sit to Stand: Minimal Assistance;2 Person Assistance  Stand to sit: Minimal Assistance;2 Person Assistance  Comment: Pt completes transfer w/ RW, Verbal cuing provided for hand placement with good follow through on second stand   Ambulation  Ambulation?: Yes  WB Status: WBAT  Ambulation 1  Surface: level tile  Device: Rolling Walker  Assistance: Contact guard assistance  Distance: 5ft FWD/BWD x2  Stairs/Curb  Stairs?: No        Other exercises  Other exercises?: Yes  Other exercises 1: standing B LE marches   Other exercises 2: STS x2    Other exercises 3: standing tolerance x5 minutes and x4 minutes   Other exercises 4: dynamic standing reaching with intermittent 1 UE support   Other exercises 5: bed mobility x2         Comment:  Frequent rest breaks PRN to allow HR to stabilize.      Goals  Short term goals  Time Frame for Short term goals: 3-4 days  Short term goal 1: bed mobility with Gabriel  Short term goal 2: transfers with Gabriel  Short term goal 3: gait with RW/SBA x 20ft  Patient Goals   Patient goals : go home    Plan    Plan  Times per week: BID  Times per day: Twice a day  Current Treatment Recommendations: Strengthening, ROM, Balance Training, Functional Mobility Training, Transfer Training, Gait Training, Endurance Training, Safety Education & Training  Safety Devices  Type of devices: Left in chair, Call light within reach, Nurse notified, Patient at risk for falls        08/08/21 1414   PT Individual Minutes   Time In [facility] 61   Time Out 1349   Minutes 25     Electronically signed by Mars Martin PTA on 8/8/21 at 2:15 PM EDT

## 2021-08-08 NOTE — PLAN OF CARE
Problem: Falls - Risk of:  Goal: Will remain free from falls  Description: Will remain free from falls  Outcome: Ongoing  Note: Bed remains in lowest position, call light within reach. Patient remains free of falls at this time. RN will continue to monitor. Goal: Absence of physical injury  Description: Absence of physical injury  Outcome: Ongoing     Problem: Skin Integrity:  Goal: Will show no infection signs and symptoms  Description: Will show no infection signs and symptoms  Outcome: Ongoing  Note: Patient turned and repositioned every 2 hours and as needed for comfort. Skin remains dry and intact. No new skin breakdown noted. Goal: Absence of new skin breakdown  Description: Absence of new skin breakdown  Outcome: Ongoing     Problem: Cardiac:  Goal: Ability to maintain an adequate cardiac output will improve  Description: Ability to maintain an adequate cardiac output will improve  Outcome: Ongoing  Note: Cardizem and Amiodarone   Goal: Hemodynamic stability will improve  Description: Hemodynamic stability will improve  Outcome: Ongoing     Problem: Fluid Volume:  Goal: Ability to achieve and maintain adequate urine output will improve  Description: Ability to achieve and maintain adequate urine output will improve  Outcome: Ongoing     Problem: Respiratory:  Goal: Respiratory status will improve  Description: Respiratory status will improve  Outcome: Ongoing  Note: Room air     Problem: Pain:  Goal: Pain level will decrease  Description: Pain level will decrease  Outcome: Ongoing  Note: Pain assessed at regular intervals. Medications administered as requested for comfort. Pain remains at a tolerable level.    Goal: Control of acute pain  Description: Control of acute pain  Outcome: Ongoing  Goal: Control of chronic pain  Description: Control of chronic pain  Outcome: Ongoing     Problem: Nutrition  Goal: Optimal nutrition therapy  Outcome: Ongoing     Problem: Musculor/Skeletal Functional Status  Goal: Highest potential functional level  Outcome: Ongoing     Problem: Musculor/Skeletal Functional Status  Goal: Highest potential functional level  Outcome: Ongoing  Goal: Absence of falls  Outcome: Ongoing

## 2021-08-09 LAB
ABSOLUTE EOS #: 0.23 K/UL (ref 0–0.4)
ABSOLUTE IMMATURE GRANULOCYTE: ABNORMAL K/UL (ref 0–0.3)
ABSOLUTE LYMPH #: 1.04 K/UL (ref 1–4.8)
ABSOLUTE MONO #: 0.69 K/UL (ref 0.1–1.3)
ABSOLUTE RETIC #: 0.09 M/UL (ref 0.02–0.1)
ALBUMIN SERPL-MCNC: 2.2 G/DL (ref 3.5–5.2)
ALBUMIN/GLOBULIN RATIO: ABNORMAL (ref 1–2.5)
ALP BLD-CCNC: 102 U/L (ref 40–129)
ALT SERPL-CCNC: 11 U/L (ref 5–41)
ANION GAP SERPL CALCULATED.3IONS-SCNC: 7 MMOL/L (ref 9–17)
AST SERPL-CCNC: 26 U/L
BASOPHILS # BLD: 1 % (ref 0–2)
BASOPHILS ABSOLUTE: 0.12 K/UL (ref 0–0.2)
BILIRUB SERPL-MCNC: 0.58 MG/DL (ref 0.3–1.2)
BILIRUBIN DIRECT: 0.19 MG/DL
BILIRUBIN, INDIRECT: 0.39 MG/DL (ref 0–1)
BUN BLDV-MCNC: 15 MG/DL (ref 8–23)
BUN/CREAT BLD: ABNORMAL (ref 9–20)
CALCIUM IONIZED: 1.16 MMOL/L (ref 1.13–1.33)
CALCIUM SERPL-MCNC: 7.6 MG/DL (ref 8.6–10.4)
CHLORIDE BLD-SCNC: 110 MMOL/L (ref 98–107)
CO2: 23 MMOL/L (ref 20–31)
CREAT SERPL-MCNC: 0.46 MG/DL (ref 0.7–1.2)
CULTURE: NORMAL
CULTURE: NORMAL
DIFFERENTIAL TYPE: ABNORMAL
EOSINOPHILS RELATIVE PERCENT: 2 % (ref 0–4)
GFR AFRICAN AMERICAN: >60 ML/MIN
GFR NON-AFRICAN AMERICAN: >60 ML/MIN
GFR SERPL CREATININE-BSD FRML MDRD: ABNORMAL ML/MIN/{1.73_M2}
GFR SERPL CREATININE-BSD FRML MDRD: ABNORMAL ML/MIN/{1.73_M2}
GLOBULIN: ABNORMAL G/DL (ref 1.5–3.8)
GLUCOSE BLD-MCNC: 102 MG/DL (ref 70–99)
HCT VFR BLD CALC: 23.1 % (ref 41–53)
HCT VFR BLD CALC: 25 % (ref 41–53)
HEMOGLOBIN: 7.5 G/DL (ref 13.5–17.5)
HEMOGLOBIN: 8.1 G/DL (ref 13.5–17.5)
IMMATURE GRANULOCYTES: ABNORMAL %
IMMATURE RETIC FRACT: ABNORMAL %
LYMPHOCYTES # BLD: 9 % (ref 24–44)
Lab: NORMAL
Lab: NORMAL
MAGNESIUM: 1.8 MG/DL (ref 1.6–2.6)
MCH RBC QN AUTO: 32.7 PG (ref 26–34)
MCHC RBC AUTO-ENTMCNC: 32.5 G/DL (ref 31–37)
MCV RBC AUTO: 100.6 FL (ref 80–100)
MONOCYTES # BLD: 6 % (ref 1–7)
MORPHOLOGY: ABNORMAL
MORPHOLOGY: ABNORMAL
NRBC AUTOMATED: ABNORMAL PER 100 WBC
PDW BLD-RTO: 15.7 % (ref 11.5–14.9)
PLATELET # BLD: 184 K/UL (ref 150–450)
PLATELET ESTIMATE: ABNORMAL
PMV BLD AUTO: 9.7 FL (ref 6–12)
POTASSIUM SERPL-SCNC: 3.8 MMOL/L (ref 3.7–5.3)
RBC # BLD: 2.29 M/UL (ref 4.5–5.9)
RBC # BLD: ABNORMAL 10*6/UL
RETIC %: 4.1 % (ref 0.5–2)
RETIC HEMOGLOBIN: ABNORMAL PG (ref 28.2–35.7)
SEG NEUTROPHILS: 82 % (ref 36–66)
SEGMENTED NEUTROPHILS ABSOLUTE COUNT: 9.42 K/UL (ref 1.3–9.1)
SODIUM BLD-SCNC: 140 MMOL/L (ref 135–144)
SPECIMEN DESCRIPTION: NORMAL
SPECIMEN DESCRIPTION: NORMAL
TOTAL PROTEIN: 4.5 G/DL (ref 6.4–8.3)
WBC # BLD: 11.5 K/UL (ref 3.5–11)
WBC # BLD: ABNORMAL 10*3/UL

## 2021-08-09 PROCEDURE — 2500000003 HC RX 250 WO HCPCS: Performed by: INTERNAL MEDICINE

## 2021-08-09 PROCEDURE — 97116 GAIT TRAINING THERAPY: CPT

## 2021-08-09 PROCEDURE — 85025 COMPLETE CBC W/AUTO DIFF WBC: CPT

## 2021-08-09 PROCEDURE — 85018 HEMOGLOBIN: CPT

## 2021-08-09 PROCEDURE — 99233 SBSQ HOSP IP/OBS HIGH 50: CPT | Performed by: INTERNAL MEDICINE

## 2021-08-09 PROCEDURE — 82330 ASSAY OF CALCIUM: CPT

## 2021-08-09 PROCEDURE — 83735 ASSAY OF MAGNESIUM: CPT

## 2021-08-09 PROCEDURE — 6360000002 HC RX W HCPCS: Performed by: STUDENT IN AN ORGANIZED HEALTH CARE EDUCATION/TRAINING PROGRAM

## 2021-08-09 PROCEDURE — 80048 BASIC METABOLIC PNL TOTAL CA: CPT

## 2021-08-09 PROCEDURE — 80076 HEPATIC FUNCTION PANEL: CPT

## 2021-08-09 PROCEDURE — 2580000003 HC RX 258: Performed by: STUDENT IN AN ORGANIZED HEALTH CARE EDUCATION/TRAINING PROGRAM

## 2021-08-09 PROCEDURE — 97110 THERAPEUTIC EXERCISES: CPT

## 2021-08-09 PROCEDURE — 6370000000 HC RX 637 (ALT 250 FOR IP): Performed by: ORTHOPAEDIC SURGERY

## 2021-08-09 PROCEDURE — 99232 SBSQ HOSP IP/OBS MODERATE 35: CPT | Performed by: PHYSICAL MEDICINE & REHABILITATION

## 2021-08-09 PROCEDURE — 6370000000 HC RX 637 (ALT 250 FOR IP)

## 2021-08-09 PROCEDURE — 85014 HEMATOCRIT: CPT

## 2021-08-09 PROCEDURE — 6360000002 HC RX W HCPCS: Performed by: ORTHOPAEDIC SURGERY

## 2021-08-09 PROCEDURE — 97530 THERAPEUTIC ACTIVITIES: CPT

## 2021-08-09 PROCEDURE — 2580000003 HC RX 258: Performed by: ORTHOPAEDIC SURGERY

## 2021-08-09 PROCEDURE — 85045 AUTOMATED RETICULOCYTE COUNT: CPT

## 2021-08-09 PROCEDURE — 6360000002 HC RX W HCPCS: Performed by: NURSE PRACTITIONER

## 2021-08-09 PROCEDURE — 99222 1ST HOSP IP/OBS MODERATE 55: CPT | Performed by: INTERNAL MEDICINE

## 2021-08-09 PROCEDURE — 2580000003 HC RX 258: Performed by: INTERNAL MEDICINE

## 2021-08-09 PROCEDURE — 2000000000 HC ICU R&B

## 2021-08-09 PROCEDURE — 36415 COLL VENOUS BLD VENIPUNCTURE: CPT

## 2021-08-09 RX ORDER — DIGOXIN 0.25 MG/ML
125 INJECTION INTRAMUSCULAR; INTRAVENOUS ONCE
Status: COMPLETED | OUTPATIENT
Start: 2021-08-09 | End: 2021-08-09

## 2021-08-09 RX ADMIN — DIGOXIN 125 MCG: 125 TABLET ORAL at 08:46

## 2021-08-09 RX ADMIN — DIGOXIN 125 MCG: 0.25 INJECTION INTRAMUSCULAR; INTRAVENOUS at 16:24

## 2021-08-09 RX ADMIN — AMIODARONE HYDROCHLORIDE 0.5 MG/MIN: 1.8 INJECTION, SOLUTION INTRAVENOUS at 10:42

## 2021-08-09 RX ADMIN — METOPROLOL TARTRATE 50 MG: 50 TABLET, FILM COATED ORAL at 20:46

## 2021-08-09 RX ADMIN — ACETAMINOPHEN 1000 MG: 500 TABLET, FILM COATED ORAL at 18:13

## 2021-08-09 RX ADMIN — ACETAMINOPHEN 1000 MG: 500 TABLET, FILM COATED ORAL at 00:58

## 2021-08-09 RX ADMIN — SODIUM CHLORIDE: 9 INJECTION, SOLUTION INTRAVENOUS at 10:40

## 2021-08-09 RX ADMIN — AMIODARONE HYDROCHLORIDE 0.5 MG/MIN: 1.8 INJECTION, SOLUTION INTRAVENOUS at 00:58

## 2021-08-09 RX ADMIN — CEFAZOLIN 1000 MG: 1 INJECTION, POWDER, FOR SOLUTION INTRAMUSCULAR; INTRAVENOUS at 13:44

## 2021-08-09 RX ADMIN — PANTOPRAZOLE SODIUM 40 MG: 40 TABLET, DELAYED RELEASE ORAL at 06:20

## 2021-08-09 RX ADMIN — METOPROLOL TARTRATE 50 MG: 50 TABLET, FILM COATED ORAL at 08:46

## 2021-08-09 RX ADMIN — MORPHINE SULFATE 2 MG: 2 INJECTION, SOLUTION INTRAMUSCULAR; INTRAVENOUS at 23:53

## 2021-08-09 RX ADMIN — ACETAMINOPHEN 1000 MG: 500 TABLET, FILM COATED ORAL at 11:24

## 2021-08-09 RX ADMIN — SODIUM CHLORIDE: 9 INJECTION, SOLUTION INTRAVENOUS at 02:46

## 2021-08-09 RX ADMIN — DILTIAZEM HYDROCHLORIDE 5 MG/HR: 5 INJECTION, SOLUTION INTRAVENOUS at 00:57

## 2021-08-09 RX ADMIN — ACETAMINOPHEN 1000 MG: 500 TABLET, FILM COATED ORAL at 06:20

## 2021-08-09 RX ADMIN — DILTIAZEM HYDROCHLORIDE 2.5 MG/HR: 5 INJECTION, SOLUTION INTRAVENOUS at 14:18

## 2021-08-09 RX ADMIN — CEFAZOLIN 1000 MG: 1 INJECTION, POWDER, FOR SOLUTION INTRAMUSCULAR; INTRAVENOUS at 05:23

## 2021-08-09 RX ADMIN — AMIODARONE HYDROCHLORIDE 0.5 MG/MIN: 1.8 INJECTION, SOLUTION INTRAVENOUS at 23:51

## 2021-08-09 RX ADMIN — CEFAZOLIN 1000 MG: 1 INJECTION, POWDER, FOR SOLUTION INTRAMUSCULAR; INTRAVENOUS at 20:46

## 2021-08-09 ASSESSMENT — ENCOUNTER SYMPTOMS
DIARRHEA: 1
APNEA: 0
PHOTOPHOBIA: 0
CHOKING: 0
BACK PAIN: 0
VOMITING: 0
EYE PAIN: 0
TROUBLE SWALLOWING: 0
VOICE CHANGE: 0
DIARRHEA: 0
RHINORRHEA: 0
RECTAL PAIN: 0
SHORTNESS OF BREATH: 0
WHEEZING: 0
BLOOD IN STOOL: 0
NAUSEA: 0
COUGH: 0
ANAL BLEEDING: 0
ABDOMINAL PAIN: 0
SORE THROAT: 0
ABDOMINAL DISTENTION: 0
CONSTIPATION: 0

## 2021-08-09 ASSESSMENT — PAIN DESCRIPTION - PAIN TYPE
TYPE: SURGICAL PAIN
TYPE: SURGICAL PAIN

## 2021-08-09 ASSESSMENT — PAIN SCALES - GENERAL
PAINLEVEL_OUTOF10: 0
PAINLEVEL_OUTOF10: 0
PAINLEVEL_OUTOF10: 3
PAINLEVEL_OUTOF10: 0
PAINLEVEL_OUTOF10: 0
PAINLEVEL_OUTOF10: 3
PAINLEVEL_OUTOF10: 4
PAINLEVEL_OUTOF10: 0
PAINLEVEL_OUTOF10: 3
PAINLEVEL_OUTOF10: 3
PAINLEVEL_OUTOF10: 0
PAINLEVEL_OUTOF10: 3
PAINLEVEL_OUTOF10: 3
PAINLEVEL_OUTOF10: 0

## 2021-08-09 ASSESSMENT — PAIN DESCRIPTION - LOCATION: LOCATION: HIP

## 2021-08-09 ASSESSMENT — PAIN DESCRIPTION - DESCRIPTORS: DESCRIPTORS: ACHING

## 2021-08-09 ASSESSMENT — PAIN DESCRIPTION - ORIENTATION: ORIENTATION: LEFT

## 2021-08-09 NOTE — CARE COORDINATION
ONGOING DISCHARGE PLAN:    Patient is alert and oriented x4. Spoke with patient regarding discharge plan and patient confirms that plan is still to go to ARU if possible,. PM&R consulted. Remains on Amiodarone gtt, IV Fluids, Cardizem gtt. Follow for Anti coags at Discharge. Will continue to follow for additional discharge needs.     Electronically signed by Serena Salomon RN on 8/9/2021 at 1:48 PM

## 2021-08-09 NOTE — PROGRESS NOTES
[address]   OCCUPATIONAL THERAPY MISSED TREATMENT NOTE   INPATIENT   Date: 21  Patient Name: Vaibhav Del Cid       Room:   MRN: [id number]   Account #: [de-identified]    : 1940  (79 y.o.)  Gender: male   Diagnosis: Left hip fracture s/p hemiarthroplasty . A fib, Rhabdomyolosis, anemia, Multiple wound/abrasions             REASON FOR MISSED TREATMENT:  Initial attempt deferred due to fatigue from recently working with physical therapy just before lunch; 2nd attempt patient is working with Physical therapy department completing in bed exercises. Will resume as high priority treatment tomorrow.    -            SHIRA Yañez

## 2021-08-09 NOTE — PROGRESS NOTES
[address] [facility].    PROGRESS NOTE             8/9/2021    8:51 AM    Name:   Johnnye Schirmer  MRN:     [id number]     Acct:      [de-identified]   Room:   2004/2004-01  IP Day:  6  Admit Date:  8/3/2021 10:59 AM    PCP:  No primary care provider on file. Code Status:  Full Code    Subjective:     C/C:   Chief Complaint   Patient presents with    Leg Pain    Irregular Heart Beat     Interval History Status: No change    The patient was seen on bedside, doing well. He is on 2 L NC sating at 96. He slept well last night. Patient last BM was yesterday, the nurse noted it was dark brown and loose, no offensive smell, no blood in stool. Patients lower limb swelling has decreased. Following H&H. Brief History: This 50-year-old male patient presented to the ED with lower lumbar weakness and was evaluated and showed to have A. fib with RVR  Is post op day 3, left hip hemiarthroplasty. Patient heart rate is fluctuating and today is 95, he's of diltiazem drip, and taking Lopressor and Digoxin, Amiodarone was started. The patient still feeling pain, patient had BM yesterday, was dark brown loose stool without blood or offensive smell. Multiple wounds are noted since admission and are being evaluated, including stage III pressure ulcers on the buttock and an unstagable sacrum ulcer, patient is still on cefazolin, traumatic injury of the elbows and knees. Today he was noted to to have low hemoglobin of 7.5 and Hemoccultwas positive in one reading  Potassium today is 4.8   magnesium is not measured        Review of Systems:     Review of Systems   Constitutional: Negative for chills, diaphoresis, fatigue and fever. HENT: Negative for congestion, rhinorrhea and sore throat. Eyes: Negative for photophobia, pain and visual disturbance. Respiratory: Negative for apnea, cough, choking, shortness of breath and wheezing.     Cardiovascular: Positive for leg swelling. Negative for chest pain and palpitations. Gastrointestinal: Positive for diarrhea (Dark brown, loose, no offensive smell, non bloody. ). Negative for abdominal pain, blood in stool, constipation, nausea and vomiting. Genitourinary: Negative for dysuria (Patient has a foly catheter in ). Musculoskeletal: Positive for arthralgias. Skin: Positive for wound. Neurological: Negative for dizziness, seizures, weakness, light-headedness and headaches. Psychiatric/Behavioral: Negative for agitation and confusion. Medications: Allergies:  No Known Allergies    Current Meds:   Scheduled Meds:    polyethylene glycol  17 g Oral Daily    pantoprazole  40 mg Oral QAM AC    ceFAZolin  1,000 mg Intravenous Q8H    acetaminophen  1,000 mg Oral 4 times per day    digoxin  125 mcg Oral Daily    metoprolol tartrate  50 mg Oral BID    sodium chloride flush  5-40 mL Intravenous 2 times per day     Continuous Infusions:    amiodarone 0.5 mg/min (21 0058)    dilTIAZem (CARDIZEM) 125 mg in dextrose 5% 125 mL infusion 10 mg/hr (21 0350)    sodium chloride      sodium chloride 125 mL/hr at 21 0246     PRN Meds: melatonin, magnesium sulfate, oxyCODONE, morphine, perflutren lipid microspheres, sodium chloride flush, sodium chloride, ondansetron **OR** ondansetron, polyethylene glycol, acetaminophen **OR** acetaminophen, potassium chloride **OR** potassium alternative oral replacement **OR** potassium chloride    Data:     Past Medical History:   has no past medical history on file. Social History:   reports that he has never smoked. He has never used smokeless tobacco. He reports that he does not drink alcohol. Family History: History reviewed. No pertinent family history.     Vitals:  BP (!) 111/57   Pulse 95   Temp 99 °F (37.2 °C) (Oral)   Resp 15   Ht 5' 7\" (1.702 m)   Wt 148 lb 5.9 oz (67.3 kg)   SpO2 96%   BMI 23.24 kg/m²   Temp (24hrs), Av.1 °F (36.7 °C), Min:97.5 °F (36.4 °C), Max:99 °F (37.2 °C)    No results for input(s): POCGLU in the last 72 hours. I/O(24Hr):     Intake/Output Summary (Last 24 hours) at 8/9/2021 0851  Last data filed at 8/9/2021 0656  Gross per 24 hour   Intake 2033.38 ml   Output 2600 ml   Net -566.62 ml       Labs:    CBC with Differential:    Lab Results   Component Value Date    WBC 11.5 08/09/2021    RBC 2.29 08/09/2021    HGB 7.5 08/09/2021    HCT 23.1 08/09/2021     08/09/2021    .6 08/09/2021    MCH 32.7 08/09/2021    MCHC 32.5 08/09/2021    RDW 15.7 08/09/2021    LYMPHOPCT 9 08/09/2021    MONOPCT 6 08/09/2021    BASOPCT 1 08/09/2021    MONOSABS 0.69 08/09/2021    LYMPHSABS 1.04 08/09/2021    EOSABS 0.23 08/09/2021    BASOSABS 0.12 08/09/2021    DIFFTYPE NOT REPORTED 08/09/2021     BMP:    Lab Results   Component Value Date     08/09/2021    K 3.8 08/09/2021     08/09/2021    CO2 23 08/09/2021    BUN 15 08/09/2021    LABALBU 3.8 08/03/2021    CREATININE 0.46 08/09/2021    CALCIUM 7.6 08/09/2021    GFRAA >60 08/09/2021    LABGLOM >60 08/09/2021    GLUCOSE 102 08/09/2021     Ionized Calcium:  No results found for: IONCA  LDH:    Lab Results   Component Value Date     08/08/2021       Lab Results   Component Value Date/Time    SPECIAL NOT REPORTED 08/03/2021 11:41 AM     Lab Results   Component Value Date/Time    CULTURE NO GROWTH 6 DAYS 08/03/2021 11:41 AM         Radiology:    ECHO Complete 2D W Doppler W Color    Result Date: 8/4/2021  1604 [facility] Transthoracic Echocardiography Report (TTE)  Patient Name Elena Alegre  Date of Study                 08/04/2021   Date of      1940  Gender                        Male  Birth   Age          79 year(s)  Race                             Room Number  2087        Height:                       66.93 inch, 170 cm   Corporate ID [id number]    Weight:                       141 pounds, 64 kg  #   Patient Acct [de-identified]   BSA: 1.74 m^2       BMI:      22.15  #                                                                kg/m^2   MR #         [id number]      Sonographer                   Eva Gan   Accession #  [id number]  Interpreting Physician        [address] [place] [address]   Fellow                   Referring Nurse Practitioner   Interpreting             Referring Physician           [address] [place] [address]  Fellow  Type of Study   TTE procedure:2D Echocardiogram, M-Mode, Doppler, Color Doppler. Procedure Date Date: 08/04/2021 Start: 10:15 AM Study Location: [facility] Technical Quality: Fair visualization Indications:Elevated troponin and Atrial fibrillation. Patient Status: Inpatient Height: 66.93 inches Weight: 141. 11 pounds BSA: 1.74 m^2 BMI: 22.15 kg/m^2 Rhythm: Atrial fibrillation HR: 129 bpm BP: 98/56 mmHg CONCLUSIONS Summary Normal left ventricle size and function with an estimated EF > 55%. Mild left ventricular hypertrophy. Left atrial dilatation. Aortic valve was not well visualized. Mild to moderate aortic insufficiency. Mild mitral regurgitation. Mild tricuspid regurgitation. Normal right ventricular systolic pressure. IVC Increased diameter, but still has inspiratory variation. Signature ----------------------------------------------------------------------------  Electronically signed by Eva Gan(Sonographer) on 08/04/2021 10:48  AM ---------------------------------------------------------------------------- ----------------------------------------------------------------------------  Electronically signed by Nico Hilton(Interpreting physician) on 08/04/2021  11:46 AM ---------------------------------------------------------------------------- FINDINGS Left Atrium Left atrial dilatation. Left Ventricle Normal left ventricle size and function with an estimated EF > 55%. No segmental wall motion abnormalities seen. Mild left ventricular hypertrophy. Right Atrium Right atrium is normal in size.  Right Ventricle Normal right ventricular size and function. Mitral Valve Normal mitral valve structure and function. Mild mitral regurgitation. Aortic Valve Aortic valve was not well visualized. Mild to moderate aortic insufficiency. No aortic stenosis. Tricuspid Valve Normal tricuspid valve structure and function. Mild tricuspid regurgitation. Normal right ventricular systolic pressure. Pulmonic Valve Pulmonic valve not well visualized but Doppler velocities are normal. No pulmonic insufficiency. Pericardial Effusion No significant pericardial effusion is seen. Miscellaneous Normal aortic root dimension. E/e' average 5.3 IVC Increased diameter, but still has inspiratory variation.  M-mode / 2D Measurements & Calculations:   LVIDd:4.82 cm(3.7 - 5.6 cm)      Diastolic SNRJUQ:872 ml  ZWNEQ:8.63 cm(2.2 - 4.0 cm)      Systolic PRMNLE:79.2 ml  LLLV:0.84 cm(0.6 - 1.1 cm)       Aortic Root:3.4 cm(2.0 - 3.7 cm)  LVPWd:1.22 cm(0.6 - 1.1 cm)      LA Dimension: 4.9 cm(1.9 - 4.0 cm)  Fractional Shortenin.72 %    LA volume/Index: 43.8 ml /25m^2  Calculated LVEF (%): 71.5 %      LVOT:2.4 cm   Mitral:                                Aortic   Peak E-Wave: 0.84 m/s                  Peak Velocity: 1.20 m/s                                         Mean Velocity: 0.88 m/s  Peak Gradient: 2.82 mmHg               Peak Gradient: 5.76 mmHg  Deceleration Time: 173 msec            Mean Gradient: 5 mmHg                                         AI P1/2t: 441 msec                                          Area (continuity): 1.83 cm^2                                         AV VTI: 24.9 cm   Tricuspid:                             Pulmonic:   Estimated RVSP: 19 mmHg  Peak TR Velocity: 1.69 m/s  Peak TR Gradient: 11.4244 mmHg  Estimated RA Pressure: 8 mmHg                                         Estimated PASP: 19.42 mmHg  Septal Wall E' velocity:0.12 m/s Lateral Wall E' velocity:0.12 m/s    XR HIP LEFT (1 VIEW)    Result Date: 2021  EXAMINATION: ONE XRAY VIEW OF THE LEFT HIP 8/5/2021 8:31 pm COMPARISON: 08/03/2021 HISTORY: ORDERING SYSTEM PROVIDED HISTORY: sp left hip hemiarthroplasty TECHNOLOGIST PROVIDED HISTORY: sp left hip hemiarthroplasty Reason for Exam: post op Acuity: Unknown Type of Exam: Unknown FINDINGS: There is interval left hip arthroplasty for repair of the left femoral neck fracture. There is edema and emphysema in the overlying soft tissues, consistent with recent surgery. Diffuse bone demineralization. Left hip arthroplasty for fixation of the left femoral neck fracture. CT HEAD WO CONTRAST    Result Date: 8/3/2021  EXAMINATION: CT OF THE HEAD WITHOUT CONTRAST  8/3/2021 1:55 pm TECHNIQUE: CT of the head was performed without the administration of intravenous contrast. Dose modulation, iterative reconstruction, and/or weight based adjustment of the mA/kV was utilized to reduce the radiation dose to as low as reasonably achievable. COMPARISON: None. HISTORY: ORDERING SYSTEM PROVIDED HISTORY: Fall TECHNOLOGIST PROVIDED HISTORY: Fall Decision Support Exception - unselect if not a suspected or confirmed emergency medical condition->Emergency Medical Condition (MA) Reason for Exam: pt fell 5 days ago, in afib Acuity: Unknown Type of Exam: Unknown FINDINGS: BRAIN/VENTRICLES: There is no acute intracranial hemorrhage, mass effect or midline shift. No abnormal extra-axial fluid collection. The gray-white differentiation is maintained without evidence of an acute infarct. There is no evidence of hydrocephalus. ORBITS: The visualized portion of the orbits demonstrate no acute abnormality. SINUSES: The visualized paranasal sinuses and mastoid air cells demonstrate no acute abnormality. SOFT TISSUES/SKULL:  No acute abnormality of the visualized skull or soft tissues. No acute intracranial abnormality.      CT CERVICAL SPINE WO CONTRAST    Result Date: 8/3/2021  EXAMINATION: CT OF THE CERVICAL SPINE WITHOUT CONTRAST 8/3/2021 1:55 pm TECHNIQUE: CT of the cervical spine was performed without the administration of intravenous contrast. Multiplanar reformatted images are provided for review. Dose modulation, iterative reconstruction, and/or weight based adjustment of the mA/kV was utilized to reduce the radiation dose to as low as reasonably achievable. COMPARISON: None. HISTORY: ORDERING SYSTEM PROVIDED HISTORY: Fall TECHNOLOGIST PROVIDED HISTORY: Fall Decision Support Exception - unselect if not a suspected or confirmed emergency medical condition->Emergency Medical Condition (MA) Reason for Exam: pt fell 5 days ago, in afib Acuity: Unknown Type of Exam: Unknown FINDINGS: BONES/ALIGNMENT: There is no acute fracture or traumatic malalignment. DEGENERATIVE CHANGES: No significant degenerative changes. SOFT TISSUES: There is no prevertebral soft tissue swelling. No acute abnormality of the cervical spine. XR CHEST PORTABLE    Result Date: 8/6/2021  EXAMINATION: ONE XRAY VIEW OF THE CHEST 8/6/2021 5:44 am COMPARISON: 5 August 2021 HISTORY: ORDERING SYSTEM PROVIDED HISTORY: ETT placement TECHNOLOGIST PROVIDED HISTORY: ETT placement Reason for Exam: ETT placement Acuity: Unknown Type of Exam: Ongoing Additional signs and symptoms: ETT placement Relevant Medical/Surgical History: ETT placement FINDINGS: AP portable view of the chest electronically labeled regarding sides. Tracheal tube 5 cm proximal to the kassidy. Extreme lung apices not included on this radiograph. Patchy opacity within the right lower lung. No effusions or pneumothoraces. Cardiomediastinal silhouette is within normal limits. Remaining visualized osseous and soft tissues are unchanged. Stable tracheal tube tip at the level of upper T4 vertebra. Faint right lower lung opacity, most likely infectious or inflammatory in the appropriate clinical setting.      XR CHEST PORTABLE    Result Date: 8/5/2021  EXAMINATION: ONE XRAY VIEW OF THE CHEST 8/5/2021 8:31 pm COMPARISON: 08/03/2021 HISTORY: ORDERING SYSTEM PROVIDED HISTORY: ETT placement TECHNOLOGIST PROVIDED HISTORY: ETT placement Reason for Exam: ET tube placement Acuity: Unknown Type of Exam: Unknown FINDINGS: Endotracheal tube terminates 5.5 cm above the kassidy. Cardiomediastinal silhouette is unchanged in size. Aortic atherosclerosis. No large pleural effusion or pneumothorax. There are faint bibasilar pulmonary opacities. No acute osseous abnormality. 1. Endotracheal tube in expected position. 2. Faint bibasilar opacities, which may be due to atelectasis. XR CHEST PORTABLE    Result Date: 8/3/2021  EXAMINATION: ONE XRAY VIEW OF THE CHEST 8/3/2021 11:41 am COMPARISON: None. HISTORY: ORDERING SYSTEM PROVIDED HISTORY: AMS TECHNOLOGIST PROVIDED HISTORY: AMS Reason for Exam: AMS Acuity: Unknown Type of Exam: Unknown FINDINGS: Patient is slightly rotated. Heart size within normal limits without evidence of vascular congestion. Aside from minor chronic appearing changes lungs are grossly clear. Probable skin fold projects in the left lower chest.  No focal lung consolidation is seen. No significant pleural effusions. Gas distended bowel loops beneath the left hemidiaphragm. Monitor leads overlie the chest.  The bones are osteopenic with degenerative changes; questionable age-indeterminate slight loss of height of lower thoracic vertebral bodies. No acute cardiopulmonary process suspected. CT HIP LEFT WO CONTRAST    Result Date: 8/3/2021  EXAMINATION: CT OF THE LEFT HIP WITHOUT CONTRAST 8/3/2021 1:55 pm TECHNIQUE: CT of the left hip was performed without the administration of intravenous contrast.  Multiplanar reformatted images are provided for review. Dose modulation, iterative reconstruction, and/or weight based adjustment of the mA/kV was utilized to reduce the radiation dose to as low as reasonably achievable.  COMPARISON: Pelvis and left hip radiographs August 3, 2021 HISTORY ORDERING SYSTEM PROVIDED HISTORY: ?acetabulum fx TECHNOLOGIST PROVIDED HISTORY: ?acetabulum fx Decision Support Exception - unselect if not a suspected or confirmed emergency medical condition->Emergency Medical Condition (MA) Reason for Exam: pt fell 5 days ago, in afib, lt hip pain Acuity: Unknown Type of Exam: Unknown FINDINGS: Bones: Redemonstration of displaced transcervical left femoral neck fracture which is subacute in appearance. No additional fractures are identified. The bones are osteopenic. No suspicious lytic or sclerotic osseous lesions are evident. Soft Tissue: Subcutaneous and intramuscular edema adjacent to the fracture site. No organized collections are identified. No subcutaneous gas. Visualized intrapelvic structures appear grossly unremarkable. Moderate amount of stool present at the rectal vault. Joint: Anatomic alignment of the left hip. Mild-to-moderate degenerative changes of the left hip. Chondrocalcinosis noted. 1. Displaced fracture of the left transcervical femoral neck which is subacute in appearance. 2. No additional fractures are identified. 3. Mild-to-moderate degenerative change of the left hip. Chondrocalcinosis also present. XR HIP 2-3 VW W PELVIS LEFT    Result Date: 8/3/2021  EXAMINATION: ONE XRAY VIEW OF THE PELVIS AND TWO XRAY VIEWS LEFT HIP 8/3/2021 8:41 am COMPARISON: None. HISTORY: ORDERING SYSTEM PROVIDED HISTORY: fall TECHNOLOGIST PROVIDED HISTORY: fall Reason for Exam: fall, left hip pain Acuity: Unknown Type of Exam: Unknown FINDINGS: There is a left femoral neck fracture with impaction, varus angulation, and proximal migration of the greater trochanter which is essentially at the level of the acetabulum. The femoral head appears to remain appropriately seated in the acetabular fossa. Possible transverse lucency through the medial left acetabulum.   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 Left femoral neck fracture with impaction, varus angulation, and proximal migration of the greater trochanter. Possible lucency through the medial left acetabulum, though radiographic evaluation is limited by osseous demineralization. Consider CT. Physical Examination:        Physical Exam  Constitutional:       General: He is not in acute distress. Appearance: Normal appearance. He is not ill-appearing. HENT:      Head: Normocephalic and atraumatic. Eyes:      General: No scleral icterus. Extraocular Movements: Extraocular movements intact. Conjunctiva/sclera: Conjunctivae normal.   Cardiovascular:      Rate and Rhythm: Normal rate. Rhythm irregular. Pulses: Normal pulses. Heart sounds: Normal heart sounds. No murmur heard. No friction rub. No gallop. Pulmonary:      Effort: Pulmonary effort is normal. No respiratory distress. Breath sounds: Normal breath sounds. No stridor. No wheezing, rhonchi or rales. Chest:      Chest wall: No tenderness. Abdominal:      General: Abdomen is flat. Bowel sounds are normal. There is no distension. Palpations: There is no mass. Tenderness: There is no abdominal tenderness. There is no guarding. Genitourinary:     Penis: Swelling present. No paraphimosis, erythema, tenderness or lesions. Testes: Normal.   Musculoskeletal:      Right lower leg: Edema present. Left lower leg: Edema present. Neurological:      General: No focal deficit present. Mental Status: He is alert and oriented to person, place, and time. Mental status is at baseline. Motor: No weakness. Psychiatric:         Mood and Affect: Mood normal.         Behavior: Behavior normal.         Thought Content:  Thought content normal.         Judgment: Judgment normal.           Assessment:        Primary Problem  A-fib [facility])    Active Hospital Problems    Diagnosis Date Noted    Anemia [D64.9] 08/08/2021    Closed fracture of left hip with routine healing [S72.002D] 08/04/2021    Rhabdomyolysis [M62.82] 08/04/2021    A-fib ([facility] [address] [I48.91] 08/03/2021       Plan:        Atrial fibrillation with RVR-still currently in atrial fibrillation, regular rate - No change   -Heart rate 95  - Diltiazem infusion D/C  -Heparin injection was stopped - PMR recommends a screening doppler 24-48 hour prior to transfer  -Cardiology on board, on digoxin and lopressor   - 150 mg IV Amiodarone     Anemia  Hgb 8.2 ==>7.2 ==> 7.5  Retic. 4%  Hemeoccult was order and was positive        Hip pain, due to left femoral neck fracture  -Orthopedic surgery on board  -Status post left hip hemiarthroplasty on 8/5/2021, postop day 4            Rhabdomyolysis, due to fall at home and prolonged time down --   - from 8/4/2021  Myoglobin 1409 -> 431  -Patient on normal saline at 125 mL an hour  - K 3.8  - Mg 1.8  - Ca 7.5 ==> waiting for ionized Ca  BUN 15  Cr 0.46           Right foot wound:  -Wound ostomy evaluation 8/5/2021, recommendations provided for multiple wounds including buttocks/sacral injuries, and bilateral elbows and knee injuries.    -Patient on cefazolin  Chronic Dermatitis of Right foot: Recommendations not provided for right foot lesion, scales are pealing off and erythema looks the same as last time. Michael Wright MD  8/9/2021  8:51 AM     Attestation and add on       I have discussed the care of Desiree Fishman , including pertinent history and exam findings,      8/9/21    with the resident. I have seen and examined the patient and the key elements of all parts of the encounter have been performed by me . I agree with the assessment, plan and orders as documented by the resident. Principal Problem:    A-fib ([facility] [address]  Active Problems:    Closed fracture of left hip with routine healing    Rhabdomyolysis    Anemia  Resolved Problems:    * No resolved hospital problems.  *         -Patient continues to do poorly  He has atrial

## 2021-08-09 NOTE — PLAN OF CARE
PRE-CONSULT ROUNDING NOTE    HPI    40-year-old male admitted after suffering a fall at home. Per report patient was laying on the floor for 5 days and found to be in A. fib. He was started on diltiazem and heparin drip. Patient was also found to have left hip fracture and rhabdomyolysis. Patient has multiple wounds to the sacrum, elbows, knees, buttocks. Patient had over 3 g hemoglobin drop over 48 hours. Reticulocyte 4.1. Stool was positive for occult blood. Per RN he has had multiple nonbloody bowel movements over last two days. Patient denies any abdominal pain, nausea, vomiting, heartburns. No chest pain, SOB. No hx of ulcer disease in the past.  Patient has not seen physician in greater than 30 years. No prior EGD or colonoscopy. Patient does report regular NSAID use over the last 8 months  No AC. Patient reports heavy drinking in the past.  Stopped drinking in 2003. No hx of liver disease    Review of Systems   Constitutional: Negative for appetite change, fatigue and fever. HENT: Negative for mouth sores, sore throat, trouble swallowing and voice change. Eyes:        No ictirus   Respiratory: Negative for apnea, cough, choking, shortness of breath and wheezing. Cardiovascular: Negative for chest pain, palpitations and leg swelling. Gastrointestinal: Negative for abdominal distention, abdominal pain, anal bleeding, blood in stool, constipation, diarrhea, nausea, rectal pain and vomiting. Endocrine: Negative for polydipsia, polyphagia and polyuria. Genitourinary: Negative for frequency, hematuria and urgency. Musculoskeletal: Positive for arthralgias and gait problem. Negative for back pain and joint swelling. Skin: Negative for pallor and rash. Allergic/Immunologic: Negative for food allergies. Neurological: Negative for dizziness, seizures, weakness and headaches. Hematological: Negative for adenopathy. Does not bruise/bleed easily.    Psychiatric/Behavioral: Negative for sleep disturbance. The patient is not nervous/anxious. Physical Exam  Vitals and nursing note reviewed. Constitutional:       General: He is not in acute distress. Appearance: He is well-developed. HENT:      Head: Normocephalic and atraumatic. Mouth/Throat:      Pharynx: No oropharyngeal exudate. Eyes:      General: No scleral icterus. Conjunctiva/sclera: Conjunctivae normal.      Pupils: Pupils are equal, round, and reactive to light. Neck:      Thyroid: No thyromegaly. Trachea: No tracheal deviation. Cardiovascular:      Rate and Rhythm: Normal rate and regular rhythm. Heart sounds: Normal heart sounds. No murmur heard. Pulmonary:      Effort: Pulmonary effort is normal. No respiratory distress. Breath sounds: Normal breath sounds. No wheezing or rales. Abdominal:      General: Bowel sounds are normal. There is no distension. Palpations: Abdomen is soft. There is no hepatomegaly or mass. Tenderness: There is no abdominal tenderness. There is no guarding or rebound. Hernia: No hernia is present. Genitourinary:     Rectum: Normal.   Musculoskeletal:         General: Tenderness and signs of injury present. Cervical back: Normal range of motion and neck supple. Lymphadenopathy:      Cervical: No cervical adenopathy. Skin:     General: Skin is warm and dry. Findings: No erythema or rash. Neurological:      Mental Status: He is alert and oriented to person, place, and time. Cranial Nerves: No cranial nerve deficit. Psychiatric:         Thought Content:  Thought content normal.         ASSESSMENT/PLAN    Drop in hgb  FOBT +  Hx chronic NSAID use    -Anemia workup  -NPO after midnight  -EGD tomorrow  -Trend H&H  -Transfuse for hgb < 7  -PPI  -Colonoscopy, inpatient vs outpatient

## 2021-08-09 NOTE — PROGRESS NOTES
Notified [facility], that per Dr Madera Niperin pt is approp for ARU when medically ready. Pt will need to be off IV amio & diltiazem as well as have BLE dopplers d/t no chemical DVT prophylaxis.

## 2021-08-09 NOTE — PROGRESS NOTES
Physical Medicine & Rehabilitation  Progress Note    8/9/2021 10:04 AM     CC: Ambulatory and ADL dysfunction due to left hip fracture status post hemiarthroplasty 65    5year-old male had a fall from standing at home. He per report he was laying on the floor for 5 days he was found to be in A. fib-started on diltiazem and heparin drip. Found to have a left hip fracture as well as rhabdomyolysis. Also has multiple wounds including buttocks, sacrum and bilateral elbows and bilateral knees. Underwent hemiarthroplasty 8/5. Extubated 8/6. Hospital course complicated by A. fib, edema, anemia. Internal medicine-A. fib-continue with elevated heart rate, heparin/diltiazem discontinued, cardiology follow, digoxin Lopressor, hemoglobin tapering down-Hemoccult was positive, wound ostomy following, has chronic dermatitis of right foot-patient on cefazolin. Cardiology and primary team holding off on anticoagulation, on EPC cuffs    Wound care-staged 3 right buttock wound unstageable sacral wound    Cardiology-Heparin drip held due to drop in hemoglobin monitor H&H not a suitable candidate for long-term anticoagulation due to fall risk no plans for further ischemic work-up    Subjective:   No complaints. ROS:  Denies fevers, chills, sweats. No chest pain, palpitations, lightheadedness. Denies coughing, wheezing or shortness of breath. Denies abdominal pain, nausea, diarrhea or constipation. No new areas of joint pain. Denies new areas of numbness or weakness. Denies new anxiety or depression issues. No new skin problems.     Rehabilitation:   PT:  Restrictions/Precautions: Weight Bearing, Fall Risk  Left Lower Extremity Weight Bearing: Weight Bearing As Tolerated   Transfers  Sit to Stand: Minimal Assistance, 2 Person Assistance  Stand to sit: Minimal Assistance, 2 Person Assistance  Bed to Chair: Minimal assistance (with RW)  Comment: Pt completes transfer w/ RW, Verbal cuing provided for hand placement with good follow through on second stand   WB Status: WBAT  Ambulation 1  Surface: level tile  Device: Rolling Walker  Assistance: Contact guard assistance  Distance: 5ft FWD/BWD x2          OT:  ADL  Feeding: Setup  Grooming: Setup, Minimal assistance  UE Bathing: Setup, Maximum assistance  LE Bathing: Dependent/Total  UE Dressing: Setup, Maximum assistance  LE Dressing: Dependent/Total (footlevel dressing only addressed)  Toileting: Dependent/Total  Additional Comments: per clinical reasoning and mobility, work socrates assessment. Balance  Sitting Balance: Supervision (static sitting EOB)  Standing Balance: Minimal assistance (CGA-min A; 2nd person SBA for safety. )   Standing Balance  Time: 5 min, 4 min   Activity: static/dynamic standing EOB, forward/backward steps bed<>window. Comment: increased to min A with dynamic standing; intermittent UE raises to tap writer hands to address standing balance with decreased uE support. Functional Mobility  Functional - Mobility Device: Rolling Walker  Activity: Other (~5 steps forwad/back bed<>window. )  Assist Level: Contact guard assistance (Assist x2 for safety and multiple line mgmt. )     Bed mobility  Rolling to Left: Minimal assistance  Rolling to Right: Minimal assistance  Supine to Sit: Moderate assistance  Sit to Supine: Moderate assistance  Scooting: Moderate assistance  Transfers  Stand Step Transfers: Moderate assistance (RW)  Sit to stand: Minimal assistance, 2 Person assistance  Stand to sit: Minimal assistance, 2 Person assistance  Transfer Comments: cueing for hand placement with good return. ST:            Objective:  BP (!) 125/57   Pulse 112   Temp 97.6 °F (36.4 °C) (Oral)   Resp 17   Ht 5' 7\" (1.702 m)   Wt 148 lb 5.9 oz (67.3 kg)   SpO2 93%   BMI 23.24 kg/m²  I Body mass index is 23.24 kg/m².  I   Wt Readings from Last 1 Encounters:   21 148 lb 5.9 oz (67.3 kg)      Temp (24hrs), Av.1 °F (36.7 °C), Min:97.5 °F (36.4 °C), Max:99 °F (37.2 °C)         GEN: well developed, well nourished, no acute distress  HEENT: Normocephalic atraumatic, EOMI, mucous membranes pink and moist  CV: Tachycardia, no murmurs, rubs or gallops  PULM: Respirations WNL and unlabored  ABD: soft, NT, ND, +BS and equal  NEURO: A&O x3. Sensation intact to light touch. Knew year, president location, follows commands  MSK: 4/5 upper extremity right lower extremity and distal left lower extremity limited proximal left lower extremity  EXTREMITIES: No calf tenderness to palpation bilaterally. Right distal lower extremity with dysvascular changes, 2+ edema left lower extremity  SKIN: warm dry and intact with good turgor  PSYCH: appropriately interactive.  Affect WNL      Medications   Scheduled Meds:   polyethylene glycol  17 g Oral Daily    pantoprazole  40 mg Oral QAM AC    ceFAZolin  1,000 mg Intravenous Q8H    acetaminophen  1,000 mg Oral 4 times per day    digoxin  125 mcg Oral Daily    metoprolol tartrate  50 mg Oral BID    sodium chloride flush  5-40 mL Intravenous 2 times per day     Continuous Infusions:   amiodarone 0.5 mg/min (08/09/21 0058)    dilTIAZem (CARDIZEM) 125 mg in dextrose 5% 125 mL infusion 10 mg/hr (08/09/21 0350)    sodium chloride      sodium chloride 125 mL/hr at 08/09/21 0246     PRN Meds:.melatonin, magnesium sulfate, oxyCODONE, morphine, perflutren lipid microspheres, sodium chloride flush, sodium chloride, ondansetron **OR** ondansetron, polyethylene glycol, acetaminophen **OR** acetaminophen, potassium chloride **OR** potassium alternative oral replacement **OR** potassium chloride     Diagnostics:     CBC:   Recent Labs     08/07/21  0521 08/08/21  0044 08/08/21  0446 08/08/21  0446 08/08/21  1426 08/08/21  2031 08/09/21  0407   WBC 7.3  --  7.6  --   --   --  11.5*   RBC 2.48*  --  2.20*  --   --   --  2.29*   HGB 8.2*   < > 7.2*   < > 7.4* 7.2* 7.5*   HCT 24.2*   < > 21.4*   < > 22.3* 21.8* 23.1*   MCV 97.5  -- 97. 7  --   --   --  100.6*   RDW 14.3  --  14.9  --   --   --  15.7*     --  174  --   --   --  184    < > = values in this interval not displayed. BMP:   Recent Labs     08/07/21  0521 08/07/21  0521 08/07/21  1423 08/08/21  0446 08/09/21  0407     --   --  141 140   K 3.3*   < > 3.5* 4.4 3.8   *  --   --  112* 110*   CO2 25  --   --  25 23   BUN 15  --   --  22 15   CREATININE 0.47*  --   --  0.47* 0.46*    < > = values in this interval not displayed. BNP: No results for input(s): BNP in the last 72 hours. PT/INR: No results for input(s): PROTIME, INR in the last 72 hours. APTT: No results for input(s): APTT in the last 72 hours. CARDIAC ENZYMES: No results for input(s): CKMB, CKMBINDEX, TROPONINT in the last 72 hours. Invalid input(s): CKTOTAL;3  FASTING LIPID PANEL:No results found for: CHOL, HDL, TRIG  LIVER PROFILE: No results for input(s): AST, ALT, ALB, BILIDIR, BILITOT, ALKPHOS in the last 72 hours. I/O (24Hr): Intake/Output Summary (Last 24 hours) at 8/9/2021 1004  Last data filed at 8/9/2021 0656  Gross per 24 hour   Intake 2033.38 ml   Output 2600 ml   Net -566.62 ml       Glu last 24 hour  No results for input(s): POCGLU in the last 72 hours. No results for input(s): CLARITYU, COLORU, PHUR, SPECGRAV, PROTEINU, RBCUA, BLOODU, BACTERIA, NITRU, WBCUA, LEUKOCYTESUR, YEAST, GLUCOSEU, BILIRUBINUR in the last 72 hours. Impression:     1. Left hip fracture s/p hemiarthroplasty 8/5  2. Atrial fibrillation with RVR -no anticoagulation per cardiology due to risk of falls-digoxin, metoprolol amiodarone/diltiazem drip  3. Rhabdomyolysis - improving, IV fluids  4. Multiple wound/abrasions-wound care following  5. Anemia-hemoglobin 7.5, heparin drip on hold, stool occult blood positive-question work-up/evaluation  6. Pain-Roxicodone,-morphine drip has not received  7. Leukocytosis-increase 7.3-11.5, on Ancef IV start 8/7 taking until discontinued clarify  8.  Chronic dermatitis right leg       Recommendations:     1. Diagnosis:  Left hip fracture  2. Therapy: Has PT and OT needs  3. Medical Necessity: As above  4. Support: Lives alone, has a friend/neighbor that provides some assistance  5. Rehab Recommendation:  Would benefit from acute inpatient rehabilitation when medically/cardiac ready    Need to be off amiodarone/diltiazem drip,     6. DVT Prophylaxis:  Currently not on anticoagulation due to anemia, will need Doppler screen 24 to 48 hours prior to transfer     It was my pleasure to evaluate Reynaldo Lux today. Please call with questions. Arnav Hein. Leatha Epley, MD       This note is created with the assistance of a speech recognition program.  While intending to generate a document that actually reflects the content of the visit, the document can still have some errors including those of syntax and sound a like substitutions which may escape proof reading.   In such instances, actual meaning can be extrapolated by contextual diversion

## 2021-08-09 NOTE — PLAN OF CARE
Nutrition Problem #1: Increased nutrient needs  Intervention: Food and/or Nutrient Delivery: Continue Current Diet, Continue Oral Nutrition Supplement  Nutritional Goals: Meet % of nutrient needs with PO diet and supplements.

## 2021-08-09 NOTE — PROGRESS NOTES
[facility]  Progress Note                   Date:   8/9/2021  Patient name: Shy Sumner  Date of admission:  8/3/2021 10:59 AM  MRN:   [id number]  YOB: 1940  PCP: No primary care provider on file. Reason for Admission: A-fib [facility]) [I48.91]    Subjective:       Clinical Changes /Abnormalities:     Patient seen and examined in room after discussion with RN. Denies any chest pain or SOB, denies palpitations. HR remains in Afib 90-110s at rest. Amio & Cardizem gtts remains on, attempting to wean but HR       Medications:   Scheduled Meds:   polyethylene glycol  17 g Oral Daily    pantoprazole  40 mg Oral QAM AC    ceFAZolin  1,000 mg Intravenous Q8H    acetaminophen  1,000 mg Oral 4 times per day    digoxin  125 mcg Oral Daily    metoprolol tartrate  50 mg Oral BID    sodium chloride flush  5-40 mL Intravenous 2 times per day     Continuous Infusions:   amiodarone 0.5 mg/min (08/09/21 1042)    dilTIAZem (CARDIZEM) 125 mg in dextrose 5% 125 mL infusion 2.5 mg/hr (08/09/21 1418)    sodium chloride      sodium chloride 50 mL/hr at 08/09/21 1345     CBC:   Recent Labs     08/07/21  0521 08/08/21  0044 08/08/21  0446 08/08/21  0446 08/08/21  1426 08/08/21  2031 08/09/21  0407   WBC 7.3  --  7.6  --   --   --  11.5*   HGB 8.2*   < > 7.2*   < > 7.4* 7.2* 7.5*     --  174  --   --   --  184    < > = values in this interval not displayed. BMP:    Recent Labs     08/07/21  0521 08/07/21  0521 08/07/21  1423 08/08/21  0446 08/09/21  0407     --   --  141 140   K 3.3*   < > 3.5* 4.4 3.8   *  --   --  112* 110*   CO2 25  --   --  25 23   BUN 15  --   --  22 15   CREATININE 0.47*  --   --  0.47* 0.46*   GLUCOSE 115*  --   --  121* 102*    < > = values in this interval not displayed. Hepatic:  Recent Labs     08/09/21  0407   AST 26   ALT 11   BILITOT 0.58   ALKPHOS 102     Troponin:   No results for input(s): TROPHS in the last 72 hours.   BNP: No results for input(s): BNP in the last 72 hours. Lipids: No results for input(s): CHOL, HDL in the last 72 hours. Invalid input(s): LDLCALCU  INR:   No results for input(s): INR in the last 72 hours. Objective:   Vitals: BP (!) 113/43   Pulse 97   Temp 97.8 °F (36.6 °C) (Oral)   Resp 16   Ht 5' 7\" (1.702 m)   Wt 148 lb 5.9 oz (67.3 kg)   SpO2 93%   BMI 23.24 kg/m²   General appearance: alert and cooperative with exam, lethargic on exam   HEENT: Head: Normocephalic, no lesions, without obvious abnormality. Neck:no JVD, trachea midline, no adenopathy  Lungs: Diminished to auscultation but clear . On RA without distress   Heart: Irregular rate and rhythm, s1/s2 auscultated, no murmurs, HR 90-110s at rest  Abdomen: soft, non-tender, bowel sounds active  Extremities: no edema - see surgical evaluation. Denies pain presently   Neurologic: not done    Echo 8/4/21  Summary  Normal left ventricle size and function with an estimated EF > 55%. Mild left ventricular hypertrophy. Left atrial dilatation. Aortic valve was not well visualized. Mild to moderate aortic insufficiency. Mild mitral regurgitation. Mild tricuspid regurgitation. Normal right ventricular systolic pressure. IVC Increased diameter, but still has inspiratory variation. Assessment / Acute Cardiac Problems:   1. Atrial fibrillation with RVR, suboptimal rate control   2. Rhabdomyolysis with prolonged time spent on floor - 5 days  3. Elevated troponins, type II MI secondary to rhabdomyolysis/tachycradia/hypoxia  4. Preserved LV systolic function   5. Mechanical fall with hip fracture status post left hip hemiarthroplasty (POD # 2)   6. Acute respiratory failure, resolved   7. Preserved LVEF with Mild-mod AI, mild MR, mild TR    Patient Active Problem List:     A-fib ([address]     Closed fracture of left hip with routine healing     Rhabdomyolysis      Plan of Treatment:   1. Continue to wean Cardizem gtt and continue IV Amio gtt.  Will give IV Dig x1 and continue oral. Continue lopressor  - unable to titrate up d/t hypotension with increase. 2. Heparin drip is held due to drop in Hb, remains low at 7.5 today. Continue to monitor H/H   3. Not a suitable candidate for long term AC d/t fall risk unless goes to NH and can be monitoring. 4. Echo reviewed as above. No plans for further ischemic work-up at this time. 5. Keep K >4 and Mg >2.        Electronically signed by ANDRES Rolon - CNP on 8/9/2021 at 2:51 PM  [address]  [phone]

## 2021-08-09 NOTE — PROGRESS NOTES
[address]   Physical Therapy Progress Note    Date: 21  Patient Name: Kayla Islas       Room:   MRN: [id number]   Account: [de-identified]   : 1940  (79 y.o.)   Gender: male     Discharge Recommendations   The patient would benefit from an intensive level of therapy after discharge from the facility. They should be able to tolerate 3-hours of Combined OT/PT/ST over 5 days/week or at least 900 minutes of  Combined Therapy over 7 days. Equipment Needed: No       Diagnosis: Left hip fracture s/p hemiarthroplasty . A fib, Rhabdomyolosis, anemia, Multiple wound/abrasions  Restrictions/Precautions: Weight Bearing, Fall Risk  Left Lower Extremity Weight Bearing: Weight Bearing As Tolerated   Past Medical History:  has no past medical history on file. Past Surgical History:   has a past surgical history that includes hip surgery (Left, 2021). Overall Orientation Status: Within Normal Limits  Restrictions/Precautions  Restrictions/Precautions: Weight Bearing; Fall Risk  Lower Extremity Weight Bearing Restrictions  Left Lower Extremity Weight Bearing: Weight Bearing As Tolerated    Subjective: Pt alert in bed reports ready to get moving  Comments: RINA Rodriguez in pt's room giving pt medication upon arrival. Pt is very pleasant and cooperative with PT tx. Vital Signs  Patient Currently in Pain: Yes  Pain Assessment: 0-10  Pain Level: 3  Pain Type: Surgical pain  Pain Location: Hip  Pain Orientation: Left  Non-Pharmaceutical Pain Intervention(s): Ambulation/Increased Activity; Distraction;Repositioned  Response to Pain Intervention: Patient Satisfied                Bed Mobility:   Rolling: Minimal assistance  Supine to Sit: Moderate assistance  Sit to Supine: Moderate assistance (x1, assisting bilat LE ex.)  Scooting: Moderate assistance      Transfers:  Sit to Stand:  Moderate Assistance  Stand to sit: Moderate Assistance  Bed to Chair: Moderate assistance Sitting - Static: Good  Sitting - Dynamic: Good     Other exercises 1: Static Standing x2, 55sec and 1min 10sec  Other exercises 2: STS x4  Other exercises 3: Supine bilat LE ex, [id number] (AM & PM tx)  Other exercises 4: Transfer from bed to bedside commode to bed (pericare and clean brief)  Other exercises 5: bed mobility x2  Other Activities  Comment:  Frequent rest breaks PRN to allow HR to stabilize. Activity Tolerance: Patient Tolerated treatment well;Patient limited by fatigue;Patient limited by endurance  Activity Tolerance: Pt very motivated and very cooperative. Extra time to complete tasks due to fatigue  PT Equipment Recommendations  Equipment Needed: No       Assessment  Activity Tolerance: Patient Tolerated treatment well;Patient limited by fatigue;Patient limited by endurance   Body structures, Functions, Activity limitations: Decreased functional mobility ; Decreased strength;Decreased balance;Decreased endurance  Discharge Recommendations: Patient would benefit from continued therapy after discharge     Type of devices: Left in bed;Call light within reach;Nurse notified     Plan  Times per week: BID  Times per day: Twice a day  Current Treatment Recommendations: Strengthening, ROM, Balance Training, Functional Mobility Training, Transfer Training, Gait Training, Endurance Training, Safety Education & Training    Patient Education  New Education Provided:  Plan of Care  Learner:patient  Method: demonstration and explanation       Outcome: needs reinforcement     Goals  Short term goals  Time Frame for Short term goals: 3-4 days  Short term goal 1: bed mobility with Gabriel  Short term goal 2: transfers with Gabriel  Short term goal 3: gait with RW/SBA x 20ft       08/09/21 1123 08/09/21 1517   PT Individual Minutes   Time In [id number]   Time Out [phone]   Minutes 43 25       Electronically signed by Vinicio Meade PTA on 8/9/21 at 3:53 PM EDT

## 2021-08-09 NOTE — PLAN OF CARE
Problem: Falls - Risk of:  Goal: Will remain free from falls  Description: Will remain free from falls  8/9/2021 1522 by Stuart Barnes RN  Outcome: Ongoing  Note: Bed remains in lowest position, call light within reach. Patient remains free of falls at this time. RN will continue to monitor. 8/9/2021 0251 by Kathy Hyde RN  Outcome: Ongoing  Note: Bed remains in lowest position, call light within reach. Patient remains free of falls at this time. RN will continue to monitor. 8/9/2021 0246 by Kathy Hyde RN  Outcome: Ongoing  Note: Bed remains in lowest position, call light within reach. Patient remains free of falls at this time. RN will continue to monitor. Goal: Absence of physical injury  Description: Absence of physical injury  8/9/2021 1522 by Stuart Barnes RN  Outcome: Ongoing  8/9/2021 0251 by Kathy Hyde RN  Outcome: Ongoing  Note: Fall assessment performed and appropriate measures implemented. Room freed from clutter. Bed in lowest position with wheels locked. Call light in place. ID band in place. 8/9/2021 0246 by Kathy Hyde RN  Outcome: Ongoing  Note: Fall assessment performed and appropriate measures implemented. Room freed from clutter. Bed in lowest position with wheels locked. Call light in place. ID band in place. Problem: Skin Integrity:  Goal: Will show no infection signs and symptoms  Description: Will show no infection signs and symptoms  8/9/2021 1522 by Stuart Barnes RN  Outcome: Ongoing  Note: Patient turned and repositioned every 2 hours and as needed for comfort. Skin remains dry and intact. No new skin breakdown noted. 8/9/2021 0251 by Kathy Hyde RN  Outcome: Ongoing  Note: Patient has low grade temperatures. 8/9/2021 0246 by Kathy Hyde RN  Outcome: Ongoing  Note: Patient afebrile. No signs or symptoms of infection noted. On ancef.    Goal: Absence of new skin breakdown  Description: Absence of new skin breakdown  8/9/2021 1522 by Stuart Barnes RN  Outcome: Ongoing  8/9/2021 0251 by Lobo Boland RN  Outcome: Ongoing  Note: Patient turned and repositioned every 2 hours and as needed for comfort. Skin remains dry and intact. No new skin breakdown noted. 8/9/2021 0246 by Lobo Boland RN  Outcome: Ongoing  Note: Patient turned and repositioned every two hours to prevent further skin breakdown. Wounds and abrasions cleansed and foam dressings changed. Problem: Cardiac:  Goal: Ability to maintain an adequate cardiac output will improve  Description: Ability to maintain an adequate cardiac output will improve  8/9/2021 1522 by Williams Engle RN  Outcome: Ongoing  Note: Cardizem, amiodarone, digoxin, lopressor  8/9/2021 0251 by Lobo Boland RN  Outcome: Ongoing  Note: On cardene. 8/9/2021 0246 by Lobo Boland RN  Outcome: Ongoing  Goal: Hemodynamic stability will improve  Description: Hemodynamic stability will improve  8/9/2021 1522 by Williams Engle RN  Outcome: Ongoing  8/9/2021 0251 by Lobo Boland RN  Outcome: Ongoing  8/9/2021 0246 by Lobo Boland RN  Outcome: Ongoing     Problem: Fluid Volume:  Goal: Ability to achieve and maintain adequate urine output will improve  Description: Ability to achieve and maintain adequate urine output will improve  8/9/2021 1522 by Williams Engle RN  Outcome: Ongoing  8/9/2021 0251 by Lobo oBland RN  Outcome: Ongoing  Note: Monitoring I&O's.   8/9/2021 0246 by Lobo Boland RN  Outcome: Ongoing  Note: Monitoring I&O's. Problem: Respiratory:  Goal: Respiratory status will improve  Description: Respiratory status will improve  8/9/2021 1522 by Williams Engle RN  Outcome: Ongoing  Note: Room Air  8/9/2021 0251 by Lobo Boland RN  Outcome: Ongoing  Note: Respiratory status stable. 8/9/2021 0246 by Lobo Boland RN  Outcome: Ongoing  Note: Patient on 2L nasal cannula due to oxygen saturation decreasing to 87-88% on room air.       Problem: Pain:  Goal: Pain level will decrease  Description: Pain level will decrease  8/9/2021 1522 by Matthew Dennis RN  Outcome: Ongoing  Note: Pain assessed at regular intervals. Medications administered as requested for comfort. Pain remains at a tolerable level. 8/9/2021 0251 by Roni Torres RN  Outcome: Ongoing  8/9/2021 0246 by Roni Torres RN  Outcome: Ongoing  Goal: Control of acute pain  Description: Control of acute pain  8/9/2021 1522 by Matthew Dennis RN  Outcome: Ongoing  8/9/2021 0251 by Roni Torres RN  Outcome: Ongoing  Note: Pain assessed at regular intervals. Patient denies having any pain. 8/9/2021 0246 by Roni Torres RN  Outcome: Ongoing  Note: Pain assessed at regular intervals. Patient receiving tylenol. Goal: Control of chronic pain  Description: Control of chronic pain  8/9/2021 1522 by Matthew Dennis RN  Outcome: Ongoing  8/9/2021 0251 by Roni Torres RN  Outcome: Ongoing  8/9/2021 0246 by Roni Torres RN  Outcome: Ongoing     Problem: Nutrition  Goal: Optimal nutrition therapy  8/9/2021 1522 by Matthew Dennis RN  Outcome: Ongoing  8/9/2021 0251 by Roni Torres RN  Outcome: Ongoing  8/9/2021 0246 by Roni Torres RN  Outcome: Ongoing  Note: Patient on easy to chew diet and tolerating well. Problem: Musculor/Skeletal Functional Status  Goal: Highest potential functional level  8/9/2021 1522 by Matthew Dennis RN  Outcome: Ongoing  8/9/2021 0251 by Roni Torres RN  Outcome: Ongoing  Note: Patient has left sided weakness from stroke. 8/9/2021 0246 by Roni Torres RN  Outcome: Ongoing  Note: Patient has generalized weakness from surgery. Participating as much as possible in turns. Problem: Musculor/Skeletal Functional Status  Goal: Highest potential functional level  8/9/2021 1522 by Matthew Dennis RN  Outcome: Ongoing  8/9/2021 0251 by Roni Torres RN  Outcome: Ongoing  Note: Patient has left sided weakness from stroke. Participating in care and turns as much as possible.    8/9/2021 0246 by Roni Torres RN  Outcome: Ongoing  Goal: Absence of falls  8/9/2021 1522 by Matthew Dennis RN  Outcome: Ongoing  8/9/2021 0251 by Isaiah Dyson RN  Outcome: Ongoing  8/9/2021 0246 by Isaiah Dyson RN  Outcome: Ongoing

## 2021-08-09 NOTE — PLAN OF CARE
Problem: Falls - Risk of:  Goal: Will remain free from falls  Description: Will remain free from falls  8/9/2021 0251 by Conchita Chung RN  Outcome: Ongoing  Note: Bed remains in lowest position, call light within reach. Patient remains free of falls at this time. RN will continue to monitor. Goal: Absence of physical injury  Description: Absence of physical injury  8/9/2021 0251 by Conchita Chung RN  Outcome: Ongoing  Note: Fall assessment performed and appropriate measures implemented. Room freed from clutter. Bed in lowest position with wheels locked. Call light in place. ID band in place. Problem: Skin Integrity:  Goal: Will show no infection signs and symptoms  Description: Will show no infection signs and symptoms  8/9/2021 0246 by Conchita Chung RN  Outcome: Ongoing  Note: Patient afebrile. No signs or symptoms of infection noted. On ancef. Goal: Absence of new skin breakdown  Description: Absence of new skin breakdown  8/9/2021 0251 by Conchita Chung RN  Outcome: Ongoing  Note: Patient turned and repositioned every 2 hours and as needed for comfort. Skin remains dry and intact. No new skin breakdown noted. 8/9/2021 0246 by Conchita Chung RN  Outcome: Ongoing  Problem: Cardiac:  Goal: Ability to maintain an adequate cardiac output will improve  Description: Ability to maintain an adequate cardiac output will improve  Goal: Hemodynamic stability will improve  Description: Hemodynamic stability will improve  8/9/2021 0251 by Conchita Chung RN  Outcome: Ongoing     Problem: Fluid Volume:  Goal: Ability to achieve and maintain adequate urine output will improve  Description: Ability to achieve and maintain adequate urine output will improve  8/9/2021 0251 by Conchita Chung RN  Outcome: Ongoing  Note: Monitoring I&O's.       Problem: Respiratory:  Goal: Respiratory status will improve  Description: Respiratory status will improve   8/9/2021 0246 by Conchita Chung RN  Outcome: Ongoing  Note: Patient on 2L nasal cannula due to oxygen saturation decreasing to 87-88% on room air. Problem: Pain:  Goal: Pain level will decrease  Description: Pain level will decrease  8/9/2021 0251 by Juleit Mancuso RN  Outcome: Ongoing  Goal: Control of acute pain  Description: Control of acute pain  8/9/2021 0251 by Juliet Mancuso RN  Outcome: Ongoing  Note: Pain assessed at regular intervals. Patient denies having any pain. 8/9/2021 0246 by Juliet Mancuso RN  Outcome: Ongoing  Note: Pain assessed at regular intervals. Patient receiving tylenol. Goal: Control of chronic pain  Description: Control of chronic pain  8/9/2021 0251 by Juliet Mancuso RN  Outcome: Ongoing     Problem: Nutrition  Goal: Optimal nutrition therapy  8/9/2021 0246 by Juliet Mancuso RN  Outcome: Ongoing  Note: Patient on easy to chew diet and tolerating well. Problem: Musculor/Skeletal Functional Status  Goal: Highest potential functional level  8/9/2021 0251 by Juliet Mancuso RN  Outcome: Ongoing  8/9/2021 0246 by Juliet Mancuso RN  Outcome: Ongoing  Note: Patient has generalized weakness from surgery. Participating as much as possible in turns.       Problem: Musculor/Skeletal Functional Status  Goal: Highest potential functional level  8/9/2021 0246 by Juliet Mancuso RN  Outcome: Ongoing

## 2021-08-09 NOTE — PROGRESS NOTES
Comprehensive Nutrition Assessment    Type and Reason for Visit:  Reassess    Nutrition Recommendations/Plan: Continue diet and supplements as ordered. Nutrition Assessment:  Pt is on Easy [employer] eating fairly well, also getting Ensure High Protein twice daily. Malnutrition Assessment:  Malnutrition Status: At risk for malnutrition (Comment)    Context:  Acute Illness     Findings of the 6 clinical characteristics of malnutrition:  Energy Intake:  7 - 50% or less of estimated energy requirements for 5 or more days  Weight Loss:  Unable to assess     Body Fat Loss:  No significant body fat loss     Muscle Mass Loss:  No significant muscle mass loss    Fluid Accumulation:  1 - Mild Extremities   Strength:  Not Performed    Estimated Daily Nutrient Needs:  Energy (kcal): [place] x 1.3= 1700 kcal; Weight Used for Energy Requirements:  Admission     Protein (g):  1.5-2g/kg= 100-135 g; Weight Used for Protein Requirements:  Ideal          Nutrition Related Findings:  Increased edema: +1 BUE's, +2 BLE's, +3 perineal. Labs and meds reviewed. Wounds:  Multiple, Pressure Injury, Unstageable, Stage III (trauma)       Current Nutrition Therapies:    ADULT DIET; Easy to Chew  Adult Oral Nutrition Supplement;  Low Calorie/High Protein Oral Supplement    Anthropometric Measures:  · Height: 5' 7\" (170.2 cm)  · Current Body Weight: 148 lb (67.1 kg) (increased edema)   · Admission Body Weight: 142 lb (64.4 kg)     · Ideal Body Weight: 148 lbs;   · BMI: 23.2  · BMI Categories: Normal Weight (BMI 22.0 to 24.9) age over 72       Nutrition Diagnosis:   · Increased nutrient needs related to  (healing) as evidenced by wounds      Nutrition Interventions:   Food and/or Nutrient Delivery:  Continue Current Diet, Continue Oral Nutrition Supplement  Nutrition Education/Counseling:  No recommendation at this time   Coordination of Nutrition Care:  Continue to monitor while inpatient    Goals:  Meet % of nutrient

## 2021-08-10 ENCOUNTER — APPOINTMENT (OUTPATIENT)
Dept: GENERAL RADIOLOGY | Age: 81
DRG: 981 | End: 2021-08-10
Payer: MEDICARE

## 2021-08-10 ENCOUNTER — ANESTHESIA EVENT (OUTPATIENT)
Dept: ENDOSCOPY | Age: 81
DRG: 981 | End: 2021-08-10
Payer: MEDICARE

## 2021-08-10 ENCOUNTER — ANESTHESIA (OUTPATIENT)
Dept: ENDOSCOPY | Age: 81
DRG: 981 | End: 2021-08-10
Payer: MEDICARE

## 2021-08-10 VITALS — DIASTOLIC BLOOD PRESSURE: 61 MMHG | SYSTOLIC BLOOD PRESSURE: 128 MMHG | OXYGEN SATURATION: 99 %

## 2021-08-10 PROBLEM — I50.9 HEART FAILURE (HCC): Status: ACTIVE | Noted: 2021-08-10

## 2021-08-10 PROBLEM — M62.82 RHABDOMYOLYSIS: Status: RESOLVED | Noted: 2021-08-04 | Resolved: 2021-08-10

## 2021-08-10 LAB
ABSOLUTE EOS #: 0 K/UL (ref 0–0.4)
ABSOLUTE IMMATURE GRANULOCYTE: ABNORMAL K/UL (ref 0–0.3)
ABSOLUTE LYMPH #: 1.06 K/UL (ref 1–4.8)
ABSOLUTE MONO #: 0.71 K/UL (ref 0.1–1.3)
ANION GAP SERPL CALCULATED.3IONS-SCNC: 8 MMOL/L (ref 9–17)
BASOPHILS # BLD: 0 % (ref 0–2)
BASOPHILS ABSOLUTE: 0 K/UL (ref 0–0.2)
BNP INTERPRETATION: ABNORMAL
BUN BLDV-MCNC: 12 MG/DL (ref 8–23)
BUN/CREAT BLD: ABNORMAL (ref 9–20)
C-REACTIVE PROTEIN: 18.1 MG/L (ref 0–5)
CALCIUM SERPL-MCNC: 7.6 MG/DL (ref 8.6–10.4)
CHLORIDE BLD-SCNC: 110 MMOL/L (ref 98–107)
CO2: 23 MMOL/L (ref 20–31)
CREAT SERPL-MCNC: 0.48 MG/DL (ref 0.7–1.2)
DIFFERENTIAL TYPE: ABNORMAL
DIGOXIN DATE LAST DOSE: NORMAL
DIGOXIN DOSE AMOUNT: 157
DIGOXIN DOSE TIME: 957
DIGOXIN LEVEL: 0.7 NG/ML (ref 0.5–2)
EOSINOPHILS RELATIVE PERCENT: 0 % (ref 0–4)
GFR AFRICAN AMERICAN: >60 ML/MIN
GFR NON-AFRICAN AMERICAN: >60 ML/MIN
GFR SERPL CREATININE-BSD FRML MDRD: ABNORMAL ML/MIN/{1.73_M2}
GFR SERPL CREATININE-BSD FRML MDRD: ABNORMAL ML/MIN/{1.73_M2}
GLUCOSE BLD-MCNC: 96 MG/DL (ref 70–99)
HCT VFR BLD CALC: 23.2 % (ref 41–53)
HCT VFR BLD CALC: 24.1 % (ref 41–53)
HEMOGLOBIN: 7.5 G/DL (ref 13.5–17.5)
HEMOGLOBIN: 7.8 G/DL (ref 13.5–17.5)
IMMATURE GRANULOCYTES: ABNORMAL %
LYMPHOCYTES # BLD: 9 % (ref 24–44)
MCH RBC QN AUTO: 32.9 PG (ref 26–34)
MCHC RBC AUTO-ENTMCNC: 32.5 G/DL (ref 31–37)
MCV RBC AUTO: 101.3 FL (ref 80–100)
MONOCYTES # BLD: 6 % (ref 1–7)
MORPHOLOGY: ABNORMAL
NRBC AUTOMATED: ABNORMAL PER 100 WBC
PDW BLD-RTO: 15.6 % (ref 11.5–14.9)
PLATELET # BLD: 211 K/UL (ref 150–450)
PLATELET ESTIMATE: ABNORMAL
PMV BLD AUTO: 9.6 FL (ref 6–12)
POTASSIUM SERPL-SCNC: 3.6 MMOL/L (ref 3.7–5.3)
PREALBUMIN: 12 MG/DL (ref 20–40)
PRO-BNP: 2198 PG/ML
RBC # BLD: 2.38 M/UL (ref 4.5–5.9)
RBC # BLD: ABNORMAL 10*6/UL
SEG NEUTROPHILS: 85 % (ref 36–66)
SEGMENTED NEUTROPHILS ABSOLUTE COUNT: 10.03 K/UL (ref 1.3–9.1)
SODIUM BLD-SCNC: 141 MMOL/L (ref 135–144)
WBC # BLD: 11.8 K/UL (ref 3.5–11)
WBC # BLD: ABNORMAL 10*3/UL

## 2021-08-10 PROCEDURE — 99233 SBSQ HOSP IP/OBS HIGH 50: CPT | Performed by: INTERNAL MEDICINE

## 2021-08-10 PROCEDURE — 6360000002 HC RX W HCPCS: Performed by: STUDENT IN AN ORGANIZED HEALTH CARE EDUCATION/TRAINING PROGRAM

## 2021-08-10 PROCEDURE — 6360000002 HC RX W HCPCS: Performed by: ORTHOPAEDIC SURGERY

## 2021-08-10 PROCEDURE — 71045 X-RAY EXAM CHEST 1 VIEW: CPT

## 2021-08-10 PROCEDURE — 2580000003 HC RX 258: Performed by: INTERNAL MEDICINE

## 2021-08-10 PROCEDURE — 0DB98ZX EXCISION OF DUODENUM, VIA NATURAL OR ARTIFICIAL OPENING ENDOSCOPIC, DIAGNOSTIC: ICD-10-PCS | Performed by: INTERNAL MEDICINE

## 2021-08-10 PROCEDURE — 6360000002 HC RX W HCPCS: Performed by: INTERNAL MEDICINE

## 2021-08-10 PROCEDURE — 88305 TISSUE EXAM BY PATHOLOGIST: CPT

## 2021-08-10 PROCEDURE — 3609012400 HC EGD TRANSORAL BIOPSY SINGLE/MULTIPLE: Performed by: INTERNAL MEDICINE

## 2021-08-10 PROCEDURE — 84134 ASSAY OF PREALBUMIN: CPT

## 2021-08-10 PROCEDURE — 97116 GAIT TRAINING THERAPY: CPT

## 2021-08-10 PROCEDURE — 97110 THERAPEUTIC EXERCISES: CPT

## 2021-08-10 PROCEDURE — 3700000000 HC ANESTHESIA ATTENDED CARE: Performed by: INTERNAL MEDICINE

## 2021-08-10 PROCEDURE — 97530 THERAPEUTIC ACTIVITIES: CPT

## 2021-08-10 PROCEDURE — 7100000001 HC PACU RECOVERY - ADDTL 15 MIN: Performed by: INTERNAL MEDICINE

## 2021-08-10 PROCEDURE — 2000000000 HC ICU R&B

## 2021-08-10 PROCEDURE — 2709999900 HC NON-CHARGEABLE SUPPLY: Performed by: INTERNAL MEDICINE

## 2021-08-10 PROCEDURE — 80162 ASSAY OF DIGOXIN TOTAL: CPT

## 2021-08-10 PROCEDURE — 2580000003 HC RX 258: Performed by: STUDENT IN AN ORGANIZED HEALTH CARE EDUCATION/TRAINING PROGRAM

## 2021-08-10 PROCEDURE — 6360000002 HC RX W HCPCS

## 2021-08-10 PROCEDURE — 85025 COMPLETE CBC W/AUTO DIFF WBC: CPT

## 2021-08-10 PROCEDURE — 2500000003 HC RX 250 WO HCPCS: Performed by: INTERNAL MEDICINE

## 2021-08-10 PROCEDURE — 85018 HEMOGLOBIN: CPT

## 2021-08-10 PROCEDURE — 6360000002 HC RX W HCPCS: Performed by: NURSE ANESTHETIST, CERTIFIED REGISTERED

## 2021-08-10 PROCEDURE — 36415 COLL VENOUS BLD VENIPUNCTURE: CPT

## 2021-08-10 PROCEDURE — 80048 BASIC METABOLIC PNL TOTAL CA: CPT

## 2021-08-10 PROCEDURE — 6370000000 HC RX 637 (ALT 250 FOR IP): Performed by: ORTHOPAEDIC SURGERY

## 2021-08-10 PROCEDURE — 6370000000 HC RX 637 (ALT 250 FOR IP): Performed by: INTERNAL MEDICINE

## 2021-08-10 PROCEDURE — 2580000003 HC RX 258: Performed by: NURSE ANESTHETIST, CERTIFIED REGISTERED

## 2021-08-10 PROCEDURE — 99212 OFFICE O/P EST SF 10 MIN: CPT

## 2021-08-10 PROCEDURE — 88342 IMHCHEM/IMCYTCHM 1ST ANTB: CPT

## 2021-08-10 PROCEDURE — 85014 HEMATOCRIT: CPT

## 2021-08-10 PROCEDURE — 43239 EGD BIOPSY SINGLE/MULTIPLE: CPT | Performed by: INTERNAL MEDICINE

## 2021-08-10 PROCEDURE — 7100000000 HC PACU RECOVERY - FIRST 15 MIN: Performed by: INTERNAL MEDICINE

## 2021-08-10 PROCEDURE — 86140 C-REACTIVE PROTEIN: CPT

## 2021-08-10 PROCEDURE — 0DB78ZX EXCISION OF STOMACH, PYLORUS, VIA NATURAL OR ARTIFICIAL OPENING ENDOSCOPIC, DIAGNOSTIC: ICD-10-PCS | Performed by: INTERNAL MEDICINE

## 2021-08-10 PROCEDURE — 2500000003 HC RX 250 WO HCPCS: Performed by: NURSE ANESTHETIST, CERTIFIED REGISTERED

## 2021-08-10 PROCEDURE — 83880 ASSAY OF NATRIURETIC PEPTIDE: CPT

## 2021-08-10 RX ORDER — PROPOFOL 10 MG/ML
INJECTION, EMULSION INTRAVENOUS PRN
Status: DISCONTINUED | OUTPATIENT
Start: 2021-08-10 | End: 2021-08-10 | Stop reason: SDUPTHER

## 2021-08-10 RX ORDER — LIDOCAINE HYDROCHLORIDE 10 MG/ML
INJECTION, SOLUTION EPIDURAL; INFILTRATION; INTRACAUDAL; PERINEURAL PRN
Status: DISCONTINUED | OUTPATIENT
Start: 2021-08-10 | End: 2021-08-10 | Stop reason: SDUPTHER

## 2021-08-10 RX ORDER — POTASSIUM CHLORIDE 7.45 MG/ML
10 INJECTION INTRAVENOUS
Status: DISPENSED | OUTPATIENT
Start: 2021-08-10 | End: 2021-08-10

## 2021-08-10 RX ORDER — SODIUM CHLORIDE 9 MG/ML
INJECTION, SOLUTION INTRAVENOUS CONTINUOUS PRN
Status: DISCONTINUED | OUTPATIENT
Start: 2021-08-10 | End: 2021-08-10 | Stop reason: SDUPTHER

## 2021-08-10 RX ORDER — FUROSEMIDE 10 MG/ML
20 INJECTION INTRAMUSCULAR; INTRAVENOUS ONCE
Status: COMPLETED | OUTPATIENT
Start: 2021-08-10 | End: 2021-08-10

## 2021-08-10 RX ORDER — PANTOPRAZOLE SODIUM 40 MG/1
40 TABLET, DELAYED RELEASE ORAL
Status: DISCONTINUED | OUTPATIENT
Start: 2021-08-10 | End: 2021-08-13 | Stop reason: HOSPADM

## 2021-08-10 RX ADMIN — MORPHINE SULFATE 2 MG: 2 INJECTION, SOLUTION INTRAMUSCULAR; INTRAVENOUS at 10:43

## 2021-08-10 RX ADMIN — CEFAZOLIN 1000 MG: 1 INJECTION, POWDER, FOR SOLUTION INTRAMUSCULAR; INTRAVENOUS at 06:19

## 2021-08-10 RX ADMIN — ACETAMINOPHEN 1000 MG: 500 TABLET, FILM COATED ORAL at 17:55

## 2021-08-10 RX ADMIN — SODIUM CHLORIDE: 9 INJECTION, SOLUTION INTRAVENOUS at 06:19

## 2021-08-10 RX ADMIN — ACETAMINOPHEN 1000 MG: 500 TABLET, FILM COATED ORAL at 23:03

## 2021-08-10 RX ADMIN — POTASSIUM CHLORIDE 10 MEQ: 10 INJECTION, SOLUTION INTRAVENOUS at 12:43

## 2021-08-10 RX ADMIN — DILTIAZEM HYDROCHLORIDE 2.5 MG/HR: 5 INJECTION, SOLUTION INTRAVENOUS at 08:11

## 2021-08-10 RX ADMIN — CEFAZOLIN 1000 MG: 1 INJECTION, POWDER, FOR SOLUTION INTRAMUSCULAR; INTRAVENOUS at 22:31

## 2021-08-10 RX ADMIN — OXYCODONE HYDROCHLORIDE 5 MG: 5 TABLET ORAL at 20:16

## 2021-08-10 RX ADMIN — METOPROLOL TARTRATE 50 MG: 50 TABLET, FILM COATED ORAL at 19:56

## 2021-08-10 RX ADMIN — MORPHINE SULFATE 2 MG: 2 INJECTION, SOLUTION INTRAMUSCULAR; INTRAVENOUS at 03:50

## 2021-08-10 RX ADMIN — AMIODARONE HYDROCHLORIDE 0.5 MG/MIN: 1.8 INJECTION, SOLUTION INTRAVENOUS at 23:02

## 2021-08-10 RX ADMIN — POTASSIUM CHLORIDE 10 MEQ: 10 INJECTION, SOLUTION INTRAVENOUS at 10:47

## 2021-08-10 RX ADMIN — POTASSIUM CHLORIDE 10 MEQ: 10 INJECTION, SOLUTION INTRAVENOUS at 14:15

## 2021-08-10 RX ADMIN — POTASSIUM CHLORIDE 10 MEQ: 10 INJECTION, SOLUTION INTRAVENOUS at 15:00

## 2021-08-10 RX ADMIN — DIGOXIN 125 MCG: 125 TABLET ORAL at 09:57

## 2021-08-10 RX ADMIN — LIDOCAINE HYDROCHLORIDE 50 MG: 10 INJECTION, SOLUTION EPIDURAL; INFILTRATION; INTRACAUDAL; PERINEURAL at 13:32

## 2021-08-10 RX ADMIN — AMIODARONE HYDROCHLORIDE 0.5 MG/MIN: 1.8 INJECTION, SOLUTION INTRAVENOUS at 11:12

## 2021-08-10 RX ADMIN — IRON SUCROSE 100 MG: 20 INJECTION, SOLUTION INTRAVENOUS at 16:47

## 2021-08-10 RX ADMIN — POTASSIUM CHLORIDE 10 MEQ: 10 INJECTION, SOLUTION INTRAVENOUS at 09:39

## 2021-08-10 RX ADMIN — METOPROLOL TARTRATE 50 MG: 50 TABLET, FILM COATED ORAL at 09:57

## 2021-08-10 RX ADMIN — PROPOFOL 80 MG: 10 INJECTION, EMULSION INTRAVENOUS at 13:32

## 2021-08-10 RX ADMIN — SODIUM CHLORIDE: 9 INJECTION, SOLUTION INTRAVENOUS at 13:00

## 2021-08-10 RX ADMIN — PANTOPRAZOLE SODIUM 40 MG: 40 TABLET, DELAYED RELEASE ORAL at 16:51

## 2021-08-10 RX ADMIN — FUROSEMIDE 20 MG: 10 INJECTION, SOLUTION INTRAMUSCULAR; INTRAVENOUS at 11:03

## 2021-08-10 ASSESSMENT — PULMONARY FUNCTION TESTS
PIF_VALUE: 1

## 2021-08-10 ASSESSMENT — ENCOUNTER SYMPTOMS
RHINORRHEA: 0
VOMITING: 0
DIARRHEA: 0
BACK PAIN: 0
EYE PAIN: 0
NAUSEA: 0
PHOTOPHOBIA: 0
SORE THROAT: 0
CHEST TIGHTNESS: 0
CONSTIPATION: 0
CHOKING: 0
ABDOMINAL PAIN: 0
SHORTNESS OF BREATH: 0
BLOOD IN STOOL: 0
COUGH: 0
WHEEZING: 0

## 2021-08-10 ASSESSMENT — PAIN DESCRIPTION - PROGRESSION: CLINICAL_PROGRESSION: GRADUALLY WORSENING

## 2021-08-10 ASSESSMENT — PAIN DESCRIPTION - DESCRIPTORS
DESCRIPTORS: SHARP;SHOOTING
DESCRIPTORS: ACHING

## 2021-08-10 ASSESSMENT — PAIN SCALES - GENERAL
PAINLEVEL_OUTOF10: 0
PAINLEVEL_OUTOF10: 0
PAINLEVEL_OUTOF10: 3
PAINLEVEL_OUTOF10: 7
PAINLEVEL_OUTOF10: 0
PAINLEVEL_OUTOF10: 4
PAINLEVEL_OUTOF10: 3
PAINLEVEL_OUTOF10: 3
PAINLEVEL_OUTOF10: 0
PAINLEVEL_OUTOF10: 1

## 2021-08-10 ASSESSMENT — PAIN DESCRIPTION - ORIENTATION
ORIENTATION: LEFT

## 2021-08-10 ASSESSMENT — PAIN DESCRIPTION - ONSET: ONSET: GRADUAL

## 2021-08-10 ASSESSMENT — PAIN DESCRIPTION - PAIN TYPE
TYPE: ACUTE PAIN
TYPE: SURGICAL PAIN
TYPE: SURGICAL PAIN

## 2021-08-10 ASSESSMENT — PAIN - FUNCTIONAL ASSESSMENT
PAIN_FUNCTIONAL_ASSESSMENT: ACTIVITIES ARE NOT PREVENTED
PAIN_FUNCTIONAL_ASSESSMENT: 0-10

## 2021-08-10 ASSESSMENT — PAIN DESCRIPTION - LOCATION
LOCATION: HIP

## 2021-08-10 ASSESSMENT — PAIN DESCRIPTION - FREQUENCY
FREQUENCY: INTERMITTENT
FREQUENCY: INTERMITTENT

## 2021-08-10 NOTE — CONSULTS
GI Consult Note:    Name: Kayla Islas  MRN: [id number]     Acct: [de-identified]  Room: 2004/2004-01    Admit Date: 8/3/2021  PCP: No primary care provider on file. Physician Requesting Consult: Nallely Allen MD     Reason for Consult: Dropping hemoglobin, stool positive for blood. Chief Complaint:     Chief Complaint   Patient presents with    Leg Pain    Irregular Heart Beat       History Obtained From:     patient, electronic medical record    History of Present Illness:      Kayla Islas is a  79 y.o.  male who presents with Leg Pain and Irregular Heart Beat      Symptoms:  Onset:  Patient admitted to the hospital with a history of fall, and sustained injuries including femoral neck fracture. .  Patient underwent left hip hemiarthroplasty on 8/5/2021. Patient also had rhabdomyolysis and soft tissue injuries secondary to fall. Patient did not receive anticoagulation therapy. Recently patient had 3 g hemoglobin drop in 2 days. Reticulocyte count was increased. His stool was checked and was positive for occult blood. However patient bowel movements were liquid consistency normal color. No obvious hematochezia. During my visit today around 4 PM, patient denies abdominal pain. No nausea vomiting. No dysphagia. Patient denies history of ulcer disease, liver disease in the past.  However he was taking excessive NSAIDs prior to admission. Several years ago used to drink alcohol but at least in the last 15 to 20 years he stopped drinking. At that time not known to have pancreatitis, liver disease secondary to alcoholism. 69-year-old male admitted after suffering a fall at home. Per report patient was laying on the floor for 5 days and found to be in A. fib. He was started on diltiazem and heparin drip. Patient was also found to have left hip fracture and rhabdomyolysis. Patient has multiple wounds to the sacrum, elbows, knees, buttocks. Patient had over 3 g hemoglobin drop over 48 hours. Tobacco:    reports that he has never smoked. He has never used smokeless tobacco.  Alcohol:      reports no history of alcohol use. Drug Use: has no history on file for drug use. Family History:     History reviewed. No pertinent family history.     Review of Systems:     Review of Systems    Code Status:  Full Code    Physical Exam:     Vitals:  BP (!) 124/54   Pulse 132   Temp 98 °F (36.7 °C) (Oral)   Resp 16   Ht 5' 7\" (1.702 m)   Wt 148 lb 5.9 oz (67.3 kg)   SpO2 95%   BMI 23.24 kg/m²   Temp (24hrs), Av °F (36.7 °C), Min:97.5 °F (36.4 °C), Max:99 °F (37.2 °C)      Physical Exam  Data:   CBC:   Lab Results   Component Value Date    WBC 11.5 2021    RBC 2.29 2021    HGB 8.1 2021    HCT 25.0 2021    .6 2021    MCH 32.7 2021    MCHC 32.5 2021    RDW 15.7 2021     2021    MPV 9.7 2021     CBC with Differential:  Lab Results   Component Value Date    WBC 11.5 2021    RBC 2.29 2021    HGB 8.1 2021    HCT 25.0 2021     2021    .6 2021    MCH 32.7 2021    MCHC 32.5 2021    RDW 15.7 2021    LYMPHOPCT 9 2021    MONOPCT 6 2021    BASOPCT 1 2021    MONOSABS 0.69 2021    LYMPHSABS 1.04 2021    EOSABS 0.23 2021    BASOSABS 0.12 2021    DIFFTYPE NOT REPORTED 2021     Hemoglobin/Hematocrit:  Lab Results   Component Value Date    HGB 8.1 2021    HCT 25.0 2021     CMP:    Lab Results   Component Value Date     2021    K 3.8 2021     2021    CO2 23 2021    BUN 15 2021    CREATININE 0.46 2021    GFRAA >60 2021    LABGLOM >60 2021    GLUCOSE 102 2021    PROT 4.5 2021    LABALBU 2.2 2021    CALCIUM 7.6 2021    BILITOT 0.58 2021    ALKPHOS 102 2021    AST 26 2021    ALT 11 2021     BMP:  Lab Results   Component

## 2021-08-10 NOTE — PROGRESS NOTES
ICU Progress Note (Non-Vent)  [facility]    Patient - Ebonie Pugh,  Age - 79 y.o.    - 1940      Room Number -    MRN -  [id number]   Acct # - [de-identified]  Date of Admission -  8/3/2021 10:59 AM    Events of Past 24 Hours   Sitting in chair, looks somewhat weak but not requiring any oxygen. Oxygen saturation is not 67%, that is not accurate; was 88% on room air and 2 L was placed. Vitals    height is 5' 7\" (1.702 m) and weight is 148 lb 5.9 oz (67.3 kg). His oral temperature is 97.3 °F (36.3 °C). His blood pressure is 114/46 (abnormal) and his pulse is 80. His respiration is 17 and oxygen saturation is 67% (abnormal).        Temperature Range: Temp: 97.3 °F (36.3 °C) Temp  Av °F (36.7 °C)  Min: 97.3 °F (36.3 °C)  Max: 98.8 °F (37.1 °C)  BP Range:  Systolic (90NQD), GGI:146 , Min:87 , MXY:573     Diastolic (37KYJ), GTS:36, Min:36, Max:103    Pulse Range: Pulse  Av.2  Min: 69  Max: 132  Respiration Range: Resp  Av  Min: 11  Max: 28  Current Pulse Ox[de-identified]  SpO2: (!) 67 %  24HR Pulse Ox Range:  SpO2  Av.9 %  Min: 67 %  Max: 97 %  Oxygen Amount and Delivery: O2 Flow Rate (L/min): 2 L/min    Wt Readings from Last 3 Encounters:   21 148 lb 5.9 oz (67.3 kg)     I/O       Intake/Output Summary (Last 24 hours) at 8/10/2021 1037  Last data filed at 8/10/2021 0500  Gross per 24 hour   Intake 3182.67 ml   Output 2550 ml   Net 632.67 ml     DRAIN/TUBE OUTPUT       Invasive Lines   ICP PRESSURE RANGE  No data recorded  CVP PRESSURE RANGE  No data recorded      Medications      potassium chloride  10 mEq Intravenous Q1H    polyethylene glycol  17 g Oral Daily    pantoprazole  40 mg Oral QAM AC    ceFAZolin  1,000 mg Intravenous Q8H    acetaminophen  1,000 mg Oral 4 times per day    digoxin  125 mcg Oral Daily    metoprolol tartrate  50 mg Oral BID    sodium chloride flush  5-40 mL Intravenous 2 times per day     melatonin, magnesium sulfate, oxyCODONE, morphine, perflutren lipid microspheres, sodium chloride flush, sodium chloride, ondansetron **OR** ondansetron, polyethylene glycol, potassium chloride **OR** potassium alternative oral replacement **OR** potassium chloride  IV Drips/Infusions   amiodarone 0.5 mg/min (08/09/21 2351)    dilTIAZem (CARDIZEM) 125 mg in dextrose 5% 125 mL infusion 2.5 mg/hr (08/10/21 0811)    sodium chloride      sodium chloride 50 mL/hr at 08/10/21 2468       Diet/Nutrition   ADULT DIET; Easy to Chew  Adult Oral Nutrition Supplement; Low Calorie/High Protein Oral Supplement    Exam      Constitutional - Alert, arousable pale appearing  General Appearance  well developed, well nourished, looks fatigued  HEENT -normocephalic, atraumatic. PERRLA  Lungs - Chest expands equally, no wheezes, bilateral crackles worse on the left than the right  Cardiovascular - Heart sounds are normal.  normal rate and rhythm regular, no murmur, gallop or rub. Abdomen - soft, nontender, nondistended, no masses or organomegaly  Neurologic - CN II-XII are grossly intact.  There are no focal motor deficits  Skin - no bruising or bleeding  Extremities - no cyanosis, clubbing or edema    Lab Results   CBC     Lab Results   Component Value Date    WBC 11.8 08/10/2021    RBC 2.38 08/10/2021    HGB 7.8 08/10/2021    HCT 24.1 08/10/2021     08/10/2021    .3 08/10/2021    MCH 32.9 08/10/2021    MCHC 32.5 08/10/2021    RDW 15.6 08/10/2021    LYMPHOPCT 9 08/10/2021    MONOPCT 6 08/10/2021    BASOPCT 0 08/10/2021    MONOSABS 0.71 08/10/2021    LYMPHSABS 1.06 08/10/2021    EOSABS 0.00 08/10/2021    BASOSABS 0.00 08/10/2021    DIFFTYPE NOT REPORTED 08/10/2021       BMP   Lab Results   Component Value Date     08/10/2021    K 3.6 08/10/2021     08/10/2021    CO2 23 08/10/2021    BUN 12 08/10/2021    CREATININE 0.48 08/10/2021    GLUCOSE 96 08/10/2021       LFTS  Lab Results   Component Value Date    ALKPHOS 102 08/09/2021    ALT 11 08/09/2021    AST 26 08/09/2021    PROT 4.5 08/09/2021    BILITOT 0.58 08/09/2021    BILIDIR 0.19 08/09/2021    IBILI 0.39 08/09/2021    LABALBU 2.2 08/09/2021       ABG ABGs:   Lab Results   Component Value Date    PHART 7.412 08/06/2021    PO2ART 117.0 08/06/2021    QXZ4RRY 39.1 08/06/2021       Lab Results   Component Value Date    MODE PRVC 08/06/2021         INR  No results for input(s): PROTIME, INR in the last 72 hours. APTT  No results for input(s): APTT in the last 72 hours. Lactic Acid  Lab Results   Component Value Date    LACTA 1.7 08/03/2021    LACTA 2.3 08/03/2021    LACTA 2.3 08/03/2021        BNP   No results for input(s): BNP in the last 72 hours. Cultures       Radiology     CXR      CT Scans    (See actual reports for details)      SYSTEMS ASSESSMENT    Status post left hip hemiarthroplasty  Atrial fibrillation with rapid ventricular response  Anemia with stool positive for Hemoccult blood  No history of underlying lung disease  Non-smoker      I have ordered a stat chest x-ray because the rales are new on exam  Continue incentive spirometry  Hemoglobin remained stable  Still on low-dose Cardizem and amiodarone drip but appears to have converted to sinus rhythm  EGD planned for today, watch for oversedation. Awaiting further cardiology input. Critical Care Time   0 min    Electronically signed by Moriah Dick MD on 8/10/2021 at 10:37 AM      Addendum: Chest x-ray looks worse with increased effusion interstitial markings right greater than left.   We will Hep-Lock IV and give him Lasix 20 mg stat

## 2021-08-10 NOTE — PLAN OF CARE
Problem: Falls - Risk of:  Goal: Will remain free from falls  8/10/2021 1736 by Juan Colon RN  Outcome: Met This Shift  8/10/2021 0455 by Lou Scott RN  Outcome: Met This Shift  Goal: Absence of physical injury  Outcome: Met This Shift     Problem: Skin Integrity:  Goal: Will show no infection signs and symptoms  8/10/2021 1736 by Juan Colon RN  Outcome: Met This Shift  8/10/2021 0455 by Lou Scott RN  Outcome: Ongoing  Note: No new skin breakdown noted  Goal: Absence of new skin breakdown  Outcome: Met This Shift     Problem: Cardiac:  Goal: Ability to maintain an adequate cardiac output will improve  8/10/2021 1736 by Juan Colon RN  Outcome: Met This Shift  8/10/2021 0455 by Lou Scott RN  Outcome: Ongoing  Note: Afib on monitor, Amiodarone and cardizem gtts continue as ordered, Lopressor as ordered, BP stable  Goal: Hemodynamic stability will improve  Outcome: Met This Shift     Problem: Fluid Volume:  Goal: Ability to achieve and maintain adequate urine output will improve  8/10/2021 1736 by Juan Colon RN  Outcome: Met This Shift  8/10/2021 0455 by Lou Scott RN  Outcome: Ongoing  Note: IV fluids as ordered, labs monitored as ordered, intake and output monitored     Problem: Respiratory:  Goal: Respiratory status will improve  8/10/2021 1736 by Juan Colon RN  Outcome: Met This Shift  8/10/2021 0455 by Lou Scott RN  Outcome: Met This Shift  Patient continues to use incentive spirometer. Encouraged to cough and deep breath. Assisted to reposition minimally every two hours.       Problem: Pain:  Goal: Pain level will decrease  8/10/2021 1736 by Juan Colon RN  Outcome: Met This Shift  8/10/2021 0455 by Lou Scott RN  Outcome: Ongoing  Note: Morphine given several times this shift but pt having achieving comfort this shift, tylenol not given due to restrictions for dose exceeding >than recommended for 24 hrs,  Goal: Control of acute pain  Outcome: Met This Shift  Goal: Control of chronic pain  Outcome: Met This Shift  Patient has stated having minimal to no pain. Acetaminophen and MS administered as ordered and needed. Non-pharmacological interventions also utilized. Problem: Nutrition  Goal: Optimal nutrition therapy  8/10/2021 1736 by Marvin Marte RN  Outcome: Met This Shift  8/10/2021 0455 by Gabriel Contreras RN  Outcome: Met This Shift  Patient tolerating diet and fluids and Ensure supplements. Problem: Muscular/Skeletal Functional Status  Goal: Highest potential functional level  8/10/2021 1736 by Marvin Marte RN  Outcome: Met This Shift  8/10/2021 0455 by Gabriel Contreras RN  Outcome: Ongoing  Note: Pt weak but does assist with positioning while in bed     Problem: Musculor/Skeletal Functional Status  Goal: Highest potential functional level  Outcome: Met This Shift  Goal: Absence of falls  Outcome: Met This Shift  Patient absent of falls. Has been out of bed up in chair, utilizes walker for safety. Working with PT on mobility and strengthening. Problem: Infection - Central Venous Catheter-Associated Bloodstream Infection:  Goal: Will show no infection signs and symptoms  8/10/2021 1736 by Marvin Marte RN  Outcome: Met This Shift  8/10/2021 0455 by Gabriel Contreras RN  Outcome: Ongoing  Note: No new skin breakdown noted  Patient's wounds improving. No new signs of skin breakdown. Wound care at bedside today for wound care/treatment. Problem: Urinary Elimination:  Goal: Signs and symptoms of infection will decrease  Outcome: Met This Shift  Goal: Complications related to the disease process, condition or treatment will be avoided or minimized  Outcome: Met This Shift  Patient's labs and VS stable. Ma remains in place. Ma/caity hygiene provided per protocol and as needed. Will continue to assess need for ma.

## 2021-08-10 NOTE — PROGRESS NOTES
[address]    PROGRESS NOTE             8/10/2021    12:53 PM    Name:   Robbie Padilla  MRN:     [id number]     Acct:      [de-identified]   Room:   250 [facility] ENDO Pool/NONE  IP Day:  7  Admit Date:  8/3/2021 10:59 AM    PCP:  No primary care provider on file. Code Status:  Full Code    Subjective:     C/C:   Chief Complaint   Patient presents with    Leg Pain    Irregular Heart Beat     Interval History Status: Worsen. Patient was seen sitting on the chair, having physiotherapy patient is standing and moving slowly. Patient saturation was 96 on room air. Patient denies any chest pain shortness of breath, dizziness, abdominal pain patient had BW yesterday and have not noticed any blood. Patient had inspiratory crackles bilaterally. The patient is n.p.o. for a EGD later today. The patient is off heparin, vitally stable. Brief History: This 79 Y. O. male patient with no known medical history. Post left hip hemiarthroplasty day 2. Presented with feeling weakness and falling down. History of prolonged immobilization and dehydration. ER evaluation showed atrial fibrillation with RVR. Patient had multiple injuries of the elbows and knees, stage III pressure ulcers of the sacrum buttocks, and was started on cefazolin.     Patient heart rate is still fluctuating around 120 and still on diltiazem drip. Patient continues taking digoxin and metoprolol.       Hgb 7.8  Heme occult was positive and GI were consulted and are planning for a GED later today. Patient may need to be transfused per GI. Heparin was stopped  K 3.5  Ca :7.8  Corrected for 2.2 albumin= 9.1  Total Pr 4.5        Review of Systems:     Review of Systems   Constitutional: Negative for chills, fatigue and fever. HENT: Negative for rhinorrhea and sore throat. Eyes: Negative for photophobia, pain and visual disturbance.    Respiratory: Negative for cough, choking, chest tightness, shortness of breath and wheezing. Cardiovascular: Positive for leg swelling. Negative for chest pain. Gastrointestinal: Negative for abdominal pain, blood in stool, constipation, diarrhea, nausea and vomiting. Genitourinary: Negative for difficulty urinating. Musculoskeletal: Positive for arthralgias. Negative for back pain. Neurological: Negative for dizziness, tremors, weakness, light-headedness, numbness and headaches. Psychiatric/Behavioral: Negative for behavioral problems and confusion. Medications: Allergies:  No Known Allergies    Current Meds:   Scheduled Meds:    [MAR Hold] potassium chloride  10 mEq Intravenous Q1H    [MAR Hold] iron sucrose  100 mg Intravenous Once    [MAR Hold] polyethylene glycol  17 g Oral Daily    [MAR Hold] pantoprazole  40 mg Oral QAM AC    [MAR Hold] ceFAZolin  1,000 mg Intravenous Q8H    [MAR Hold] acetaminophen  1,000 mg Oral 4 times per day    [MAR Hold] digoxin  125 mcg Oral Daily    [MAR Hold] metoprolol tartrate  50 mg Oral BID    [MAR Hold] sodium chloride flush  5-40 mL Intravenous 2 times per day     Continuous Infusions:    [MAR Hold] amiodarone 0.5 mg/min (08/10/21 1239)    [MAR Hold] dilTIAZem (CARDIZEM) 125 mg in dextrose 5% 125 mL infusion 2.5 mg/hr (08/10/21 1239)    [MAR Hold] sodium chloride       PRN Meds: [MAR Hold] melatonin, [MAR Hold] magnesium sulfate, [MAR Hold] oxyCODONE, [MAR Hold] morphine, [MAR Hold] perflutren lipid microspheres, [MAR Hold] sodium chloride flush, [MAR Hold] sodium chloride, [MAR Hold] ondansetron **OR** [MAR Hold] ondansetron, [MAR Hold] polyethylene glycol, [MAR Hold] potassium chloride **OR** [MAR Hold] potassium alternative oral replacement **OR** [MAR Hold] potassium chloride    Data:     Past Medical History:   has a past medical history of Atrial fibrillation ([facility] [address] Social History:   reports that he has never smoked.  He has never used smokeless tobacco. He reports that he does not drink alcohol. Family History: History reviewed. No pertinent family history. Vitals:  BP (!) 125/54   Pulse 69   Temp 98.7 °F (37.1 °C) (Infrared)   Resp 22   Ht 5' 7\" (1.702 m)   Wt 148 lb (67.1 kg)   SpO2 100%   BMI 23.18 kg/m²   Temp (24hrs), Av.1 °F (36.7 °C), Min:97.3 °F (36.3 °C), Max:98.8 °F (37.1 °C)    No results for input(s): POCGLU in the last 72 hours. I/O(24Hr):     Intake/Output Summary (Last 24 hours) at 8/10/2021 1253  Last data filed at 8/10/2021 1126  Gross per 24 hour   Intake 3182.67 ml   Output 3200 ml   Net -17.33 ml       Labs:    CBC with Differential:    Lab Results   Component Value Date    WBC 11.8 08/10/2021    RBC 2.38 08/10/2021    HGB 7.8 08/10/2021    HCT 24.1 08/10/2021     08/10/2021    .3 08/10/2021    MCH 32.9 08/10/2021    MCHC 32.5 08/10/2021    RDW 15.6 08/10/2021    LYMPHOPCT 9 08/10/2021    MONOPCT 6 08/10/2021    BASOPCT 0 08/10/2021    MONOSABS 0.71 08/10/2021    LYMPHSABS 1.06 08/10/2021    EOSABS 0.00 08/10/2021    BASOSABS 0.00 08/10/2021    DIFFTYPE NOT REPORTED 08/10/2021     BMP:    Lab Results   Component Value Date     08/10/2021    K 3.6 08/10/2021     08/10/2021    CO2 23 08/10/2021    BUN 12 08/10/2021    LABALBU 2.2 2021    CREATININE 0.48 08/10/2021    CALCIUM 7.6 08/10/2021    GFRAA >60 08/10/2021    LABGLOM >60 08/10/2021    GLUCOSE 96 08/10/2021       Lab Results   Component Value Date/Time    SPECIAL NOT REPORTED 2021 11:41 AM     Lab Results   Component Value Date/Time    CULTURE NO GROWTH 6 DAYS 2021 11:41 AM         Radiology:    ECHO Complete 2D W Doppler W Color    Result Date: 2021  [address] [facility] Transthoracic Echocardiography Report (TTE)  Patient Name Stephanie Vargas  Date of Study                 2021   Date of      1940  Gender                        Male  Birth   Age          79 year(s)  Race                             Room Number  4437 estimated EF > 55%. No segmental wall motion abnormalities seen. Mild left ventricular hypertrophy. Right Atrium Right atrium is normal in size. Right Ventricle Normal right ventricular size and function. Mitral Valve Normal mitral valve structure and function. Mild mitral regurgitation. Aortic Valve Aortic valve was not well visualized. Mild to moderate aortic insufficiency. No aortic stenosis. Tricuspid Valve Normal tricuspid valve structure and function. Mild tricuspid regurgitation. Normal right ventricular systolic pressure. Pulmonic Valve Pulmonic valve not well visualized but Doppler velocities are normal. No pulmonic insufficiency. Pericardial Effusion No significant pericardial effusion is seen. Miscellaneous Normal aortic root dimension. E/e' average 5.3 IVC Increased diameter, but still has inspiratory variation.  M-mode / 2D Measurements & Calculations:   LVIDd:4.82 cm(3.7 - 5.6 cm)      Diastolic JCPOKV:179 ml  UCQCE:2.74 cm(2.2 - 4.0 cm)      Systolic EBXMV.7 ml  YFZJ:3.90 cm(0.6 - 1.1 cm)       Aortic Root:3.4 cm(2.0 - 3.7 cm)  LVPWd:1.22 cm(0.6 - 1.1 cm)      LA Dimension: 4.9 cm(1.9 - 4.0 cm)  Fractional Shortenin.72 %    LA volume/Index: 43.8 ml /25m^2  Calculated LVEF (%): 71.5 %      LVOT:2.4 cm   Mitral:                                Aortic   Peak E-Wave: 0.84 m/s                  Peak Velocity: 1.20 m/s                                         Mean Velocity: 0.88 m/s  Peak Gradient: 2.82 mmHg               Peak Gradient: 5.76 mmHg  Deceleration Time: 173 msec            Mean Gradient: 5 mmHg                                         AI P1/2t: 441 msec                                          Area (continuity): 1.83 cm^2                                         AV VTI: 24.9 cm   Tricuspid:                             Pulmonic:   Estimated RVSP: 19 mmHg  Peak TR Velocity: 1.69 m/s  Peak TR Gradient: 11.4244 mmHg  Estimated RA Pressure: 8 mmHg                                         Estimated PASP: 19.42 mmHg  Septal Wall E' velocity:0.12 m/s Lateral Wall E' velocity:0.12 m/s    XR HIP LEFT (1 VIEW)    Result Date: 8/5/2021  EXAMINATION: ONE XRAY VIEW OF THE LEFT HIP 8/5/2021 8:31 pm COMPARISON: 08/03/2021 HISTORY: ORDERING SYSTEM PROVIDED HISTORY: sp left hip hemiarthroplasty TECHNOLOGIST PROVIDED HISTORY: sp left hip hemiarthroplasty Reason for Exam: post op Acuity: Unknown Type of Exam: Unknown FINDINGS: There is interval left hip arthroplasty for repair of the left femoral neck fracture. There is edema and emphysema in the overlying soft tissues, consistent with recent surgery. Diffuse bone demineralization. Left hip arthroplasty for fixation of the left femoral neck fracture. CT HEAD WO CONTRAST    Result Date: 8/3/2021  EXAMINATION: CT OF THE HEAD WITHOUT CONTRAST  8/3/2021 1:55 pm TECHNIQUE: CT of the head was performed without the administration of intravenous contrast. Dose modulation, iterative reconstruction, and/or weight based adjustment of the mA/kV was utilized to reduce the radiation dose to as low as reasonably achievable. COMPARISON: None. HISTORY: ORDERING SYSTEM PROVIDED HISTORY: Fall TECHNOLOGIST PROVIDED HISTORY: Fall Decision Support Exception - unselect if not a suspected or confirmed emergency medical condition->Emergency Medical Condition (MA) Reason for Exam: pt fell 5 days ago, in afib Acuity: Unknown Type of Exam: Unknown FINDINGS: BRAIN/VENTRICLES: There is no acute intracranial hemorrhage, mass effect or midline shift. No abnormal extra-axial fluid collection. The gray-white differentiation is maintained without evidence of an acute infarct. There is no evidence of hydrocephalus. ORBITS: The visualized portion of the orbits demonstrate no acute abnormality. SINUSES: The visualized paranasal sinuses and mastoid air cells demonstrate no acute abnormality. SOFT TISSUES/SKULL:  No acute abnormality of the visualized skull or soft tissues.      No acute intracranial abnormality. CT CERVICAL SPINE WO CONTRAST    Result Date: 8/3/2021  EXAMINATION: CT OF THE CERVICAL SPINE WITHOUT CONTRAST 8/3/2021 1:55 pm TECHNIQUE: CT of the cervical spine was performed without the administration of intravenous contrast. Multiplanar reformatted images are provided for review. Dose modulation, iterative reconstruction, and/or weight based adjustment of the mA/kV was utilized to reduce the radiation dose to as low as reasonably achievable. COMPARISON: None. HISTORY: ORDERING SYSTEM PROVIDED HISTORY: Fall TECHNOLOGIST PROVIDED HISTORY: Fall Decision Support Exception - unselect if not a suspected or confirmed emergency medical condition->Emergency Medical Condition (MA) Reason for Exam: pt fell 5 days ago, in afib Acuity: Unknown Type of Exam: Unknown FINDINGS: BONES/ALIGNMENT: There is no acute fracture or traumatic malalignment. DEGENERATIVE CHANGES: No significant degenerative changes. SOFT TISSUES: There is no prevertebral soft tissue swelling. No acute abnormality of the cervical spine. XR CHEST PORTABLE    Result Date: 8/6/2021  EXAMINATION: ONE XRAY VIEW OF THE CHEST 8/6/2021 5:44 am COMPARISON: 5 August 2021 HISTORY: ORDERING SYSTEM PROVIDED HISTORY: ETT placement TECHNOLOGIST PROVIDED HISTORY: ETT placement Reason for Exam: ETT placement Acuity: Unknown Type of Exam: Ongoing Additional signs and symptoms: ETT placement Relevant Medical/Surgical History: ETT placement FINDINGS: AP portable view of the chest electronically labeled regarding sides. Tracheal tube 5 cm proximal to the kassidy. Extreme lung apices not included on this radiograph. Patchy opacity within the right lower lung. No effusions or pneumothoraces. Cardiomediastinal silhouette is within normal limits. Remaining visualized osseous and soft tissues are unchanged. Stable tracheal tube tip at the level of upper T4 vertebra.  Faint right lower lung opacity, most likely infectious or inflammatory in the appropriate clinical setting. XR CHEST PORTABLE    Result Date: 8/5/2021  EXAMINATION: ONE XRAY VIEW OF THE CHEST 8/5/2021 8:31 pm COMPARISON: 08/03/2021 HISTORY: ORDERING SYSTEM PROVIDED HISTORY: ETT placement TECHNOLOGIST PROVIDED HISTORY: ETT placement Reason for Exam: ET tube placement Acuity: Unknown Type of Exam: Unknown FINDINGS: Endotracheal tube terminates 5.5 cm above the kassidy. Cardiomediastinal silhouette is unchanged in size. Aortic atherosclerosis. No large pleural effusion or pneumothorax. There are faint bibasilar pulmonary opacities. No acute osseous abnormality. 1. Endotracheal tube in expected position. 2. Faint bibasilar opacities, which may be due to atelectasis. XR CHEST PORTABLE    Result Date: 8/3/2021  EXAMINATION: ONE XRAY VIEW OF THE CHEST 8/3/2021 11:41 am COMPARISON: None. HISTORY: ORDERING SYSTEM PROVIDED HISTORY: AMS TECHNOLOGIST PROVIDED HISTORY: AMS Reason for Exam: AMS Acuity: Unknown Type of Exam: Unknown FINDINGS: Patient is slightly rotated. Heart size within normal limits without evidence of vascular congestion. Aside from minor chronic appearing changes lungs are grossly clear. Probable skin fold projects in the left lower chest.  No focal lung consolidation is seen. No significant pleural effusions. Gas distended bowel loops beneath the left hemidiaphragm. Monitor leads overlie the chest.  The bones are osteopenic with degenerative changes; questionable age-indeterminate slight loss of height of lower thoracic vertebral bodies. No acute cardiopulmonary process suspected. CT HIP LEFT WO CONTRAST    Result Date: 8/3/2021  EXAMINATION: CT OF THE LEFT HIP WITHOUT CONTRAST 8/3/2021 1:55 pm TECHNIQUE: CT of the left hip was performed without the administration of intravenous contrast.  Multiplanar reformatted images are provided for review.  Dose modulation, iterative reconstruction, and/or weight based adjustment of the mA/kV was utilized to reduce the radiation dose to as low as reasonably achievable. COMPARISON: Pelvis and left hip radiographs August 3, 2021 HISTORY ORDERING SYSTEM PROVIDED HISTORY: ?acetabulum fx TECHNOLOGIST PROVIDED HISTORY: ?acetabulum fx Decision Support Exception - unselect if not a suspected or confirmed emergency medical condition->Emergency Medical Condition (MA) Reason for Exam: pt fell 5 days ago, in afib, lt hip pain Acuity: Unknown Type of Exam: Unknown FINDINGS: Bones: Redemonstration of displaced transcervical left femoral neck fracture which is subacute in appearance. No additional fractures are identified. The bones are osteopenic. No suspicious lytic or sclerotic osseous lesions are evident. Soft Tissue: Subcutaneous and intramuscular edema adjacent to the fracture site. No organized collections are identified. No subcutaneous gas. Visualized intrapelvic structures appear grossly unremarkable. Moderate amount of stool present at the rectal vault. Joint: Anatomic alignment of the left hip. Mild-to-moderate degenerative changes of the left hip. Chondrocalcinosis noted. 1. Displaced fracture of the left transcervical femoral neck which is subacute in appearance. 2. No additional fractures are identified. 3. Mild-to-moderate degenerative change of the left hip. Chondrocalcinosis also present. XR HIP 2-3 VW W PELVIS LEFT    Result Date: 8/3/2021  EXAMINATION: ONE XRAY VIEW OF THE PELVIS AND TWO XRAY VIEWS LEFT HIP 8/3/2021 8:41 am COMPARISON: None. HISTORY: ORDERING SYSTEM PROVIDED HISTORY: fall TECHNOLOGIST PROVIDED HISTORY: fall Reason for Exam: fall, left hip pain Acuity: Unknown Type of Exam: Unknown FINDINGS: There is a left femoral neck fracture with impaction, varus angulation, and proximal migration of the greater trochanter which is essentially at the level of the acetabulum. The femoral head appears to remain appropriately seated in the acetabular fossa.   Possible transverse lucency through the medial left acetabulum. 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 Left femoral neck fracture with impaction, varus angulation, and proximal migration of the greater trochanter. Possible lucency through the medial left acetabulum, though radiographic evaluation is limited by osseous demineralization. Consider CT. Physical Examination:        Physical Exam  Constitutional:       General: He is not in acute distress. Appearance: Normal appearance. He is not ill-appearing. HENT:      Head: Normocephalic and atraumatic. Cardiovascular:      Rate and Rhythm: Normal rate. Rhythm irregular. Pulses: Normal pulses. Heart sounds: Normal heart sounds. Heart sounds not distant. No murmur heard. No friction rub. No gallop. Pulmonary:      Effort: Pulmonary effort is normal. No tachypnea. Breath sounds: No stridor or transmitted upper airway sounds. Examination of the right-middle field reveals rales. Examination of the left-middle field reveals rales. Examination of the right-lower field reveals rales. Examination of the left-lower field reveals rales. Rales present. No wheezing. Chest:      Chest wall: No tenderness. Abdominal:      General: Abdomen is flat. Bowel sounds are normal. There is no distension. Palpations: Abdomen is soft. Tenderness: There is no abdominal tenderness. There is no guarding. Musculoskeletal:      Right lower leg: Pitting Edema present. Left lower leg: Pitting Edema present. Skin:     Capillary Refill: Capillary refill takes less than 2 seconds. Findings: Lesion (Sarcrum and buttocks pressure ulcer, traumatic injuries on knees and elbows.) present. Neurological:      General: No focal deficit present. Mental Status: He is alert and oriented to person, place, and time.       Cranial Nerves: No cranial

## 2021-08-10 NOTE — OP NOTE
ESOPHAGOGASTRODUODENOSCOPY   ( EGD )  DATE OF PROCEDURE: 8/10/2021     SURGEON: Suresh Mcconnell MD    ASSISTANT: None    PREOPERATIVE DIAGNOSIS: Drop of hemoglobin, stool positive for blood. Procedure performed evaluate upper GI lesions    POSTOPERATIVE DIAGNOSIS: Huge duodenal ulcer, antral erosions    OPERATION: Upper GI endoscopy with Biopsy    ANESTHESIA: MAC    ESTIMATED BLOOD LOSS: None    COMPLICATIONS: None. SPECIMENS:  Was Obtained: Random gastric biopsies to evaluate H. pylori    HISTORY: The patient is a 79y.o. year old male with history of above preop diagnosis. I recommended esophagogastroduodenoscopy with possible biopsy and I explained the risk, benefits, expected outcome, and alternatives to the procedure. Risks included but are not limited to bleeding, infection, respiratory distress, hypotension, and perforation of the esophagus, stomach, or duodenum. Patient understands and is in agreement. PROCEDURE: The patient was given IV conscious sedation. The patient's SPO2 remained above 90% throughout the procedure. Cetacaine spray given. Patient placed in left lateral position. Olympus  videogastroscope was inserted orally under vision into the esophagus without difficulty and advanced into the stomach then through the pylorus up to the second part of duodenum. Findings:    Retropharyngeal area was grossly normal appearing    Esophagus: normal    Stomach:    Fundus and Cardia Examined in Retroflexed View: normal    Body: normal    Antrum: abnormal: Has antral erosions. Random biopsies from the body and antrum to evaluate H. pylori. Duodenum:     Descending: normal    Bulb: abnormal: Huge ulcer in the apex of the bulb. No stigmata of visible vessel. No signs of acute bleeding. While withdrawing the scope the above findings were verified and the scope was removed. The patient has tolerated the procedure without unusual events. Recommendations/Plan:   1.  F/U Biopsies  2. F/U In Office as instructed  3. Discussed with the family  4. To keep on PPI therapy. 5. Full liquid diet.                    Electronically signed by Mika Witt MD  on 8/10/2021 at 1:39 PM

## 2021-08-10 NOTE — PROGRESS NOTES
[facility]   OCCUPATIONAL THERAPY MISSED TREATMENT NOTE   INPATIENT   Date: 8/10/21  Patient Name: Paige Hannah       Room: [facility]  MRN: [id number]   Account #: [de-identified]    : 1940  (79 y.o.)  Gender: male   Diagnosis: Left hip fracture s/p hemiarthroplasty .    A fib, Rhabdomyolosis, anemia, Multiple wound/abrasions             REASON FOR MISSED TREATMENT:  Patient unable to participate   -   Testing OOR for EGD        SHIRA Yañez

## 2021-08-10 NOTE — PROGRESS NOTES
Writer spoke with NP from cardiology and updated her on patient's condition, chest xray, urine output. Consent given for patient to go to OR for EGD. Writer also has spoken to PCP/Resident team. Amilcar Dominguez from them for patient to go for EGD.

## 2021-08-10 NOTE — PLAN OF CARE
Problem: Falls - Risk of:  Goal: Will remain free from falls  Description: Will remain free from falls  8/10/2021 0455 by Gabriel Contreras RN  Outcome: Met This Shift     Problem: Respiratory:  Goal: Respiratory status will improve  Description: Respiratory status will improve  8/10/2021 0455 by Gabrile Contreras RN  Outcome: Met This Shift     Problem: Nutrition  Goal: Optimal nutrition therapy  8/10/2021 0455 by Gabriel Contreras RN  Outcome: Met This Shift     Problem: Skin Integrity:  Goal: Will show no infection signs and symptoms  Description: Will show no infection signs and symptoms  8/10/2021 0455 by Gabriel Contreras RN  Outcome: Ongoing  Note: No new skin breakdown noted     Problem: Cardiac:  Goal: Ability to maintain an adequate cardiac output will improve  Description: Ability to maintain an adequate cardiac output will improve  8/10/2021 0455 by Gabriel Contreras RN  Outcome: Ongoing  Note: Afib on monitor, Amiodarone and cardizem gtts continue as ordered, Lopressor as ordered, BP stable     Problem: Fluid Volume:  Goal: Ability to achieve and maintain adequate urine output will improve  Description: Ability to achieve and maintain adequate urine output will improve  8/10/2021 0455 by Gabriel Contreras RN  Outcome: Ongoing  Note: IV fluids as ordered, labs monitored as ordered, intake and output monitored     Problem: Musculor/Skeletal Functional Status  Goal: Highest potential functional level  8/10/2021 0455 by Gabriel Contreras RN  Outcome: Ongoing  Note: Pt weak but does assist with positioning while in bed     Problem: Pain:  Goal: Pain level will decrease  Description: Pain level will decrease  8/10/2021 0455 by Gabriel Contreras RN  Outcome: Ongoing  Note: Morphine given several times this shift but pt having achieving comfort this shift, tylenol not given due to restrictions for dose exceeding >than recommended for 24 hrs,

## 2021-08-10 NOTE — PROGRESS NOTES
Patient taken down in stable condition wit 2L/NC and monitor on via cart to pre-op holding area. Bedside report given.

## 2021-08-10 NOTE — PROGRESS NOTES
Physical Therapy        Physical Therapy Cancel Note      DATE: 8/10/2021    NAME: Vaibhav Del Cid  MRN: [id number]   : 1940      Patient not seen this date for Physical Therapy due to:    Surgery/Procedure:  Attempted BID tx @ 1230 , pt out of room       Electronically signed by Crescencio Lee PTA on 8/10/2021 at 4:17 PM

## 2021-08-10 NOTE — ANESTHESIA POSTPROCEDURE EVALUATION
Department of Anesthesiology  Postprocedure Note    Patient: Robbie Padilla  MRN: [id number]  YOB: 1940  Date of evaluation: 8/10/2021  Time:  3:51 PM     Procedure Summary     Date: 08/10/21 Room / Location: [facility] ENDO 01 / [facility]    Anesthesia Start: 2992 Anesthesia Stop: 6685    Procedure: EGD BIOPSY (N/A Esophagus) Diagnosis:       (POSSIBLE GI BLEED)      (PT FULLY VACCINATED)    Surgeons: He Diehl MD Responsible Provider: Elke Marshall MD    Anesthesia Type: general ASA Status: 3          Anesthesia Type: general    Carina Phase I: Carina Score: 8    Carina Phase II:      Last vitals: Reviewed and per EMR flowsheets.        Anesthesia Post Evaluation    Comments: POST- ANESTHESIA EVALUATION       Pt Name: Robbie Padilla  MRN: [id number]  YOB: 1940  Date of evaluation: 8/10/2021  Time:  3:51 PM      BP (!) 130/45   Pulse 73   Temp 98.8 °F (37.1 °C) (Oral)   Resp 8   Ht 5' 7\" (1.702 m)   Wt 148 lb (67.1 kg)   SpO2 96%   BMI 23.18 kg/m²      Consciousness Level  Awake  Cardiopulmonary Status  Stable  Pain Adequately Treated YES  Nausea / Vomiting  NO  Adequate Hydration  YES  Anesthesia Related Complications NONE      Electronically signed by Elke Marshall MD on 8/10/2021 at 3:51 PM

## 2021-08-10 NOTE — PROGRESS NOTES
dark brawny and flaking skin. , SpO2 decreases to 80% w/ ambulation and transfers . Consistent verbal cuing provided for pursed lip breathing. O2 resat to 95%, HR remains WNL throughout tx. Orientation  Orientation  Overall Orientation Status: Within Normal Limits  Objective   Bed mobility  Rolling to Right: Minimal assistance  Supine to Sit: Minimal assistance (for trunk progression)  Scooting: Contact guard assistance  Comment: Pt completes bed mobility w/ HOB elevated slightly and use of R bed rail. MAX cuing provided for deep breathing techniques w transfer. Pt able to walk B LE to EOB , MIN A required for trunk progression SUPINE>SIT. Transfers  Sit to Stand: Moderate Assistance  Stand to sit: Moderate Assistance  Bed to Chair: Moderate assistance  Comment: Pt completes transfer w/ RW. Verbal cuing required for safe handplacement w/ tranfer. Pt desats quickly w/ transfer to 77%, observed pt holding breathing during transfer,  MAX cuing for deep breathing techniques throughout. Ambulation  Ambulation?: No  WB Status: WBAT  Ambulation 1  Surface: level tile  Device: Rolling Walker  Assistance: Contact guard assistance  Distance: 5ft FWD/BWD x2  Comments: Pt completing ambulation w/ RW, verbal cuing provided for sequencing w/ device and postural awareness. MAX cuing for deep breathing w/ ambulation. Stairs/Curb  Stairs?: No     Balance  Sitting - Static: Good  Sitting - Dynamic: Good  Standing - Static: Fair (CGA with RW)  Standing - Dynamic: Fair;- (Gabriel with RW)  Comments: Standing w/ RW   Other exercises  Other exercises?: Yes  Other exercises 1: Seated at EOB BLE ex's x10: ankle pumps , LAQ, marching. Other exercises 2: STS x3   Other exercises 3: Education provided on importance of continuing deep breathing throughout tx to prevent desat. Comment:  Frequent rest breaks PRN to allow O2 to stabilize.     Goals  Short term goals  Time Frame for Short term goals: 3-4 days  Short term goal 1: bed mobility with Gabriel  Short term goal 2: transfers with Gabriel  Short term goal 3: gait with RW/SBA x 20ft  Patient Goals   Patient goals : go home    Plan    Plan  Times per week: BID  Times per day: Twice a day  Current Treatment Recommendations: Strengthening, ROM, Balance Training, Functional Mobility Training, Transfer Training, Gait Training, Endurance Training, Safety Education & Training  Safety Devices  Type of devices: Left in bed, Call light within reach, Nurse notified     Therapy Time   Individual Concurrent Group Co-treatment   Time In 0823         Time Out 0910         Minutes [address] [place] [address]

## 2021-08-10 NOTE — PROGRESS NOTES
Cleansed with saline 08/10/21 1725   Dressing/Treatment Foam 08/10/21 1600   Dressing Change Due 08/09/21 08/08/21 0739   Wound Length (cm) 2.4 cm 08/10/21 1725   Wound Width (cm) 2 cm 08/10/21 1725   Wound Depth (cm) 0.1 cm 08/10/21 1725   Wound Surface Area (cm^2) 4.8 cm^2 08/10/21 1725   Change in Wound Size % (l*w) 56.76 08/10/21 1725   Wound Volume (cm^3) 0.48 cm^3 08/10/21 1725   Wound Healing % 57 08/10/21 1725   Wound Assessment Eschar dry;Pink/red 08/10/21 1725   Drainage Amount None 08/10/21 1725   Drainage Description Serosanguinous 08/10/21 1130   Odor None 08/10/21 1725   Shira-wound Assessment Blanchable erythema;Dry/flaky 08/10/21 1725   Margins Attached edges 08/10/21 1725   Wound Thickness Description not for Pressure Injury Full thickness 08/10/21 1130   Number of days: 5       Wound 08/05/21 Elbow Right cluster (Active)   Wound Image   08/10/21 1725   Wound Etiology Traumatic 08/10/21 1725   Dressing Status Old drainage noted;New dressing applied 08/10/21 1725   Wound Cleansed Soap and water 08/08/21 0739   Dressing/Treatment Foam 08/10/21 1725   Dressing Change Due 08/11/21 08/08/21 0739   Wound Length (cm) 0.6 cm 08/10/21 1725   Wound Width (cm) 0.8 cm 08/10/21 1725   Wound Depth (cm) 0.1 cm 08/10/21 1725   Wound Surface Area (cm^2) 0.48 cm^2 08/10/21 1725   Change in Wound Size % (l*w) 99.02 08/10/21 1725   Wound Volume (cm^3) 0.048 cm^3 08/10/21 1725   Wound Healing % 99 08/10/21 1725   Wound Assessment Pink/red 08/10/21 1725   Drainage Amount Small 08/10/21 1725   Drainage Description Serosanguinous 08/10/21 1725   Odor None 08/10/21 1725   Shira-wound Assessment Dry/flaky 08/10/21 1725   Margins Attached edges 08/10/21 1725   Number of days: 5       Wound 08/05/21 Elbow Left cluster (Active)   Wound Image   08/10/21 1725   Wound Etiology Traumatic 08/10/21 1725   Dressing Status Old drainage noted;New dressing applied 08/10/21 1725   Wound Cleansed Cleansed with saline 08/10/21 1725 Dressing/Treatment Foam 08/10/21 1725   Wound Length (cm) 1.3 cm 08/10/21 1725   Wound Width (cm) 3.2 cm 08/10/21 1725   Wound Depth (cm) 0.1 cm 08/10/21 1725   Wound Surface Area (cm^2) 4.16 cm^2 08/10/21 1725   Change in Wound Size % (l*w) 76.23 08/10/21 1725   Wound Volume (cm^3) 0.416 cm^3 08/10/21 1725   Wound Healing % 76 08/10/21 1725   Wound Assessment Pink/red 08/10/21 1725   Drainage Amount Small 08/10/21 1725   Drainage Description Serosanguinous 08/10/21 1725   Odor None 08/10/21 1725   Shira-wound Assessment Dry/flaky 08/10/21 1725   Margins Attached edges 08/10/21 1725   Number of days: 5       Wound 08/05/21 Knee Right (Active)   Wound Image   08/10/21 1725   Wound Etiology Traumatic 08/10/21 1725   Dressing Status Old drainage noted;New dressing applied 08/10/21 1725   Wound Cleansed Cleansed with saline 08/10/21 1725   Dressing/Treatment Foam 08/10/21 1725   Dressing Change Due 08/11/21 08/08/21 0739   Wound Length (cm) 2.5 cm 08/10/21 1725   Wound Width (cm) 5.5 cm 08/10/21 1725   Wound Depth (cm) 0.1 cm 08/10/21 1725   Wound Surface Area (cm^2) 13.75 cm^2 08/10/21 1725   Change in Wound Size % (l*w) 34.52 08/10/21 1725   Wound Volume (cm^3) 1.375 cm^3 08/10/21 1725   Wound Healing % 35 08/10/21 1725   Wound Assessment Pink/red 08/10/21 1725   Drainage Amount Small 08/10/21 1725   Drainage Description Serosanguinous 08/10/21 1725   Odor None 08/10/21 1725   Shira-wound Assessment Dry/flaky 08/10/21 1725   Margins Attached edges 08/10/21 1725   Wound Thickness Description not for Pressure Injury Full thickness 08/10/21 1130   Number of days: 5       Wound 08/05/21 Knee Left (Active)   Wound Image   08/10/21 1725   Wound Etiology Traumatic 08/10/21 1725   Dressing Status Old drainage noted;New dressing applied 08/10/21 1725   Wound Cleansed Cleansed with saline 08/10/21 1725   Dressing/Treatment Foam 08/10/21 1725   Dressing Change Due 08/11/21 08/08/21 0739   Wound Length (cm) 1.8 cm 08/10/21 1725 Wound Width (cm) 0.8 cm 08/10/21 1725   Wound Depth (cm) 0.1 cm 08/10/21 1725   Wound Surface Area (cm^2) 1.44 cm^2 08/10/21 1725   Change in Wound Size % (l*w) 53.25 08/10/21 1725   Wound Volume (cm^3) 0.144 cm^3 08/10/21 1725   Wound Healing % 53 08/10/21 1725   Wound Assessment Pink/red 08/10/21 1725   Drainage Amount Moderate 08/10/21 1725   Drainage Description Serosanguinous 08/10/21 1725   Odor None 08/10/21 1725   Shira-wound Assessment Dry/flaky 08/10/21 1725   Margins Defined edges 08/10/21 1725   Wound Thickness Description not for Pressure Injury Full thickness 08/09/21 0400   Number of days: 5       Wound 08/10/21 Hip Right (Active)   Wound Image   08/10/21 1725   Wound Etiology Traumatic 08/10/21 1725   Dressing Status Old drainage noted;New dressing applied 08/10/21 1725   Wound Cleansed Cleansed with saline 08/10/21 1725   Dressing/Treatment Foam 08/10/21 1725   Wound Length (cm) 6.5 cm 08/10/21 1725   Wound Width (cm) 7.2 cm 08/10/21 1725   Wound Depth (cm) 0.1 cm 08/10/21 1725   Wound Surface Area (cm^2) 46.8 cm^2 08/10/21 1725   Wound Volume (cm^3) 4.68 cm^3 08/10/21 1725   Wound Assessment Pink/red 08/10/21 1725   Drainage Amount Small 08/10/21 1725   Drainage Description Serous 08/10/21 1725   Odor None 08/10/21 1725   Shira-wound Assessment Blanchable erythema 08/10/21 1725   Margins Attached edges 08/10/21 1725   Number of days: 0     PIERRE OrtegaN, RN-WCC, CSWS, DAPWCA  [facility] [address], [place], and [facility]  [phone]

## 2021-08-10 NOTE — ANESTHESIA PRE PROCEDURE
Department of Anesthesiology  Preprocedure Note       Name:  Shay Mota   Age:  79 y.o.  :  1940                                          MRN:  [id number]         Date:  8/10/2021      Surgeon: Vira Corral):  Tyrone Weston MD    Procedure: Procedure(s):  EGD    Medications prior to admission:   Prior to Admission medications    Medication Sig Start Date End Date Taking? Authorizing Provider   ibuprofen (ADVIL;MOTRIN) 200 MG tablet Take 400 mg by mouth every 6 hours as needed for Pain    Historical Provider, MD   Multiple Vitamins-Minerals (THERAPEUTIC MULTIVITAMIN-MINERALS) tablet Take 1 tablet by mouth daily    Historical Provider, MD       Current medications:    No current facility-administered medications for this visit. No current outpatient medications on file.      Facility-Administered Medications Ordered in Other Visits   Medication Dose Route Frequency Provider Last Rate Last Admin    [facility] Hold] potassium chloride 10 mEq/100 mL IVPB (Peripheral Line)  10 mEq Intravenous Q1H Parker Garcia  mL/hr at 08/10/21 1047 10 mEq at 08/10/21 1047    [MAR Hold] iron sucrose (VENOFER) 50 mg in sodium chloride 0.9 % 50 mL IVPB  50 mg Intravenous Once Pepe Clancy MD        [facility] Hold] polyethylene glycol (GLYCOLAX) packet 17 g  17 g Oral Daily Parker Garcia MD   17 g at 21 0839    [MAR Hold] pantoprazole (PROTONIX) tablet 40 mg  40 mg Oral QAM AC Pepe Clancy MD   40 mg at 21 0620    [MAR Hold] amiodarone (NEXTERONE) 360 mg in dextrose 5% 200 ml  0.5 mg/min Intravenous Continuous Donn Schilder, MD 16.7 mL/hr at 08/10/21 1112 0.5 mg/min at 08/10/21 1112    [MAR Hold] melatonin tablet 3 mg  3 mg Oral Nightly PRN Pepe Clancy MD   3 mg at 21 1909    [MAR Hold] magnesium sulfate 1000 mg in dextrose 5% 100 mL IVPB  1,000 mg Intravenous PRN Aylin Arriola MD   Stopped at 21 1257    [MAR Hold] ceFAZolin (ANCEF) 1,000 mg in dextrose 5 % 50 mL IVPB (mini-bag)  1,000 mg Intravenous Vikki Valdez MD   Stopped at 08/10/21 0649    [MAR Hold] dilTIAZem 125 mg in dextrose 5 % 125 mL infusion  5-15 mg/hr Intravenous Continuous David Choe MD 2.5 mL/hr at 08/10/21 0811 2.5 mg/hr at 08/10/21 0811    [[facility] Hold] oxyCODONE (ROXICODONE) immediate release tablet 5 mg  5 mg Oral Q4H PRN Yoel Valera MD   5 mg at 08/07/21 0921    [[facility] Hold] acetaminophen (TYLENOL) tablet 1,000 mg  1,000 mg Oral 4 times per day Yoel Valera MD   1,000 mg at 08/09/21 1813    [MAR Hold] morphine (PF) injection 2 mg  2 mg Intravenous Q2H PRN Yoel Valera MD   2 mg at 08/10/21 1043    [MAR Hold] perflutren lipid microspheres (DEFINITY) injection 2.2 mg  2 mL Intravenous ONCE PRN Yoel Valera MD        [facility] Hold] digoxin (LANOXIN) tablet 125 mcg  125 mcg Oral Daily Yoel Valera MD   125 mcg at 08/10/21 0957    [MAR Hold] metoprolol tartrate (LOPRESSOR) tablet 50 mg  50 mg Oral BID Yoel Valera MD   50 mg at 08/10/21 0957    [MAR Hold] sodium chloride flush 0.9 % injection 5-40 mL  5-40 mL Intravenous 2 times per day Yoel Valera MD   10 mL at 08/08/21 1909    [MAR Hold] sodium chloride flush 0.9 % injection 5-40 mL  5-40 mL Intravenous PRN Yoel Valera MD        [facility] Hold] 0.9 % sodium chloride infusion  25 mL Intravenous PRN Yoel Valera MD        [facility] Hold] ondansetron (ZOFRAN-ODT) disintegrating tablet 4 mg  4 mg Oral Q8H PRN Yoel Valera MD        Or   [facility] Hold] ondansetron (ZOFRAN) injection 4 mg  4 mg Intravenous Q6H PRN Yoel Valera MD        [facility] Hold] polyethylene glycol (GLYCOLAX) packet 17 g  17 g Oral Daily PRN Vilinda Counter, MD        [facility] Hold] potassium chloride (KLOR-CON M) extended release tablet 40 mEq  40 mEq Oral PRN Vilinda Counter, MD   40 mEq at 08/07/21 1648    Or    [MAR Hold] potassium bicarb-citric acid (EFFER-K) effervescent tablet 40 mEq  40 mEq Oral PRN Vilinda Counter, MD        Or   [facility] Hold] potassium chloride 10 mEq/100 mL IVPB (Peripheral Line)  10 mEq Intravenous PRN Kelli Sanches  mL/hr at 08/10/21 0939 10 mEq at 08/10/21 1400       Allergies:  No Known Allergies    Problem List:    Patient Active Problem List   Diagnosis Code    A-fib ([facility] [[email or web address]] 75.) I48.91    Closed fracture of left hip with routine healing S72.002D    Anemia D64.9       Past Medical History:  No past medical history on file. Past Surgical History:        Procedure Laterality Date    HIP SURGERY Left 8/5/2021    HIP HEMIARTHROPLASTY performed by Kelli Sanches MD at [address] [facility] History:    Social History     Tobacco Use    Smoking status: Never Smoker    Smokeless tobacco: Never Used   Substance Use Topics    Alcohol use: Never                                Counseling given: Not Answered      Vital Signs (Current): There were no vitals filed for this visit. BP Readings from Last 3 Encounters:   08/10/21 (!) 114/46   08/05/21 81/66       NPO Status:                                                                                 BMI:   Wt Readings from Last 3 Encounters:   08/06/21 148 lb 5.9 oz (67.3 kg)     There is no height or weight on file to calculate BMI.    CBC:   Lab Results   Component Value Date    WBC 11.8 08/10/2021    RBC 2.38 08/10/2021    HGB 7.8 08/10/2021    HCT 24.1 08/10/2021    .3 08/10/2021    RDW 15.6 08/10/2021     08/10/2021       CMP:   Lab Results   Component Value Date     08/10/2021    K 3.6 08/10/2021     08/10/2021    CO2 23 08/10/2021    BUN 12 08/10/2021    CREATININE 0.48 08/10/2021    GFRAA >60 08/10/2021    LABGLOM >60 08/10/2021    GLUCOSE 96 08/10/2021    PROT 4.5 08/09/2021    CALCIUM 7.6 08/10/2021    BILITOT 0.58 08/09/2021    ALKPHOS 102 08/09/2021    AST 26 08/09/2021    ALT 11 08/09/2021       POC Tests: No results for input(s): POCGLU, POCNA, POCK, POCCL, POCBUN, POCHEMO, POCHCT in the last 72 hours.     Coags:   Lab Results   Component Value Date PROTIME 15.6 08/03/2021    INR 1.2 08/03/2021    APTT >150.0 08/03/2021       HCG (If Applicable): No results found for: PREGTESTUR, PREGSERUM, HCG, HCGQUANT     ABGs:   Lab Results   Component Value Date    PHART 7.412 08/06/2021    PO2ART 117.0 08/06/2021    ISC1UKX 39.1 08/06/2021    BWU2UEE 24.9 08/06/2021    I9EXBFRA 97.2 08/06/2021        Type & Screen (If Applicable):  No results found for: LABABO, LABRH    Drug/Infectious Status (If Applicable):  No results found for: HIV, HEPCAB    COVID-19 Screening (If Applicable):   Lab Results   Component Value Date    COVID19 Not Detected 08/03/2021           Anesthesia Evaluation  Patient summary reviewed and Nursing notes reviewed no history of anesthetic complications:   Airway: Mallampati: II  TM distance: >3 FB   Neck ROM: full  Mouth opening: > = 3 FB Dental:    (+) upper dentures and lower dentures      Pulmonary:Negative Pulmonary ROS   (+) rales,                             Cardiovascular:    (+) valvular problems/murmurs: AI and MR, dysrhythmias: atrial fibrillation, CHF:,       ECG reviewed  Rhythm: regular  Rate: normal  Echocardiogram reviewed    Cleared by cardiology           ROS comment: Normal left ventricle size and function with an estimated EF > 55%. Mild left ventricular hypertrophy. Left atrial dilatation. Aortic valve was not well visualized. Mild to moderate aortic insufficiency. Mild mitral regurgitation. Mild tricuspid regurgitation. Normal right ventricular systolic pressure. IVC Increased diameter, but still has inspiratory variation. Neuro/Psych:   Negative Neuro/Psych ROS              GI/Hepatic/Renal: Neg GI/Hepatic/Renal ROS            Endo/Other:    (+) blood dyscrasia (heparin gtt): anemia:., .                  ROS comment: LEFT HIP FRACTURE s/p left hip hemiarthroplasty   Rhabdomyolysis Abdominal:             Vascular:           Other Findings:               Anesthesia Plan      general     ASA 3     (GA with TIVA)  Induction: intravenous. MIPS: Prophylactic antiemetics administered. Anesthetic plan and risks discussed with patient. Plan discussed with CRNA.                   Yana Mcmillan MD   8/10/2021

## 2021-08-10 NOTE — PROGRESS NOTES
Writer attempted to call Vannesa Orozco NP for surgery. Message left. Perfect serve message also sent regarding patient's chest xray, Lasix given. Request for Dr. Mark Kiser to evaluate for EGD or to reschedule procedure.

## 2021-08-11 LAB
ABSOLUTE EOS #: 0.3 K/UL (ref 0–0.4)
ABSOLUTE IMMATURE GRANULOCYTE: ABNORMAL K/UL (ref 0–0.3)
ABSOLUTE LYMPH #: 0.9 K/UL (ref 1–4.8)
ABSOLUTE MONO #: 0.8 K/UL (ref 0.1–1.3)
ANION GAP SERPL CALCULATED.3IONS-SCNC: 9 MMOL/L (ref 9–17)
BASOPHILS # BLD: 1 % (ref 0–2)
BASOPHILS ABSOLUTE: 0.1 K/UL (ref 0–0.2)
BNP INTERPRETATION: ABNORMAL
BUN BLDV-MCNC: 13 MG/DL (ref 8–23)
BUN/CREAT BLD: ABNORMAL (ref 9–20)
CALCIUM SERPL-MCNC: 7.7 MG/DL (ref 8.6–10.4)
CHLORIDE BLD-SCNC: 107 MMOL/L (ref 98–107)
CO2: 25 MMOL/L (ref 20–31)
CREAT SERPL-MCNC: 0.59 MG/DL (ref 0.7–1.2)
DIFFERENTIAL TYPE: ABNORMAL
EOSINOPHILS RELATIVE PERCENT: 3 % (ref 0–4)
GFR AFRICAN AMERICAN: >60 ML/MIN
GFR NON-AFRICAN AMERICAN: >60 ML/MIN
GFR SERPL CREATININE-BSD FRML MDRD: ABNORMAL ML/MIN/{1.73_M2}
GFR SERPL CREATININE-BSD FRML MDRD: ABNORMAL ML/MIN/{1.73_M2}
GLUCOSE BLD-MCNC: 98 MG/DL (ref 70–99)
HCT VFR BLD CALC: 22.1 % (ref 41–53)
HCT VFR BLD CALC: 22.8 % (ref 41–53)
HCT VFR BLD CALC: 23.6 % (ref 41–53)
HEMOGLOBIN: 7.3 G/DL (ref 13.5–17.5)
HEMOGLOBIN: 7.3 G/DL (ref 13.5–17.5)
HEMOGLOBIN: 7.7 G/DL (ref 13.5–17.5)
IMMATURE GRANULOCYTES: ABNORMAL %
LYMPHOCYTES # BLD: 10 % (ref 24–44)
MCH RBC QN AUTO: 33.4 PG (ref 26–34)
MCH RBC QN AUTO: 33.8 PG (ref 26–34)
MCHC RBC AUTO-ENTMCNC: 32.5 G/DL (ref 31–37)
MCHC RBC AUTO-ENTMCNC: 32.9 G/DL (ref 31–37)
MCV RBC AUTO: 102.7 FL (ref 80–100)
MCV RBC AUTO: 102.9 FL (ref 80–100)
MONOCYTES # BLD: 9 % (ref 1–7)
NRBC AUTOMATED: ABNORMAL PER 100 WBC
NRBC AUTOMATED: ABNORMAL PER 100 WBC
PDW BLD-RTO: 15.8 % (ref 11.5–14.9)
PDW BLD-RTO: 16.2 % (ref 11.5–14.9)
PLATELET # BLD: 202 K/UL (ref 150–450)
PLATELET # BLD: 218 K/UL (ref 150–450)
PLATELET ESTIMATE: ABNORMAL
PMV BLD AUTO: 9 FL (ref 6–12)
PMV BLD AUTO: 9 FL (ref 6–12)
POTASSIUM SERPL-SCNC: 3.9 MMOL/L (ref 3.7–5.3)
PRO-BNP: 1153 PG/ML
RBC # BLD: 2.15 M/UL (ref 4.5–5.9)
RBC # BLD: 2.29 M/UL (ref 4.5–5.9)
RBC # BLD: ABNORMAL 10*6/UL
SEG NEUTROPHILS: 77 % (ref 36–66)
SEGMENTED NEUTROPHILS ABSOLUTE COUNT: 7 K/UL (ref 1.3–9.1)
SODIUM BLD-SCNC: 141 MMOL/L (ref 135–144)
WBC # BLD: 10.7 K/UL (ref 3.5–11)
WBC # BLD: 9.1 K/UL (ref 3.5–11)
WBC # BLD: ABNORMAL 10*3/UL

## 2021-08-11 PROCEDURE — APPSS30 APP SPLIT SHARED TIME 16-30 MINUTES: Performed by: NURSE PRACTITIONER

## 2021-08-11 PROCEDURE — 6370000000 HC RX 637 (ALT 250 FOR IP): Performed by: INTERNAL MEDICINE

## 2021-08-11 PROCEDURE — 99232 SBSQ HOSP IP/OBS MODERATE 35: CPT | Performed by: PHYSICAL MEDICINE & REHABILITATION

## 2021-08-11 PROCEDURE — 36415 COLL VENOUS BLD VENIPUNCTURE: CPT

## 2021-08-11 PROCEDURE — 2580000003 HC RX 258: Performed by: INTERNAL MEDICINE

## 2021-08-11 PROCEDURE — 97535 SELF CARE MNGMENT TRAINING: CPT

## 2021-08-11 PROCEDURE — 97530 THERAPEUTIC ACTIVITIES: CPT

## 2021-08-11 PROCEDURE — 85018 HEMOGLOBIN: CPT

## 2021-08-11 PROCEDURE — 2060000000 HC ICU INTERMEDIATE R&B

## 2021-08-11 PROCEDURE — 83880 ASSAY OF NATRIURETIC PEPTIDE: CPT

## 2021-08-11 PROCEDURE — 80048 BASIC METABOLIC PNL TOTAL CA: CPT

## 2021-08-11 PROCEDURE — 97110 THERAPEUTIC EXERCISES: CPT

## 2021-08-11 PROCEDURE — 85014 HEMATOCRIT: CPT

## 2021-08-11 PROCEDURE — 99233 SBSQ HOSP IP/OBS HIGH 50: CPT | Performed by: INTERNAL MEDICINE

## 2021-08-11 PROCEDURE — 85025 COMPLETE CBC W/AUTO DIFF WBC: CPT

## 2021-08-11 PROCEDURE — 6360000002 HC RX W HCPCS: Performed by: INTERNAL MEDICINE

## 2021-08-11 PROCEDURE — 99232 SBSQ HOSP IP/OBS MODERATE 35: CPT | Performed by: INTERNAL MEDICINE

## 2021-08-11 PROCEDURE — 97116 GAIT TRAINING THERAPY: CPT

## 2021-08-11 PROCEDURE — 85027 COMPLETE CBC AUTOMATED: CPT

## 2021-08-11 RX ORDER — FUROSEMIDE 10 MG/ML
20 INJECTION INTRAMUSCULAR; INTRAVENOUS ONCE
Status: COMPLETED | OUTPATIENT
Start: 2021-08-11 | End: 2021-08-11

## 2021-08-11 RX ORDER — AMIODARONE HYDROCHLORIDE 200 MG/1
200 TABLET ORAL DAILY
Status: DISCONTINUED | OUTPATIENT
Start: 2021-08-11 | End: 2021-08-13 | Stop reason: HOSPADM

## 2021-08-11 RX ADMIN — AMIODARONE HYDROCHLORIDE 200 MG: 200 TABLET ORAL at 17:03

## 2021-08-11 RX ADMIN — METOPROLOL TARTRATE 50 MG: 50 TABLET, FILM COATED ORAL at 21:16

## 2021-08-11 RX ADMIN — Medication 3 MG: at 21:17

## 2021-08-11 RX ADMIN — FUROSEMIDE 20 MG: 10 INJECTION, SOLUTION INTRAMUSCULAR; INTRAVENOUS at 09:33

## 2021-08-11 RX ADMIN — DIGOXIN 125 MCG: 125 TABLET ORAL at 09:14

## 2021-08-11 RX ADMIN — SODIUM CHLORIDE, PRESERVATIVE FREE 10 ML: 5 INJECTION INTRAVENOUS at 21:24

## 2021-08-11 RX ADMIN — PANTOPRAZOLE SODIUM 40 MG: 40 TABLET, DELAYED RELEASE ORAL at 17:18

## 2021-08-11 RX ADMIN — METOPROLOL TARTRATE 50 MG: 50 TABLET, FILM COATED ORAL at 09:14

## 2021-08-11 RX ADMIN — POLYETHYLENE GLYCOL 3350 17 G: 17 POWDER, FOR SOLUTION ORAL at 09:13

## 2021-08-11 RX ADMIN — CEFAZOLIN 1000 MG: 1 INJECTION, POWDER, FOR SOLUTION INTRAMUSCULAR; INTRAVENOUS at 21:24

## 2021-08-11 RX ADMIN — ACETAMINOPHEN 1000 MG: 500 TABLET, FILM COATED ORAL at 06:42

## 2021-08-11 RX ADMIN — CEFAZOLIN 1000 MG: 1 INJECTION, POWDER, FOR SOLUTION INTRAMUSCULAR; INTRAVENOUS at 06:02

## 2021-08-11 RX ADMIN — OXYCODONE HYDROCHLORIDE 5 MG: 5 TABLET ORAL at 21:16

## 2021-08-11 RX ADMIN — DILTIAZEM HYDROCHLORIDE 30 MG: 30 TABLET, FILM COATED ORAL at 17:03

## 2021-08-11 RX ADMIN — PANTOPRAZOLE SODIUM 40 MG: 40 TABLET, DELAYED RELEASE ORAL at 06:42

## 2021-08-11 ASSESSMENT — PAIN DESCRIPTION - DESCRIPTORS: DESCRIPTORS: ACHING

## 2021-08-11 ASSESSMENT — ENCOUNTER SYMPTOMS
EYES NEGATIVE: 1
RESPIRATORY NEGATIVE: 1
GASTROINTESTINAL NEGATIVE: 1

## 2021-08-11 ASSESSMENT — PAIN DESCRIPTION - FREQUENCY: FREQUENCY: INTERMITTENT

## 2021-08-11 ASSESSMENT — PAIN SCALES - GENERAL
PAINLEVEL_OUTOF10: 0
PAINLEVEL_OUTOF10: 0
PAINLEVEL_OUTOF10: 5
PAINLEVEL_OUTOF10: 3
PAINLEVEL_OUTOF10: 0

## 2021-08-11 ASSESSMENT — PAIN - FUNCTIONAL ASSESSMENT: PAIN_FUNCTIONAL_ASSESSMENT: ACTIVITIES ARE NOT PREVENTED

## 2021-08-11 ASSESSMENT — PAIN DESCRIPTION - PAIN TYPE: TYPE: SURGICAL PAIN

## 2021-08-11 ASSESSMENT — PAIN DESCRIPTION - LOCATION: LOCATION: HIP

## 2021-08-11 ASSESSMENT — PAIN DESCRIPTION - PROGRESSION: CLINICAL_PROGRESSION: GRADUALLY WORSENING

## 2021-08-11 ASSESSMENT — PAIN DESCRIPTION - ONSET: ONSET: GRADUAL

## 2021-08-11 ASSESSMENT — PAIN DESCRIPTION - ORIENTATION: ORIENTATION: LEFT

## 2021-08-11 NOTE — PROGRESS NOTES
Transfer report given to [facility] on PCU. Discussed patient's condition, tests, labs, medications, treatment and plan of care. All questions and concerns addressed.

## 2021-08-11 NOTE — PROGRESS NOTES
Short progress note for Dr. Radha Peters prior to rounds By   Parker Garcia Internal Medicine resident    Is a post op day 5 , left hip hemiarthroplasy   addmitted with A fib   Currently on diltiazim   Amiodarone seems not to be given today    Patient was seen on bedside, denies any CP, SOB   Patient still has crackles bilaterally more prononced on the left side   Bilateral leg +2 pitting edema,   Edema of the penis and mild erythema      EGD results should an ulcer non bleeding   GI plan to follow up in office   Continue PPI   And full liquid diet       Need to talk to  Cardio for [facility] options   Patient was off heparin or 7 days     Still on cefazolin for right wound infection   Multiple pressure and traumatic injury-- healing    Electronically signed by Parker Garcia MD on 8/11/21 at 8:33 AM EDT  Attending Physician Statement    I have discussed the case of Shay Mota, including pertinent history and exam findings with the resident. I have seen and examined the patient and the key elements of the encounter have been performed by me. I agree with the assessment, plan, and orders as documented by the resident. Hemoglobin today 7.3 and his atrial fibrillation is well controlled with apical response of 90/min.   EGD showed evidence of large duodenal bulb ulcer with no active bleeding at the time of examination  Electronically signed by Leni Wells MD on 8/11/2021 at 12:31 PM

## 2021-08-11 NOTE — PROGRESS NOTES
Writer spoke with Dr. Arabella Sommers. Updated him on patient's condition, VS, medications, drips. Orders received. May transfer to PCU. Continue Amiodarone and Cardizem drip. Will discontinue after he sees and evaluates patient.

## 2021-08-11 NOTE — PROGRESS NOTES
ICU Progress Note (Non-Vent)  [facility]    Patient - Clearance Bryan,  Age - 79 y.o.    - 1940      Room Number -    MRN -  [id number]   [facility]  Date of Admission -  8/3/2021 10:59 AM    Events of Past 24 Hours   He looks and feels much better, not as tachypneic    Vitals    height is 5' 7\" (1.702 m) and weight is 148 lb (67.1 kg). His oral temperature is 96.9 °F (36.1 °C). His blood pressure is 116/43 (abnormal) and his pulse is 79. His respiration is 18 and oxygen saturation is 96%.        Temperature Range: Temp: 96.9 °F (36.1 °C) Temp  Av.3 °F (36.8 °C)  Min: 96.9 °F (36.1 °C)  Max: 99.3 °F (37.4 °C)  BP Range:  Systolic (96IVE), YCJ:976 , Min:87 , ABR:285     Diastolic (85MZN), DEN:17, Min:29, Max:69    Pulse Range: Pulse  Av.4  Min: 60  Max: 82  Respiration Range: Resp  Av.1  Min: 8  Max: 27  Current Pulse Ox[de-identified]  SpO2: 96 %  24HR Pulse Ox Range:  SpO2  Av %  Min: 67 %  Max: 100 %  Oxygen Amount and Delivery: O2 Flow Rate (L/min): 3 L/min    Wt Readings from Last 3 Encounters:   08/10/21 148 lb (67.1 kg)     I/O       Intake/Output Summary (Last 24 hours) at 2021 0854  Last data filed at 2021 0500  Gross per 24 hour   Intake 2126.3 ml   Output 3750 ml   Net -1623.7 ml     DRAIN/TUBE OUTPUT       Invasive Lines   ICP PRESSURE RANGE  No data recorded  CVP PRESSURE RANGE  No data recorded      Medications      pantoprazole  40 mg Oral BID AC    polyethylene glycol  17 g Oral Daily    ceFAZolin  1,000 mg Intravenous Q8H    acetaminophen  1,000 mg Oral 4 times per day    digoxin  125 mcg Oral Daily    metoprolol tartrate  50 mg Oral BID    sodium chloride flush  5-40 mL Intravenous 2 times per day     melatonin, magnesium sulfate, oxyCODONE, morphine, perflutren lipid microspheres, sodium chloride flush, sodium chloride, ondansetron **OR** ondansetron, polyethylene glycol, potassium chloride **OR** potassium alternative oral replacement **OR** potassium chloride  IV Drips/Infusions   amiodarone 0.5 mg/min (08/10/21 2302)    dilTIAZem (CARDIZEM) 125 mg in dextrose 5% 125 mL infusion 2.5 mg/hr (08/10/21 2000)    sodium chloride         Diet/Nutrition   Adult Oral Nutrition Supplement; Low Calorie/High Protein Oral Supplement  ADULT DIET; Full Liquid    Exam      Constitutional - Alert, arousable  General Appearance  well developed, well nourished  HEENT -normocephalic, atraumatic. PERRLA  Lungs - Chest expands equally, no wheezes, improved aeration, not tachypneic  Cardiovascular - Heart sounds are normal.  normal rate and rhythm regular, no murmur, gallop or rub. Abdomen - soft, nontender, nondistended, no masses or organomegaly  Neurologic - CN II-XII are grossly intact.  There are no focal motor deficits  Skin - no bruising or bleeding  Extremities - no cyanosis, clubbing or edema    Lab Results   CBC     Lab Results   Component Value Date    WBC 9.1 08/11/2021    RBC 2.15 08/11/2021    HGB 7.3 08/11/2021    HCT 22.1 08/11/2021     08/11/2021    .7 08/11/2021    MCH 33.8 08/11/2021    MCHC 32.9 08/11/2021    RDW 15.8 08/11/2021    LYMPHOPCT 10 08/11/2021    MONOPCT 9 08/11/2021    BASOPCT 1 08/11/2021    MONOSABS 0.80 08/11/2021    LYMPHSABS 0.90 08/11/2021    EOSABS 0.30 08/11/2021    BASOSABS 0.10 08/11/2021    DIFFTYPE NOT REPORTED 08/11/2021       BMP   Lab Results   Component Value Date     08/11/2021    K 3.9 08/11/2021     08/11/2021    CO2 25 08/11/2021    BUN 13 08/11/2021    CREATININE 0.59 08/11/2021    GLUCOSE 98 08/11/2021       LFTS  Lab Results   Component Value Date    ALKPHOS 102 08/09/2021    ALT 11 08/09/2021    AST 26 08/09/2021    PROT 4.5 08/09/2021    BILITOT 0.58 08/09/2021    BILIDIR 0.19 08/09/2021    IBILI 0.39 08/09/2021    LABALBU 2.2 08/09/2021       ABG ABGs:   Lab Results   Component Value Date    PHART 7.412 08/06/2021    PO2ART 117.0 08/06/2021    OZQ0OQD 39.1 08/06/2021       Lab Results   Component Value Date    MODE PRVC 08/06/2021         INR  No results for input(s): PROTIME, INR in the last 72 hours. APTT  No results for input(s): APTT in the last 72 hours. Lactic Acid  Lab Results   Component Value Date    LACTA 1.7 08/03/2021    LACTA 2.3 08/03/2021    LACTA 2.3 08/03/2021        BNP   No results for input(s): BNP in the last 72 hours.      Cultures       Radiology     CXR      CT Scans    (See actual reports for details)      SYSTEMS ASSESSMENT    Status post left hip hemiarthroplasty  Atrial fibrillation with rapid ventricular response  Volume overload  Anemia with stool positive for Hemoccult blood-EGD 8/10 showed duodenal ulcer  No history of underlying lung disease  Non-smoker      Remains on minimal doses of Cardizem and amiodarone, in sinus rhythm  He is cleared for diet per GI  Hemoglobin reasonably stable, continue to monitor  Remains on room air  We will give 1 dose of IV Lasix again  Follow-up proBNP  Okay to move to stepdown bed      Critical Care Time   0 min    Electronically signed by Jannice Cooks, MD on 8/11/2021 at 8:54 AM

## 2021-08-11 NOTE — PLAN OF CARE
Safety maintained, call light is within reach, no s/s or c/o distress, bed is low/locked, side rails are up x2, bed alarm remains on  Problem: Falls - Risk of:  Goal: Will remain free from falls  Description: Will remain free from falls  Outcome: Ongoing  Goal: Absence of physical injury  Description: Absence of physical injury  Outcome: Ongoing     Problem: Skin Integrity:  Goal: Will show no infection signs and symptoms  Description: Will show no infection signs and symptoms  Outcome: Ongoing  Goal: Absence of new skin breakdown  Description: Absence of new skin breakdown  Outcome: Ongoing     Problem: Cardiac:  Goal: Ability to maintain an adequate cardiac output will improve  Description: Ability to maintain an adequate cardiac output will improve  Outcome: Ongoing  Goal: Hemodynamic stability will improve  Description: Hemodynamic stability will improve  Outcome: Ongoing     Problem: Fluid Volume:  Goal: Ability to achieve and maintain adequate urine output will improve  Description: Ability to achieve and maintain adequate urine output will improve  Outcome: Ongoing     Problem: Pain:  Goal: Pain level will decrease  Description: Pain level will decrease  Outcome: Ongoing  Goal: Control of acute pain  Description: Control of acute pain  Outcome: Ongoing  Goal: Control of chronic pain  Description: Control of chronic pain  Outcome: Ongoing     Problem: Respiratory:  Goal: Respiratory status will improve  Description: Respiratory status will improve  Outcome: Ongoing     Problem: Fluid Volume:  Goal: Ability to achieve and maintain adequate urine output will improve  Description: Ability to achieve and maintain adequate urine output will improve  Outcome: Ongoing

## 2021-08-11 NOTE — PROGRESS NOTES
Dr. Aparna Ramos at bedside, Evaluated patient for ARU. Patient will need Venous dopplers done before discharge to ARU if not on anticoagulants.

## 2021-08-11 NOTE — CARE COORDINATION
Per PM& R note patient is appropriate and will be accepted when he is medically ready/stable. Since the patient has anemia, he is not on any anticoagulation and would therefore need bilateral dopplers completed 24-48 hours prior to admitting to the ARU.

## 2021-08-11 NOTE — PROGRESS NOTES
GI Progress notes    8/11/2021   4:02 PM    Name:  Hudson Sen  MRN:    [id number]     Acct:     [de-identified]   Room:  2111/2111-01   Day: 8     Admit Date: 8/3/2021 10:59 AM  PCP: No primary care provider on file. Subjective:     C/C:   Chief Complaint   Patient presents with    Leg Pain    Irregular Heart Beat       Interval History: Status: improved. Patient seen and examined. No acute events overnight  Hemoglobin stable  No abdominal pain  No reported bowel movement since Monday  No nausea, vomiting   Tolerating diet    Status post EGD yesterday revealing large duodenal ulcer, gastric erosions. ROS:  Constitutional: negative for chills, fevers and sweats  Gastrointestinal: negative for abdominal pain, constipation, diarrhea, nausea and vomiting      Medications:      Allergies: No Known Allergies    Current Meds: amiodarone (CORDARONE) tablet 200 mg, Daily  dilTIAZem (CARDIZEM) tablet 30 mg, 2 times per day  pantoprazole (PROTONIX) tablet 40 mg, BID AC  polyethylene glycol (GLYCOLAX) packet 17 g, Daily  melatonin tablet 3 mg, Nightly PRN  magnesium sulfate 1000 mg in dextrose 5% 100 mL IVPB, PRN  ceFAZolin (ANCEF) 1,000 mg in dextrose 5 % 50 mL IVPB (mini-bag), Q8H  oxyCODONE (ROXICODONE) immediate release tablet 5 mg, Q4H PRN  acetaminophen (TYLENOL) tablet 1,000 mg, 4 times per day  morphine (PF) injection 2 mg, Q2H PRN  perflutren lipid microspheres (DEFINITY) injection 2.2 mg, ONCE PRN  metoprolol tartrate (LOPRESSOR) tablet 50 mg, BID  sodium chloride flush 0.9 % injection 5-40 mL, 2 times per day  sodium chloride flush 0.9 % injection 5-40 mL, PRN  0.9 % sodium chloride infusion, PRN  ondansetron (ZOFRAN-ODT) disintegrating tablet 4 mg, Q8H PRN   Or  ondansetron (ZOFRAN) injection 4 mg, Q6H PRN  polyethylene glycol (GLYCOLAX) packet 17 g, Daily PRN  potassium chloride (KLOR-CON M) extended release tablet 40 mEq, PRN   Or  potassium bicarb-citric acid (EFFER-K) effervescent tablet 40 mEq, PRN Or  potassium chloride 10 mEq/100 mL IVPB (Peripheral Line), PRN        Data:     Code Status:  Full Code    History reviewed. No pertinent family history. Social History     Socioeconomic History    Marital status: Single     Spouse name: Not on file    Number of children: Not on file    Years of education: Not on file    Highest education level: Not on file   Occupational History    Not on file   Tobacco Use    Smoking status: Never Smoker    Smokeless tobacco: Never Used   Substance and Sexual Activity    Alcohol use: Never    Drug use: Not on file    Sexual activity: Not on file   Other Topics Concern    Not on file   Social History Narrative    Not on file     Social Determinants of Health     Financial Resource Strain:     Difficulty of Paying Living Expenses:    Food Insecurity:     Worried About Running Out of Food in the Last Year:     [address] in the Last Year:    Transportation Needs:     Lack of Transportation (Medical):  Lack of Transportation (Non-Medical):    Physical Activity:     Days of Exercise per Week:     Minutes of Exercise per Session:    Stress:     Feeling of Stress :    Social Connections:     Frequency of Communication with Friends and Family:     Frequency of Social Gatherings with Friends and Family:     Attends [employer] Services:     Active Member of Clubs or Organizations:     Attends Club or Organization Meetings:     Marital Status:    Intimate Partner Violence:     Fear of Current or Ex-Partner:     Emotionally Abused:     Physically Abused:     Sexually Abused:        Vitals:  BP (!) 105/51   Pulse 67   Temp 97.7 °F (36.5 °C) (Axillary)   Resp 14   Ht 5' 7\" (1.702 m)   Wt 148 lb (67.1 kg)   SpO2 92%   BMI 23.18 kg/m²   Temp (24hrs), Av.9 °F (36.6 °C), Min:96.9 °F (36.1 °C), Max:99.3 °F (37.4 °C)    No results for input(s): POCGLU in the last 72 hours. I/O (24Hr):     Intake/Output Summary (Last 24 hours) at 2021 [address] filed at 8/11/2021 0957  Gross per 24 hour   Intake 2068. 3 ml   Output 2800 ml   Net -731.7 ml       Labs:      CBC:   Lab Results   Component Value Date    WBC 9.1 08/11/2021    RBC 2.15 08/11/2021    HGB 7.3 08/11/2021    HCT 22.1 08/11/2021    .7 08/11/2021    MCH 33.8 08/11/2021    MCHC 32.9 08/11/2021    RDW 15.8 08/11/2021     08/11/2021    MPV 9.0 08/11/2021     CBC with Differential:    Lab Results   Component Value Date    WBC 9.1 08/11/2021    RBC 2.15 08/11/2021    HGB 7.3 08/11/2021    HCT 22.1 08/11/2021     08/11/2021    .7 08/11/2021    MCH 33.8 08/11/2021    MCHC 32.9 08/11/2021    RDW 15.8 08/11/2021    LYMPHOPCT 10 08/11/2021    MONOPCT 9 08/11/2021    BASOPCT 1 08/11/2021    MONOSABS 0.80 08/11/2021    LYMPHSABS 0.90 08/11/2021    EOSABS 0.30 08/11/2021    BASOSABS 0.10 08/11/2021    DIFFTYPE NOT REPORTED 08/11/2021     Hemoglobin/Hematocrit:    Lab Results   Component Value Date    HGB 7.3 08/11/2021    HCT 22.1 08/11/2021     CMP:    Lab Results   Component Value Date     08/11/2021    K 3.9 08/11/2021     08/11/2021    CO2 25 08/11/2021    BUN 13 08/11/2021    CREATININE 0.59 08/11/2021    GFRAA >60 08/11/2021    LABGLOM >60 08/11/2021    GLUCOSE 98 08/11/2021    PROT 4.5 08/09/2021    LABALBU 2.2 08/09/2021    CALCIUM 7.7 08/11/2021    BILITOT 0.58 08/09/2021    ALKPHOS 102 08/09/2021    AST 26 08/09/2021    ALT 11 08/09/2021     BMP:    Lab Results   Component Value Date     08/11/2021    K 3.9 08/11/2021     08/11/2021    CO2 25 08/11/2021    BUN 13 08/11/2021    LABALBU 2.2 08/09/2021    CREATININE 0.59 08/11/2021    CALCIUM 7.7 08/11/2021    GFRAA >60 08/11/2021    LABGLOM >60 08/11/2021    GLUCOSE 98 08/11/2021     PT/INR:    Lab Results   Component Value Date    PROTIME 15.6 08/03/2021    INR 1.2 08/03/2021     PTT:    Lab Results   Component Value Date    APTT >150.0 08/03/2021   [APTT}    Physical Examination: General appearance: alert, cooperative and no distress  Mental Status: oriented to person, place and time and normal affect  Abdomen: soft, nontender, nondistended, bowel sounds     Assessment:        Primary Problem  Atrial fibrillation with RVR [facility])     Active Hospital Problems    Diagnosis Date Noted    Heart failure ([facility] [I50.9] 08/10/2021    Closed fracture of left hip with routine healing [S72.002D] 08/04/2021    Atrial fibrillation with RVR ([facility] [I48.91] 08/03/2021     Past Medical History:   Diagnosis Date    Atrial fibrillation ([facility]     with RVR        Plan:        1. Anemia, FOBT positive, status post EGD revealing large duodenal ulcer, antral erosions  1. Await gastric biopsies to evaluate H. Pylori  2. Continue PPI therapy  3. Soft diet  4. Avoid NSAIDS    Explained to the patient and d/W Nursing Staff  Will F/U with you  Please call or Page for any issues or change in status  Thanks    Electronically signed by ANDRES Matos NP on 8/11/2021 at 4:02 PM     Patient seen along with ANDRES at about 2:30 PM.    Patient looks stable. No signs of acute bleeding. His abdominal pain significantly improved. No nausea vomiting. Given the size of the ulcer, it is reasonable not to start anticoagulation therapy at this time. Gastric biopsies for H. pylori pending.

## 2021-08-11 NOTE — PROGRESS NOTES
Physical Medicine & Rehabilitation  Progress Note    8/11/2021 10:48 AM     CC: Ambulatory and ADL dysfunction due to left hip fracture status post hemiarthroplasty done on 8/5      Subjective:   No complaints. Feels well. .  Being transferred out of ICU today. Notes last BM 2 days ago. Pain controlled    ROS:  Denies fevers, chills, sweats. No chest pain, palpitations, lightheadedness. Denies coughing, wheezing or shortness of breath. Denies abdominal pain, nausea, diarrhea or constipation. No new areas of joint pain. Denies new areas of numbness or weakness. Denies new anxiety or depression issues. No new skin problems. Rehabilitation:   PT:  Restrictions/Precautions: Weight Bearing, Fall Risk  Left Lower Extremity Weight Bearing: Weight Bearing As Tolerated   Transfers  Sit to Stand: Minimal Assistance  Stand to sit: Minimal Assistance  Bed to Chair: Minimal assistance  Stand Pivot Transfers: Moderate Assistance  Comment: Pt completes transfer w/ RW x2. Verbal cuing required for safe handplacement w/ tranfer from stand>sit. Sao2 improves with standing to 95%. WB Status: WBAT  Ambulation 1  Surface: level tile  Device: Rolling Walker  Assistance: Contact guard assistance  Distance: 5ft FWD/BWD x2  Comments: Pt completing ambulation w/ RW, verbal cuing provided for sequencing w/ device and postural awareness. Patient declined further amb this date. OT:  ADL  Feeding: Setup  Grooming: Setup, Minimal assistance  UE Bathing: Setup, Maximum assistance  LE Bathing: Dependent/Total  UE Dressing: Setup, Maximum assistance  LE Dressing: Dependent/Total (footlevel dressing only addressed)  Toileting: Dependent/Total  Additional Comments: per clinical reasoning and mobility, work socrates assessment.          Balance  Sitting Balance: Supervision (static sitting EOB)  Standing Balance: Minimal assistance (CGA-min A; 2nd person SBA for safety. )   Standing Balance  Time: 5 min, 4 min   Activity: static/dynamic standing EOB, forward/backward steps bed<>window. Comment: increased to min A with dynamic standing; intermittent UE raises to tap writer hands to address standing balance with decreased uE support. Functional Mobility  Functional - Mobility Device: Rolling Walker  Activity: Other (~5 steps forwad/back bed<>window. )  Assist Level: Contact guard assistance (Assist x2 for safety and multiple line mgmt. )     Bed mobility  Bridging: Minimal assistance  Rolling to Left: Minimal assistance  Rolling to Right: Minimal assistance  Supine to Sit: Minimal assistance  Sit to Supine: Moderate assistance  Scooting: Contact guard assistance  Comment: Patient requires Min A with bed mobility for L LE advancement. Use of R handrail during bridges. Continues to need VC for deep breathing with activity. Transfers  Stand Step Transfers: Moderate assistance (RW)  Sit to stand: Minimal assistance, 2 Person assistance  Stand to sit: Minimal assistance, 2 Person assistance  Transfer Comments: cueing for hand placement with good return. ST:            Objective:  BP (!) 110/56   Pulse 69   Temp 97.6 °F (36.4 °C) (Oral)   Resp 14   Ht 5' 7\" (1.702 m)   Wt 148 lb (67.1 kg)   SpO2 97%   BMI 23.18 kg/m²  I Body mass index is 23.18 kg/m². I   Wt Readings from Last 1 Encounters:   08/10/21 148 lb (67.1 kg)      Temp (24hrs), Av.3 °F (36.8 °C), Min:96.9 °F (36.1 °C), Max:99.3 °F (37.4 °C)         GEN: well developed, well nourished, no acute distress  HEENT: Normocephalic atraumatic, EOMI, mucous membranes pink and moist  CV: RRR, no murmurs, rubs or gallops  PULM:  Respirations WNL and unlabored  ABD: soft, NT, ND, +BS and equal  NEURO: A&O x3. Sensation intact to light touch. MSK: At least antigravity left lower extremity, 4 my/5 right lower extremity, 4/5 upper extremity  EXTREMITIES: No calf tenderness to palpation bilaterally.  No edema BLEs  SKIN: warm dry and intact with good turgor, right distal lower extremity with chronic dermatitis  PSYCH: appropriately interactive. Affect WNL. Medications   Scheduled Meds:   amiodarone  200 mg Oral Daily    dilTIAZem  30 mg Oral 2 times per day    pantoprazole  40 mg Oral BID AC    polyethylene glycol  17 g Oral Daily    ceFAZolin  1,000 mg Intravenous Q8H    acetaminophen  1,000 mg Oral 4 times per day    metoprolol tartrate  50 mg Oral BID    sodium chloride flush  5-40 mL Intravenous 2 times per day     Continuous Infusions:   sodium chloride       PRN Meds:.melatonin, magnesium sulfate, oxyCODONE, morphine, perflutren lipid microspheres, sodium chloride flush, sodium chloride, ondansetron **OR** ondansetron, polyethylene glycol, potassium chloride **OR** potassium alternative oral replacement **OR** potassium chloride     Diagnostics:     CBC:   Recent Labs     08/09/21  0407 08/09/21  1950 08/10/21  0422 08/10/21  2010 08/11/21  0403   WBC 11.5*  --  11.8*  --  9.1   RBC 2.29*  --  2.38*  --  2.15*   HGB 7.5*   < > 7.8* 7.5* 7.3*   HCT 23.1*   < > 24.1* 23.2* 22.1*   .6*  --  101.3*  --  102.7*   RDW 15.7*  --  15.6*  --  15.8*     --  211  --  202    < > = values in this interval not displayed. BMP:   Recent Labs     08/09/21  0407 08/10/21  0422 08/11/21  0403    141 141   K 3.8 3.6* 3.9   * 110* 107   CO2 23 23 25   BUN 15 12 13   CREATININE 0.46* 0.48* 0.59*     BNP: No results for input(s): BNP in the last 72 hours. PT/INR: No results for input(s): PROTIME, INR in the last 72 hours. APTT: No results for input(s): APTT in the last 72 hours. CARDIAC ENZYMES: No results for input(s): CKMB, CKMBINDEX, TROPONINT in the last 72 hours. Invalid input(s): CKTOTAL;3  FASTING LIPID PANEL:No results found for: CHOL, HDL, TRIG  LIVER PROFILE:   Recent Labs     08/09/21  0407   AST 26   ALT 11   BILIDIR 0.19   BILITOT 0.58   ALKPHOS 102        I/O (24Hr):     Intake/Output Summary (Last 24 hours) at 8/11/2021 [address] [place] [address] filed at 8/11/2021 0957  Gross per 24 hour   Intake 2486.3 ml   Output 4900 ml   Net -2413.7 ml       Glu last 24 hour  No results for input(s): POCGLU in the last 72 hours. No results for input(s): CLARITYU, COLORU, PHUR, SPECGRAV, PROTEINU, RBCUA, BLOODU, BACTERIA, NITRU, WBCUA, LEUKOCYTESUR, YEAST, GLUCOSEU, BILIRUBINUR in the last 72 hours. EGD 8/10-huge duodenal ulcer, antral erosion, will need follow-up biopsies to keep on PPI therapy    Impression:     1. Left hip fracture s/p hemiarthroplasty 8/5  2. Atrial fibrillation with RVR -no anticoagulation per cardiology due to risk of falls-DC amiodarone, Cardizem drips, continues on Cardizem p.o. and Lopressor  3. Rhabdomyolysis - improving, IV fluids  4. Multiple wound/abrasions-wound care following- bilateral elbows, knees right hip abrasion, buttock wound closed sacral wound  5. Anemia-hemoglobin 7.3,  stool occult blood positive-EGD as above  6. Huge duodenal ulcer, antral erosion-Protonix  7. Pain-Roxicodone,-morphine drip has not received  8. Shjptijkdyvw-ndicbynr-myjyxpdcv on Ancef IV-questionable length  9. Chronic dermatitis right leg        Recommendations:     1. Diagnosis:  Left hip fracture  2. Therapy: Has PT and OT needs  3. Medical Necessity: As above  4. Support: Lives alone, has a friend/neighbor that provides some assistance  5. Rehab Recommendation:  Would benefit from acute inpatient rehabilitation when medically/cardiac ready  6. DVT Prophylaxis:  Currently not on anticoagulation due to anemia, will need Doppler screen 24 to 48 hours prior to transfer, please clarify if patient can be placed on prophylactic anticoagulation-Lovenox    Davonteodilia Paul MD       This note is created with the assistance of a speech recognition program.  While intending to generate a document that actually reflects the content of the visit, the document can still have some errors including those of syntax and sound a like substitutions which may escape proof reading.   In such instances, actual meaning can be extrapolated by contextual diversion

## 2021-08-11 NOTE — PROGRESS NOTES
[facility]  Progress Note                   Date:   8/11/2021  Patient name: Paige Hannah  Date of admission:  8/3/2021 10:59 AM  MRN:   [id number]  YOB: 1940  PCP: No primary care provider on file. Reason for Admission: A-fib [facility]) [I48.91]    Subjective:       Clinical Changes /Abnormalities:     Seen and examined  On amio drip and low dose cardizem drip  In NSR. No cp, sob. Medications:   Scheduled Meds:   amiodarone  200 mg Oral Daily    dilTIAZem  30 mg Oral 2 times per day    pantoprazole  40 mg Oral BID AC    polyethylene glycol  17 g Oral Daily    ceFAZolin  1,000 mg Intravenous Q8H    acetaminophen  1,000 mg Oral 4 times per day    metoprolol tartrate  50 mg Oral BID    sodium chloride flush  5-40 mL Intravenous 2 times per day     Continuous Infusions:   sodium chloride       CBC:   Recent Labs     08/09/21 0407 08/09/21  1950 08/10/21  0422 08/10/21  2010 08/11/21  0403   WBC 11.5*  --  11.8*  --  9.1   HGB 7.5*   < > 7.8* 7.5* 7.3*     --  211  --  202    < > = values in this interval not displayed. BMP:    Recent Labs     08/09/21  0407 08/10/21  0422 08/11/21  0403    141 141   K 3.8 3.6* 3.9   * 110* 107   CO2 23 23 25   BUN 15 12 13   CREATININE 0.46* 0.48* 0.59*   GLUCOSE 102* 96 98     Hepatic:  Recent Labs     08/09/21 0407   AST 26   ALT 11   BILITOT 0.58   ALKPHOS 102     Troponin:   No results for input(s): TROPHS in the last 72 hours. BNP: No results for input(s): BNP in the last 72 hours. Lipids: No results for input(s): CHOL, HDL in the last 72 hours. Invalid input(s): LDLCALCU  INR:   No results for input(s): INR in the last 72 hours.     Objective:   Vitals: BP (!) 111/38   Pulse 71   Temp 96.9 °F (36.1 °C) (Oral)   Resp 14   Ht 5' 7\" (1.702 m)   Wt 148 lb (67.1 kg)   SpO2 93%   BMI 23.18 kg/m²   General appearance: alert and cooperative with exam, lethargic on exam   HEENT: Head: Normocephalic, no lesions, without obvious abnormality. Neck:no JVD, trachea midline, no adenopathy  Lungs: Diminished to auscultation but clear . On RA without distress   Heart: Irregular rate and rhythm, s1/s2 auscultated, no murmurs, HR 90-110s at rest  Abdomen: soft, non-tender, bowel sounds active  Extremities: no edema - see surgical evaluation. Denies pain presently   Neurologic: not done    Echo 8/4/21  Summary  Normal left ventricle size and function with an estimated EF > 55%. Mild left ventricular hypertrophy. Left atrial dilatation. Aortic valve was not well visualized. Mild to moderate aortic insufficiency. Mild mitral regurgitation. Mild tricuspid regurgitation. Normal right ventricular systolic pressure. IVC Increased diameter, but still has inspiratory variation. Assessment / Acute Cardiac Problems:   1. Atrial fibrillation with RVR, suboptimal rate control - back in NSR  2. Rhabdomyolysis with prolonged time spent on floor - 5 days  3. Elevated troponins, type II MI secondary to rhabdomyolysis/tachycradia/hypoxia  4. Preserved LV systolic function   5. Mechanical fall with hip fracture status post left hip hemiarthroplasty (POD # 2)   6. Preserved LVEF with Mild-mod AI, mild MR, mild TR    Plan of Treatment:   1. Stop amio gtt  2. Stop cardizem gtt  3. Start amio po  4. Start cardizem po  5. Continue lopressor po  6. Stop heparin drip  7. No AC due to anemia and no long term AC due to falls. Okay for stepdown. Will follow from a far,   Follow up in 2 weeks. Kelli Hicks DO, [facility], [address], [address], [place] [address] [facility]  [facility]. [facility]  [phone]

## 2021-08-11 NOTE — PROGRESS NOTES
[address] [facility]    INPATIENT OCCUPATIONAL THERAPY  PROGRESS NOTE  Date: 2021  Patient Name: Johnnye Schirmer      Room:   MRN: [id number]    : 1940  (79 y.o.) Gender: male     Discharge Recommendations:  Further Occupational Therapy is recommended upon facility discharge. OT Equipment Recommendations  Equipment Needed: Yes  Other: walker, reachers, sock aide, long sponge, call alert necklace vs keep phone on person, BSC, twin bed to main floor- needed for d/c to home, when able to access upstairs- recommend extended tub bench and use of pitcher to pour water vs obtain shower nozzle. Equipment Needed:  (TBD)       Diagnosis: Left hip fracture s/p hemiarthroplasty . A fib, Rhabdomyolosis, anemia, Multiple wound/abrasions  General  Additional Pertinent Hx: injury 2 months ago- pt reports was carrying laundry upstairs with sudden pain L thigh and legs gave out and fell to knees, since then decline in mobility but still ambulated in house with cane, switched to living mainly on main floor. pt with scales on skin R ankle + foot, per pt has decreased in past 2 months post injury as he was unable to apply hydrocortisol cream, OT alerted RN to ask MD re treatment. He initially presented after a fall from standing at home. He reportedly was laying on the floor for 5 days before he was able to call for help. He was found to have atrial fibrillation with RVR in the ED and was started on diltiazem and heparin drips. He was also found to have left hip fracture and rhabdomylolysis. He has multiple wounds, including buttock, sacrum, bilateral elbow, and bilateral knee wounds. He underwent left hip hemiarthroplasty on 21 (Dr. Karon Boo). Extubated 21. He remains on diltiazem and heparin drips at this time. Family / Caregiver Present: No  Diagnosis: Left hip fracture s/p hemiarthroplasty .    A fib, Rhabdomyolosis, anemia, Multiple wound/abrasions    Restrictions  Restrictions/Precautions: Weight Bearing, Fall Risk  Left Lower Extremity Weight Bearing: Weight Bearing As Tolerated      Subjective  Subjective: \"I stopped using the bath tub for that reason because it was difficult to get in and out of.\"  (after injury 2 months ago) ed re obtain extended tub bench and use pitcher to pour water- do not access bottom of tub until MD permits. Comments: Pt just transfers out from ICU; per RN Wilmer andrews for therapy. He has imrpoved tolerance today and willing to remain OOB in bedside chair at treatment completion. Edema to BLE; writer recommnding compression stockings if medically appropriate, RN to assist with Medical clearance. Protective waffle cushions applied to bed and chair for skin protection (RN initating mattres upon my entry). He is requiring increased assist wiht transfer this afternoon, Minx1 this AM with PT dept, Mod<>MaxAx1 this afternoon using RW/difficulty weightbearing into RLE (surgical extremity LLE); able to bear through. Overall Orientation Status: Within Functional Limits  Patient Observation  Observations: socks pitting in ankles due to swelling       Objective  Cognition  Overall Cognitive Status: WFL  Bed mobility  Supine to Sit: Minimal assistance  Scooting: Contact guard assistance  Comment:  To nonsurgical side; VC for sequencing to EOB; increased time for trunk upright; F+ overall tech with cuing  Balance  Sitting Balance: Supervision (static sitting EOB)  Standing Balance: Minimal assistance (x1)  Standing Balance  Time: 2min, 2min  Activity: static stand for weight bearing/mobiltiy prep; mobility to bedside chair   Comment: VC for posture with f- carryover today; difficulty with initial WB into RLE (nonsurgical extremity); VC for UE compensation with G outcome   Functional Mobility  Functional - Mobility Device: Rolling Walker  Activity: Other (few feet from edge of bed to bedside chair; same side)  Assist Level: Contact guard assistance  Functional Mobility Comments: x1, VC for safety/attention to environment; assist required for medical lines   ADL  Feeding: Setup  Grooming: Setup;Minimal assistance  UE Bathing: Setup;Maximum assistance  LE Bathing: Dependent/Total  UE Dressing: Setup;Maximum assistance  LE Dressing: Dependent/Total (footlevel dressing only addressed)  Toileting: Dependent/Total  Additional Comments: per clinical reasoning and mobility, work socrates assessment. Assist required to manage foot tasks; adjusted sizing from L to XL due to pitting for improve skin integrity. Trialled adaptive heel-toe strategy with poor tolerane. To benefit from AE training in future sessions. Writer reviewing ma catheter/importance of insight and stabilization; pt continues to require cuing for attention to ma with mobility. Transfers  Sit to stand: Moderate assistance  Stand to sit: Minimal assistance  Transfer Comments: VC for hand placement and posture with pt demoing heavy forward lean; requiring heavy UE compensation for upright. Initial attempt reuquiring max assistance, progresssing to moderate using UE compensation; F+ control for stand to sit           Additional Activities: education on LE edema, potential benefits of compression stockings; informed will require medical clearance                 Assessment  Performance deficits / Impairments: Decreased functional mobility ; Decreased high-level IADLs;Decreased ADL status; Decreased endurance;Decreased balance;Decreased safe awareness  Assessment: has no bed/bath on main and only neighbor friend for support, was using urinal and sleep on couch, neighbor mowing grass (since injury 2 months ago) OT ed pt need for inpt therapy and to obtain [facility] and twin bed for main floor and have assist with laundry, cleaning, empty BSC in addition to already order food online and have neighbor do yardwork.   Prognosis: Good  Activity Tolerance: Patient Tolerated treatment well  Safety Devices in place: Yes  Type of devices: Nurse notified;Call light within reach; All fall risk precautions in place;Gait belt;Left in chair  Equipment Recommendations  Equipment Needed:  (TBD)          Patient Education:   OT POC, skin protection, UE compensation  Learner:patient  Method: demonstration and explanation       Outcome: needs reinforcement     Plan  Safety Devices  Safety Devices in place: Yes  Type of devices: Nurse notified, Call light within reach, All fall risk precautions in place, Gait belt, Left in chair  Plan  Times per week: 4-5  Times per day: Daily (BID if possible )  Current Treatment Recommendations: Safety Education & Training, Balance Training, Patient/Caregiver Education & Training, Self-Care / ADL, Functional Mobility Training, Equipment Evaluation, Education, & procurement, Home Management Training, Endurance Training      Goals  Short term goals  Time Frame for Short term goals: 1 week  Short term goal 1: set up UE bathe and dress and groom  Short term goal 2: max LE bathe and dress  Short term goal 3: socrates 5-6 min stand, amb with RW and min A for adls  Short term goal 4: min A adl transfers and toileting    OT Individual Minutes  Time In: 1100  Time Out: 1140  Minutes: 40      Electronically signed by SHIRA Castillo on 8/11/21 at 4:35 PM EDT

## 2021-08-11 NOTE — PROGRESS NOTES
Physical Therapy  Facility/Department: [facility] PROGRESSIVE CARE  Daily Treatment Note  NAME: Jaymie West  : 1940  MRN: [id number]    Date of Service: 2021    Discharge Recommendations:  Patient would benefit from continued therapy after discharge   PT Equipment Recommendations  Equipment Needed: No    Assessment   History: Hip fx; Rabdo  REQUIRES PT FOLLOW UP: Yes  Activity Tolerance  Activity Tolerance: Patient limited by fatigue;Patient limited by endurance     Patient Diagnosis(es): The primary encounter diagnosis was Atrial fibrillation with RVR ([facility] [address] Diagnoses of Dehydration, Traumatic rhabdomyolysis, initial encounter [facility]), and Closed fracture of left hip, initial encounter [facility]) were also pertinent to this visit. has a past medical history of [facility] has a past surgical history that includes hip surgery (Left, 2021) and Upper gastrointestinal endoscopy (N/A, 8/10/2021). Restrictions  Restrictions/Precautions  Restrictions/Precautions: Weight Bearing, Fall Risk  Lower Extremity Weight Bearing Restrictions  Left Lower Extremity Weight Bearing: Weight Bearing As Tolerated  Subjective   General  Chart Reviewed: Yes  Response To Previous Treatment: Patient with no complaints from previous session. Family / Caregiver Present: No  Subjective  Subjective: Pt positioned supine in bed and agreeable to PT. General Comment  Comments: RN Nafisa plaza's tx  Pain Screening  Patient Currently in Pain: Denies (Pressure at incision site )  Vital Signs  BP Location: Left upper arm  Patient Currently in Pain: Denies (Pressure at incision site )  Oxygen Therapy  O2 Device: None (Room air)  Patient Observation  Observations: R ankle w/ dark brawny and flaking skin. , SpO2 decreases to 80% w/ ambulation and transfers . Consistent verbal cuing provided for pursed lip breathing. O2 resat to 95%, HR remains WNL throughout tx.         Orientation  Orientation  Overall Orientation Status: Within Normal Limits  Cognition      Objective   Bed mobility  Bridging: Minimal assistance  Rolling to Right: Minimal assistance  Supine to Sit: Minimal assistance  Scooting: Contact guard assistance  Comment: Patient requires Min A with bed mobility for L LE advancement. Use of R handrail during bridges. Continues to need VC for deep breathing with activity. Transfers  Sit to Stand: Minimal Assistance  Stand to sit: Minimal Assistance  Bed to Chair: Minimal assistance  Comment: Pt completes transfer w/ RW x2. Verbal cuing required for safe handplacement w/ tranfer from stand>sit. Sao2 improves with standing to 95%. Ambulation  Ambulation?: Yes  WB Status: WBAT  Ambulation 1  Surface: level tile  Device: Rolling Walker  Assistance: Contact guard assistance  Distance: 5ft FWD/BWD x2  Comments: Pt completing ambulation w/ RW, verbal cuing provided for sequencing w/ device and postural awareness. Patient declined further amb this date. Stairs/Curb  Stairs?: No     Balance  Sitting - Static: Good  Sitting - Dynamic: Good  Standing - Static: Fair (CGA with RW)  Standing - Dynamic: Fair;- (Gabriel with RW)  Comments: Standing w/ RW   Other exercises  Other exercises?: Yes  Other exercises 1: Seated at EOB BLE ex's x10: ankle pump, LAQ  Other exercises 2: STS x3   Other exercises 3: Education provided on importance of continuing deep breathing throughout tx to prevent desat. Other exercises 4: Standing Marches 10x B LE     Comment:  Frequent rest breaks PRN to allow O2 to stabilize.     Goals  Short term goals  Time Frame for Short term goals: 3-4 days  Short term goal 1: bed mobility with Gabriel  Short term goal 2: transfers with Gabriel  Short term goal 3: gait with RW/SBA x 20ft  Patient Goals   Patient goals : go home    Plan    Plan  Times per week: BID  Times per day: Twice a day  Current Treatment Recommendations: Strengthening, ROM, Balance Training, Functional Mobility Training, Transfer Training, Gait Training, Endurance Training, Safety Education & Training  Safety Devices  Type of devices: Left in bed, Call light within reach, Nurse notified, All fall risk precautions in place (RN Regis Morin )     Therapy Time   Individual Concurrent Group Co-treatment   Time In [address] [place] [address] Road         Time Out 0859         Minutes [address], [place]

## 2021-08-12 ENCOUNTER — APPOINTMENT (OUTPATIENT)
Dept: GENERAL RADIOLOGY | Age: 81
DRG: 981 | End: 2021-08-12
Payer: MEDICARE

## 2021-08-12 LAB
ABSOLUTE EOS #: 0.3 K/UL (ref 0–0.4)
ABSOLUTE IMMATURE GRANULOCYTE: ABNORMAL K/UL (ref 0–0.3)
ABSOLUTE LYMPH #: 1.1 K/UL (ref 1–4.8)
ABSOLUTE MONO #: 0.9 K/UL (ref 0.1–1.3)
ANION GAP SERPL CALCULATED.3IONS-SCNC: 8 MMOL/L (ref 9–17)
BASOPHILS # BLD: 1 % (ref 0–2)
BASOPHILS ABSOLUTE: 0.1 K/UL (ref 0–0.2)
BNP INTERPRETATION: ABNORMAL
BUN BLDV-MCNC: 13 MG/DL (ref 8–23)
BUN/CREAT BLD: ABNORMAL (ref 9–20)
CALCIUM SERPL-MCNC: 8 MG/DL (ref 8.6–10.4)
CHLORIDE BLD-SCNC: 106 MMOL/L (ref 98–107)
CO2: 26 MMOL/L (ref 20–31)
CREAT SERPL-MCNC: 0.58 MG/DL (ref 0.7–1.2)
CULTURE: NORMAL
DIFFERENTIAL TYPE: ABNORMAL
DIRECT EXAM: NORMAL
EOSINOPHILS RELATIVE PERCENT: 3 % (ref 0–4)
FOLATE: 12.2 NG/ML
GFR AFRICAN AMERICAN: >60 ML/MIN
GFR NON-AFRICAN AMERICAN: >60 ML/MIN
GFR SERPL CREATININE-BSD FRML MDRD: ABNORMAL ML/MIN/{1.73_M2}
GFR SERPL CREATININE-BSD FRML MDRD: ABNORMAL ML/MIN/{1.73_M2}
GLUCOSE BLD-MCNC: 94 MG/DL (ref 70–99)
HCT VFR BLD CALC: 22 % (ref 41–53)
HCT VFR BLD CALC: 22.5 % (ref 41–53)
HCT VFR BLD CALC: 23.1 % (ref 41–53)
HEMOGLOBIN: 7.1 G/DL (ref 13.5–17.5)
HEMOGLOBIN: 7.3 G/DL (ref 13.5–17.5)
HEMOGLOBIN: 7.3 G/DL (ref 13.5–17.5)
IMMATURE GRANULOCYTES: ABNORMAL %
LEGIONELLA PNEUMOPHILIA AG, URINE: NEGATIVE
LYMPHOCYTES # BLD: 11 % (ref 24–44)
Lab: NORMAL
MCH RBC QN AUTO: 32.9 PG (ref 26–34)
MCH RBC QN AUTO: 33.2 PG (ref 26–34)
MCH RBC QN AUTO: 33.3 PG (ref 26–34)
MCHC RBC AUTO-ENTMCNC: 31.4 G/DL (ref 31–37)
MCHC RBC AUTO-ENTMCNC: 32.2 G/DL (ref 31–37)
MCHC RBC AUTO-ENTMCNC: 32.5 G/DL (ref 31–37)
MCV RBC AUTO: 102.5 FL (ref 80–100)
MCV RBC AUTO: 103.3 FL (ref 80–100)
MCV RBC AUTO: 104.6 FL (ref 80–100)
MONOCYTES # BLD: 9 % (ref 1–7)
NRBC AUTOMATED: ABNORMAL PER 100 WBC
PDW BLD-RTO: 16.2 % (ref 11.5–14.9)
PDW BLD-RTO: 16.3 % (ref 11.5–14.9)
PDW BLD-RTO: 16.7 % (ref 11.5–14.9)
PLATELET # BLD: 208 K/UL (ref 150–450)
PLATELET # BLD: 212 K/UL (ref 150–450)
PLATELET # BLD: 240 K/UL (ref 150–450)
PLATELET ESTIMATE: ABNORMAL
PMV BLD AUTO: 9.1 FL (ref 6–12)
POTASSIUM SERPL-SCNC: 4.5 MMOL/L (ref 3.7–5.3)
PRO-BNP: 1136 PG/ML
PROCALCITONIN: 0.08 NG/ML
RBC # BLD: 2.13 M/UL (ref 4.5–5.9)
RBC # BLD: 2.2 M/UL (ref 4.5–5.9)
RBC # BLD: 2.21 M/UL (ref 4.5–5.9)
RBC # BLD: ABNORMAL 10*6/UL
SEG NEUTROPHILS: 76 % (ref 36–66)
SEGMENTED NEUTROPHILS ABSOLUTE COUNT: 7.5 K/UL (ref 1.3–9.1)
SODIUM BLD-SCNC: 140 MMOL/L (ref 135–144)
SOURCE: NORMAL
SPECIMEN DESCRIPTION: NORMAL
STREP PNEUMONIAE ANTIGEN: NEGATIVE
VITAMIN B-12: 941 PG/ML (ref 232–1245)
WBC # BLD: 9.6 K/UL (ref 3.5–11)
WBC # BLD: 9.8 K/UL (ref 3.5–11)
WBC # BLD: 9.9 K/UL (ref 3.5–11)
WBC # BLD: ABNORMAL 10*3/UL

## 2021-08-12 PROCEDURE — 6370000000 HC RX 637 (ALT 250 FOR IP): Performed by: INTERNAL MEDICINE

## 2021-08-12 PROCEDURE — 83516 IMMUNOASSAY NONANTIBODY: CPT

## 2021-08-12 PROCEDURE — APPSS30 APP SPLIT SHARED TIME 16-30 MINUTES: Performed by: NURSE PRACTITIONER

## 2021-08-12 PROCEDURE — 87449 NOS EACH ORGANISM AG IA: CPT

## 2021-08-12 PROCEDURE — 86738 MYCOPLASMA ANTIBODY: CPT

## 2021-08-12 PROCEDURE — 6360000002 HC RX W HCPCS: Performed by: INTERNAL MEDICINE

## 2021-08-12 PROCEDURE — 86900 BLOOD TYPING SEROLOGIC ABO: CPT

## 2021-08-12 PROCEDURE — 87899 AGENT NOS ASSAY W/OPTIC: CPT

## 2021-08-12 PROCEDURE — 80048 BASIC METABOLIC PNL TOTAL CA: CPT

## 2021-08-12 PROCEDURE — 87070 CULTURE OTHR SPECIMN AEROBIC: CPT

## 2021-08-12 PROCEDURE — 85027 COMPLETE CBC AUTOMATED: CPT

## 2021-08-12 PROCEDURE — 97110 THERAPEUTIC EXERCISES: CPT

## 2021-08-12 PROCEDURE — 36415 COLL VENOUS BLD VENIPUNCTURE: CPT

## 2021-08-12 PROCEDURE — 2580000003 HC RX 258: Performed by: INTERNAL MEDICINE

## 2021-08-12 PROCEDURE — 82746 ASSAY OF FOLIC ACID SERUM: CPT

## 2021-08-12 PROCEDURE — 71045 X-RAY EXAM CHEST 1 VIEW: CPT

## 2021-08-12 PROCEDURE — 87205 SMEAR GRAM STAIN: CPT

## 2021-08-12 PROCEDURE — 84145 PROCALCITONIN (PCT): CPT

## 2021-08-12 PROCEDURE — 86920 COMPATIBILITY TEST SPIN: CPT

## 2021-08-12 PROCEDURE — 82607 VITAMIN B-12: CPT

## 2021-08-12 PROCEDURE — 2060000000 HC ICU INTERMEDIATE R&B

## 2021-08-12 PROCEDURE — 97116 GAIT TRAINING THERAPY: CPT

## 2021-08-12 PROCEDURE — 83880 ASSAY OF NATRIURETIC PEPTIDE: CPT

## 2021-08-12 PROCEDURE — 99233 SBSQ HOSP IP/OBS HIGH 50: CPT | Performed by: INTERNAL MEDICINE

## 2021-08-12 PROCEDURE — 99232 SBSQ HOSP IP/OBS MODERATE 35: CPT | Performed by: PHYSICAL MEDICINE & REHABILITATION

## 2021-08-12 PROCEDURE — 99232 SBSQ HOSP IP/OBS MODERATE 35: CPT | Performed by: INTERNAL MEDICINE

## 2021-08-12 PROCEDURE — 85025 COMPLETE CBC W/AUTO DIFF WBC: CPT

## 2021-08-12 PROCEDURE — 86850 RBC ANTIBODY SCREEN: CPT

## 2021-08-12 PROCEDURE — 86901 BLOOD TYPING SEROLOGIC RH(D): CPT

## 2021-08-12 RX ORDER — FUROSEMIDE 10 MG/ML
20 INJECTION INTRAMUSCULAR; INTRAVENOUS 2 TIMES DAILY
Status: DISCONTINUED | OUTPATIENT
Start: 2021-08-12 | End: 2021-08-13 | Stop reason: HOSPADM

## 2021-08-12 RX ORDER — SODIUM CHLORIDE 9 MG/ML
INJECTION, SOLUTION INTRAVENOUS PRN
Status: DISCONTINUED | OUTPATIENT
Start: 2021-08-12 | End: 2021-08-13 | Stop reason: HOSPADM

## 2021-08-12 RX ADMIN — CEFAZOLIN 1000 MG: 1 INJECTION, POWDER, FOR SOLUTION INTRAMUSCULAR; INTRAVENOUS at 05:16

## 2021-08-12 RX ADMIN — POLYETHYLENE GLYCOL 3350 17 G: 17 POWDER, FOR SOLUTION ORAL at 08:59

## 2021-08-12 RX ADMIN — DILTIAZEM HYDROCHLORIDE 30 MG: 30 TABLET, FILM COATED ORAL at 08:59

## 2021-08-12 RX ADMIN — SODIUM CHLORIDE 25 ML: 9 INJECTION, SOLUTION INTRAVENOUS at 05:16

## 2021-08-12 RX ADMIN — ACETAMINOPHEN 1000 MG: 500 TABLET, FILM COATED ORAL at 06:04

## 2021-08-12 RX ADMIN — ACETAMINOPHEN 1000 MG: 500 TABLET, FILM COATED ORAL at 17:49

## 2021-08-12 RX ADMIN — METOPROLOL TARTRATE 50 MG: 50 TABLET, FILM COATED ORAL at 08:58

## 2021-08-12 RX ADMIN — METOPROLOL TARTRATE 50 MG: 50 TABLET, FILM COATED ORAL at 21:46

## 2021-08-12 RX ADMIN — SODIUM CHLORIDE, PRESERVATIVE FREE 10 ML: 5 INJECTION INTRAVENOUS at 21:46

## 2021-08-12 RX ADMIN — PANTOPRAZOLE SODIUM 40 MG: 40 TABLET, DELAYED RELEASE ORAL at 18:02

## 2021-08-12 RX ADMIN — Medication 3 MG: at 22:49

## 2021-08-12 RX ADMIN — PANTOPRAZOLE SODIUM 40 MG: 40 TABLET, DELAYED RELEASE ORAL at 06:04

## 2021-08-12 RX ADMIN — DILTIAZEM HYDROCHLORIDE 30 MG: 30 TABLET, FILM COATED ORAL at 21:46

## 2021-08-12 RX ADMIN — ACETAMINOPHEN 1000 MG: 500 TABLET, FILM COATED ORAL at 00:06

## 2021-08-12 RX ADMIN — FUROSEMIDE 20 MG: 10 INJECTION, SOLUTION INTRAMUSCULAR; INTRAVENOUS at 12:50

## 2021-08-12 RX ADMIN — FUROSEMIDE 20 MG: 10 INJECTION, SOLUTION INTRAMUSCULAR; INTRAVENOUS at 17:49

## 2021-08-12 RX ADMIN — AMIODARONE HYDROCHLORIDE 200 MG: 200 TABLET ORAL at 08:59

## 2021-08-12 RX ADMIN — ACETAMINOPHEN 1000 MG: 500 TABLET, FILM COATED ORAL at 12:50

## 2021-08-12 RX ADMIN — SODIUM CHLORIDE, PRESERVATIVE FREE 10 ML: 5 INJECTION INTRAVENOUS at 09:02

## 2021-08-12 ASSESSMENT — PAIN DESCRIPTION - LOCATION
LOCATION: HIP

## 2021-08-12 ASSESSMENT — PAIN DESCRIPTION - DESCRIPTORS
DESCRIPTORS: ACHING

## 2021-08-12 ASSESSMENT — PAIN DESCRIPTION - PAIN TYPE
TYPE: SURGICAL PAIN

## 2021-08-12 ASSESSMENT — ENCOUNTER SYMPTOMS
SHORTNESS OF BREATH: 0
WHEEZING: 0
BACK PAIN: 0
EYES NEGATIVE: 1
COUGH: 0
CHEST TIGHTNESS: 0
GASTROINTESTINAL NEGATIVE: 1

## 2021-08-12 ASSESSMENT — PAIN DESCRIPTION - PROGRESSION
CLINICAL_PROGRESSION: GRADUALLY WORSENING

## 2021-08-12 ASSESSMENT — PAIN DESCRIPTION - ORIENTATION
ORIENTATION: LEFT

## 2021-08-12 ASSESSMENT — PAIN SCALES - GENERAL
PAINLEVEL_OUTOF10: 2
PAINLEVEL_OUTOF10: 1
PAINLEVEL_OUTOF10: 2

## 2021-08-12 ASSESSMENT — PAIN DESCRIPTION - FREQUENCY
FREQUENCY: INTERMITTENT

## 2021-08-12 ASSESSMENT — PAIN - FUNCTIONAL ASSESSMENT
PAIN_FUNCTIONAL_ASSESSMENT: ACTIVITIES ARE NOT PREVENTED

## 2021-08-12 ASSESSMENT — PAIN DESCRIPTION - ONSET
ONSET: GRADUAL
ONSET: GRADUAL

## 2021-08-12 NOTE — PROGRESS NOTES
Pulmonary Progress Note  [facility]      Patient - Candi Alfaro,  Age - 79 y.o.    - 1940      Room Number - -01   N -  [id number]   [facility] # - [de-identified]  Date of Admission -  8/3/2021 10:59 Nicholas Orozco MD  Primary Care Physician - No primary care provider on file. SUBJECTIVE   He seems to be doing better today. He denies cough, wheezing,  SOB or hemoptysis. He has not been using his spirometer. His main concern today is that he has not had a bowel movement in 3 days. OBJECTIVE   VITALS    height is 5' 7\" (1.702 m) and weight is 181 lb 10.5 oz (82.4 kg). His oral temperature is 97.6 °F (36.4 °C). His blood pressure is 128/59 (abnormal) and his pulse is 75. His respiration is 17 and oxygen saturation is 91%. Body mass index is 28.45 kg/m². Temperature Range: Temp: 97.6 °F (36.4 °C) Temp  Av.2 °F (36.8 °C)  Min: 97.6 °F (36.4 °C)  Max: 98.8 °F (37.1 °C)  BP Range:  Systolic (09ZUN), HPK:525 , Min:105 , YZD:237     Diastolic (40ASU), ZRD:49, Min:51, Max:59    Pulse Range: Pulse  Av  Min: 66  Max: 76  Respiration Range: Resp  Avg: 15.8  Min: 14  Max: 17  Current Pulse Ox[de-identified]  SpO2: 91 %  24HR Pulse Ox Range:  SpO2  Av.8 %  Min: 91 %  Max: 93 %  Oxygen Amount and Delivery: O2 Flow Rate (L/min): 3 L/min    Wt Readings from Last 3 Encounters:   21 181 lb 10.5 oz (82.4 kg)       I/O (24 Hours)    Intake/Output Summary (Last 24 hours) at 2021 1127  Last data filed at 2021 0651  Gross per 24 hour   Intake 150 ml   Output 2600 ml   Net -2450 ml       EXAM     General Appearance  Awake, alert, oriented, in no acute distress  HEENT - normocephalic, atraumatic.  []  Mallampati  [] Crowded airway   [] Macroglossia  []  Retrognathia  [] Micrognathia  []  Normal tongue size []  Normal Bite  [] Forrest sign positive    Neck - Supple,  trachea midline   Lungs - Mild crackles on the right. Prolonged expiratory phase. Cardiovascular - Heart sounds are normal.  Regular rate and rhythm   Abdomen - Soft, nontender, nondistended, no masses or organomegaly  Neurologic - There are no focal motor or sensory deficits  Skin - No bruising or bleeding  Extremities - No clubbing, cyanosis, edema    MEDS      amiodarone  200 mg Oral Daily    dilTIAZem  30 mg Oral 2 times per day    pantoprazole  40 mg Oral BID AC    polyethylene glycol  17 g Oral Daily    acetaminophen  1,000 mg Oral 4 times per day    metoprolol tartrate  50 mg Oral BID    sodium chloride flush  5-40 mL Intravenous 2 times per day      sodium chloride      sodium chloride 25 mL (08/12/21 0516)     sodium chloride, melatonin, magnesium sulfate, oxyCODONE, morphine, perflutren lipid microspheres, sodium chloride flush, sodium chloride, ondansetron **OR** ondansetron, polyethylene glycol, potassium chloride **OR** potassium alternative oral replacement **OR** potassium chloride    LABS   CBC   Recent Labs     08/12/21  0504   WBC 9.8   HGB 7.3*   HCT 23.1*   .6*        BMP:   Lab Results   Component Value Date     08/12/2021    K 4.5 08/12/2021     08/12/2021    CO2 26 08/12/2021    BUN 13 08/12/2021    LABALBU 2.2 08/09/2021    CREATININE 0.58 08/12/2021    CALCIUM 8.0 08/12/2021    GFRAA >60 08/12/2021    LABGLOM >60 08/12/2021     ABGs:  Lab Results   Component Value Date    PHART 7.412 08/06/2021    PO2ART 117.0 08/06/2021    KUJ9SUE 39.1 08/06/2021      Lab Results   Component Value Date    MODE PRVC 08/06/2021     Ionized Calcium:  No results found for: IONCA  Magnesium:    Lab Results   Component Value Date    MG 1.8 08/09/2021     Phosphorus:    Lab Results   Component Value Date    PHOS 2.3 08/05/2021        LIVER PROFILE No results for input(s): AST, ALT, LIPASE, BILIDIR, BILITOT, ALKPHOS in the last 72 hours. Invalid input(s):   AMYLASE,  ALB  INR No results for input(s): INR in the last 72 hours. PTT   Lab Results   Component Value Date    APTT >150.0 () 08/03/2021         RADIOLOGY       Left is today, Right is two days ago. (See actual reports for details)    ASSESSMENT/PLAN   Principal Problem:    Anemia  Active Problems:    Atrial fibrillation with RVR (HCC)    Closed fracture of left hip with routine healing    Heart failure ([facility])    Paroxysmal atrial fibrillation ([facility])  Resolved Problems:    Rhabdomyolysis    His has some crackles on his exam. He is receiving care from multiple specialities. His chest x-ray done today showed worsening infiltrates and small pleural effusions. He needs to use the spirometer at least every hour. He should be using a flutter valve at least 10 times every hour as well. He is afebrile, but will order a procalcitonin to rule out bacterial infection. His ECHO done on 8/3 showed some heart failure, including mild LVH, left atrial dilation, and mild mitral regurgitation. He received two doses of IV Lasix and does not seem to have a volume overload today. His creatinine continues to be low at 0.58. Monitor I&O. Given another lasix IV today. His current I&O is +3,293. His ProBNP went down and his hemoglobin improved (7.3 this morning). Hemoglobin was fluctuating, but will continue to monitor. If he drops below 7, he will need a blood transfusion. Continue monitoring ProBNP. Continue current regimen per cardiology for atrial fibrillation. Cardiology recommended no anticoagulants due to his anemia and history of falls. Electronically signed by BROCK Mckay III on 8/12/2021 at 11:27 AM    Patient seen and examined independently by me. Above discussed and I agree with medical student note except where indicated in the EMR revision history. Also see my additional comments and changes indicated by discrete font, text color, italics, and/or initials. Labs, cultures, and radiographs where available were reviewed.        His x-ray has not significantly improved and he still has evidence of heart failure with bilateral crackles and lower extremity edema. Discussed with cardiology Dr. Wendie Epps, will place on standing dose of IV Lasix for now 20 mg every 12 and watch his I's and O's.   Urged him to continue to use his incentive spirometer  Electronically signed by Mika Muse MD on 8/12/2021 at 12:40 PM

## 2021-08-12 NOTE — PROGRESS NOTES
GI Progress notes    8/12/2021   2:19 PM    Name:  Gregory Pizano  MRN:    [id number]     Acct:     [de-identified]   Room:  2111/2111-01   Day: 9     Admit Date: 8/3/2021 10:59 AM  PCP: No primary care provider on file. Subjective:     C/C:   Chief Complaint   Patient presents with    Leg Pain    Irregular Heart Beat       Interval History: Status: improved. Patient seen and examined. No acute events overnight. Afebrile, vital signs stable. Hemoglobin stable. No abdominal pain. No nausea, vomiting. Tolerating diet. ROS:  Constitutional: negative for chills, fevers and sweats  Gastrointestinal: negative for abdominal pain, constipation, diarrhea, nausea and vomiting    Medications:      Allergies: No Known Allergies    Current Meds: 0.9 % sodium chloride infusion, PRN  furosemide (LASIX) injection 20 mg, BID  amiodarone (CORDARONE) tablet 200 mg, Daily  dilTIAZem (CARDIZEM) tablet 30 mg, 2 times per day  pantoprazole (PROTONIX) tablet 40 mg, BID AC  polyethylene glycol (GLYCOLAX) packet 17 g, Daily  melatonin tablet 3 mg, Nightly PRN  magnesium sulfate 1000 mg in dextrose 5% 100 mL IVPB, PRN  oxyCODONE (ROXICODONE) immediate release tablet 5 mg, Q4H PRN  acetaminophen (TYLENOL) tablet 1,000 mg, 4 times per day  morphine (PF) injection 2 mg, Q2H PRN  perflutren lipid microspheres (DEFINITY) injection 2.2 mg, ONCE PRN  metoprolol tartrate (LOPRESSOR) tablet 50 mg, BID  sodium chloride flush 0.9 % injection 5-40 mL, 2 times per day  sodium chloride flush 0.9 % injection 5-40 mL, PRN  0.9 % sodium chloride infusion, PRN  ondansetron (ZOFRAN-ODT) disintegrating tablet 4 mg, Q8H PRN   Or  ondansetron (ZOFRAN) injection 4 mg, Q6H PRN  polyethylene glycol (GLYCOLAX) packet 17 g, Daily PRN  potassium chloride (KLOR-CON M) extended release tablet 40 mEq, PRN   Or  potassium bicarb-citric acid (EFFER-K) effervescent tablet 40 mEq, PRN   Or  potassium chloride 10 mEq/100 mL IVPB (Peripheral Line), PRN        Data: Code Status:  Full Code    History reviewed. No pertinent family history. Social History     Socioeconomic History    Marital status: Single     Spouse name: Not on file    Number of children: Not on file    Years of education: Not on file    Highest education level: Not on file   Occupational History    Not on file   Tobacco Use    Smoking status: Never Smoker    Smokeless tobacco: Never Used   Substance and Sexual Activity    Alcohol use: Never    Drug use: Not on file    Sexual activity: Not on file   Other Topics Concern    Not on file   Social History Narrative    Not on file     Social Determinants of Health     Financial Resource Strain:     Difficulty of Paying Living Expenses:    Food Insecurity:     Worried About Running Out of Food in the Last Year:     [address] in the Last Year:    Transportation Needs:     Lack of Transportation (Medical):  Lack of Transportation (Non-Medical):    Physical Activity:     Days of Exercise per Week:     Minutes of Exercise per Session:    Stress:     Feeling of Stress :    Social Connections:     Frequency of Communication with Friends and Family:     Frequency of Social Gatherings with Friends and Family:     Attends Hindu Services:     Active Member of Clubs or Organizations:     Attends Club or Organization Meetings:     Marital Status:    Intimate Partner Violence:     Fear of Current or Ex-Partner:     Emotionally Abused:     Physically Abused:     Sexually Abused:        Vitals:  BP (!) 104/49   Pulse 63   Temp 98.2 °F (36.8 °C) (Oral)   Resp 18   Ht 5' 7\" (1.702 m)   Wt 181 lb 10.5 oz (82.4 kg)   SpO2 92%   BMI 28.45 kg/m²   Temp (24hrs), Av.3 °F (36.8 °C), Min:97.6 °F (36.4 °C), Max:98.8 °F (37.1 °C)    No results for input(s): POCGLU in the last 72 hours. I/O (24Hr):     Intake/Output Summary (Last 24 hours) at 2021 1419  Last data filed at 2021 0651  Gross per 24 hour   Intake 150 ml to person, place and time and normal affect  Abdomen: soft, nontender, nondistended, bowel sounds present    Assessment:        Primary Problem  Anemia     Active Hospital Problems    Diagnosis Date Noted    Paroxysmal atrial fibrillation (HCC) [I48.0]     Heart failure ([facility] [I50.9] 08/10/2021    Anemia [D64.9] 08/08/2021    Closed fracture of left hip with routine healing [S72.002D] 08/04/2021    [facility] [I48.91] 08/03/2021     Past Medical History:   Diagnosis Date    Atrial fibrillation ([facility]     with RVR        Plan:        1. Anemia, FOBT positive, status post EGD revealing large duodenal ulcer, antral erosions  1. Await gastric biopsies to evaluate H. Pylori  2. Continue PPI therapy  3. Soft diet  4. Avoid NSAIDS  5. Monitor for bleeding  6. Trend H&H  7. Transfuse for hemoglobin less than 7  8. Elevated MCV-check B12, folate  9. T TGA    Explained to the patient and d/W Nursing Staff  Will F/U with you  Please call or Page for any issues or change in status  Thanks    Electronically signed by Reyes Brothers, APRN - NP on 8/12/2021 at 2:19 PM       GI attending physician note. Patient seen with APRN at about 11:30 AM.    I independently performed an evaluation on Jack Guillermo. I have reviewed the above documentation completed by the Nurse Practitioner and agree with the history, examination, and management plan. Recommendations discussed. Patient looks stable. No signs of bleeding. Abdominal pain is better. Hemoglobin has been stable. Advised to hold off anticoagulation therapy for at least 5 to 7 days. From GI point of view may be followed as an outpatient. To keep on PPI therapy. To avoid NSAIDs.

## 2021-08-12 NOTE — PROGRESS NOTES
Physical Medicine & Rehabilitation  Progress Note    8/12/2021 12:08 PM     CC: Ambulatory and ADL dysfunction due to left hip fracture status post hemiarthroplasty done on 8/5    GI-await biopsy results evaluate for H. pylori, continue PPI, avoid anti-inflammatories, soft diet-EGD showed large duodenal ulcer and antral erosions    Cardiology-continue amiodarone, Cardizem and beta-blocker, no anticoagulation follow-up in 1 to 2 weeks post discharge    Pulmonary-chest x-ray showed worsening infiltrates and small pleural effusion use spirometry, as usual hemoglobin under 7    Subjective:   No complaints. Feels well. . . Pain controlled    ROS:  Denies fevers, chills, sweats. No chest pain, palpitations, lightheadedness. Denies coughing, wheezing or shortness of breath. Denies abdominal pain, nausea, diarrhea or constipation. No new areas of joint pain. Denies new areas of numbness or weakness. Denies new anxiety or depression issues. No new skin problems. Rehabilitation:   PT:  Restrictions/Precautions: Weight Bearing, Fall Risk  Left Lower Extremity Weight Bearing: Weight Bearing As Tolerated   Transfers  Sit to Stand: Minimal Assistance  Stand to sit: Minimal Assistance  Bed to Chair: Minimal assistance  Stand Pivot Transfers: Moderate Assistance  Comment: Pt completes transfer w/ RW x2. Verbal cuing required for safe handplacement w/ tranfer from stand>sit. Sao2 improves with standing to 95%. WB Status: WBAT  Ambulation 1  Surface: level tile  Device: Rolling Walker  Assistance: Contact guard assistance  Distance: 5ft FWD/BWD x2  Comments: Pt completing ambulation w/ RW, verbal cuing provided for sequencing w/ device and postural awareness. Patient declined further amb this date.            OT:  ADL  Feeding: Setup  Grooming: Setup, Minimal assistance  UE Bathing: Setup, Maximum assistance  LE Bathing: Dependent/Total  UE Dressing: Setup, Maximum assistance  LE Dressing: Dependent/Total (footlevel dressing only addressed)  Toileting: Dependent/Total  Additional Comments: per clinical reasoning and mobility, work socrates assessment. Assist required to manage foot tasks; adjusted sizing from L to XL due to pitting for improve skin integrity. Trialled adaptive heel-toe strategy with poor tolerane. To benefit from AE training in future sessions. Writer reviewing ma catheter/importance of insight and stabilization; pt continues to require cuing for attention to ma with mobility. Balance  Sitting Balance: Supervision (static sitting EOB)  Standing Balance: Minimal assistance (x1)   Standing Balance  Time: 2min, 2min  Activity: static stand for weight bearing/mobiltiy prep; mobility to bedside chair   Comment: VC for posture with f- carryover today; difficulty with initial WB into RLE (nonsurgical extremity); VC for UE compensation with G outcome   Functional Mobility  Functional - Mobility Device: Rolling Walker  Activity: Other (few feet from edge of bed to bedside chair; same side)  Assist Level: Contact guard assistance  Functional Mobility Comments: x1, VC for safety/attention to environment; assist required for medical lines     Bed mobility  Bridging: Minimal assistance  Rolling to Left: Minimal assistance  Rolling to Right: Minimal assistance  Supine to Sit: Minimal assistance  Sit to Supine: Moderate assistance  Scooting: Contact guard assistance  Comment: To nonsurgical side; VC for sequencing to EOB; increased time for trunk upright; F+ overall tech with cuing  Transfers  Stand Step Transfers: Moderate assistance (RW)  Sit to stand: Moderate assistance  Stand to sit: Minimal assistance  Transfer Comments: VC for hand placement and posture with pt demoing heavy forward lean; requiring heavy UE compensation for upright.  Initial attempt reuquiring max assistance, progresssing to moderate using UE compensation; F+ control for stand to sit                   ST:            Objective:  BP (!) 128/59 Pulse 75   Temp 97.6 °F (36.4 °C) (Oral)   Resp 17   Ht 5' 7\" (1.702 m)   Wt 181 lb 10.5 oz (82.4 kg)   SpO2 91%   BMI 28.45 kg/m²  I Body mass index is 28.45 kg/m². I   Wt Readings from Last 1 Encounters:   21 181 lb 10.5 oz (82.4 kg)      Temp (24hrs), Av.2 °F (36.8 °C), Min:97.6 °F (36.4 °C), Max:98.8 °F (37.1 °C)         GEN: well developed, well nourished, no acute distress  HEENT: Normocephalic atraumatic, EOMI, mucous membranes pink and moist  CV: RRR, no murmurs, rubs or gallops  PULM:  Respirations WNL and unlabored  ABD: soft, NT, ND, +BS and equal  NEURO: A&O x3. Sensation intact to light touch. MSK: At least antigravity left lower extremity, 4 -/5 right lower extremity, 4/5 upper extremity  EXTREMITIES: No calf tenderness to palpation bilaterally. No edema BLEs  SKIN: warm dry and intact with good turgor, right distal lower extremity with chronic dermatitis, left hip incision with some drainage under dressing  PSYCH: appropriately interactive. Affect WNL.           Medications   Scheduled Meds:   amiodarone  200 mg Oral Daily    dilTIAZem  30 mg Oral 2 times per day    pantoprazole  40 mg Oral BID AC    polyethylene glycol  17 g Oral Daily    acetaminophen  1,000 mg Oral 4 times per day    metoprolol tartrate  50 mg Oral BID    sodium chloride flush  5-40 mL Intravenous 2 times per day     Continuous Infusions:   sodium chloride      sodium chloride 25 mL (21 0516)     PRN Meds:.sodium chloride, melatonin, magnesium sulfate, oxyCODONE, morphine, perflutren lipid microspheres, sodium chloride flush, sodium chloride, ondansetron **OR** ondansetron, polyethylene glycol, potassium chloride **OR** potassium alternative oral replacement **OR** potassium chloride     Diagnostics:     CBC:   Recent Labs     21  1656 21  1656 21  0145 21  0504   WBC 10.7  --   --  9.6 9.8   RBC 2.29*  --   --  2.13* 2.21*   HGB 7.7*   < > 7.3* 7.1* 7.3*   HCT 23.6*   < > 22.8* 22.0* 23.1*   .9*  --   --  103.3* 104.6*   RDW 16.2*  --   --  16.2* 16.7*     --   --  212 208    < > = values in this interval not displayed. BMP:   Recent Labs     08/10/21  0422 08/11/21  0403 08/12/21  0504    141 140   K 3.6* 3.9 4.5   * 107 106   CO2 23 25 26   BUN 12 13 13   CREATININE 0.48* 0.59* 0.58*     BNP: No results for input(s): BNP in the last 72 hours. PT/INR: No results for input(s): PROTIME, INR in the last 72 hours. APTT: No results for input(s): APTT in the last 72 hours. CARDIAC ENZYMES: No results for input(s): CKMB, CKMBINDEX, TROPONINT in the last 72 hours. Invalid input(s): CKTOTAL;3  FASTING LIPID PANEL:No results found for: CHOL, HDL, TRIG  LIVER PROFILE:   No results for input(s): AST, ALT, ALB, BILIDIR, BILITOT, ALKPHOS in the last 72 hours. I/O (24Hr): Intake/Output Summary (Last 24 hours) at 8/12/2021 1208  Last data filed at 8/12/2021 0651  Gross per 24 hour   Intake 150 ml   Output 2600 ml   Net -2450 ml       Glu last 24 hour  No results for input(s): POCGLU in the last 72 hours. No results for input(s): CLARITYU, COLORU, PHUR, SPECGRAV, PROTEINU, RBCUA, BLOODU, BACTERIA, NITRU, WBCUA, LEUKOCYTESUR, YEAST, GLUCOSEU, BILIRUBINUR in the last 72 hours. EGD 8/10-huge duodenal ulcer, antral erosion, will need follow-up biopsies to keep on PPI therapy    Impression:     1. Left hip fracture s/p hemiarthroplasty 8/5  2. Atrial fibrillation with RVR -no anticoagulation per cardiology due to risk of falls-, oral amiodarone, Cardizem and metoprolol, no anticoagulation per cardiology  3. Rhabdomyolysis - improving, IV fluids  4. Multiple wound/abrasions-wound care following- bilateral elbows, knees right hip abrasion, buttock wound closed sacral wound  5. Anemia-hemoglobin 7.3,  stool occult blood positive-EGD as above  6. Huge duodenal ulcer, antral erosion-Protonix  7.  Pain-Roxicodone,-morphine drip has not received  8. Leukocytosis-improved  9. Chronic dermatitis right leg        Recommendations:     1. Diagnosis:  Left hip fracture  2. Therapy: Has PT and OT needs  3. Medical Necessity: As above  4. Support: Lives alone, has a friend/neighbor that provides some assistance  5. Rehab Recommendation:  Would benefit from acute inpatient rehabilitation when medically/cardiac ready  6. DVT Prophylaxis:  Currently not on anticoagulation due to anemia, will need Doppler screen 24 to 48 hours prior to transfer, please clarify if patient can be placed on prophylactic anticoagulation-Lovenox    Davonteodilia Ralph MD       This note is created with the assistance of a speech recognition program.  While intending to generate a document that actually reflects the content of the visit, the document can still have some errors including those of syntax and sound a like substitutions which may escape proof reading.   In such instances, actual meaning can be extrapolated by contextual diversion

## 2021-08-12 NOTE — CARE COORDINATION
SW waiting for this patient to be ready to DC to the ARU. SW will follow for ARU placement when the patient is ready. SW checked and there will be no anticoagulation therefore, there is still the need to have the bilateral dopplers 24-48 hours prior to admission.

## 2021-08-12 NOTE — PROGRESS NOTES
Physician Progress Note      Dago Lloyd  [facility] #:                  [id number]  :                       1940  ADMIT DATE:       8/3/2021 10:59 AM  [address] [place] DATE:  RESPONDING  PROVIDER #:        Shaw Sandy MD          QUERY TEXT:    Pt admitted with fall at home. Pt noted to have rhabdomyolysis. If possible,   please document in progress notes and discharge summary if you are evaluating   and/or treating any of the following: The medical record reflects the following:  Risk Factors: 82yo, Falls  Clinical Indicators: per ED notes--> fall in house, laid on ground for about 5   days without any food or water, final impression traumatic rhabdomyolysis;   per IM progress notes--> rhabdomyolysis due to fall at home and prolonged time   down  Treatment: 2L IVF bolus, maintenance IVF @ 125ml/hr, monitoring, hospital   admission    Per [email or web address]  Traumatic rhabdomyolysis cause examples: crush syndrome, prolonged   immobilization  Nontraumatic rhabdomyolysis cause examples:  marked exertion, hyperthermia,   metabolic myopathy, drugs or toxins, infections, electrolyte disorders. Options provided:  -- Traumatic rhabdomyolysis  -- Nontraumatic rhabdomyolysis  -- Other - I will add my own diagnosis  -- Disagree - Not applicable / Not valid  -- Disagree - Clinically unable to determine / Unknown  -- Refer to Clinical Documentation Reviewer    PROVIDER RESPONSE TEXT:    This patient has traumatic rhabdomyolysis.     Query created by: Joyice Aschoff on 2021 11:27 AM      Electronically signed by:  Shaw Sandy MD 2021 2:05 PM

## 2021-08-12 NOTE — PROGRESS NOTES
and cooperative with exam, lethargic on exam   HEENT: Head: Normocephalic, no lesions, without obvious abnormality. Neck:no JVD, trachea midline, no adenopathy  Lungs: Diminished to auscultation but clear . On RA without distress   Heart: regular rate and rhythm, s1/s2 auscultated, no murmurs, SR 76  Abdomen: soft, non-tender, bowel sounds active  Extremities: no edema - see surgical evaluation. Denies pain presently   Neurologic: not done    Echo 8/4/21  Summary  Normal left ventricle size and function with an estimated EF > 55%. Mild left ventricular hypertrophy. Left atrial dilatation. Aortic valve was not well visualized. Mild to moderate aortic insufficiency. Mild mitral regurgitation. Mild tricuspid regurgitation. Normal right ventricular systolic pressure. IVC Increased diameter, but still has inspiratory variation. Assessment / Acute Cardiac Problems:   1. Atrial fibrillation with RVR, suboptimal rate control - back in NSR  2. Rhabdomyolysis with prolonged time spent on floor - 5 days  3. Elevated troponins, type II MI secondary to rhabdomyolysis/tachycradia/hypoxia  4. Preserved LV systolic function   5. Mechanical fall with hip fracture status post left hip hemiarthroplasty (POD # 2)   6. Acute respiratory failure, resolved   7. Preserved LVEF with Mild-mod AI, mild MR, mild TR    Plan of Treatment:   1. Remains stable and doing well from a CV standpoint. Remains in SR. Continue PO Amio, Cardizem, and BB. 2. No AC due to anemia and no long term AC due to falls. 3. No plans for further ischemic work-up. Will sign off. Pt. To f/u in clinic in 1-2 weeks post discharge. 4. Please call with further questions/concerns.     Saurabh Amado, ANDRES - CNP

## 2021-08-12 NOTE — PROGRESS NOTES
Patient has atrial fibrillation with a low hemoglobin of 7.3 g.  A candidate for RBC transfusion.   The right is a high risk for acute myocardial infarction if he develops rapid atrial fibrillation and considering his age he does have underlying coronary artery disease,   Patient was interviewed and examined by me please refer to the notes by resident

## 2021-08-12 NOTE — PLAN OF CARE
Problem: Falls - Risk of:  Goal: Will remain free from falls  Description: Will remain free from falls  8/12/2021 1843 by Akira Mccrary RN  Outcome: Met This Shift  Note: Patient was up to chair and ambulated with walker with Rehab, No injuries noted, tolerated. Problem: Falls - Risk of:  Goal: Absence of physical injury  Description: Absence of physical injury  Outcome: Ongoing     Problem: Skin Integrity:  Goal: Will show no infection signs and symptoms  Description: Will show no infection signs and symptoms  8/12/2021 1843 by Akira Mccrary RN  Note: Encouraged to deep breathe and cough. Encouraged to use Incentive spirometer and helped patient to set goals  to increase lung compacity and reduce chances of pneumonia.      Problem: Skin Integrity:  Goal: Absence of new skin breakdown  Description: Absence of new skin breakdown  8/12/2021 1843 by Akira Mccrary RN  Outcome: Ongoing     Problem: Cardiac:  Goal: Ability to maintain an adequate cardiac output will improve  Description: Ability to maintain an adequate cardiac output will improve  8/12/2021 1843 by Akira Mccrary RN  Outcome: Ongoing  Note: Strict I & O D/T fluid retention and increase of IV lasix     Problem: Cardiac:  Goal: Hemodynamic stability will improve  Description: Hemodynamic stability will improve  Outcome: Ongoing     Problem: Fluid Volume:  Goal: Ability to achieve and maintain adequate urine output will improve  Description: Ability to achieve and maintain adequate urine output will improve  Outcome: Ongoing     Problem: Respiratory:  Goal: Respiratory status will improve  Description: Respiratory status will improve  8/12/2021 1843 by Akira Mccrary RN  Outcome: Ongoing     Problem: Pain:  Goal: Pain level will decrease  Description: Pain level will decrease  8/12/2021 1843 by Akira Mccrary RN  Outcome: Ongoing     Problem: Pain:  Goal: Control of chronic pain  Description: Control of chronic pain  Outcome: Ongoing Problem: Pain:  Goal: Control of acute pain  Description: Control of acute pain  Outcome: Ongoing     Problem: Nutrition  Goal: Optimal nutrition therapy  Outcome: Ongoing     Problem: Musculor/Skeletal Functional Status  Goal: Highest potential functional level  Outcome: Ongoing     Problem: Musculor/Skeletal Functional Status  Goal: Highest potential functional level  8/12/2021 1843 by Frankie Shane RN  Outcome: Ongoing     Problem: Musculor/Skeletal Functional Status  Goal: Absence of falls  8/12/2021 1843 by Frankie Shane RN  Outcome: Ongoing     Problem: Infection - Central Venous Catheter-Associated Bloodstream Infection:  Goal: Will show no infection signs and symptoms  Description: Will show no infection signs and symptoms  8/12/2021 1843 by Frankie Shane RN  Note: Encouraged to deep breathe and cough. Encouraged to use Incentive spirometer and helped patient to set goals  to increase lung compacity and reduce chances of pneumonia.      Problem: Urinary Elimination:  Goal: Signs and symptoms of infection will decrease  Description: Signs and symptoms of infection will decrease  8/12/2021 1843 by Frankie Shane RN  Outcome: Ongoing     Problem: Urinary Elimination:  Goal: Complications related to the disease process, condition or treatment will be avoided or minimized  Description: Complications related to the disease process, condition or treatment will be avoided or minimized  Outcome: Ongoing

## 2021-08-12 NOTE — PROGRESS NOTES
[address]    Physical Therapy Progress Note    Date: 21  Patient Name: Shay Mota       Room:   MRN: [id number]   Account: [de-identified]   : 1940  (79 y.o.)   Gender: male     Discharge Recommendations   The patient would benefit from an intensive level of therapy after discharge from the facility. They should be able to tolerate 3-hours of Combined OT/PT/ST over 5 days/week or at least 900 minutes of  Combined Therapy over 7 days. Equipment Needed: No       Diagnosis: Left hip fracture s/p hemiarthroplasty . A fib, Rhabdomyolosis, anemia, Multiple wound/abrasions  Restrictions/Precautions: Weight Bearing, Fall Risk  Left Lower Extremity Weight Bearing: Weight Bearing As Tolerated   Past Medical History:  has a past medical history of Atrial fibrillation ([facility] [address] Past Surgical History:   has a past surgical history that includes hip surgery (Left, 2021) and Upper gastrointestinal endoscopy (N/A, 8/10/2021). Overall Orientation Status: Within Normal Limits  Restrictions/Precautions  Restrictions/Precautions: Weight Bearing; Fall Risk  Lower Extremity Weight Bearing Restrictions  Left Lower Extremity Weight Bearing: Weight Bearing As Tolerated    Subjective: Pt reports wanting to try and sit on bedside commode  Comments: Pt's neighbor checking in on patient and willing to get pt clothes for rehab. Pt is pleasant and agreeable to PT tx.     Vital Signs  Patient Currently in Pain: Denies                    Bed Mobility:   Bridging: Stand by assistance  Rolling: Stand by assistance (Right)  Supine to Sit: Minimal assistance (w/ trunk progression)  Sit to Supine: Unable to assess (pt left in bedside chair)  Scooting: Stand by assistance  Comment: HOB 25 degrees      Transfers:  Sit to Stand: Minimal Assistance  Stand to sit: Minimal Assistance  Bed to Chair: Minimal assistance           WB Status: WBAT  Ambulation 1  Surface: level tile  Device: Rolling Walker  Assistance: Contact guard assistance  Quality of Gait: forward flexed posture with head and gaze downward, short step length, narrow FAITH, shuffle gait  Gait Deviations: Slow Danuta;Decreased step length;Decreased step height;Shuffles  Distance: 4ft  Comments: Bed to bedside commode, straight path and turn, cues for longer stride and wider FAITH with upright posture      Ambulation 2  Surface - 2: level tile  Device 2: Rolling Walker  Assistance 2: Contact guard assistance  Quality of Gait 2: Slow pace, flexed hips and flexed knees, unsteady, narrow FAITH, head and gaze downward  Gait Deviations: Slow Danuta;Decreased step length;Decreased step height  Distance: 18ft  Comments: cues for knee extension during stance phase, cues for upright posture with head upright for improved posture     Stairs/Curb  Stairs?: No                                                     Posture: Fair  Sitting - Static: Good  Sitting - Dynamic: Good  Standing - Static: Fair (CGA with RW)  Standing - Dynamic: Fair;- (Gabriel with RW)  Comments: Standing w/ RW      Other exercises?: Yes  Other exercises 1: Seated BLE exercises, [id number] (AM & PM)  Other exercises 2: STS x2 (AM & PM)  Other exercises 3: Education provided on importance of continuing deep breathing throughout tx to prevent desat. Other exercises 4: Transfer to commode with pericare during AM Tx. Other Activities  Comment:  Frequent rest breaks PRN to allow O2 to stabilize. Activity Tolerance: Patient limited by fatigue;Patient limited by endurance  PT Equipment Recommendations  Equipment Needed: No       Assessment  Activity Tolerance: Patient limited by fatigue;Patient limited by endurance   Body structures, Functions, Activity limitations: Decreased functional mobility ; Decreased strength;Decreased balance;Decreased endurance  Assessment: Impaired mobility due to decreased tolerance to activity and fall risk due to safety issues of decreased strength and balance  Discharge Recommendations: Patient would benefit from continued therapy after discharge     Type of devices: Call light within reach;Nurse notified; All fall risk precautions in place; Left in chair (RN Marcellus Ortiz updated, PCT Melanie cleaning pt up exit)     Plan  Times per week: BID  Times per day: Twice a day  Current Treatment Recommendations: Strengthening, ROM, Balance Training, Functional Mobility Training, Transfer Training, Gait Training, Endurance Training, Safety Education & Training    Patient Education  New Education Provided:  Plan of Care  Learner:patient  Method: demonstration and explanation       Outcome: needs reinforcement     Goals  Short term goals  Time Frame for Short term goals: 3-4 days  Short term goal 1: bed mobility with Gabriel  Short term goal 2: transfers with Gabriel  Short term goal 3: gait with RW/SBA x 20ft       08/12/21 1015 08/12/21 1300   PT Individual Minutes   Time In 1015 1300   Time Out 5595 0669   Minutes 34 23       Electronically signed by Pablo Jimenez PTA on 8/12/21 at 1:33 PM EDT

## 2021-08-12 NOTE — PROGRESS NOTES
[address]    OCCUPATIONAL THERAPY MISSED TREATMENT NOTE   INPATIENT   Date: 21  Patient Name: Tammie Barrios       Room:   MRN: [id number]   Account #: [de-identified]    : 1940  (79 y.o.)  Gender: male   Diagnosis: Left hip fracture s/p hemiarthroplasty . A fib, Rhabdomyolosis, anemia, Multiple wound/abrasions             REASON FOR MISSED TREATMENT:  Patient declined at this time due to fatigue and visitor in room. RN also in room aware of pt deferring therapy at this time. OT will check back later this date as able. OT attempt at [phone]. [phone], ROSADO/L

## 2021-08-12 NOTE — PROGRESS NOTES
[facility]    PROGRESS NOTE             8/11/2021    10:12 PM    Name:   Baron Walsh  MRN:     [id number]     Acct:      [de-identified]   Room:   2111/2111-01  IP Day:  8  Admit Date:  8/3/2021 10:59 AM    PCP:  No primary care provider on file. Code Status:  Full Code    Subjective:     C/C:   Chief Complaint   Patient presents with    Leg Pain    Irregular Heart Beat     Interval History Status: improved. Patient was seen this morning at 7:30 AM, lying comfortably, no acute events overnights, on room air, and denying any chest pain, S OB, cough, abdominal pain, fever, chills. Mild inspiratory crackles on the left middle and lower zones of the chest, vital signs are stable, heart rate regular and rhythmic. Brief History: This 79-year-old male patient presented to the ED with lower lumbar weakness and was evaluated and showed to have A. fib with RVR and rhabdomyolysis, is post op day 5, left hip hemiarthroplasty. Patient heart rate is regular and rhythmic  Diltiazem and amiodarone GTT were stopped diltiazem and amiodarone p.o. were started and the patient continues to take Lopressor. .   Multiple wounds are noted since admission and are being evaluated, including stage III pressure ulcers on the buttock and an unstagable sacral ulcer, traumatic injury of the elbows and knees. Today he was noted to to have low hemoglobin of 7.4 and his EGD showed a large nonbleeding duodenal ulcer and was started on PPIs. Review of Systems:     Review of Systems   Constitutional: Negative. Eyes: Negative. Respiratory: Negative. Cardiovascular: Positive for leg swelling. Negative for chest pain and palpitations. Gastrointestinal: Negative. Genitourinary: Negative. Musculoskeletal: Positive for arthralgias. Skin: Negative. Neurological: Negative for tremors, seizures, weakness, light-headedness, numbness and headaches. Medications: Allergies:  No Known Allergies    Current Meds:   Scheduled Meds:    amiodarone  200 mg Oral Daily    dilTIAZem  30 mg Oral 2 times per day    pantoprazole  40 mg Oral BID AC    polyethylene glycol  17 g Oral Daily    ceFAZolin  1,000 mg Intravenous Q8H    acetaminophen  1,000 mg Oral 4 times per day    metoprolol tartrate  50 mg Oral BID    sodium chloride flush  5-40 mL Intravenous 2 times per day     Continuous Infusions:    sodium chloride       PRN Meds: melatonin, magnesium sulfate, oxyCODONE, morphine, perflutren lipid microspheres, sodium chloride flush, sodium chloride, ondansetron **OR** ondansetron, polyethylene glycol, potassium chloride **OR** potassium alternative oral replacement **OR** potassium chloride    Data:     Past Medical History:   has a past medical history of Atrial fibrillation ([facility] [address] Social History:   reports that he has never smoked. He has never used smokeless tobacco. He reports that he does not drink alcohol. Family History: History reviewed. No pertinent family history. Vitals:  BP (!) 113/53   Pulse 76   Temp 98.8 °F (37.1 °C) (Oral)   Resp 16   Ht 5' 7\" (1.702 m)   Wt 148 lb (67.1 kg)   SpO2 91%   BMI 23.18 kg/m²   Temp (24hrs), Av.9 °F (36.6 °C), Min:96.9 °F (36.1 °C), Max:98.8 °F (37.1 °C)    No results for input(s): POCGLU in the last 72 hours. I/O(24Hr):     Intake/Output Summary (Last 24 hours) at [address]212  Last data filed at 2021 1836  Gross per 24 hour   Intake 595.84 ml   Output 3800 ml   Net -3204.16 ml       Labs:    CBC with Differential:    Lab Results   Component Value Date    WBC 10.7 2021    RBC 2.29 2021    HGB 7.3 2021    HCT 22.8 2021     2021    .9 2021    MCH 33.4 2021    MCHC 32.5 2021    RDW 16.2 2021    LYMPHOPCT 10 2021    MONOPCT 9 2021    BASOPCT 1 2021    MONOSABS 0.80 2021    LYMPHSABS 0.90 08/11/2021    EOSABS 0.30 08/11/2021    BASOSABS 0.10 08/11/2021    DIFFTYPE NOT REPORTED 08/11/2021     BMP:    Lab Results   Component Value Date     08/11/2021    K 3.9 08/11/2021     08/11/2021    CO2 25 08/11/2021    BUN 13 08/11/2021    LABALBU 2.2 08/09/2021    CREATININE 0.59 08/11/2021    CALCIUM 7.7 08/11/2021    GFRAA >60 08/11/2021    LABGLOM >60 08/11/2021    GLUCOSE 98 08/11/2021     Albumin:    Lab Results   Component Value Date    LABALBU 2.2 08/09/2021       Lab Results   Component Value Date/Time    SPECIAL NOT REPORTED 08/03/2021 11:41 AM     Lab Results   Component Value Date/Time    CULTURE NO GROWTH 6 DAYS 08/03/2021 11:41 AM         Radiology:    ECHO Complete 2D W Doppler W Color    Result Date: 8/4/2021  [facility] Transthoracic Echocardiography Report (TTE)  Patient Name Dylan Gaitan  Date of Study                 08/04/2021   Date of      1940  Gender                        Male  Birth   Age          79 year(s)  Race                             Room Number  2087        Height:                       66.93 inch, 170 cm   Corporate ID [id number]    Weight:                       141 pounds, 64 kg  #   Patient Acct [de-identified]   BSA:           1.74 m^2       BMI:      22.15  #                                                                kg/m^2   MR #         [id number]      Sonographer                   Eva Gan   Accession #  [id number]  Interpreting Physician        [address] [place] [address]   Fellow                   Referring Nurse Practitioner   Interpreting             Referring Physician           [address] [place] [address]  Fellow  Type of Study   TTE procedure:2D Echocardiogram, M-Mode, Doppler, Color Doppler. Procedure Date Date: 08/04/2021 Start: 10:15 AM Study Location: [address] [place] [address] Technical Quality: Fair visualization Indications:Elevated troponin and Atrial fibrillation. Patient Status: Inpatient Height: 66.93 inches Weight: 141. 11 pounds BSA: 1.74 m^2 BMI: 22.15 kg/m^2 Rhythm: Atrial fibrillation HR: 129 bpm BP: 98/56 mmHg CONCLUSIONS Summary Normal left ventricle size and function with an estimated EF > 55%. Mild left ventricular hypertrophy. Left atrial dilatation. Aortic valve was not well visualized. Mild to moderate aortic insufficiency. Mild mitral regurgitation. Mild tricuspid regurgitation. Normal right ventricular systolic pressure. IVC Increased diameter, but still has inspiratory variation. Signature ----------------------------------------------------------------------------  Electronically signed by Eva Gan(Sonographer) on 08/04/2021 10:48  AM ---------------------------------------------------------------------------- ----------------------------------------------------------------------------  Electronically signed by Nico Hilton(Interpreting physician) on 08/04/2021  11:46 AM ---------------------------------------------------------------------------- FINDINGS Left Atrium Left atrial dilatation. Left Ventricle Normal left ventricle size and function with an estimated EF > 55%. No segmental wall motion abnormalities seen. Mild left ventricular hypertrophy. Right Atrium Right atrium is normal in size. Right Ventricle Normal right ventricular size and function. Mitral Valve Normal mitral valve structure and function. Mild mitral regurgitation. Aortic Valve Aortic valve was not well visualized. Mild to moderate aortic insufficiency. No aortic stenosis. Tricuspid Valve Normal tricuspid valve structure and function. Mild tricuspid regurgitation. Normal right ventricular systolic pressure. Pulmonic Valve Pulmonic valve not well visualized but Doppler velocities are normal. No pulmonic insufficiency. Pericardial Effusion No significant pericardial effusion is seen. Miscellaneous Normal aortic root dimension. E/e' average 5.3 IVC Increased diameter, but still has inspiratory variation.  M-mode / 2D Measurements & Calculations:   LVIDd:4.82 cm(3.7 - 5.6 cm)      Diastolic NLFSFX:976 ml  AXXRF:7.15 cm(2.2 - 4.0 cm)      Systolic HQYNTU:92.2 ml  EFFD:7.62 cm(0.6 - 1.1 cm)       Aortic Root:3.4 cm(2.0 - 3.7 cm)  LVPWd:1.22 cm(0.6 - 1.1 cm)      LA Dimension: 4.9 cm(1.9 - 4.0 cm)  Fractional Shortenin.72 %    LA volume/Index: 43.8 ml /25m^2  Calculated LVEF (%): 71.5 %      LVOT:2.4 cm   Mitral:                                Aortic   Peak E-Wave: 0.84 m/s                  Peak Velocity: 1.20 m/s                                         Mean Velocity: 0.88 m/s  Peak Gradient: 2.82 mmHg               Peak Gradient: 5.76 mmHg  Deceleration Time: 173 msec            Mean Gradient: 5 mmHg                                         AI P1/2t: 441 msec                                          Area (continuity): 1.83 cm^2                                         AV VTI: 24.9 cm   Tricuspid:                             Pulmonic:   Estimated RVSP: 19 mmHg  Peak TR Velocity: 1.69 m/s  Peak TR Gradient: 11.4244 mmHg  Estimated RA Pressure: 8 mmHg                                         Estimated PASP: 19.42 mmHg  Septal Wall E' velocity:0.12 m/s Lateral Wall E' velocity:0.12 m/s    XR HIP LEFT (1 VIEW)    Result Date: 2021  EXAMINATION: ONE XRAY VIEW OF THE LEFT HIP 2021 8:31 pm COMPARISON: 2021 HISTORY: ORDERING SYSTEM PROVIDED HISTORY: sp left hip hemiarthroplasty TECHNOLOGIST PROVIDED HISTORY: sp left hip hemiarthroplasty Reason for Exam: post op Acuity: Unknown Type of Exam: Unknown FINDINGS: There is interval left hip arthroplasty for repair of the left femoral neck fracture. There is edema and emphysema in the overlying soft tissues, consistent with recent surgery. Diffuse bone demineralization. Left hip arthroplasty for fixation of the left femoral neck fracture.      CT HEAD WO CONTRAST    Result Date: 8/3/2021  EXAMINATION: CT OF THE HEAD WITHOUT CONTRAST  8/3/2021 1:55 pm TECHNIQUE: CT of the head was performed without the administration of significant degenerative changes. SOFT TISSUES: There is no prevertebral soft tissue swelling. No acute abnormality of the cervical spine. XR CHEST PORTABLE    Result Date: 8/10/2021  EXAMINATION: ONE XRAY VIEW OF THE CHEST 8/10/2021 10:34 am COMPARISON: Chest radiograph performed 08/06/2021. HISTORY: ORDERING SYSTEM PROVIDED HISTORY: Dyspnea, rales on exam TECHNOLOGIST PROVIDED HISTORY: Dyspnea, rales on exam Reason for Exam: Dyspnea, rales on exam Acuity: Acute Type of Exam: Subsequent/Follow-up Additional signs and symptoms: Dyspnea, rales on exam FINDINGS: There is a right mid and lower lung infiltrate. There are small bilateral effusions. The mediastinal structures are unremarkable. The upper abdomen unremarkable. The extrathoracic soft tissues are unremarkable. Right mid and lower lung infiltrates with small bilateral effusions. XR CHEST PORTABLE    Result Date: 8/6/2021  EXAMINATION: ONE XRAY VIEW OF THE CHEST 8/6/2021 5:44 am COMPARISON: 5 August 2021 HISTORY: ORDERING SYSTEM PROVIDED HISTORY: ETT placement TECHNOLOGIST PROVIDED HISTORY: ETT placement Reason for Exam: ETT placement Acuity: Unknown Type of Exam: Ongoing Additional signs and symptoms: ETT placement Relevant Medical/Surgical History: ETT placement FINDINGS: AP portable view of the chest electronically labeled regarding sides. Tracheal tube 5 cm proximal to the kassidy. Extreme lung apices not included on this radiograph. Patchy opacity within the right lower lung. No effusions or pneumothoraces. Cardiomediastinal silhouette is within normal limits. Remaining visualized osseous and soft tissues are unchanged. Stable tracheal tube tip at the level of upper T4 vertebra. Faint right lower lung opacity, most likely infectious or inflammatory in the appropriate clinical setting.      XR CHEST PORTABLE    Result Date: 8/5/2021  EXAMINATION: ONE XRAY VIEW OF THE CHEST 8/5/2021 8:31 pm COMPARISON: 08/03/2021 HISTORY: ORDERING SYSTEM PROVIDED HISTORY: ETT placement TECHNOLOGIST PROVIDED HISTORY: ETT placement Reason for Exam: ET tube placement Acuity: Unknown Type of Exam: Unknown FINDINGS: Endotracheal tube terminates 5.5 cm above the kassidy. Cardiomediastinal silhouette is unchanged in size. Aortic atherosclerosis. No large pleural effusion or pneumothorax. There are faint bibasilar pulmonary opacities. No acute osseous abnormality. 1. Endotracheal tube in expected position. 2. Faint bibasilar opacities, which may be due to atelectasis. XR CHEST PORTABLE    Result Date: 8/3/2021  EXAMINATION: ONE XRAY VIEW OF THE CHEST 8/3/2021 11:41 am COMPARISON: None. HISTORY: ORDERING SYSTEM PROVIDED HISTORY: AMS TECHNOLOGIST PROVIDED HISTORY: AMS Reason for Exam: AMS Acuity: Unknown Type of Exam: Unknown FINDINGS: Patient is slightly rotated. Heart size within normal limits without evidence of vascular congestion. Aside from minor chronic appearing changes lungs are grossly clear. Probable skin fold projects in the left lower chest.  No focal lung consolidation is seen. No significant pleural effusions. Gas distended bowel loops beneath the left hemidiaphragm. Monitor leads overlie the chest.  The bones are osteopenic with degenerative changes; questionable age-indeterminate slight loss of height of lower thoracic vertebral bodies. No acute cardiopulmonary process suspected. CT HIP LEFT WO CONTRAST    Result Date: 8/3/2021  EXAMINATION: CT OF THE LEFT HIP WITHOUT CONTRAST 8/3/2021 1:55 pm TECHNIQUE: CT of the left hip was performed without the administration of intravenous contrast.  Multiplanar reformatted images are provided for review. Dose modulation, iterative reconstruction, and/or weight based adjustment of the mA/kV was utilized to reduce the radiation dose to as low as reasonably achievable.  COMPARISON: Pelvis and left hip radiographs August 3, 2021 HISTORY ORDERING SYSTEM PROVIDED HISTORY: ?acetabulum fx TECHNOLOGIST PROVIDED HISTORY: ?acetabulum fx Decision Support Exception - unselect if not a suspected or confirmed emergency medical condition->Emergency Medical Condition (MA) Reason for Exam: pt fell 5 days ago, in afib, lt hip pain Acuity: Unknown Type of Exam: Unknown FINDINGS: Bones: Redemonstration of displaced transcervical left femoral neck fracture which is subacute in appearance. No additional fractures are identified. The bones are osteopenic. No suspicious lytic or sclerotic osseous lesions are evident. Soft Tissue: Subcutaneous and intramuscular edema adjacent to the fracture site. No organized collections are identified. No subcutaneous gas. Visualized intrapelvic structures appear grossly unremarkable. Moderate amount of stool present at the rectal vault. Joint: Anatomic alignment of the left hip. Mild-to-moderate degenerative changes of the left hip. Chondrocalcinosis noted. 1. Displaced fracture of the left transcervical femoral neck which is subacute in appearance. 2. No additional fractures are identified. 3. Mild-to-moderate degenerative change of the left hip. Chondrocalcinosis also present. XR HIP 2-3 VW W PELVIS LEFT    Result Date: 8/3/2021  EXAMINATION: ONE XRAY VIEW OF THE PELVIS AND TWO XRAY VIEWS LEFT HIP 8/3/2021 8:41 am COMPARISON: None. HISTORY: ORDERING SYSTEM PROVIDED HISTORY: fall TECHNOLOGIST PROVIDED HISTORY: fall Reason for Exam: fall, left hip pain Acuity: Unknown Type of Exam: Unknown FINDINGS: There is a left femoral neck fracture with impaction, varus angulation, and proximal migration of the greater trochanter which is essentially at the level of the acetabulum. The femoral head appears to remain appropriately seated in the acetabular fossa. Possible transverse lucency through the medial left acetabulum.   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 Left femoral neck fracture with impaction, varus angulation, and proximal migration of the greater trochanter. Possible lucency through the medial left acetabulum, though radiographic evaluation is limited by osseous demineralization. Consider CT. Physical Examination:        Physical Exam  Constitutional:       General: He is not in acute distress. Appearance: Normal appearance. He is not ill-appearing, toxic-appearing or diaphoretic. HENT:      Head: Atraumatic. Eyes:      General: No scleral icterus. Right eye: No discharge. Left eye: No discharge. Extraocular Movements: Extraocular movements intact. Conjunctiva/sclera: Conjunctivae normal.   Cardiovascular:      Rate and Rhythm: Normal rate and regular rhythm. Pulses: Normal pulses. Heart sounds: Normal heart sounds. No murmur heard. No friction rub. No gallop. Pulmonary:      Effort: Pulmonary effort is normal. No respiratory distress. Breath sounds: No stridor. Rales (left middle and lower zone) present. No wheezing or rhonchi. Chest:      Chest wall: No tenderness. Abdominal:      General: Abdomen is flat. There is no distension. Palpations: Abdomen is soft. Tenderness: There is no abdominal tenderness. Genitourinary:     Penis: Erythema (Slightly on the left aspect of the swollen shaft) and swelling present. No tenderness, discharge or lesions. Musculoskeletal:      Right lower leg: Edema present. Left lower leg: Edema present. Skin:     General: Skin is warm. Capillary Refill: Capillary refill takes less than 2 seconds. Neurological:      General: No focal deficit present. Mental Status: He is alert and oriented to person, place, and time. Motor: No weakness.            Assessment:        Primary Problem  Atrial fibrillation with RVR [facility])    Active Hospital Problems    Diagnosis Date Noted    Paroxysmal atrial fibrillation

## 2021-08-12 NOTE — PLAN OF CARE
Problem: Falls - Risk of:  Goal: Will remain free from falls  Description: Will remain free from falls  8/12/2021 0551 by Erasmo Astorga RN  Outcome: Ongoing  Note: The patient remained free from falls this shift, call light within reach, bed in locked and lowest position. Side rails up x2. Continue to monitor closely. Problem: Skin Integrity:  Goal: Will show no infection signs and symptoms  Description: Will show no infection signs and symptoms  8/12/2021 0551 by Erasmo Astorga RN  Outcome: Ongoing  Note: Pt shows no signs or symptoms of infection at this time. VS stable, alert and oriented x4. Hand hygiene utilized upon entry and exit. Will continue to monitor. Problem: Skin Integrity:  Goal: Absence of new skin breakdown  Description: Absence of new skin breakdown  8/12/2021 0551 by Erasmo Astorga RN  Outcome: Ongoing  Note: Skin assessment complete, waffle mattress in place, patient turned and repositioned every two hours with assistance from staff. Area kept free from moisture, will continue to monitor. Problem: Cardiac:  Goal: Ability to maintain an adequate cardiac output will improve  Description: Ability to maintain an adequate cardiac output will improve  8/12/2021 0551 by Erasmo Astorga RN  Outcome: Ongoing     Problem: Respiratory:  Goal: Respiratory status will improve  Description: Respiratory status will improve  8/12/2021 0551 by Erasmo Astorga RN  Outcome: Ongoing     Problem: Pain:  Goal: Pain level will decrease  Description: Pain level will decrease  8/12/2021 0551 by Erasmo Astorga RN  Outcome: Ongoing  Note: Patient able to verbalize needs throughout this shift, given PRN medication as prescribed. Will continue to monitor.         Problem: Musculor/Skeletal Functional Status  Goal: Highest potential functional level  8/12/2021 0551 by Erasmo Astorga RN  Outcome: Ongoing     Problem: Musculor/Skeletal Functional Status  Goal: Absence of falls  8/12/2021 0551 by Erasmo Astorga RN  Outcome: Ongoing     Problem: Infection - Central Venous Catheter-Associated Bloodstream Infection:  Goal: Will show no infection signs and symptoms  Description: Will show no infection signs and symptoms  8/12/2021 0551 by Erasmo Astorga RN  Outcome: Ongoing  Note: Pt shows no signs or symptoms of infection at this time. VS stable, alert and oriented x4. Hand hygiene utilized upon entry and exit. Will continue to monitor.         Problem: Urinary Elimination:  Goal: Signs and symptoms of infection will decrease  Description: Signs and symptoms of infection will decrease  8/12/2021 0551 by Erasmo Astorga RN  Outcome: Ongoing

## 2021-08-12 NOTE — PROGRESS NOTES
[address]    PROGRESS NOTE             8/12/2021    4:58 PM    Name:   Favian Bowers  MRN:     [id number]     Acct:      [de-identified]   Room:   2111/2111-01  IP Day:  9  Admit Date:  8/3/2021 10:59 AM    PCP:  No primary care provider on file. Code Status:  Full Code    Subjective:     C/C:   Chief Complaint   Patient presents with    Leg Pain    Irregular Heart Beat     Interval History Status: not changed. Patient was seen on bedside, he is on room air, Pulse 75, /59. He denies any chest pain, SOB, dizziness, abdominal pain. Still has bilateral lower limb edema, and mild inspiratory crackles right and left middle and lower zones. Hgb was same as yesterday 7.3      Brief History: This 79-year-old male patient presented to the ED with lower lumbar weakness and was evaluated and showed to have A. fib with RVR and rhabdomyolysis, is post op day 6, left hip hemiarthroplasty. Patient heart rate is regular and rhythmic  Diltiazem and amiodarone GTT were stopped diltiazem and amiodarone p.o. were started and the patient continues to take Lopressor. Multiple wounds are noted since admission and are being evaluated and healing well, including stage III pressure ulcers on the buttock and an unstagable sacral ulcer, traumatic injury of the elbows and knees. Today he was noted to to have low hemoglobin of 7.3   EGD showed a nonbleeding duodenal ulcer and was started on PPI  Patient might be transfused as the atrial fibrillation might be due to CAD hence the patient could be transfused. Review of Systems:     Review of Systems   Constitutional: Negative for chills, fatigue and fever. HENT: Negative. Eyes: Negative. Respiratory: Negative for cough, chest tightness, shortness of breath and wheezing. Cardiovascular: Negative for chest pain and palpitations. Gastrointestinal: Negative.     Genitourinary: Positive for penile swelling. Negative for difficulty urinating, discharge, flank pain, penile pain and testicular pain. Musculoskeletal: Positive for arthralgias. Negative for back pain. Skin: Negative. Neurological: Negative for tremors, seizures, syncope, weakness, light-headedness and headaches. Psychiatric/Behavioral: Negative for agitation, behavioral problems and confusion. Medications: Allergies:  No Known Allergies    Current Meds:   Scheduled Meds:    furosemide  20 mg Intravenous BID    amiodarone  200 mg Oral Daily    dilTIAZem  30 mg Oral 2 times per day    pantoprazole  40 mg Oral BID AC    polyethylene glycol  17 g Oral Daily    acetaminophen  1,000 mg Oral 4 times per day    metoprolol tartrate  50 mg Oral BID    sodium chloride flush  5-40 mL Intravenous 2 times per day     Continuous Infusions:    sodium chloride      sodium chloride 25 mL (21 0516)     PRN Meds: sodium chloride, melatonin, magnesium sulfate, oxyCODONE, morphine, perflutren lipid microspheres, sodium chloride flush, sodium chloride, ondansetron **OR** ondansetron, polyethylene glycol, potassium chloride **OR** potassium alternative oral replacement **OR** potassium chloride    Data:     Past Medical History:   has a past medical history of Atrial fibrillation ([facility] [address] Social History:   reports that he has never smoked. He has never used smokeless tobacco. He reports that he does not drink alcohol. Family History: History reviewed. No pertinent family history. Vitals:  BP (!) 104/49   Pulse 63   Temp 98.2 °F (36.8 °C) (Oral)   Resp 18   Ht 5' 7\" (1.702 m)   Wt 181 lb 10.5 oz (82.4 kg)   SpO2 92%   BMI 28.45 kg/m²   Temp (24hrs), Av.3 °F (36.8 °C), Min:97.6 °F (36.4 °C), Max:98.8 °F (37.1 °C)    No results for input(s): POCGLU in the last 72 hours. I/O(24Hr):     Intake/Output Summary (Last 24 hours) at 2021 8957  Last data filed at 2021 1624  Gross per 24 hour   Intake 150 ml Output 4500 ml   Net -4350 ml       Labs:    CBC with Differential:    Lab Results   Component Value Date    WBC 9.9 08/12/2021    RBC 2.20 08/12/2021    HGB 7.3 08/12/2021    HCT 22.5 08/12/2021     08/12/2021    .5 08/12/2021    MCH 33.3 08/12/2021    MCHC 32.5 08/12/2021    RDW 16.3 08/12/2021    LYMPHOPCT 11 08/12/2021    MONOPCT 9 08/12/2021    BASOPCT 1 08/12/2021    MONOSABS 0.90 08/12/2021    LYMPHSABS 1.10 08/12/2021    EOSABS 0.30 08/12/2021    BASOSABS 0.10 08/12/2021    DIFFTYPE NOT REPORTED 08/12/2021     BMP:    Lab Results   Component Value Date     08/12/2021    K 4.5 08/12/2021     08/12/2021    CO2 26 08/12/2021    BUN 13 08/12/2021    LABALBU 2.2 08/09/2021    CREATININE 0.58 08/12/2021    CALCIUM 8.0 08/12/2021    GFRAA >60 08/12/2021    LABGLOM >60 08/12/2021    GLUCOSE 94 08/12/2021       Lab Results   Component Value Date/Time    SPECIAL NOT REPORTED 08/03/2021 11:41 AM     Lab Results   Component Value Date/Time    CULTURE NO GROWTH 6 DAYS 08/03/2021 11:41 AM         Radiology:    ECHO Complete 2D W Doppler W Color    Result Date: 8/4/2021  [address] [facility] Transthoracic Echocardiography Report (TTE)  Patient Name Sebastian Johnson  Date of Study                 08/04/2021   Date of      1940  Gender                        Male  Birth   Age          79 year(s)  Race                             Room Number  2087        Height:                       66.93 inch, 170 cm   Corporate ID [id number]    Weight:                       141 pounds, 64 kg  #   Patient Acct [de-identified]   BSA:           1.74 m^2       BMI:      22.15  #                                                                kg/m^2   MR #         [id number]      Sonographer                   Eva Gan   Accession #  [id number]  Interpreting Physician        [address] [place] [address]   Fellow                   Referring Nurse Practitioner   Interpreting             Referring Physician           [address] [place] [address] Fellow  Type of Study   TTE procedure:2D Echocardiogram, M-Mode, Doppler, Color Doppler. Procedure Date Date: 08/04/2021 Start: 10:15 AM Study Location: [facility] Technical Quality: Fair visualization Indications:Elevated troponin and Atrial fibrillation. Patient Status: Inpatient Height: 66.93 inches Weight: 141. 11 pounds BSA: 1.74 m^2 BMI: 22.15 kg/m^2 Rhythm: Atrial fibrillation HR: 129 bpm BP: 98/56 mmHg CONCLUSIONS Summary Normal left ventricle size and function with an estimated EF > 55%. Mild left ventricular hypertrophy. Left atrial dilatation. Aortic valve was not well visualized. Mild to moderate aortic insufficiency. Mild mitral regurgitation. Mild tricuspid regurgitation. Normal right ventricular systolic pressure. IVC Increased diameter, but still has inspiratory variation. Signature ----------------------------------------------------------------------------  Electronically signed by Eva Gan(Sonographer) on 08/04/2021 10:48  AM ---------------------------------------------------------------------------- ----------------------------------------------------------------------------  Electronically signed by Nico Hilton(Interpreting physician) on 08/04/2021  11:46 AM ---------------------------------------------------------------------------- FINDINGS Left Atrium Left atrial dilatation. Left Ventricle Normal left ventricle size and function with an estimated EF > 55%. No segmental wall motion abnormalities seen. Mild left ventricular hypertrophy. Right Atrium Right atrium is normal in size. Right Ventricle Normal right ventricular size and function. Mitral Valve Normal mitral valve structure and function. Mild mitral regurgitation. Aortic Valve Aortic valve was not well visualized. Mild to moderate aortic insufficiency. No aortic stenosis. Tricuspid Valve Normal tricuspid valve structure and function. Mild tricuspid regurgitation. Normal right ventricular systolic pressure. Pulmonic Valve Pulmonic valve not well visualized but Doppler velocities are normal. No pulmonic insufficiency. Pericardial Effusion No significant pericardial effusion is seen. Miscellaneous Normal aortic root dimension. E/e' average 5.3 IVC Increased diameter, but still has inspiratory variation. M-mode / 2D Measurements & Calculations:   LVIDd:4.82 cm(3.7 - 5.6 cm)      Diastolic QZACQ ml  TYUIC:8.51 cm(2.2 - 4.0 cm)      Systolic SREYIL:39.4 ml  TQII:1.92 cm(0.6 - 1.1 cm)       Aortic Root:3.4 cm(2.0 - 3.7 cm)  LVPWd:1.22 cm(0.6 - 1.1 cm)      LA Dimension: 4.9 cm(1.9 - 4.0 cm)  Fractional Shortenin.72 %    LA volume/Index: 43.8 ml /25m^2  Calculated LVEF (%): 71.5 %      LVOT:2.4 cm   Mitral:                                Aortic   Peak E-Wave: 0.84 m/s                  Peak Velocity: 1.20 m/s                                         Mean Velocity: 0.88 m/s  Peak Gradient: 2.82 mmHg               Peak Gradient: 5.76 mmHg  Deceleration Time: 173 msec            Mean Gradient: 5 mmHg                                         AI P1/2t: 441 msec                                          Area (continuity): 1.83 cm^2                                         AV VTI: 24.9 cm   Tricuspid:                             Pulmonic:   Estimated RVSP: 19 mmHg  Peak TR Velocity: 1.69 m/s  Peak TR Gradient: 11.4244 mmHg  Estimated RA Pressure: 8 mmHg                                         Estimated PASP: 19.42 mmHg  Septal Wall E' velocity:0.12 m/s Lateral Wall E' velocity:0.12 m/s    XR HIP LEFT (1 VIEW)    Result Date: 2021  EXAMINATION: ONE XRAY VIEW OF THE LEFT HIP 2021 8:31 pm COMPARISON: 2021 HISTORY: ORDERING SYSTEM PROVIDED HISTORY: sp left hip hemiarthroplasty TECHNOLOGIST PROVIDED HISTORY: sp left hip hemiarthroplasty Reason for Exam: post op Acuity: Unknown Type of Exam: Unknown FINDINGS: There is interval left hip arthroplasty for repair of the left femoral neck fracture.   There is edema and emphysema in the overlying soft tissues, consistent with recent surgery. Diffuse bone demineralization. Left hip arthroplasty for fixation of the left femoral neck fracture. CT HEAD WO CONTRAST    Result Date: 8/3/2021  EXAMINATION: CT OF THE HEAD WITHOUT CONTRAST  8/3/2021 1:55 pm TECHNIQUE: CT of the head was performed without the administration of intravenous contrast. Dose modulation, iterative reconstruction, and/or weight based adjustment of the mA/kV was utilized to reduce the radiation dose to as low as reasonably achievable. COMPARISON: None. HISTORY: ORDERING SYSTEM PROVIDED HISTORY: Fall TECHNOLOGIST PROVIDED HISTORY: Fall Decision Support Exception - unselect if not a suspected or confirmed emergency medical condition->Emergency Medical Condition (MA) Reason for Exam: pt fell 5 days ago, in afib Acuity: Unknown Type of Exam: Unknown FINDINGS: BRAIN/VENTRICLES: There is no acute intracranial hemorrhage, mass effect or midline shift. No abnormal extra-axial fluid collection. The gray-white differentiation is maintained without evidence of an acute infarct. There is no evidence of hydrocephalus. ORBITS: The visualized portion of the orbits demonstrate no acute abnormality. SINUSES: The visualized paranasal sinuses and mastoid air cells demonstrate no acute abnormality. SOFT TISSUES/SKULL:  No acute abnormality of the visualized skull or soft tissues. No acute intracranial abnormality. CT CERVICAL SPINE WO CONTRAST    Result Date: 8/3/2021  EXAMINATION: CT OF THE CERVICAL SPINE WITHOUT CONTRAST 8/3/2021 1:55 pm TECHNIQUE: CT of the cervical spine was performed without the administration of intravenous contrast. Multiplanar reformatted images are provided for review. Dose modulation, iterative reconstruction, and/or weight based adjustment of the mA/kV was utilized to reduce the radiation dose to as low as reasonably achievable. COMPARISON: None.  HISTORY: ORDERING SYSTEM PROVIDED HISTORY: Fall TECHNOLOGIST PROVIDED HISTORY: Fall Decision Support Exception - unselect if not a suspected or confirmed emergency medical condition->Emergency Medical Condition (MA) Reason for Exam: pt fell 5 days ago, in afib Acuity: Unknown Type of Exam: Unknown FINDINGS: BONES/ALIGNMENT: There is no acute fracture or traumatic malalignment. DEGENERATIVE CHANGES: No significant degenerative changes. SOFT TISSUES: There is no prevertebral soft tissue swelling. No acute abnormality of the cervical spine. XR CHEST PORTABLE    Result Date: 8/10/2021  EXAMINATION: ONE XRAY VIEW OF THE CHEST 8/10/2021 10:34 am COMPARISON: Chest radiograph performed 08/06/2021. HISTORY: ORDERING SYSTEM PROVIDED HISTORY: Dyspnea, rales on exam TECHNOLOGIST PROVIDED HISTORY: Dyspnea, rales on exam Reason for Exam: Dyspnea, rales on exam Acuity: Acute Type of Exam: Subsequent/Follow-up Additional signs and symptoms: Dyspnea, rales on exam FINDINGS: There is a right mid and lower lung infiltrate. There are small bilateral effusions. The mediastinal structures are unremarkable. The upper abdomen unremarkable. The extrathoracic soft tissues are unremarkable. Right mid and lower lung infiltrates with small bilateral effusions. XR CHEST PORTABLE    Result Date: 8/6/2021  EXAMINATION: ONE XRAY VIEW OF THE CHEST 8/6/2021 5:44 am COMPARISON: 5 August 2021 HISTORY: ORDERING SYSTEM PROVIDED HISTORY: ETT placement TECHNOLOGIST PROVIDED HISTORY: ETT placement Reason for Exam: ETT placement Acuity: Unknown Type of Exam: Ongoing Additional signs and symptoms: ETT placement Relevant Medical/Surgical History: ETT placement FINDINGS: AP portable view of the chest electronically labeled regarding sides. Tracheal tube 5 cm proximal to the kassidy. Extreme lung apices not included on this radiograph. Patchy opacity within the right lower lung. No effusions or pneumothoraces. Cardiomediastinal silhouette is within normal limits.   Remaining visualized osseous and soft tissues are unchanged. Stable tracheal tube tip at the level of upper T4 vertebra. Faint right lower lung opacity, most likely infectious or inflammatory in the appropriate clinical setting. XR CHEST PORTABLE    Result Date: 8/5/2021  EXAMINATION: ONE XRAY VIEW OF THE CHEST 8/5/2021 8:31 pm COMPARISON: 08/03/2021 HISTORY: ORDERING SYSTEM PROVIDED HISTORY: ETT placement TECHNOLOGIST PROVIDED HISTORY: ETT placement Reason for Exam: ET tube placement Acuity: Unknown Type of Exam: Unknown FINDINGS: Endotracheal tube terminates 5.5 cm above the kassiyd. Cardiomediastinal silhouette is unchanged in size. Aortic atherosclerosis. No large pleural effusion or pneumothorax. There are faint bibasilar pulmonary opacities. No acute osseous abnormality. 1. Endotracheal tube in expected position. 2. Faint bibasilar opacities, which may be due to atelectasis. XR CHEST PORTABLE    Result Date: 8/3/2021  EXAMINATION: ONE XRAY VIEW OF THE CHEST 8/3/2021 11:41 am COMPARISON: None. HISTORY: ORDERING SYSTEM PROVIDED HISTORY: AMS TECHNOLOGIST PROVIDED HISTORY: AMS Reason for Exam: AMS Acuity: Unknown Type of Exam: Unknown FINDINGS: Patient is slightly rotated. Heart size within normal limits without evidence of vascular congestion. Aside from minor chronic appearing changes lungs are grossly clear. Probable skin fold projects in the left lower chest.  No focal lung consolidation is seen. No significant pleural effusions. Gas distended bowel loops beneath the left hemidiaphragm. Monitor leads overlie the chest.  The bones are osteopenic with degenerative changes; questionable age-indeterminate slight loss of height of lower thoracic vertebral bodies. No acute cardiopulmonary process suspected.      CT HIP LEFT WO CONTRAST    Result Date: 8/3/2021  EXAMINATION: CT OF THE LEFT HIP WITHOUT CONTRAST 8/3/2021 1:55 pm TECHNIQUE: CT of the left hip was performed without the administration of intravenous contrast.  Multiplanar reformatted images are provided for review. Dose modulation, iterative reconstruction, and/or weight based adjustment of the mA/kV was utilized to reduce the radiation dose to as low as reasonably achievable. COMPARISON: Pelvis and left hip radiographs August 3, 2021 HISTORY ORDERING SYSTEM PROVIDED HISTORY: ?acetabulum fx TECHNOLOGIST PROVIDED HISTORY: ?acetabulum fx Decision Support Exception - unselect if not a suspected or confirmed emergency medical condition->Emergency Medical Condition (MA) Reason for Exam: pt fell 5 days ago, in afib, lt hip pain Acuity: Unknown Type of Exam: Unknown FINDINGS: Bones: Redemonstration of displaced transcervical left femoral neck fracture which is subacute in appearance. No additional fractures are identified. The bones are osteopenic. No suspicious lytic or sclerotic osseous lesions are evident. Soft Tissue: Subcutaneous and intramuscular edema adjacent to the fracture site. No organized collections are identified. No subcutaneous gas. Visualized intrapelvic structures appear grossly unremarkable. Moderate amount of stool present at the rectal vault. Joint: Anatomic alignment of the left hip. Mild-to-moderate degenerative changes of the left hip. Chondrocalcinosis noted. 1. Displaced fracture of the left transcervical femoral neck which is subacute in appearance. 2. No additional fractures are identified. 3. Mild-to-moderate degenerative change of the left hip. Chondrocalcinosis also present. XR HIP 2-3 VW W PELVIS LEFT    Result Date: 8/3/2021  EXAMINATION: ONE XRAY VIEW OF THE PELVIS AND TWO XRAY VIEWS LEFT HIP 8/3/2021 8:41 am COMPARISON: None.  HISTORY: ORDERING SYSTEM PROVIDED HISTORY: fall TECHNOLOGIST PROVIDED HISTORY: fall Reason for Exam: fall, left hip pain Acuity: Unknown Type of Exam: Unknown FINDINGS: There is a left femoral neck fracture with impaction, varus angulation, and proximal migration of the greater lower leg: Edema present. Left lower leg: Edema present. Neurological:      General: No focal deficit present. Mental Status: He is alert and oriented to person, place, and time. Cranial Nerves: No cranial nerve deficit. Sensory: No sensory deficit. Motor: No weakness. Psychiatric:         Mood and Affect: Mood normal.         Behavior: Behavior normal.         Thought Content: Thought content normal.           Assessment:        Primary Problem  Anemia    Active Hospital Problems    Diagnosis Date Noted    Paroxysmal atrial fibrillation (HCC) [I48.0]     Heart failure ([address] [I50.9] 08/10/2021    Anemia [D64.9] 08/08/2021    Closed fracture of left hip with routine healing [S72.002D] 08/04/2021    Atrial fibrillation with RVR ([address] [I48.91] 08/03/2021       Plan:        Anemia  Hgb 7.3    ==> 102.5  WBC 9.8 ==> 9.9  F/U in office for the duodenal ulcer and keep on PPI  To be Transfused 1 unit     Lung infiltrates  Lasix 20 mg IV (3rd dose)  Pro BNP 2198 ==> 1153 ==> 1136   Procal 0.08  CXR today: Increasing now moderate asymmetric infiltrates with small pleural effusions, related to progression in vascular congestion and/or pneumonia    Atrial Fib. With RVR   HR is regular and rhythmic  Cardiology on board  No AC due to anemia and no long term AC due to falls. Patient is on 200mg PO amiodarone  30 mg PO Diltiazem  20 mg PO Lopressor  To f/u in clinic in 1-2 weeks post discharge.     Multiple wounds  Healing well  Waiting for the next assessement  Cefazolin DC    Status post left hip hemiarthroplasty    Rhabdomyolysis -- Resolving  Cr 0.58 (8/12/2021)  BUN 13 (8/12/2021)    Chronic dermatitis       Mitch Johnson MD  8/12/2021  4:58 PM

## 2021-08-13 ENCOUNTER — HOSPITAL ENCOUNTER (INPATIENT)
Age: 81
LOS: 14 days | Discharge: HOME | DRG: 560 | End: 2021-08-27
Attending: INTERNAL MEDICINE | Admitting: STUDENT IN AN ORGANIZED HEALTH CARE EDUCATION/TRAINING PROGRAM
Payer: MEDICARE

## 2021-08-13 VITALS
RESPIRATION RATE: 18 BRPM | BODY MASS INDEX: 27.82 KG/M2 | HEART RATE: 71 BPM | SYSTOLIC BLOOD PRESSURE: 91 MMHG | HEIGHT: 67 IN | TEMPERATURE: 98.4 F | OXYGEN SATURATION: 93 % | WEIGHT: 177.25 LBS | DIASTOLIC BLOOD PRESSURE: 58 MMHG

## 2021-08-13 DIAGNOSIS — S72.002D CLOSED FRACTURE OF LEFT HIP WITH ROUTINE HEALING: ICD-10-CM

## 2021-08-13 DIAGNOSIS — S72.002A CLOSED LEFT HIP FRACTURE, INITIAL ENCOUNTER (HCC): Primary | ICD-10-CM

## 2021-08-13 LAB
ABSOLUTE EOS #: 0.2 K/UL (ref 0–0.4)
ABSOLUTE IMMATURE GRANULOCYTE: ABNORMAL K/UL (ref 0–0.3)
ABSOLUTE LYMPH #: 0.8 K/UL (ref 1–4.8)
ABSOLUTE MONO #: 0.8 K/UL (ref 0.1–1.3)
ANION GAP SERPL CALCULATED.3IONS-SCNC: 5 MMOL/L (ref 9–17)
BASOPHILS # BLD: 1 % (ref 0–2)
BASOPHILS ABSOLUTE: 0.1 K/UL (ref 0–0.2)
BUN BLDV-MCNC: 11 MG/DL (ref 8–23)
BUN/CREAT BLD: ABNORMAL (ref 9–20)
CALCIUM SERPL-MCNC: 7.9 MG/DL (ref 8.6–10.4)
CHLORIDE BLD-SCNC: 109 MMOL/L (ref 98–107)
CO2: 31 MMOL/L (ref 20–31)
CREAT SERPL-MCNC: 0.64 MG/DL (ref 0.7–1.2)
DIFFERENTIAL TYPE: ABNORMAL
EOSINOPHILS RELATIVE PERCENT: 3 % (ref 0–4)
GFR AFRICAN AMERICAN: >60 ML/MIN
GFR NON-AFRICAN AMERICAN: >60 ML/MIN
GFR SERPL CREATININE-BSD FRML MDRD: ABNORMAL ML/MIN/{1.73_M2}
GFR SERPL CREATININE-BSD FRML MDRD: ABNORMAL ML/MIN/{1.73_M2}
GLUCOSE BLD-MCNC: 96 MG/DL (ref 70–99)
HCT VFR BLD CALC: 22 % (ref 41–53)
HEMOGLOBIN: 7.2 G/DL (ref 13.5–17.5)
IMMATURE GRANULOCYTES: ABNORMAL %
LYMPHOCYTES # BLD: 9 % (ref 24–44)
MCH RBC QN AUTO: 33.5 PG (ref 26–34)
MCHC RBC AUTO-ENTMCNC: 32.6 G/DL (ref 31–37)
MCV RBC AUTO: 102.7 FL (ref 80–100)
MONOCYTES # BLD: 9 % (ref 1–7)
MYCOPLASMA PNEUMONIAE IGM: 0.15
NRBC AUTOMATED: ABNORMAL PER 100 WBC
PDW BLD-RTO: 16.3 % (ref 11.5–14.9)
PLATELET # BLD: 230 K/UL (ref 150–450)
PLATELET ESTIMATE: ABNORMAL
PMV BLD AUTO: 8.6 FL (ref 6–12)
POTASSIUM SERPL-SCNC: 3.9 MMOL/L (ref 3.7–5.3)
PREALBUMIN: 12 MG/DL (ref 20–40)
RBC # BLD: 2.14 M/UL (ref 4.5–5.9)
RBC # BLD: ABNORMAL 10*6/UL
SEG NEUTROPHILS: 78 % (ref 36–66)
SEGMENTED NEUTROPHILS ABSOLUTE COUNT: 7 K/UL (ref 1.3–9.1)
SODIUM BLD-SCNC: 145 MMOL/L (ref 135–144)
SURGICAL PATHOLOGY REPORT: NORMAL
WBC # BLD: 9 K/UL (ref 3.5–11)
WBC # BLD: ABNORMAL 10*3/UL

## 2021-08-13 PROCEDURE — 6370000000 HC RX 637 (ALT 250 FOR IP): Performed by: INTERNAL MEDICINE

## 2021-08-13 PROCEDURE — 99232 SBSQ HOSP IP/OBS MODERATE 35: CPT | Performed by: INTERNAL MEDICINE

## 2021-08-13 PROCEDURE — 2580000003 HC RX 258: Performed by: INTERNAL MEDICINE

## 2021-08-13 PROCEDURE — 85025 COMPLETE CBC W/AUTO DIFF WBC: CPT

## 2021-08-13 PROCEDURE — 80048 BASIC METABOLIC PNL TOTAL CA: CPT

## 2021-08-13 PROCEDURE — P9016 RBC LEUKOCYTES REDUCED: HCPCS

## 2021-08-13 PROCEDURE — 86900 BLOOD TYPING SEROLOGIC ABO: CPT

## 2021-08-13 PROCEDURE — 36415 COLL VENOUS BLD VENIPUNCTURE: CPT

## 2021-08-13 PROCEDURE — 99232 SBSQ HOSP IP/OBS MODERATE 35: CPT | Performed by: PHYSICAL MEDICINE & REHABILITATION

## 2021-08-13 PROCEDURE — 97110 THERAPEUTIC EXERCISES: CPT

## 2021-08-13 PROCEDURE — 97116 GAIT TRAINING THERAPY: CPT

## 2021-08-13 PROCEDURE — 97535 SELF CARE MNGMENT TRAINING: CPT

## 2021-08-13 PROCEDURE — 1180000000 HC REHAB R&B

## 2021-08-13 PROCEDURE — 36430 TRANSFUSION BLD/BLD COMPNT: CPT

## 2021-08-13 PROCEDURE — 97530 THERAPEUTIC ACTIVITIES: CPT

## 2021-08-13 PROCEDURE — 84134 ASSAY OF PREALBUMIN: CPT

## 2021-08-13 PROCEDURE — 6360000002 HC RX W HCPCS: Performed by: INTERNAL MEDICINE

## 2021-08-13 PROCEDURE — 99239 HOSP IP/OBS DSCHRG MGMT >30: CPT | Performed by: INTERNAL MEDICINE

## 2021-08-13 PROCEDURE — 93970 EXTREMITY STUDY: CPT

## 2021-08-13 PROCEDURE — APPSS30 APP SPLIT SHARED TIME 16-30 MINUTES: Performed by: NURSE PRACTITIONER

## 2021-08-13 RX ORDER — SODIUM CHLORIDE 9 MG/ML
25 INJECTION, SOLUTION INTRAVENOUS PRN
Status: CANCELLED | OUTPATIENT
Start: 2021-08-13

## 2021-08-13 RX ORDER — METOPROLOL TARTRATE 50 MG/1
50 TABLET, FILM COATED ORAL 2 TIMES DAILY
Status: DISCONTINUED | OUTPATIENT
Start: 2021-08-13 | End: 2021-08-26

## 2021-08-13 RX ORDER — POLYETHYLENE GLYCOL 3350 17 G/17G
17 POWDER, FOR SOLUTION ORAL DAILY
Status: DISCONTINUED | OUTPATIENT
Start: 2021-08-13 | End: 2021-08-27 | Stop reason: HOSPADM

## 2021-08-13 RX ORDER — FUROSEMIDE 20 MG/1
20 TABLET ORAL 2 TIMES DAILY
Status: DISCONTINUED | OUTPATIENT
Start: 2021-08-14 | End: 2021-08-27 | Stop reason: HOSPADM

## 2021-08-13 RX ORDER — BISACODYL 10 MG
10 SUPPOSITORY, RECTAL RECTAL DAILY PRN
Status: DISCONTINUED | OUTPATIENT
Start: 2021-08-13 | End: 2021-08-27 | Stop reason: HOSPADM

## 2021-08-13 RX ORDER — PANTOPRAZOLE SODIUM 40 MG/1
40 TABLET, DELAYED RELEASE ORAL
Status: DISCONTINUED | OUTPATIENT
Start: 2021-08-14 | End: 2021-08-27 | Stop reason: HOSPADM

## 2021-08-13 RX ORDER — POLYETHYLENE GLYCOL 3350 17 G/17G
17 POWDER, FOR SOLUTION ORAL DAILY
Status: CANCELLED | OUTPATIENT
Start: 2021-08-14

## 2021-08-13 RX ORDER — SODIUM CHLORIDE 9 MG/ML
INJECTION, SOLUTION INTRAVENOUS PRN
Status: CANCELLED | OUTPATIENT
Start: 2021-08-13

## 2021-08-13 RX ORDER — POTASSIUM CHLORIDE 20 MEQ/1
40 TABLET, EXTENDED RELEASE ORAL PRN
Status: CANCELLED | OUTPATIENT
Start: 2021-08-13

## 2021-08-13 RX ORDER — LANOLIN ALCOHOL/MO/W.PET/CERES
3 CREAM (GRAM) TOPICAL NIGHTLY PRN
Status: DISCONTINUED | OUTPATIENT
Start: 2021-08-13 | End: 2021-08-27 | Stop reason: HOSPADM

## 2021-08-13 RX ORDER — SODIUM CHLORIDE 9 MG/ML
INJECTION, SOLUTION INTRAVENOUS PRN
Status: DISCONTINUED | OUTPATIENT
Start: 2021-08-13 | End: 2021-08-13 | Stop reason: HOSPADM

## 2021-08-13 RX ORDER — LANOLIN ALCOHOL/MO/W.PET/CERES
3 CREAM (GRAM) TOPICAL NIGHTLY PRN
Status: CANCELLED | OUTPATIENT
Start: 2021-08-13

## 2021-08-13 RX ORDER — FUROSEMIDE 10 MG/ML
20 INJECTION INTRAMUSCULAR; INTRAVENOUS 2 TIMES DAILY
Status: CANCELLED | OUTPATIENT
Start: 2021-08-13

## 2021-08-13 RX ORDER — HYDROCODONE BITARTRATE AND ACETAMINOPHEN 5; 325 MG/1; MG/1
1 TABLET ORAL EVERY 6 HOURS PRN
Status: CANCELLED | OUTPATIENT
Start: 2021-08-13

## 2021-08-13 RX ORDER — AMIODARONE HYDROCHLORIDE 200 MG/1
200 TABLET ORAL DAILY
Status: CANCELLED | OUTPATIENT
Start: 2021-08-14

## 2021-08-13 RX ORDER — PANTOPRAZOLE SODIUM 40 MG/1
40 TABLET, DELAYED RELEASE ORAL
Status: CANCELLED | OUTPATIENT
Start: 2021-08-13

## 2021-08-13 RX ORDER — MORPHINE SULFATE 2 MG/ML
2 INJECTION, SOLUTION INTRAMUSCULAR; INTRAVENOUS
Status: CANCELLED | OUTPATIENT
Start: 2021-08-13

## 2021-08-13 RX ORDER — ACETAMINOPHEN 325 MG/1
650 TABLET ORAL EVERY 4 HOURS PRN
Status: DISCONTINUED | OUTPATIENT
Start: 2021-08-13 | End: 2021-08-27 | Stop reason: HOSPADM

## 2021-08-13 RX ORDER — HYDROCODONE BITARTRATE AND ACETAMINOPHEN 5; 325 MG/1; MG/1
1 TABLET ORAL EVERY 6 HOURS PRN
Status: DISCONTINUED | OUTPATIENT
Start: 2021-08-13 | End: 2021-08-13 | Stop reason: HOSPADM

## 2021-08-13 RX ORDER — AMIODARONE HYDROCHLORIDE 200 MG/1
200 TABLET ORAL DAILY
Status: DISCONTINUED | OUTPATIENT
Start: 2021-08-14 | End: 2021-08-27 | Stop reason: HOSPADM

## 2021-08-13 RX ORDER — HYDROCODONE BITARTRATE AND ACETAMINOPHEN 5; 325 MG/1; MG/1
1 TABLET ORAL EVERY 6 HOURS PRN
Status: DISCONTINUED | OUTPATIENT
Start: 2021-08-13 | End: 2021-08-27 | Stop reason: HOSPADM

## 2021-08-13 RX ORDER — SENNA PLUS 8.6 MG/1
2 TABLET ORAL DAILY PRN
Status: DISCONTINUED | OUTPATIENT
Start: 2021-08-13 | End: 2021-08-27 | Stop reason: HOSPADM

## 2021-08-13 RX ORDER — FUROSEMIDE 10 MG/ML
20 INJECTION INTRAMUSCULAR; INTRAVENOUS 2 TIMES DAILY
Status: DISCONTINUED | OUTPATIENT
Start: 2021-08-14 | End: 2021-08-13

## 2021-08-13 RX ORDER — ONDANSETRON 2 MG/ML
4 INJECTION INTRAMUSCULAR; INTRAVENOUS EVERY 6 HOURS PRN
Status: CANCELLED | OUTPATIENT
Start: 2021-08-13

## 2021-08-13 RX ORDER — MAGNESIUM SULFATE 1 G/100ML
1000 INJECTION INTRAVENOUS PRN
Status: CANCELLED | OUTPATIENT
Start: 2021-08-13

## 2021-08-13 RX ORDER — POLYETHYLENE GLYCOL 3350 17 G/17G
17 POWDER, FOR SOLUTION ORAL DAILY PRN
Status: CANCELLED | OUTPATIENT
Start: 2021-08-13

## 2021-08-13 RX ORDER — SODIUM CHLORIDE 0.9 % (FLUSH) 0.9 %
5-40 SYRINGE (ML) INJECTION PRN
Status: CANCELLED | OUTPATIENT
Start: 2021-08-13

## 2021-08-13 RX ORDER — POTASSIUM CHLORIDE 7.45 MG/ML
10 INJECTION INTRAVENOUS PRN
Status: CANCELLED | OUTPATIENT
Start: 2021-08-13

## 2021-08-13 RX ORDER — METOPROLOL TARTRATE 50 MG/1
50 TABLET, FILM COATED ORAL 2 TIMES DAILY
Status: CANCELLED | OUTPATIENT
Start: 2021-08-13

## 2021-08-13 RX ORDER — ONDANSETRON 4 MG/1
4 TABLET, ORALLY DISINTEGRATING ORAL EVERY 8 HOURS PRN
Status: CANCELLED | OUTPATIENT
Start: 2021-08-13

## 2021-08-13 RX ORDER — SODIUM CHLORIDE 0.9 % (FLUSH) 0.9 %
5-40 SYRINGE (ML) INJECTION EVERY 12 HOURS SCHEDULED
Status: CANCELLED | OUTPATIENT
Start: 2021-08-13

## 2021-08-13 RX ADMIN — PANTOPRAZOLE SODIUM 40 MG: 40 TABLET, DELAYED RELEASE ORAL at 06:09

## 2021-08-13 RX ADMIN — AMIODARONE HYDROCHLORIDE 200 MG: 200 TABLET ORAL at 15:58

## 2021-08-13 RX ADMIN — POLYETHYLENE GLYCOL 3350 17 G: 17 POWDER, FOR SOLUTION ORAL at 10:00

## 2021-08-13 RX ADMIN — PANTOPRAZOLE SODIUM 40 MG: 40 TABLET, DELAYED RELEASE ORAL at 15:58

## 2021-08-13 RX ADMIN — ACETAMINOPHEN 1000 MG: 500 TABLET, FILM COATED ORAL at 00:07

## 2021-08-13 RX ADMIN — ACETAMINOPHEN 1000 MG: 500 TABLET, FILM COATED ORAL at 06:09

## 2021-08-13 RX ADMIN — SODIUM CHLORIDE, PRESERVATIVE FREE 10 ML: 5 INJECTION INTRAVENOUS at 10:01

## 2021-08-13 RX ADMIN — METOPROLOL TARTRATE 50 MG: 50 TABLET, FILM COATED ORAL at 10:00

## 2021-08-13 RX ADMIN — FUROSEMIDE 20 MG: 10 INJECTION, SOLUTION INTRAMUSCULAR; INTRAVENOUS at 10:00

## 2021-08-13 RX ADMIN — DILTIAZEM HYDROCHLORIDE 30 MG: 30 TABLET, FILM COATED ORAL at 10:00

## 2021-08-13 ASSESSMENT — PAIN DESCRIPTION - FREQUENCY: FREQUENCY: INTERMITTENT

## 2021-08-13 ASSESSMENT — PAIN SCALES - GENERAL
PAINLEVEL_OUTOF10: 3
PAINLEVEL_OUTOF10: 2
PAINLEVEL_OUTOF10: 0

## 2021-08-13 ASSESSMENT — PAIN DESCRIPTION - PAIN TYPE: TYPE: SURGICAL PAIN

## 2021-08-13 ASSESSMENT — PAIN DESCRIPTION - PROGRESSION: CLINICAL_PROGRESSION: GRADUALLY WORSENING

## 2021-08-13 ASSESSMENT — PAIN - FUNCTIONAL ASSESSMENT: PAIN_FUNCTIONAL_ASSESSMENT: ACTIVITIES ARE NOT PREVENTED

## 2021-08-13 ASSESSMENT — PAIN DESCRIPTION - ORIENTATION: ORIENTATION: LEFT

## 2021-08-13 ASSESSMENT — PAIN DESCRIPTION - DESCRIPTORS: DESCRIPTORS: ACHING

## 2021-08-13 ASSESSMENT — PAIN DESCRIPTION - ONSET: ONSET: GRADUAL

## 2021-08-13 ASSESSMENT — PAIN DESCRIPTION - LOCATION: LOCATION: HIP

## 2021-08-13 NOTE — PLAN OF CARE
Problem: Falls - Risk of:  Goal: Will remain free from falls  Description: Will remain free from falls  8/13/2021 0337 by Mary Ann Pedroza RN  Outcome: Ongoing  Note: The patient remained free from falls this shift, call light within reach, bed in locked and lowest position. Side rails up x2. Continue to monitor closely. Problem: Skin Integrity:  Goal: Will show no infection signs and symptoms  Description: Will show no infection signs and symptoms  8/13/2021 0337 by Mary Ann Pedroza RN  Outcome: Ongoing  Note: Pt shows no signs or symptoms of infection at this time. VS stable, alert and oriented x4. Hand hygiene utilized upon entry and exit. Will continue to monitor. Problem: Cardiac:  Goal: Ability to maintain an adequate cardiac output will improve  Description: Ability to maintain an adequate cardiac output will improve  8/13/2021 0337 by Mary Ann Pedroza RN  Outcome: Ongoing     Problem: Fluid Volume:  Goal: Ability to achieve and maintain adequate urine output will improve  Description: Ability to achieve and maintain adequate urine output will improve  8/13/2021 0337 by Mary Ann Pedroza RN  Outcome: Ongoing     Problem: Respiratory:  Goal: Respiratory status will improve  Description: Respiratory status will improve  8/13/2021 0337 by Mary Ann Pedroza RN  Outcome: Ongoing     Problem: Pain:  Goal: Pain level will decrease  Description: Pain level will decrease  8/13/2021 0337 by Mary Ann Pedroza RN  Outcome: Ongoing  Note: Patient able to verbalize pain throughout this shift, given PRN medication as prescribed. Will continue to monitor.         Problem: Musculor/Skeletal Functional Status  Goal: Highest potential functional level  8/13/2021 0337 by Mary Ann Pedroza RN  Outcome: Ongoing

## 2021-08-13 NOTE — PROGRESS NOTES
[facility]  Progress Note                   Date:   8/13/2021  Patient name: Clearance Grain  Date of admission:  8/3/2021 10:59 AM  MRN:   [id number]  YOB: 1940  PCP: No primary care provider on file. Reason for Admission: A-fib ([facility] [address] [I48.91]    Subjective:       Clinical Changes /Abnormalities:   Pt. Seen & examined in room resting quietly. No acute CV issue/concerns overnight. Labs, vitals, & tele reviewed. Remains SR 70's. Working on PT/OT and rehab placement. Medications:   Scheduled Meds:   furosemide  20 mg Intravenous BID    amiodarone  200 mg Oral Daily    dilTIAZem  30 mg Oral 2 times per day    pantoprazole  40 mg Oral BID AC    polyethylene glycol  17 g Oral Daily    metoprolol tartrate  50 mg Oral BID    sodium chloride flush  5-40 mL Intravenous 2 times per day     Continuous Infusions:   sodium chloride      sodium chloride      sodium chloride 25 mL (08/12/21 0516)     CBC:   Recent Labs     08/12/21  0504 08/12/21  1511 08/13/21  0458   WBC 9.8 9.9 9.0   HGB 7.3* 7.3* 7.2*    240 230     BMP:    Recent Labs     08/11/21  0403 08/12/21  0504 08/13/21  0458    140 145*   K 3.9 4.5 3.9    106 109*   CO2 25 26 31   BUN 13 13 11   CREATININE 0.59* 0.58* 0.64*   GLUCOSE 98 94 96     Hepatic:  No results for input(s): AST, ALT, ALB, BILITOT, ALKPHOS in the last 72 hours. Troponin:   No results for input(s): TROPHS in the last 72 hours. BNP: No results for input(s): BNP in the last 72 hours. Lipids: No results for input(s): CHOL, HDL in the last 72 hours. Invalid input(s): LDLCALCU  INR:   No results for input(s): INR in the last 72 hours.     Objective:   Vitals: BP (!) 107/43   Pulse 69   Temp 97.9 °F (36.6 °C) (Oral)   Resp 18   Ht 5' 7\" (1.702 m)   Wt 177 lb 4 oz (80.4 kg)   SpO2 93%   BMI 27.76 kg/m²   General appearance: alert and cooperative with exam, lethargic on exam   HEENT: Head: Normocephalic, no lesions, without obvious abnormality. Neck:no JVD, trachea midline, no adenopathy  Lungs: Diminished to auscultation but clear . On RA without distress   Heart: regular rate and rhythm, s1/s2 auscultated, no murmurs, SR 76  Abdomen: soft, non-tender, bowel sounds active  Extremities: no edema - see surgical evaluation. Denies pain presently   Neurologic: not done    Echo 8/4/21  Summary  Normal left ventricle size and function with an estimated EF > 55%. Mild left ventricular hypertrophy. Left atrial dilatation. Aortic valve was not well visualized. Mild to moderate aortic insufficiency. Mild mitral regurgitation. Mild tricuspid regurgitation. Normal right ventricular systolic pressure. IVC Increased diameter, but still has inspiratory variation. Assessment / Acute Cardiac Problems:   1. Atrial fibrillation with RVR, suboptimal rate control - back in NSR  2. Rhabdomyolysis with prolonged time spent on floor - 5 days  3. Elevated troponins, type II MI secondary to rhabdomyolysis/tachycradia/hypoxia  4. Preserved LV systolic function   5. Mechanical fall with hip fracture status post left hip hemiarthroplasty (POD # 2)   6. Acute respiratory failure, resolved   7. Preserved LVEF with Mild-mod AI, mild MR, mild TR    Plan of Treatment:   1. Remains stable and doing well from a CV standpoint. Remains in SR. Continue PO Amio, Cardizem, and BB. 2. No AC due to anemia and no long term AC due to falls. This was discussed in detail with patient today. Questions/concerns addressed. Reviewed in detail symptoms of Afib including but not limited to palpitations, dizziness, fatigue. 3. No plans for further ischemic work-up. Will sign off. Pt. To f/u in clinic in 1-2 weeks post discharge.        Travis Osler, ANDRES - CNP

## 2021-08-13 NOTE — PROGRESS NOTES
intro to sock aid use with increased time/setup trialled 2x with Min VC for tech. Handout provided for intro to ADL DME and LB AE. PM: demo for reacher use to manage floor level items and for footie mgt. Good V sequence, warrants hands on training. Review on reacher vs dressing stick. Positioning  Adaptations: socks adjusted from XL to XXL to reduce edema pitting for improved skin integrity with G result; use of adaptive LB dressing equipment for compensation of difficulty managing lower body tasks; visual compensation      Breathing strategies for pain mgt with fair carryover. Assessment  Performance deficits / Impairments: Decreased functional mobility ; Decreased high-level IADLs;Decreased ADL status; Decreased endurance;Decreased balance;Decreased safe awareness  Assessment: has no bed/bath on main and only neighbor friend for support, was using urinal and sleep on couch, neighbor mowing grass (since injury 2 months ago) OT ed pt need for inpt therapy and to obtain [facility] and twin bed for main floor and have assist with laundry, cleaning, empty BSC in addition to already order food online and have neighbor do yardwork.; improved overall task tolerance and standing tasks  Prognosis: Good  Activity Tolerance: Patient Tolerated treatment well  Safety Devices in place: Yes  Type of devices: Nurse notified;Call light within reach; All fall risk precautions in place;Gait belt;Left in chair  Equipment Recommendations  Equipment Needed:  (TBD)       Patient Education:  AE, compensatory strategies, breathing  Patient Goals   Patient goals : return home indep  Learner:patient  Method: demonstration and explanation       Outcome: needs reinforcement        Plan  Plan  Times per week: 4-5 (BID)  Times per day: Twice a day (per OT eval )  Current Treatment Recommendations: Safety Education & Training, Balance Training, Patient/Caregiver Education & Training, Self-Care / ADL, Functional Mobility Training, Equipment Evaluation, Education, & procurement, Home Management Training, Endurance Training  Patient Goals   Patient goals : return home indep  Short term goals  Time Frame for Short term goals: 1 week  Short term goal 1: set up UE bathe and dress and groom  Short term goal 2: max LE bathe and dress  Short term goal 3: socrates 5-6 min stand, amb with RW and min A for adls  Short term goal 4: min A adl transfers and toileting        08/13/21 1240 08/13/21 1540   OT Individual Minutes   Time In [id number]   Time Out 6410 [id number]   Minutes 30 30     Electronically signed by Seleta Goldberg Leu, OTA on 8/13/21 at 4:16 PM EDT

## 2021-08-13 NOTE — PROGRESS NOTES
support due to afib with RVR confirmed  -- Acute Respiratory Failure as evidenced by, Please document evidence. -- Other - I will add my own diagnosis  -- Disagree - Not applicable / Not valid  -- Disagree - Clinically unable to determine / Unknown  -- Refer to Clinical Documentation Reviewer    PROVIDER RESPONSE TEXT:    Provider disagreed with this query. send to pulm    Query created by: Juan Luis Mustafa on 8/9/2021 10:28 AM      QUERY TEXT:    Patient admitted with afib with RVR. Noted documentation of type II MI in   progress notes 8/7 & 8/8. In order to support the diagnosis of type II MI,   please refer to 4th universal definition of MI below and include additional   clinical indicators in your documentation. Or please document if the   diagnosis of type II MI has been ruled out after study. The medical record reflects the following:  Risk Factors: s/p fall, down for few days and unable to get up per self,   atrial fibrillation with RVR, Rhabdomyolysis  Clinical Indicators: 8/4 cardiology consult note:  patient denies chest pain   and shortness of breath;  high sensitivity troponins: [id number];  8/3 12   lead EKG:  Atrial fibrillation with rapid ventricular response, Repolarization   abnormalities, Septal infarct , age undetermined; 8/4  ECHO:  EF >55%, mild   LVH, no documented wall motion abnormalities  Treatment:  diagnostic testing as above;  heparin gtt;  diltiazem gtt and   amiodarone for atrial fibrillation, digoxin, lopressor, monitoring on   telemetry    Fourth Universal Definition of Myocardial Infarction:   To have a myocardial   infarction requires both an elevated troponin blood test along with at least   one of the following:  - Symptoms of acute myocardial ischemia (Types 1 - 5 MI)  - Clinical evidence of ischemia, as evidenced in an electrocardiogram (EKG)   showing new ischemic changes (Type 1, Type 2, Type 3, or Type 4a MI)  - Development of pathological Q waves (Types 1 - 5 MI)  - Imaging evidence of new loss of viable myocardium or new regional wall   motion abnormality in a pattern consistent with an ischemic etiology (Types 1   - 5 MI)  Options provided:  -- MI type II present as evidenced by, Please document evidence. -- MI type II ruled out after study and demand ischemia due to rhabdomyolysis   and afib with RVR confirmed  -- Other - I will add my own diagnosis  -- Disagree - Not applicable / Not valid  -- Disagree - Clinically unable to determine / Unknown  -- Refer to Clinical Documentation Reviewer    PROVIDER RESPONSE TEXT:    Type II MI was ruled out after study and demand ischemia due to rhabdomyolysis   and afib with RVR confirmed.     Query created by: Shanell Hendricks on 8/9/2021 10:43 AM      Electronically signed by:  Shireen Mathias DO 8/13/2021 9:07 AM

## 2021-08-13 NOTE — PROGRESS NOTES
GI Progress notes    8/13/2021   12:45 PM    Name:  Shay Mota  MRN:    [id number]     Acct:     [de-identified]   Room:  2111/2111-01   Day: 8     Admit Date: 8/3/2021 10:59 AM  PCP: No primary care provider on file. Subjective:     C/C:   Chief Complaint   Patient presents with    Leg Pain    Irregular Heart Beat       Interval History: Status: not changed. Patient seen and examined. No acute events overnight. No abdominal pain, nausea, vomiting  Tolerating diet  Will get 1 unit of PRBCs for hgb 7.3  Planning d/c to ARU    ROS:  Constitutional: negative for chills, fevers and sweats  Gastrointestinal: negative for abdominal pain, constipation, diarrhea, nausea and vomiting    Medications:      Allergies: No Known Allergies    Current Meds: HYDROcodone-acetaminophen (NORCO) 5-325 MG per tablet 1 tablet, Q6H PRN  0.9 % sodium chloride infusion, PRN  0.9 % sodium chloride infusion, PRN  furosemide (LASIX) injection 20 mg, BID  amiodarone (CORDARONE) tablet 200 mg, Daily  dilTIAZem (CARDIZEM) tablet 30 mg, 2 times per day  pantoprazole (PROTONIX) tablet 40 mg, BID AC  polyethylene glycol (GLYCOLAX) packet 17 g, Daily  melatonin tablet 3 mg, Nightly PRN  magnesium sulfate 1000 mg in dextrose 5% 100 mL IVPB, PRN  morphine (PF) injection 2 mg, Q2H PRN  perflutren lipid microspheres (DEFINITY) injection 2.2 mg, ONCE PRN  metoprolol tartrate (LOPRESSOR) tablet 50 mg, BID  sodium chloride flush 0.9 % injection 5-40 mL, 2 times per day  sodium chloride flush 0.9 % injection 5-40 mL, PRN  0.9 % sodium chloride infusion, PRN  ondansetron (ZOFRAN-ODT) disintegrating tablet 4 mg, Q8H PRN   Or  ondansetron (ZOFRAN) injection 4 mg, Q6H PRN  polyethylene glycol (GLYCOLAX) packet 17 g, Daily PRN  potassium chloride (KLOR-CON M) extended release tablet 40 mEq, PRN   Or  potassium bicarb-citric acid (EFFER-K) effervescent tablet 40 mEq, PRN   Or  potassium chloride 10 mEq/100 mL IVPB (Peripheral Line), PRN        Data: Code Status:  Full Code    History reviewed. No pertinent family history. Social History     Socioeconomic History    Marital status: Single     Spouse name: Not on file    Number of children: Not on file    Years of education: Not on file    Highest education level: Not on file   Occupational History    Not on file   Tobacco Use    Smoking status: Never Smoker    Smokeless tobacco: Never Used   Substance and Sexual Activity    Alcohol use: Never    Drug use: Not on file    Sexual activity: Not on file   Other Topics Concern    Not on file   Social History Narrative    Not on file     Social Determinants of Health     Financial Resource Strain:     Difficulty of Paying Living Expenses:    Food Insecurity:     Worried About Running Out of Food in the Last Year:     [address] in the Last Year:    Transportation Needs:     Lack of Transportation (Medical):  Lack of Transportation (Non-Medical):    Physical Activity:     Days of Exercise per Week:     Minutes of Exercise per Session:    Stress:     Feeling of Stress :    Social Connections:     Frequency of Communication with Friends and Family:     Frequency of Social Gatherings with Friends and Family:     Attends Congregational Services:     Active Member of Clubs or Organizations:     Attends Club or Organization Meetings:     Marital Status:    Intimate Partner Violence:     Fear of Current or Ex-Partner:     Emotionally Abused:     Physically Abused:     Sexually Abused:        Vitals:  BP (!) 108/51   Pulse 62   Temp 97.6 °F (36.4 °C) (Axillary)   Resp 16   Ht 5' 7\" (1.702 m)   Wt 177 lb 4 oz (80.4 kg)   SpO2 94%   BMI 27.76 kg/m²   Temp (24hrs), Av.9 °F (36.6 °C), Min:97.6 °F (36.4 °C), Max:98.1 °F (36.7 °C)    No results for input(s): POCGLU in the last 72 hours. I/O (24Hr):     Intake/Output Summary (Last 24 hours) at 2021 1245  Last data filed at 2021 0801  Gross per 24 hour   Intake 390 ml Output 4750 ml   Net -4360 ml       Labs:      CBC:   Lab Results   Component Value Date    WBC 9.0 08/13/2021    RBC 2.14 08/13/2021    HGB 7.2 08/13/2021    HCT 22.0 08/13/2021    .7 08/13/2021    MCH 33.5 08/13/2021    MCHC 32.6 08/13/2021    RDW 16.3 08/13/2021     08/13/2021    MPV 8.6 08/13/2021     CBC with Differential:    Lab Results   Component Value Date    WBC 9.0 08/13/2021    RBC 2.14 08/13/2021    HGB 7.2 08/13/2021    HCT 22.0 08/13/2021     08/13/2021    .7 08/13/2021    MCH 33.5 08/13/2021    MCHC 32.6 08/13/2021    RDW 16.3 08/13/2021    LYMPHOPCT 9 08/13/2021    MONOPCT 9 08/13/2021    BASOPCT 1 08/13/2021    MONOSABS 0.80 08/13/2021    LYMPHSABS 0.80 08/13/2021    EOSABS 0.20 08/13/2021    BASOSABS 0.10 08/13/2021    DIFFTYPE NOT REPORTED 08/13/2021     Hemoglobin/Hematocrit:    Lab Results   Component Value Date    HGB 7.2 08/13/2021    HCT 22.0 08/13/2021     CMP:    Lab Results   Component Value Date     08/13/2021    K 3.9 08/13/2021     08/13/2021    CO2 31 08/13/2021    BUN 11 08/13/2021    CREATININE 0.64 08/13/2021    GFRAA >60 08/13/2021    LABGLOM >60 08/13/2021    GLUCOSE 96 08/13/2021    PROT 4.5 08/09/2021    LABALBU 2.2 08/09/2021    CALCIUM 7.9 08/13/2021    BILITOT 0.58 08/09/2021    ALKPHOS 102 08/09/2021    AST 26 08/09/2021    ALT 11 08/09/2021     BMP:    Lab Results   Component Value Date     08/13/2021    K 3.9 08/13/2021     08/13/2021    CO2 31 08/13/2021    BUN 11 08/13/2021    LABALBU 2.2 08/09/2021    CREATININE 0.64 08/13/2021    CALCIUM 7.9 08/13/2021    GFRAA >60 08/13/2021    LABGLOM >60 08/13/2021    GLUCOSE 96 08/13/2021     PT/INR:    Lab Results   Component Value Date    PROTIME 15.6 08/03/2021    INR 1.2 08/03/2021     PTT:    Lab Results   Component Value Date    APTT >150.0 08/03/2021   [APTT}    Physical Examination:        General appearance: alert, cooperative and no distress  Mental Status: oriented to person, place and time and normal affect  Abdomen: soft, nontender, nondistended, bowel sounds present    Assessment:        Primary Problem  Anemia     Active Hospital Problems    Diagnosis Date Noted    Paroxysmal atrial fibrillation (HCC) [I48.0]     Heart failure ([facility] [I50.9] 08/10/2021    Anemia [D64.9] 08/08/2021    Closed fracture of left hip with routine healing [S72.002D] 08/04/2021    Atrial fibrillation with RVR ([facility] [I48.91] 08/03/2021     Past Medical History:   Diagnosis Date    Atrial fibrillation ([facility]     with RVR        Plan:        1. Anemia, FOBT positive, status post EGD revealing large duodenal ulcer, antral erosions  1. Bx positive for H pylori - will treat in office outpatient  2. Continue PPI therapy  3. Soft diet  4. Avoid NSAIDS  5. Monitor for bleeding  6. Trend H&H  7. Transfuse for hemoglobin less than 7  8. T TGA - pending    Explained to the patient and d/W Nursing Staff  Will F/U with you  Please call or Page for any issues or change in status  Thanks  . Electronically signed by ANDRES Metcalf NP on 8/13/2021 at 12:45 PM     GI attending physician note. Patient seen with APRN at 11 AM    I independently performed an evaluation on Climmie Halt. I have reviewed the above documentation completed by the Nurse Practitioner and agree with the history, examination, and management plan. Recommendations discussed. Patient seen around 11 AM along with APRN. Patient was doing well. Tolerating diet. Hemoglobin has been stable. Discussed with nursing staff. From GI point of view patient may be discharged to rehab. Not to take NSAIDs. Continue PPI therapy. Patient is advised to see in the office in the next 3 to 4 weeks.

## 2021-08-13 NOTE — PROGRESS NOTES
[address]    PROGRESS NOTE             8/13/2021    12:36 PM    Name:   Vaibhav Del Cid  MRN:     [id number]     Acct:      [de-identified]   Room:   2111/2111-01  IP Day:  10  Admit Date:  8/3/2021 10:59 AM    PCP:  No primary care provider on file. Code Status:  Full Code    Subjective:     C/C:   Chief Complaint   Patient presents with    Leg Pain    Irregular Heart Beat     Interval History Status: improved. Patient was seen and examined at bedside, he is on room air. His vital signs are stable. Blood pressure is 108/51. Respiratory rate and pulse are normal.  Patient denies any chest pain, shortness of breath, dizziness, abdominal pain, or any new aches and pains. He reports he has been tolerating full liquid diet without nausea or vomiting. He reports that he feels stronger and is able to bear weight better on his left leg then he was prior to the fall and surgery. He has been working with PT and been able to ambulate with assistance. Hemoglobin is unchanged at 7.2. Blood transfusion was not administered yesterday due to hemoglobin not falling below 7.0. Patient reports he had a bowel movement yesterday which was normal in consistency. He is making good urine that is clear and straw-colored. Jimenes catheter has been in for 7 days. Patient will be offered blood transfusion today due to his A. fib and likely CAD, consent will be obtained. Brief History:      This 57-year-old male patient presented to the ED with lower lumbar weakness and was evaluated and showed to have A. fib with RVR and rhabdomyolysis, is post op day 6, left hip hemiarthroplasty.    Patient heart rate is regular and rhythmic  Diltiazem and amiodarone GTT were stopped diltiazem and amiodarone p.o. were started and the patient continues to take Lopressor.     Multiple wounds are noted since admission and are being evaluated and healing well, including stage III pressure ulcers on the buttock and an unstagable sacral ulcer, traumatic injury of the elbows and knees. EGD showed a nonbleeding duodenal ulcer and was started on PPI  Patient might be transfused as the atrial fibrillation might be due to CAD hence the patient could be transfused. Bilateral lower extremity Dopplers were ordered as clearance prior to transfer to ARU.  2021 Dopplers bilateral lower extremities were read as negative for DVT. Review of Systems:     Review of Systems   All other systems reviewed and are negative. Medications: Allergies:  No Known Allergies    Current Meds:   Scheduled Meds:    furosemide  20 mg Intravenous BID    amiodarone  200 mg Oral Daily    dilTIAZem  30 mg Oral 2 times per day    pantoprazole  40 mg Oral BID AC    polyethylene glycol  17 g Oral Daily    metoprolol tartrate  50 mg Oral BID    sodium chloride flush  5-40 mL Intravenous 2 times per day     Continuous Infusions:    sodium chloride      sodium chloride      sodium chloride 25 mL (21 0516)     PRN Meds: HYDROcodone-acetaminophen, sodium chloride, sodium chloride, melatonin, magnesium sulfate, morphine, perflutren lipid microspheres, sodium chloride flush, sodium chloride, ondansetron **OR** ondansetron, polyethylene glycol, potassium chloride **OR** potassium alternative oral replacement **OR** potassium chloride    Data:     Past Medical History:   has a past medical history of Atrial fibrillation ([address] Social History:   reports that he has never smoked. He has never used smokeless tobacco. He reports that he does not drink alcohol. Family History: History reviewed. No pertinent family history.     Vitals:  BP (!) 108/51   Pulse 62   Temp 97.6 °F (36.4 °C) (Axillary)   Resp 16   Ht 5' 7\" (1.702 m)   Wt 177 lb 4 oz (80.4 kg)   SpO2 94%   BMI 27.76 kg/m²   Temp (24hrs), Av.9 °F (36.6 °C), Min:97.6 °F (36.4 °C), Max:98.1 °F (36.7 °C)    No results for input(s): POCGLU in the last 72 hours. I/O(24Hr): Intake/Output Summary (Last 24 hours) at 8/13/2021 1236  Last data filed at 8/13/2021 0801  Gross per 24 hour   Intake 390 ml   Output 4750 ml   Net -4360 ml       Labs:    Lab Results   Component Value Date/Time    SPECIAL NOT REPORTED 08/12/2021 04:36 PM     Lab Results   Component Value Date/Time    CULTURE NOT REPORTED 08/12/2021 04:36 PM         Radiology:    ECHO Complete 2D W Doppler W Color    Result Date: 8/4/2021  [facility] Transthoracic Echocardiography Report (TTE)  Patient Name Raji Underwood  Date of Study                 08/04/2021   Date of      1940  Gender                        Male  Birth   Age          79 year(s)  Race                             Room Number  2087        Height:                       66.93 inch, 170 cm   Corporate ID [id number]    Weight:                       141 pounds, 64 kg  #   Patient Acct [de-identified]   BSA:           1.74 m^2       BMI:      22.15  #                                                                kg/m^2   MR #         [id number]      Sonographer                   Eva Gan   Accession #  [id number]  Interpreting Physician        [address] [place] [address]   Fellow                   Referring Nurse Practitioner   Interpreting             Referring Physician           [address] [place] [address]  Fellow  Type of Study   TTE procedure:2D Echocardiogram, M-Mode, Doppler, Color Doppler. Procedure Date Date: 08/04/2021 Start: 10:15 AM Study Location: [address] [place] [address] Technical Quality: Fair visualization Indications:Elevated troponin and Atrial fibrillation. Patient Status: Inpatient Height: 66.93 inches Weight: 141. 11 pounds BSA: 1.74 m^2 BMI: 22.15 kg/m^2 Rhythm: Atrial fibrillation HR: 129 bpm BP: 98/56 mmHg CONCLUSIONS Summary Normal left ventricle size and function with an estimated EF > 55%. Mild left ventricular hypertrophy. Left atrial dilatation. Aortic valve was not well visualized.  Mild to moderate aortic insufficiency. Mild mitral regurgitation. Mild tricuspid regurgitation. Normal right ventricular systolic pressure. IVC Increased diameter, but still has inspiratory variation. Signature ----------------------------------------------------------------------------  Electronically signed by Eva Gan(Sonographer) on 2021 10:48  AM ---------------------------------------------------------------------------- ----------------------------------------------------------------------------  Electronically signed by Nico Hiltno(Interpreting physician) on 2021  11:46 AM ---------------------------------------------------------------------------- FINDINGS Left Atrium Left atrial dilatation. Left Ventricle Normal left ventricle size and function with an estimated EF > 55%. No segmental wall motion abnormalities seen. Mild left ventricular hypertrophy. Right Atrium Right atrium is normal in size. Right Ventricle Normal right ventricular size and function. Mitral Valve Normal mitral valve structure and function. Mild mitral regurgitation. Aortic Valve Aortic valve was not well visualized. Mild to moderate aortic insufficiency. No aortic stenosis. Tricuspid Valve Normal tricuspid valve structure and function. Mild tricuspid regurgitation. Normal right ventricular systolic pressure. Pulmonic Valve Pulmonic valve not well visualized but Doppler velocities are normal. No pulmonic insufficiency. Pericardial Effusion No significant pericardial effusion is seen. Miscellaneous Normal aortic root dimension. E/e' average 5.3 IVC Increased diameter, but still has inspiratory variation.  M-mode / 2D Measurements & Calculations:   LVIDd:4.82 cm(3.7 - 5.6 cm)      Diastolic QKDNEF:390 ml  WLSMK:5.40 cm(2.2 - 4.0 cm)      Systolic RHPZFM:52.7 ml  VAIY:4.03 cm(0.6 - 1.1 cm)       Aortic Root:3.4 cm(2.0 - 3.7 cm)  LVPWd:1.22 cm(0.6 - 1.1 cm)      LA Dimension: 4.9 cm(1.9 - 4.0 cm)  Fractional Shortenin.72 %    LA volume/Index: 43.8 ml /25m^2  Calculated LVEF (%): 71.5 %      LVOT:2.4 cm   Mitral:                                Aortic   Peak E-Wave: 0.84 m/s                  Peak Velocity: 1.20 m/s                                         Mean Velocity: 0.88 m/s  Peak Gradient: 2.82 mmHg               Peak Gradient: 5.76 mmHg  Deceleration Time: 173 msec            Mean Gradient: 5 mmHg                                         AI P1/2t: 441 msec                                          Area (continuity): 1.83 cm^2                                         AV VTI: 24.9 cm   Tricuspid:                             Pulmonic:   Estimated RVSP: 19 mmHg  Peak TR Velocity: 1.69 m/s  Peak TR Gradient: 11.4244 mmHg  Estimated RA Pressure: 8 mmHg                                         Estimated PASP: 19.42 mmHg  Septal Wall E' velocity:0.12 m/s Lateral Wall E' velocity:0.12 m/s    XR HIP LEFT (1 VIEW)    Result Date: 8/5/2021  EXAMINATION: ONE XRAY VIEW OF THE LEFT HIP 8/5/2021 8:31 pm COMPARISON: 08/03/2021 HISTORY: ORDERING SYSTEM PROVIDED HISTORY: sp left hip hemiarthroplasty TECHNOLOGIST PROVIDED HISTORY: sp left hip hemiarthroplasty Reason for Exam: post op Acuity: Unknown Type of Exam: Unknown FINDINGS: There is interval left hip arthroplasty for repair of the left femoral neck fracture. There is edema and emphysema in the overlying soft tissues, consistent with recent surgery. Diffuse bone demineralization. Left hip arthroplasty for fixation of the left femoral neck fracture. CT HEAD WO CONTRAST    Result Date: 8/3/2021  EXAMINATION: CT OF THE HEAD WITHOUT CONTRAST  8/3/2021 1:55 pm TECHNIQUE: CT of the head was performed without the administration of intravenous contrast. Dose modulation, iterative reconstruction, and/or weight based adjustment of the mA/kV was utilized to reduce the radiation dose to as low as reasonably achievable. COMPARISON: None.  HISTORY: ORDERING SYSTEM PROVIDED HISTORY: Fall TECHNOLOGIST PROVIDED HISTORY: Fall Decision Support Exception - unselect if not a suspected or confirmed emergency medical condition->Emergency Medical Condition (MA) Reason for Exam: pt fell 5 days ago, in afib Acuity: Unknown Type of Exam: Unknown FINDINGS: BRAIN/VENTRICLES: There is no acute intracranial hemorrhage, mass effect or midline shift. No abnormal extra-axial fluid collection. The gray-white differentiation is maintained without evidence of an acute infarct. There is no evidence of hydrocephalus. ORBITS: The visualized portion of the orbits demonstrate no acute abnormality. SINUSES: The visualized paranasal sinuses and mastoid air cells demonstrate no acute abnormality. SOFT TISSUES/SKULL:  No acute abnormality of the visualized skull or soft tissues. No acute intracranial abnormality. CT CERVICAL SPINE WO CONTRAST    Result Date: 8/3/2021  EXAMINATION: CT OF THE CERVICAL SPINE WITHOUT CONTRAST 8/3/2021 1:55 pm TECHNIQUE: CT of the cervical spine was performed without the administration of intravenous contrast. Multiplanar reformatted images are provided for review. Dose modulation, iterative reconstruction, and/or weight based adjustment of the mA/kV was utilized to reduce the radiation dose to as low as reasonably achievable. COMPARISON: None. HISTORY: ORDERING SYSTEM PROVIDED HISTORY: Fall TECHNOLOGIST PROVIDED HISTORY: Fall Decision Support Exception - unselect if not a suspected or confirmed emergency medical condition->Emergency Medical Condition (MA) Reason for Exam: pt fell 5 days ago, in afib Acuity: Unknown Type of Exam: Unknown FINDINGS: BONES/ALIGNMENT: There is no acute fracture or traumatic malalignment. DEGENERATIVE CHANGES: No significant degenerative changes. SOFT TISSUES: There is no prevertebral soft tissue swelling. No acute abnormality of the cervical spine.      XR CHEST PORTABLE    Result Date: 8/12/2021  EXAMINATION: ONE XRAY VIEW OF THE CHEST 8/12/2021 7:59 am COMPARISON: August 10, 2021 chest exam HISTORY: ORDERING SYSTEM PROVIDED HISTORY: Follow-up CHF TECHNOLOGIST PROVIDED HISTORY: Follow-up CHF Reason for Exam: sob Acuity: Unknown Type of Exam: Unknown FINDINGS: Stable cardiopericardial silhouette/mild tortuosity of the thoracic aorta Interval increase in now moderate asymmetric, right greater than left, interstitial/alveolar infiltrates with interval increase in small left and minimal right pleural effusions     Increasing now moderate asymmetric infiltrates with small pleural effusions, related to progression in vascular congestion and/or pneumonia     XR CHEST PORTABLE    Result Date: 8/10/2021  EXAMINATION: ONE XRAY VIEW OF THE CHEST 8/10/2021 10:34 am COMPARISON: Chest radiograph performed 08/06/2021. HISTORY: ORDERING SYSTEM PROVIDED HISTORY: Dyspnea, rales on exam TECHNOLOGIST PROVIDED HISTORY: Dyspnea, rales on exam Reason for Exam: Dyspnea, rales on exam Acuity: Acute Type of Exam: Subsequent/Follow-up Additional signs and symptoms: Dyspnea, rales on exam FINDINGS: There is a right mid and lower lung infiltrate. There are small bilateral effusions. The mediastinal structures are unremarkable. The upper abdomen unremarkable. The extrathoracic soft tissues are unremarkable. Right mid and lower lung infiltrates with small bilateral effusions. XR CHEST PORTABLE    Result Date: 8/6/2021  EXAMINATION: ONE XRAY VIEW OF THE CHEST 8/6/2021 5:44 am COMPARISON: 5 August 2021 HISTORY: ORDERING SYSTEM PROVIDED HISTORY: ETT placement TECHNOLOGIST PROVIDED HISTORY: ETT placement Reason for Exam: ETT placement Acuity: Unknown Type of Exam: Ongoing Additional signs and symptoms: ETT placement Relevant Medical/Surgical History: ETT placement FINDINGS: AP portable view of the chest electronically labeled regarding sides. Tracheal tube 5 cm proximal to the kassidy. Extreme lung apices not included on this radiograph. Patchy opacity within the right lower lung.   No effusions or pneumothoraces. Cardiomediastinal silhouette is within normal limits. Remaining visualized osseous and soft tissues are unchanged. Stable tracheal tube tip at the level of upper T4 vertebra. Faint right lower lung opacity, most likely infectious or inflammatory in the appropriate clinical setting. XR CHEST PORTABLE    Result Date: 8/5/2021  EXAMINATION: ONE XRAY VIEW OF THE CHEST 8/5/2021 8:31 pm COMPARISON: 08/03/2021 HISTORY: ORDERING SYSTEM PROVIDED HISTORY: ETT placement TECHNOLOGIST PROVIDED HISTORY: ETT placement Reason for Exam: ET tube placement Acuity: Unknown Type of Exam: Unknown FINDINGS: Endotracheal tube terminates 5.5 cm above the kassidy. Cardiomediastinal silhouette is unchanged in size. Aortic atherosclerosis. No large pleural effusion or pneumothorax. There are faint bibasilar pulmonary opacities. No acute osseous abnormality. 1. Endotracheal tube in expected position. 2. Faint bibasilar opacities, which may be due to atelectasis. XR CHEST PORTABLE    Result Date: 8/3/2021  EXAMINATION: ONE XRAY VIEW OF THE CHEST 8/3/2021 11:41 am COMPARISON: None. HISTORY: ORDERING SYSTEM PROVIDED HISTORY: AMS TECHNOLOGIST PROVIDED HISTORY: AMS Reason for Exam: AMS Acuity: Unknown Type of Exam: Unknown FINDINGS: Patient is slightly rotated. Heart size within normal limits without evidence of vascular congestion. Aside from minor chronic appearing changes lungs are grossly clear. Probable skin fold projects in the left lower chest.  No focal lung consolidation is seen. No significant pleural effusions. Gas distended bowel loops beneath the left hemidiaphragm. Monitor leads overlie the chest.  The bones are osteopenic with degenerative changes; questionable age-indeterminate slight loss of height of lower thoracic vertebral bodies. No acute cardiopulmonary process suspected.      CT HIP LEFT WO CONTRAST    Result Date: 8/3/2021  EXAMINATION: CT OF THE LEFT HIP WITHOUT CONTRAST 8/3/2021 1:55 pm TECHNIQUE: CT of the left hip was performed without the administration of intravenous contrast.  Multiplanar reformatted images are provided for review. Dose modulation, iterative reconstruction, and/or weight based adjustment of the mA/kV was utilized to reduce the radiation dose to as low as reasonably achievable. COMPARISON: Pelvis and left hip radiographs August 3, 2021 HISTORY ORDERING SYSTEM PROVIDED HISTORY: ?acetabulum fx TECHNOLOGIST PROVIDED HISTORY: ?acetabulum fx Decision Support Exception - unselect if not a suspected or confirmed emergency medical condition->Emergency Medical Condition (MA) Reason for Exam: pt fell 5 days ago, in afib, lt hip pain Acuity: Unknown Type of Exam: Unknown FINDINGS: Bones: Redemonstration of displaced transcervical left femoral neck fracture which is subacute in appearance. No additional fractures are identified. The bones are osteopenic. No suspicious lytic or sclerotic osseous lesions are evident. Soft Tissue: Subcutaneous and intramuscular edema adjacent to the fracture site. No organized collections are identified. No subcutaneous gas. Visualized intrapelvic structures appear grossly unremarkable. Moderate amount of stool present at the rectal vault. Joint: Anatomic alignment of the left hip. Mild-to-moderate degenerative changes of the left hip. Chondrocalcinosis noted. 1. Displaced fracture of the left transcervical femoral neck which is subacute in appearance. 2. No additional fractures are identified. 3. Mild-to-moderate degenerative change of the left hip. Chondrocalcinosis also present.      VL Lower Extremity Bilateral Venous Duplex    Result Date: 8/13/2021    [facility]  Vascular Lower Extremities DVT Study Procedure   Patient Name   Harjinder March  Date of Study           08/13/2021   Date of Birth  1940  Gender                  Male   Age            79 year(s)  Race                       Room Number    1451 Height:                 66.93 inch, 170 cm   Corporate ID # [id number]    Weight:                 141 pounds, 64 kg   Patient Acct # [de-identified]   BSA:        1.74 m^2    BMI:      22.15 kg/m^2   MR #           [id number]      Sonographer             Marguerite Hernandez RVT   Accession #    [id number]  Interpreting Physician  Caroleen Boeck   Referring                  Referring Physician     Elizabeth Herrera  Nurse  Practitioner  Procedure Type of Study:   Veins: Lower Extremities DVT Study, Venous Scan Lower Bilateral.  Indications for Study:R/O DVT. Patient Status:Out Patient. Technical Quality:Adequate visualization. Conclusions   Summary   Simultaneous real time imaging utilizing B-Mode, color doppler and  spectral waveform analysis was performed on the bilateral lower  extremities for venous examination of the deep and superficial systems. Findings are:   Right:  No evidence of deep or superficial venous thrombosis. Left:  No evidence of deep or superficial venous thrombosis. Signature   ----------------------------------------------------------------  Electronically signed by Marguerite Heranndez RVT(Sonographer) on  08/13/2021 11:08 AM  ----------------------------------------------------------------   ----------------------------------------------------------------  Electronically signed by Joi Hendricks(Interpreting  physician) on 08/13/2021 11:57 AM  ----------------------------------------------------------------  Findings:   Right Impression:                    Left Impression:  The common femoral, femoral,         The common femoral, femoral,  popliteal and tibial veins           popliteal and tibial veins  demonstrate normal compressibility   demonstrate normal compressibility  and augmentation. and augmentation. Normal compressibility of the great  Normal compressibility of the great  saphenous vein. saphenous vein.    Normal compressibility of the small  Normal compressibility of the small  saphenous vein. saphenous vein. Enlarged lymph nodes are noted at  the groin level. Calcific plaque noted in the right  common femoral artery. Velocities are measured in cm/s ; Diameters are measured in cm Right Lower Extremities DVT Study Measurements Right 2D Measurements +------------------------------------+----------+---------------+----------+ ! Location                            ! Visualized! Compressibility! Thrombosis! +------------------------------------+----------+---------------+----------+ ! Common Femoral                      !Yes       ! Yes            ! None      ! +------------------------------------+----------+---------------+----------+ ! Prox Femoral                        !Yes       ! Yes            ! None      ! +------------------------------------+----------+---------------+----------+ ! Mid Femoral                         !Yes       ! Yes            ! None      ! +------------------------------------+----------+---------------+----------+ ! Dist Femoral                        !Yes       ! Yes            ! None      ! +------------------------------------+----------+---------------+----------+ ! Deep Femoral                        !Yes       ! Yes            ! None      ! +------------------------------------+----------+---------------+----------+ ! Popliteal                           !Yes       ! Yes            ! None      ! +------------------------------------+----------+---------------+----------+ ! Sapheno Femoral Junction            ! Yes       ! Yes            ! None      ! +------------------------------------+----------+---------------+----------+ ! PTV                                 ! Yes       ! Yes            ! None      ! +------------------------------------+----------+---------------+----------+ ! Peroneal                            !Yes       ! Yes            ! None      ! +------------------------------------+----------+---------------+----------+ !Gastroc                             ! Yes       ! Yes            ! None      ! +------------------------------------+----------+---------------+----------+ ! GSV Thigh                           ! Yes       ! Yes            ! None      ! +------------------------------------+----------+---------------+----------+ ! GSV Knee                            ! Yes       ! Yes            ! None      ! +------------------------------------+----------+---------------+----------+ ! GSV Ankle                           ! Yes       ! Yes            ! None      ! +------------------------------------+----------+---------------+----------+ ! SSV                                 ! Yes       ! Yes            ! None      ! +------------------------------------+----------+---------------+----------+ Left Lower Extremities DVT Study Measurements Left 2D Measurements +------------------------------------+----------+---------------+----------+ ! Location                            ! Visualized! Compressibility! Thrombosis! +------------------------------------+----------+---------------+----------+ ! Common Femoral                      !Yes       ! Yes            ! None      ! +------------------------------------+----------+---------------+----------+ ! Prox Femoral                        !Yes       ! Yes            ! None      ! +------------------------------------+----------+---------------+----------+ ! Mid Femoral                         !Yes       ! Yes            ! None      ! +------------------------------------+----------+---------------+----------+ ! Dist Femoral                        !Yes       ! Yes            ! None      ! +------------------------------------+----------+---------------+----------+ ! Deep Femoral                        !Yes       ! Yes            ! None      ! +------------------------------------+----------+---------------+----------+ ! Popliteal                           !Yes       ! Yes            ! None      ! +------------------------------------+----------+---------------+----------+ ! Sapheno Femoral Junction            ! Yes       ! Yes            ! None      ! +------------------------------------+----------+---------------+----------+ ! PTV                                 ! Yes       ! Yes            ! None      ! +------------------------------------+----------+---------------+----------+ ! Peroneal                            !Yes       ! Yes            ! None      ! +------------------------------------+----------+---------------+----------+ ! Gastroc                             ! Yes       ! Yes            ! None      ! +------------------------------------+----------+---------------+----------+ ! GSV Thigh                           ! Yes       ! Yes            ! None      ! +------------------------------------+----------+---------------+----------+ ! GSV Knee                            ! Yes       ! Yes            ! None      ! +------------------------------------+----------+---------------+----------+ ! GSV Ankle                           ! Yes       ! Yes            ! None      ! +------------------------------------+----------+---------------+----------+ ! SSV                                 ! Yes       ! Yes            ! None      ! +------------------------------------+----------+---------------+----------+    XR HIP 2-3 VW W PELVIS LEFT    Result Date: 8/3/2021  EXAMINATION: ONE XRAY VIEW OF THE PELVIS AND TWO XRAY VIEWS LEFT HIP 8/3/2021 8:41 am COMPARISON: None. HISTORY: ORDERING SYSTEM PROVIDED HISTORY: fall TECHNOLOGIST PROVIDED HISTORY: fall Reason for Exam: fall, left hip pain Acuity: Unknown Type of Exam: Unknown FINDINGS: There is a left femoral neck fracture with impaction, varus angulation, and proximal migration of the greater trochanter which is essentially at the level of the acetabulum. The femoral head appears to remain appropriately seated in the acetabular fossa.   Possible transverse lucency through the medial left acetabulum. 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 Left femoral neck fracture with impaction, varus angulation, and proximal migration of the greater trochanter. Possible lucency through the medial left acetabulum, though radiographic evaluation is limited by osseous demineralization. Consider CT. Physical Examination:        Physical Exam  Vitals reviewed. Constitutional:       General: He is not in acute distress. Appearance: Normal appearance. He is normal weight. He is not ill-appearing, toxic-appearing or diaphoretic. HENT:      Head: Normocephalic. Nose: Nose normal.      Mouth/Throat:      Mouth: Mucous membranes are moist.   Eyes:      Extraocular Movements: Extraocular movements intact. Cardiovascular:      Rate and Rhythm: Normal rate and regular rhythm. Pulmonary:      Effort: Pulmonary effort is normal. No respiratory distress. Breath sounds: Normal breath sounds. No stridor. No wheezing, rhonchi or rales. Abdominal:      General: Abdomen is flat. Bowel sounds are normal. There is no distension. Palpations: There is no mass. Tenderness: There is no abdominal tenderness. There is no guarding or rebound. Hernia: No hernia is present. Musculoskeletal:         General: Swelling present. Cervical back: Normal range of motion. Right lower leg: Edema present. Left lower leg: Edema present. Skin:     General: Skin is warm and dry. Coloration: Skin is pale. Findings: Erythema (Right calf to foot) and rash (Right calf to foot, chronic wound) present. Neurological:      General: No focal deficit present. Mental Status: He is alert. Psychiatric:         Mood and Affect: Mood normal.         Behavior: Behavior normal.         Thought Content:  Thought content normal.         Judgment: Judgment normal.           Assessment:        Primary Problem  Anemia    Active Hospital Problems    Diagnosis Date Noted    Paroxysmal atrial fibrillation (HCC) [I48.0]     Heart failure ([facility] [address] [I50.9] 08/10/2021    Anemia [D64.9] 08/08/2021    Closed fracture of left hip with routine healing [S72.002D] 08/04/2021    Atrial fibrillation with RVR ([facility] [address] [I48.91] 08/03/2021       Plan:        Anemia  -Hemoglobin 7.2  -.7  -WBC 9.0  -B12 and folate labs normal  -Keep on PPI for duodenal ulcer possible H. Pylori  -Patient to be transfused 1 unit packed red blood cells today, 8/13/2021, consent will be obtained    Lung infiltrates  -Patient on Lasix 20 mg twice daily  -CXR 8/12/2021: Increasing to moderate asymmetric infiltrates with small pleural effusions, related to progression in vascular congestion and/or pneumonia  -Symmetrical chest rise, normal breath sounds bilaterally, clear lung fields exam    A. fib with RVR -resolved, patient in normal sinus rhythm  -Heart rate is regular and rhythm controlled  -Cardiology on board  -Cardiology recommends no anticoagulation due to anemia no long-term anticoagulation due to falls  -Continue amiodarone 200 mg p.o.  -Continue diltiazem 30 mg p.o. twice daily  -Continue Lopressor 50 mg p.o. twice daily  -Patient to follow-up with cardiology outpatient 1 to 2 weeks after discharge    Multiple wounds  -Per wound ostomy care, wounds much improved  -Wound ostomy care following, appreciate their recommendations    Duodenal ulcer, likely due to H. pylori  -Gastric biopsy collected on 8/10/2021 shows moderate chronic inactive gastritis, positive for H. Pylori   -Continue PPI, Protonix 40 mg twice daily for three weeks  -GI following, patient will be treated as outpatient in office    Rhabdomyolysis -resolving  - on 8/4/2021  -Myoglobin 431 on 8/6/2021   -Creatinine today 0.64    Status post left hip hemiarthroplasty  -Patient ambulating with PT and assistance  -Reports he is feeling much better and stronger on the left side than he was prior to his fall    Chronic dermatitis - improving  -Patient states he used to put steroid cream on this  -Reports its not painful  -Less crusting and flaking than previous    Diet  -Patient on full liquid diet, tolerating well    DVT prophylaxis  -Cardiology recommends no anticoagulation due to anemia and history of falls  -SCDs in place  -Bilateral lower extremity Dopplers performed today as prerequisite for ARU transfer, no indication of DVT    Disposition  -Transfer to ARU, PMR on board  -Cardiology recommends no anticoagulation due to anemia no long-term anticoagulation due to falls. -PS [facility] with cardiology, OK from their standpoint for DVT ppx with lovenox. Vijay Nolasco MD  8/13/2021  12:36 PM   Attending Physician Statement    I have discussed the case of Mariza Pichardo, including pertinent history and exam findings with the resident. I have seen and examined the patient and the key elements of the encounter have been performed by me. I agree with the assessment, plan, and orders as documented by the resident.   He can be discharged to rehab unit after transfusion of 1 unit of packed cells  Electronically signed by Guadalupe Singh MD on 8/13/2021 at 7:56 PM

## 2021-08-13 NOTE — PROGRESS NOTES
[address]   Physical Therapy Progress Note    Date: 21  Patient Name: Osmel Denise       Room:   MRN: [id number]   Account: [de-identified]   : 1940  (79 y.o.)   Gender: male     Discharge Recommendations   The patient would benefit from an intensive level of therapy after discharge from the facility. They should be able to tolerate 3-hours of Combined OT/PT/ST over 5 days/week or at least 900 minutes of  Combined Therapy over 7 days. Equipment Needed: No       Diagnosis: Left hip fracture s/p hemiarthroplasty . A fib, Rhabdomyolosis, anemia, Multiple wound/abrasions  Restrictions/Precautions: Weight Bearing, Fall Risk  Left Lower Extremity Weight Bearing: Weight Bearing As Tolerated   Past Medical History:  has a past medical history of Atrial fibrillation ([facility] [address] Past Surgical History:   has a past surgical history that includes hip surgery (Left, 2021) and Upper gastrointestinal endoscopy (N/A, 8/10/2021). Overall Orientation Status: Within Normal Limits  Restrictions/Precautions  Restrictions/Precautions: Weight Bearing; Fall Risk  Lower Extremity Weight Bearing Restrictions  Left Lower Extremity Weight Bearing: Weight Bearing As Tolerated    Subjective: Pt is reporting ready to get up to chair  Comments: Pt's neighbor visiting with patient upon arrival. Pt is pleasant and agreeable to PT/OT tx. Co-treat with Nadine Jung.     Vital Signs  Patient Currently in Pain: Denies                   Bed Mobility  Bridging: Minimal assistance  Rolling: Stand by assistance (R)  Supine to Sit: Contact guard assistance (w/ trunk progression)  Sit to Supine: Minimal assistance (Assisting left LE)  Scooting: Stand by assistance  Bed mobility      Transfers:  Sit to Stand: Minimal Assistance  Stand to sit: Contact guard assistance  Bed to Chair: Contact guard assistance           WB Status: WBAT  Ambulation 1  Surface: level tile  Device: Rolling Walker  Assistance: Contact guard assistance  Quality of Gait: forward flexed posture with head and gaze downward, short step length, narrow FAITH, shuffle gait  Gait Deviations: Slow Danuta;Decreased step length;Decreased step height;Shuffles  Distance: 30ft (AM & PM)  Comments: Bed to bedside commode, straight path and turn, cues for longer stride and wider FAITH with upright posture        Stairs/Curb  Stairs?: No                                                     Posture: Fair  Sitting - Static: Good  Sitting - Dynamic: Good  Standing - Static: Good;- (CGA with RW)  Standing - Dynamic: Fair (Gabriel with RW)  Comments: Standing w/ RW        Other exercises?: Yes  Other exercises 1: Seated BLE exercises, [id number] (AM & PM)  Other exercises 2: STS x3 (AM & PM)  Other exercises 3: Standing Dynamic (pt shifts weight off of left LE, cues to weight bear evenly) Tolerance to reaching out slightly out of base of support encouraging even weight distribution into bilat LE  Other exercises 4: HEP Supine bilat LE exercises, pt demonstrated each exercise and understanding (pt does report visual deficits and will request neighbor to bring \"magnifying glass\" to read the HEP Handout given by PT and OT  Other exercises 5: bed mobility x2  Other Activities  Comment:  Frequent rest breaks PRN. Activity Tolerance: Patient limited by endurance; Patient Tolerated treatment well;Patient limited by fatigue  Activity Tolerance: Pt fatigues quickly, slow pace  PT Equipment Recommendations  Equipment Needed: No  Other Comments  Comments: Pt will be receiving blood transfusion per RINA Nix, it is for Therapeutic reasons and not critical reasons as pt is unable to receive prophylaxis at this time due to afib. Assessment  Activity Tolerance: Patient limited by endurance; Patient Tolerated treatment well;Patient limited by fatigue   Body structures, Functions, Activity limitations: Decreased functional mobility ; Decreased strength;Decreased

## 2021-08-13 NOTE — PROGRESS NOTES
Pulmonary Progress Note  [facility]      Patient - Talib Molina,  Age - 79 y.o.    - 1940      Room Number - -01   N -  [id number]   [facility] # - [de-identified]  Date of Admission -  8/3/2021 10:59 Mike Altamirano MD  Primary Care Physician - No primary care provider on file. SUBJECTIVE   Patient is doing better today. He feels that he is breathing much better and denies cough, SOB, wheezing, hemoptysis. He has been using his incentive spirometer every \"10 minutes about 10 times. \" He is breathing on room air. He has been eating/drinking well. His last BM was yesterday and he has been urinating without difficulty. He was started on 20 mg lasix every 12 hours yesterday. OBJECTIVE   VITALS    height is 5' 7\" (1.702 m) and weight is 177 lb 4 oz (80.4 kg). His oral temperature is 97.9 °F (36.6 °C). His blood pressure is 107/43 (abnormal) and his pulse is 69. His respiration is 18 and oxygen saturation is 93%. Body mass index is 27.76 kg/m². Temperature Range: Temp: 97.9 °F (36.6 °C) Temp  Av.1 °F (36.7 °C)  Min: 97.9 °F (36.6 °C)  Max: 98.2 °F (36.8 °C)  BP Range:  Systolic (36FUF), UFB:383 , Min:99 , KTB:157     Diastolic (45IAQ), RZR:56, Min:43, Max:52    Pulse Range: Pulse  Av.4  Min: 63  Max: 71  Respiration Range: Resp  Av  Min: 16  Max: 18  Current Pulse Ox[de-identified]  SpO2: 93 %  24HR Pulse Ox Range:  SpO2  Av.3 %  Min: 92 %  Max: 93 %  Oxygen Amount and Delivery: O2 Flow Rate (L/min): 3 L/min    Wt Readings from Last 3 Encounters:   21 177 lb 4 oz (80.4 kg)       I/O (24 Hours)    Intake/Output Summary (Last 24 hours) at 2021 1003  Last data filed at 2021 0801  Gross per 24 hour   Intake 390 ml   Output 4750 ml   Net -4360 ml       EXAM     General Appearance  Awake, alert, oriented, in no acute distress  HEENT - normocephalic, atraumatic.  [] Lab Results   Component Value Date    MG 1.8 08/09/2021     Phosphorus:    Lab Results   Component Value Date    PHOS 2.3 08/05/2021        LIVER PROFILE No results for input(s): AST, ALT, LIPASE, BILIDIR, BILITOT, ALKPHOS in the last 72 hours. Invalid input(s): AMYLASE,  ALB  INR No results for input(s): INR in the last 72 hours. PTT   Lab Results   Component Value Date    APTT >150.0 () 08/03/2021         RADIOLOGY     No new chest x-ray. ASSESSMENT/PLAN   Principal Problem:    Anemia  Active Problems:    Atrial fibrillation with RVR ([facility])    Closed fracture of left hip with routine healing    Heart failure ([facility])    Paroxysmal atrial fibrillation ([facility])  Resolved Problems:    Rhabdomyolysis    He is doing better from a pulmonary standpoint. His lasix 20 mg IV was adjusted to every 12 hours yesterday with input from cardiology. His principal problem is his heart failure and he does have bilateral LE edema today on exam. His I&O today is -4,360 indicating he is diuresing well. His lasix will need to be adjusted once his edema resolves. He may need to switch to PO. He should continue to use his incentive spirometer at least 10 times every hour. No further pulmonary intervention needed at this time. Will continue to monitor. Electronically signed by BROCK Schulte III on 8/13/2021 at 10:03 AM    Patient seen and examined independently by me. Above discussed and I agree with medical student note except where indicated in the EMR revision history. Also see my additional comments and changes indicated by discrete font, text color, italics, and/or initials. Labs, cultures, and radiographs where available were reviewed. He is hemodynamically stable and remains on room air  Hemoglobin is fairly stable, would not transfuse unless below 7  He is using his incentive spirometry.   We will sign off okay for discharge to acute rehab please reconsult if needed  Electronically signed by Gabe Guadarrama Flakita Toney MD on 8/13/2021 at 12:49 PM

## 2021-08-13 NOTE — PROGRESS NOTES
Notified TSERING Abel, that pt is able to admit to [facility] ARU today. Requested d/c readmit be completed and report be called to [phone]. Pt's BLE dopplers neg for DVTs, and writer requested clarification re:  initiation of DVT prophylaxis. Dr Madelaine Germain received notice from Dr Deepa Belle that per cardiology, ok to initiate Lovenox. Dr Madelaine Germain requested clearance from IM/GI prior to initiating Lovenox. Per Ana, [facility] PCU Clin Lead, Alecia Mays, clarified with GI/Dr Cruz, that pt is not to be initiated on Lovenox d/t anemia, ulcer. Admission Assessment completed and Dr Madelaine Germain notified.

## 2021-08-13 NOTE — PROGRESS NOTES
No events O/N. Left hip pain appropriately controlled. Blood pressure (!) 99/52, pulse 63, temperature 98.1 °F (36.7 °C), temperature source Axillary, resp. rate 16, height 5' 7\" (1.702 m), weight 177 lb 4 oz (80.4 kg), SpO2 92 %. Left hip incision is clean, dry, and intact. Sensation is grossly intact to light touch diffusely. 2+ pedal pulses. +marta DF/PF of toes and foot. Impression and plan: Mr. Rut Miranda is an 79year old sp left hip hemiarthroplasty  - Pain control  - Continue to mobilize with PT.  WBAT LLE  - Changed dressings this AM. Continue dressing changes   - Likely DC to ARU, follow up in a week

## 2021-08-13 NOTE — PROGRESS NOTES
Comprehensive Nutrition Assessment    Type and Reason for Visit:  Reassess    Nutrition Recommendations/Plan:   1. Continue current full liquid diet and supplements. 2. Advance diet as medically appropriate. Nutrition Assessment:  Patient is on full liquid diet, eating well, consuming % of meals and supplements. Monitor nutrition progression and modify diet accordingly. Malnutrition Assessment:  Malnutrition Status: At risk for malnutrition (Comment)    Context:  Acute Illness     Findings of the 6 clinical characteristics of malnutrition:  Energy Intake:  7 - 50% or less of estimated energy requirements for 5 or more days  Weight Loss:  Unable to assess     Body Fat Loss:  No significant body fat loss     Muscle Mass Loss:  No significant muscle mass loss    Fluid Accumulation:  1 - Mild Extremities   Strength:  Not Performed    Estimated Daily Nutrient Needs:  Energy (kcal): [place] x 1.3= 1700 kcal; Weight Used for Energy Requirements:  Admission     Protein (g):  1.5-2g/kg= 100-135 g; Weight Used for Protein Requirements:  Ideal          Nutrition Related Findings:  Edema: +1 sacral, +2 perineal +2 pitting BLE, trace BUE. Labs and meds reviewed      Wounds:  Multiple, Pressure Injury, Unstageable, Stage III (trauma)       Current Nutrition Therapies:    Adult Oral Nutrition Supplement; Low Calorie/High Protein Oral Supplement  ADULT DIET; Full Liquid    Anthropometric Measures:  · Height: 5' 7\" (170.2 cm)  · Current Body Weight: 177 lb 4 oz (80.4 kg) (Edema present)   · Admission Body Weight: 142 lb (64.4 kg)    · Ideal Body Weight: 148 lbs; % Ideal Body Weight     · BMI: 27.8  · BMI Categories: Overweight (BMI 25.0-29. 9)       Nutrition Diagnosis:   · Increased nutrient needs related to  (healing) as evidenced by wounds    Nutrition Interventions:   Food and/or Nutrient Delivery:  Continue Current Diet, Continue Oral Nutrition Supplement  Nutrition Education/Counseling:  No recommendation at this time   Coordination of Nutrition Care:  Continue to monitor while inpatient    Goals:  Meet % of nutrient needs with PO diet and supplements.        Nutrition Monitoring and Evaluation:   Behavioral-Environmental Outcomes:      Food/Nutrient Intake Outcomes:  Diet Advancement/Tolerance, Food and Nutrient Intake, Supplement Intake  Physical Signs/Symptoms Outcomes:  Biochemical Data, GI Status, Skin, Weight, Fluid Status or Edema     Discharge Planning:    Continue Oral Nutrition Supplement, Continue current diet       Some areas of assessment may be incomplete due to COVID-19 precautions    Bud Bernal RD, LD  Office phone [phone]

## 2021-08-13 NOTE — PROGRESS NOTES
RN spoke with medicine resident's via telephone. Updated that blood consent was needed prior to transfusion. RN also inquired about order to remove ma. RN explained that in report RN was informed that per urology ma was to stay in place at discharge, however RN cannot find documentation to support. MD states will look into.

## 2021-08-13 NOTE — PROGRESS NOTES
Patient transferred to ARU via bed by staff. Family friend at the bedside. No distress noted. Belongings with patient.

## 2021-08-13 NOTE — CARE COORDINATION
ARU does have a bed for this patient and the patient will be DC'd on this date. As long as the patient's dopplers are negative (and the preliminary is fine). The patient will be able to DC to the ARU. The DC/readmit will need to be completed with reconciled meds. The nurse can then call [phone] to give report.

## 2021-08-13 NOTE — PROGRESS NOTES
[address]  Acute Inpatient Rehab Preadmission Assessment    Patient Name: Olimpia Medina        MRN: [id number]    : 1940  (79 y.o.)  Gender: male     Admitted from:   [address] [place] [address]  [][facility]   []Health system   [][facility]   []Outside Admission - Location:                                 [x]Initial         []Updated    Date of Onset / Admission to the acute hospital:  8/3/21    Inpatient Rehabilitation Admitting Diagnosis:  Left hip fracture       Did patient have surgery/procedures? []No  [x]Yes:     OPERATION: Upper GI endoscopy with Biopsy    Procedure: Left hip hemiarthroplasty       Physicians: Anthony Morin, Maryanne Louise, [facility] [address] for clinical complications: High    Co-morbidities:      1. Atrial fibrillation with RVR  2. Rhabdomyolysis   3. Multiple wound/abrasions  4. Anemia  5. Hypokalemia   6. Chest x-ray showing moderate asymmetric infiltrates  7. Huge duodenal ulcer, antral erosion  8. Pain  9. Leukocytosis  10. Chronic dermatitis right leg  11.  Jimenes Catheter        Financial Information    Primary insurance:  [x]Medicare [] Medicare HMO  []Commercial insurance    []Medicaid   [] Medicaid HMO []Workers Compensation   []Personal Pay    Secondary Insurance:  []Medicare [] Medicare HMO  []Commercial insurance    []Medicaid  []Medicaid HMO  []Workers Compensation [x]None    Precautions:   []Cardiac Precautions:    [x]Total hip precautions:    [x]Weight Bearing status:  LLE WBAT  [x]Safety Precautions/Concerns:  Fall Risk, General Precautions  []Visually impaired:     []Hard of Hearing:     Isolation Precautions:         []Yes  [x]No  If Yes:  [] Droplet  []Contact []Airborne     []VRE     []MRSA     []C-diff         [] TB       [] ESBL [] MDRO          [] Other:        Physiatrist:  [x]   Dr. Flaquita Beavers     []   Dr. Luma Escobar  []   Dr. Juleit Espitia  []   Dr. Mujica Milder    Patients Occupation: Retired    Reviewed Lab and Diagnostic reports from Current Admission: Yes    Patients Prior Functional Level: Prior Function  Receives Help From: Neighbor  ADL Assistance: Independent (could not reach feet to bathe, don socks)  Homemaking Assistance: Needs assistance  Ambulation Assistance: Independent (with cane in house only)  Transfer Assistance: Independent  Additional Comments: Friend lives across the street and is not working at this time. - mowed grass since injury 2 months ago- pt reports was carrying laundry upstairs with sudden pain L thigh and legs gave out and fell to knees, since then mobility has been limited. History of current illness, per PM&R Consult:  Osmel Denise is a 79 y.o. male with no known past medical history admitted to [facility] on 8/3/2021.       He initially presented after a fall from standing at home. He reportedly was laying on the floor for 5 days before he was able to call for help. He was found to have atrial fibrillation with RVR in the ED and was started on diltiazem and heparin drips. He was also found to have left hip fracture and rhabdomylolysis. He has multiple wounds, including buttock, sacrum, bilateral elbow, and bilateral knee wounds. He underwent left hip hemiarthroplasty on 8/5/21 (Dr. Kaity Zhou). Extubated 8/6/21. He remains on diltiazem and heparin drips at this time.     He currently reports doing pretty well. He is sitting up in the bedside chair and rates pain in the left hip as 1 out of 10. He denies any other acute concerns.       Prognosis: Fair    Current functional status for upper extremity ADLs:  UE Bathing: Setup, Maximum assistance  UE Dressing: Setup, Maximum assistance    Current functional status for lower extremity ADLs:  LE Bathing: Dependent/Total  LE Dressing:  Moderate assistance (footlevel dressing only addressed)    Current functional status for bed, chair, wheelchair transfers:  Transfers  Sit to Stand: Minimal Assistance  Stand to sit: Contact guard assistance  Bed to Chair: Contact guard assistance  Stand Pivot Transfers: Contact guard assistance  Comment: Cues for sequencing and safety    Current functional status for toilet transfers:  Minimal Assistance       Current functional status for locomotion:  Ambulation  Ambulation?: Yes  WB Status: WBAT  More Ambulation?: Yes  Ambulation 1  Surface: level tile  Device: Rolling Walker  Assistance: Contact guard assistance  Quality of Gait: forward flexed posture with head and gaze downward, short step length, narrow FAITH, shuffle gait  Gait Deviations: Slow Danuta, Decreased step length, Decreased step height, Shuffles  Distance: 30ft (AM & PM)  Comments: Bed to bedside commode, straight path and turn, cues for longer stride and wider FAITH with upright posture    Current functional status for comprehension: Complete independence    Current functional status for expression: Complete independence    Current functional status for social interaction: Complete independence    Current functional status for problem solving: Complete independence    Current functional status for memory: Complete independence    Current Deficits R/T Impairment: Impaired Functional Mobility and Decreased ADLs    Required Therapy:   [x] Physical Therapy  [x] Occupational Therapy   [] Speech Therapy, as appropriate    Additional Services:    [x]     [] Recreational Therapy, as appropriate    [x] Nutrition    [] Dialysis  [] Cultural Needs Identified  [] Special Equipment Needs  [] Other    Rehab Justification:  Needs 3 hrs therapy per day or 15 hours per week:  Yes  Identified Rehab Nursing needs: Yes  Intense Interdisciplinary need:  Yes  Need for 24 hr physician supervision:  Yes  Measurable improved quality of life:  Yes  Willingness to participate:  Yes  Medical Necessity:  Yes  Patient able to tolerate care proposed:  Yes    Expected Discharge Destination/Functional Level:  Home with assist  Expected length of time to achieve that level of improvement: 1-2 weeks  Expected Post Discharge Treatments: Home with possible Home Care    Other information relevant to patient's care needs:  N/A    Acute Inpatient Rehabilitation Disclosure Statement will be provided to patient upon admission to ARU with patient's verbalization of understanding. I have reviewed and concur with the findings and results of the pre-admission screening assessment completed by the Inpatient Rehabilitation Admissions Coordinator.

## 2021-08-13 NOTE — PROGRESS NOTES
Dressing: Setup, Maximum assistance  LE Dressing: Dependent/Total (footlevel dressing only addressed)  Toileting: Dependent/Total  Additional Comments: per clinical reasoning and mobility, work socrates assessment. Assist required to manage foot tasks; adjusted sizing from L to XL due to pitting for improve skin integrity. Trialled adaptive heel-toe strategy with poor tolerane. To benefit from AE training in future sessions. Writer reviewing ma catheter/importance of insight and stabilization; pt continues to require cuing for attention to ma with mobility. Balance  Sitting Balance: Supervision (static sitting EOB)  Standing Balance: Minimal assistance (x1)   Standing Balance  Time: 2min, 2min  Activity: static stand for weight bearing/mobiltiy prep; mobility to bedside chair   Comment: VC for posture with f- carryover today; difficulty with initial WB into RLE (nonsurgical extremity); VC for UE compensation with G outcome   Functional Mobility  Functional - Mobility Device: Rolling Walker  Activity: Other (few feet from edge of bed to bedside chair; same side)  Assist Level: Contact guard assistance  Functional Mobility Comments: x1, VC for safety/attention to environment; assist required for medical lines     Bed mobility  Bridging: Minimal assistance  Rolling to Left: Minimal assistance  Rolling to Right: Minimal assistance  Supine to Sit: Minimal assistance  Sit to Supine: Moderate assistance  Scooting: Stand by assistance  Comment: To nonsurgical side; VC for sequencing to EOB; increased time for trunk upright; F+ overall tech with cuing  Transfers  Stand Step Transfers: Moderate assistance (RW)  Sit to stand: Moderate assistance  Stand to sit: Minimal assistance  Transfer Comments: VC for hand placement and posture with pt demoing heavy forward lean; requiring heavy UE compensation for upright.  Initial attempt reuquiring max assistance, progresssing to moderate using UE compensation; F+ control for 9. 0   RBC 2.21* 2.20* 2.14*   HGB 7.3* 7.3* 7.2*   HCT 23.1* 22.5* 22.0*   .6* 102.5* 102.7*   RDW 16.7* 16.3* 16.3*    240 230     BMP:   Recent Labs     08/11/21  0403 08/12/21  0504 08/13/21  0458    140 145*   K 3.9 4.5 3.9    106 109*   CO2 25 26 31   BUN 13 13 11   CREATININE 0.59* 0.58* 0.64*     BNP: No results for input(s): BNP in the last 72 hours. PT/INR: No results for input(s): PROTIME, INR in the last 72 hours. APTT: No results for input(s): APTT in the last 72 hours. CARDIAC ENZYMES: No results for input(s): CKMB, CKMBINDEX, TROPONINT in the last 72 hours. Invalid input(s): CKTOTAL;3  FASTING LIPID PANEL:No results found for: CHOL, HDL, TRIG  LIVER PROFILE:   No results for input(s): AST, ALT, ALB, BILIDIR, BILITOT, ALKPHOS in the last 72 hours. I/O (24Hr): Intake/Output Summary (Last 24 hours) at 8/13/2021 1333  Last data filed at 8/13/2021 0801  Gross per 24 hour   Intake 390 ml   Output 4750 ml   Net -4360 ml       Glu last 24 hour  No results for input(s): POCGLU in the last 72 hours. No results for input(s): CLARITYU, COLORU, PHUR, SPECGRAV, PROTEINU, RBCUA, BLOODU, BACTERIA, NITRU, WBCUA, LEUKOCYTESUR, YEAST, GLUCOSEU, BILIRUBINUR in the last 72 hours. EGD 8/10-huge duodenal ulcer, antral erosion, will need follow-up biopsies to keep on PPI therapy    Venous Dopplers 8/13/2021   Right:    No evidence of deep or superficial venous thrombosis.        Left:    No evidence of deep or superficial venous thrombosis.       Chest x-ray 8/12/21   Impression:        Increasing now moderate asymmetric infiltrates with small pleural effusions,   related to progression in vascular congestion and/or pneumonia          Impression:     1. Left hip fracture s/p hemiarthroplasty 8/5  2. Atrial fibrillation with RVR -no anticoagulation per cardiology due to risk of falls-, oral amiodarone, Cardizem and metoprolol, no anticoagulation per cardiology  3.  Rhabdomyolysis - improving, IV fluids  4. Multiple wound/abrasions-wound care following- bilateral elbows, knees right hip abrasion, buttock wound closed sacral wound  5. Anemia-hemoglobin 7.2,  stool occult blood positive-EGD as above-plan for transfusion 1 unit today per internal medicine due to cardiac risk  6. Chest x-ray showing moderate asymmetric infiltrates-per pulmonary continue exam spirometry 1010 times per hour continues on IV Lasix, possible conversion to oral-Lasix to be adjusted once edema resolves. 7. Huge duodenal ulcer, antral erosion-Protonix  8. Pain-Roxicodone,-morphine drip has not received  9. Leukocytosis-improved  10. Chronic dermatitis right leg  11. Continues with Jimenes        Recommendations:     1. Diagnosis:  Left hip fracture  2. Therapy: Has PT and OT needs  3. Medical Necessity: As above  4. Support: Lives alone, has a friend/neighbor that provides some assistance  5. Rehab Recommendation:  Would benefit from acute inpatient rehabilitation when medically/cardiac ready  6. DVT Prophylaxis:  Currently not on anticoagulation due to anemia,, please clarify if patient can be placed on prophylactic anticoagulation-Lovenox, venous Doppler  Neg 8/13     Discussed with nursing      Davonte Soliman MD       This note is created with the assistance of a speech recognition program.  While intending to generate a document that actually reflects the content of the visit, the document can still have some errors including those of syntax and sound a like substitutions which may escape proof reading.   In such instances, actual meaning can be extrapolated by contextual diversion

## 2021-08-14 LAB
ANION GAP SERPL CALCULATED.3IONS-SCNC: 8 MMOL/L (ref 9–17)
BUN BLDV-MCNC: 10 MG/DL (ref 8–23)
BUN/CREAT BLD: ABNORMAL (ref 9–20)
CALCIUM SERPL-MCNC: 7.8 MG/DL (ref 8.6–10.4)
CHLORIDE BLD-SCNC: 106 MMOL/L (ref 98–107)
CO2: 29 MMOL/L (ref 20–31)
CREAT SERPL-MCNC: 0.59 MG/DL (ref 0.7–1.2)
GFR AFRICAN AMERICAN: >60 ML/MIN
GFR NON-AFRICAN AMERICAN: >60 ML/MIN
GFR SERPL CREATININE-BSD FRML MDRD: ABNORMAL ML/MIN/{1.73_M2}
GFR SERPL CREATININE-BSD FRML MDRD: ABNORMAL ML/MIN/{1.73_M2}
GLUCOSE BLD-MCNC: 94 MG/DL (ref 70–99)
HCT VFR BLD CALC: 25.8 % (ref 41–53)
HEMOGLOBIN: 8.6 G/DL (ref 13.5–17.5)
MCH RBC QN AUTO: 33.5 PG (ref 26–34)
MCHC RBC AUTO-ENTMCNC: 33.3 G/DL (ref 31–37)
MCV RBC AUTO: 100.6 FL (ref 80–100)
NRBC AUTOMATED: ABNORMAL PER 100 WBC
PDW BLD-RTO: 16.7 % (ref 11.5–14.9)
PLATELET # BLD: 232 K/UL (ref 150–450)
PMV BLD AUTO: 8.4 FL (ref 6–12)
POTASSIUM SERPL-SCNC: 4.1 MMOL/L (ref 3.7–5.3)
RBC # BLD: 2.56 M/UL (ref 4.5–5.9)
SODIUM BLD-SCNC: 143 MMOL/L (ref 135–144)
TISSUE TRANSGLUTAMINASE IGA: 0.5 U/ML
WBC # BLD: 9.7 K/UL (ref 3.5–11)

## 2021-08-14 PROCEDURE — 1180000000 HC REHAB R&B

## 2021-08-14 PROCEDURE — 99223 1ST HOSP IP/OBS HIGH 75: CPT | Performed by: PHYSICAL MEDICINE & REHABILITATION

## 2021-08-14 PROCEDURE — 97166 OT EVAL MOD COMPLEX 45 MIN: CPT

## 2021-08-14 PROCEDURE — 97530 THERAPEUTIC ACTIVITIES: CPT

## 2021-08-14 PROCEDURE — 80048 BASIC METABOLIC PNL TOTAL CA: CPT

## 2021-08-14 PROCEDURE — 6370000000 HC RX 637 (ALT 250 FOR IP)

## 2021-08-14 PROCEDURE — 97161 PT EVAL LOW COMPLEX 20 MIN: CPT

## 2021-08-14 PROCEDURE — 6370000000 HC RX 637 (ALT 250 FOR IP): Performed by: PHYSICAL MEDICINE & REHABILITATION

## 2021-08-14 PROCEDURE — 97110 THERAPEUTIC EXERCISES: CPT

## 2021-08-14 PROCEDURE — 36415 COLL VENOUS BLD VENIPUNCTURE: CPT

## 2021-08-14 PROCEDURE — 85027 COMPLETE CBC AUTOMATED: CPT

## 2021-08-14 PROCEDURE — 97535 SELF CARE MNGMENT TRAINING: CPT

## 2021-08-14 RX ADMIN — METOPROLOL TARTRATE 50 MG: 50 TABLET, FILM COATED ORAL at 21:03

## 2021-08-14 RX ADMIN — PANTOPRAZOLE SODIUM 40 MG: 40 TABLET, DELAYED RELEASE ORAL at 06:31

## 2021-08-14 RX ADMIN — FUROSEMIDE 20 MG: 20 TABLET ORAL at 17:13

## 2021-08-14 RX ADMIN — HYDROCODONE BITARTRATE AND ACETAMINOPHEN 1 TABLET: 5; 325 TABLET ORAL at 11:15

## 2021-08-14 RX ADMIN — AMIODARONE HYDROCHLORIDE 200 MG: 200 TABLET ORAL at 08:34

## 2021-08-14 RX ADMIN — FUROSEMIDE 20 MG: 20 TABLET ORAL at 06:31

## 2021-08-14 RX ADMIN — DILTIAZEM HYDROCHLORIDE 30 MG: 30 TABLET, FILM COATED ORAL at 21:03

## 2021-08-14 RX ADMIN — POLYETHYLENE GLYCOL 3350 17 G: 17 POWDER, FOR SOLUTION ORAL at 08:34

## 2021-08-14 RX ADMIN — Medication 3 MG: at 21:10

## 2021-08-14 RX ADMIN — PANTOPRAZOLE SODIUM 40 MG: 40 TABLET, DELAYED RELEASE ORAL at 15:56

## 2021-08-14 ASSESSMENT — PAIN DESCRIPTION - ORIENTATION
ORIENTATION: LEFT
ORIENTATION: LEFT

## 2021-08-14 ASSESSMENT — PAIN SCALES - GENERAL
PAINLEVEL_OUTOF10: 2
PAINLEVEL_OUTOF10: 1
PAINLEVEL_OUTOF10: 0

## 2021-08-14 ASSESSMENT — PAIN DESCRIPTION - LOCATION
LOCATION: HIP
LOCATION: HIP

## 2021-08-14 ASSESSMENT — PAIN DESCRIPTION - PAIN TYPE
TYPE: SURGICAL PAIN
TYPE: SURGICAL PAIN

## 2021-08-14 ASSESSMENT — PAIN DESCRIPTION - DESCRIPTORS
DESCRIPTORS: SORE
DESCRIPTORS: DISCOMFORT

## 2021-08-14 NOTE — PROGRESS NOTES
Hip  Pain Orientation: Left  Orientation  Overall Orientation Status: Within Functional Limits  Vision  Vision: Within Functional Limits  Hearing  Hearing: Within functional limits  Social/Functional History  Lives With: Alone  Type of Home: House  Home Layout: Two level, Bed/Bath upstairs, Laundry in basement  Home Access: Stairs to enter with rails  Entrance Stairs - Number of Steps: 4; 13-14 steps up to the second floor with railing on right side. Pt reports using cane on other side for stairs. Entrance Stairs - Rails: Both  Bathroom Shower/Tub: Tub only (upstairs, had stopped tub bathe,unable access- spongebath in the kitchen.)  Bathroom Toilet: Standard (upstairs- no toilet on main - Pt report voiding urine into container and emptied into the sink. Pt reports that he would give himself enough time when needing to have a bowel movement to get upstairs.)  Bathroom Equipment:  (Pt reports that friend is looking into installing grab bars in the bathroom)  Home Equipment: Vickey Mireles, Standard walker  [employer] Help From: Neighbor  ADL Assistance: Independent (could not reach feet to bathe, don socks)  Homemaking Assistance: Needs assistance (Pt cooks, orders groceries, and clean )  Homemaking Responsibilities: Yes (ordered food online, did own cook, dishes, needed A laundry,)  Ambulation Assistance: Independent (with cane in house only)  Transfer Assistance: Independent  Active : No  Patient's  Info: Pt reports that he walks to close by places or his friend will drive him   IADL Comments: injury 2 months ago- prior indep all advanced adls. Pt reports that prior to injury he was sleeping on a flat bed and since injury he was sleeping on the main floor on the couch. (has track phone, keeps off)  Additional Comments: Friend lives across the street and is not working at this time. - mowed grass. Pt reports that he has a neighbor that is also able to assist as needed.  Pt reports that he has siblings that live close by but his has not talked to them in years. Pain Assessment  Pain Assessment: 0-10  Pain Level: 1  Pain Type: Surgical pain  Pain Location: Hip  Pain Orientation: Left  Pain Descriptors: Discomfort    Objective          Sensation  Overall Sensation Status: WFL (Pt denies)     UE Function           LUE Strength  Gross LUE Strength: WFL  L Hand General: 4/5  LUE Strength Comment: Grossly 4/5     LUE Tone: Normotonic     LUE AROM (degrees)  LUE AROM : WFL     Left Hand AROM (degrees)  Left Hand AROM: WFL  RUE Strength  Gross RUE Strength: WFL  R Hand General: 4/5  RUE Strength Comment: Grossly 4/5      RUE Tone: Normotonic     RUE AROM (degrees)  RUE AROM : WFL     Right Hand AROM (degrees)  Right Hand AROM: WFL                        Fine Motor Skills  Coordination  Movements Are Fluid And Coordinated: Yes                           Mobility  Sit to Supine: Minimal assistance (A for management of LLE)       Balance  Sitting Balance: Stand by assistance  Standing Balance: Minimal assistance (Min A - CGA)  Standing Balance  Time: 1-2 minutes x 3  Activity: Functional transfers, functional mobility, lower body dressing, lower body bathing, toileting  Comment: with RW  Functional Mobility  Functional - Mobility Device: Rolling Walker  Activity: To/from bathroom  Assist Level: Contact guard assistance  Functional Mobility Comments: Verbal cues for hand placement and safety with wheelchair follow. Bed mobility  Sit to Supine: Minimal assistance (A for management of LLE)     Transfers  Sit to stand: Moderate assistance (Mod - min )  Stand to sit: Moderate assistance (Mod - Min)  Transfer Comments: Verbal cues for hand placement and safety. Pt required increased assistance with fatigue and lower height surfaces. Toilet Transfers  Toilet - Technique: Ambulating  Equipment Used: Raised toilet seat with rails  Toilet Transfer:  Moderate assistance  Toilet Transfers Comments: Verbal cues for hand placement and safety Activity Tolerance: Patient Tolerated treatment well    Exercises  Scapular Protraction: x  Scapular Retraction: x  Shoulder Depression: x  Shoulder Elevation: x  Elbow Flexion: x  Elbow Extension: x  Supination: x  Pronation: x  Wrist Flexion: x  Wrist Extension: x    ADL     ADL  Feeding: Setup (per pt report)  Grooming: Setup (Denture care)  UE Bathing: Stand by assistance  LE Bathing: Maximum assistance  UE Dressing: Stand by assistance  LE Dressing: Maximum assistance (A with threading and footwear)  Toileting: Maximum assistance (A with bottom hygiene and clothing management)  Additional Comments: OT faciliated pt in self care routine on this date. Pt required increased A with lower body tasks due to hip precautions and pain. Pt required assistance with bottom hygiene during toileting and clothing management due to decreased safety and balance while standing. Pt currently limited due to decreased LLE ROM/hip precautions, strength, and activity tolerance impacting safety and independence with self care tasks.      OT scores     Eating  Assistance Needed: Setup or clean-up assistance (Per pt report)  CARE Score: 5  Discharge Goal: Independent  Oral Hygiene  Assistance Needed: Setup or clean-up assistance  CARE Score: 5  Discharge Goal: Independent  Toileting Hygiene  Assistance Needed: Substantial/maximal assistance  CARE Score: 2  Discharge Goal: Independent  Shower/Bathe Self  Assistance Needed: Partial/moderate assistance  CARE Score: 3  Discharge Goal: Independent  Upper Body Dressing  Assistance Needed: Supervision or touching assistance  CARE Score: 4  Discharge Goal: Independent  Lower Body Dressing  Assistance Needed: Substantial/maximal assistance  CARE Score: 2  Discharge Goal: Independent  Putting On/Taking Off Footwear  Assistance Needed: Dependent  CARE Score: 1  Discharge Goal: Independent  Toilet Transfer  Assistance Needed: Partial/moderate assistance  CARE Score: 3  Discharge Goal: Independent      Goals  Patient Goals   Patient goals : \"To be able to do all that you just mentioned. \" (in regards to independence with ADL and IADL tasks)  Short term goals  Time Frame for Short term goals: By 1 week   Short term goal 1: Pt will complete lower body dressing/bathing with min A and good safety with use of AE as needed  Short term goal 2: Pt will complete functional mobility during self care tasks with SBA and good safety with use of least restrictive device  Short term goal 3: Pt will tolerate standing 5+ minutes during functional activity of choice with CGA and good safety  Short term goal 4: Pt will complete functional transfer during self care tasks with CGA and good safety  Short term goal 5: Pt will participate in 30+ minutes of therapeutic exercises/functional activities to increase safety and independence with self care tasks  Long term goals  Time Frame for Long term goals : By discharge  Long term goal 1: Pt will complete BADLs with modified independence and good safety with use of AE as needed  Long term goal 2: Pt will complete functional transfers and mobility with modified independence and good safety with use of least restrictive device  Long term goal 3: Pt will tolerate standing 10+ minutes during funcitonal activity of choice with good safety  Long term goal 4: Pt will verbalize/demonstrate good understanding of home safety/fall prevention strategies to increase safety and independence with self care and mobility  Long term goal 5: Pt will verbalize/demonstrate good understanding of adaptive equipment/adaptive strategies/durable medical equipment to increase safety and independence with self care and mobility  Long term goals 6: Pt will complete simple meal prep/light house keeping with supervision and good safety    Assessment  Performance deficits / Impairments: Decreased ADL status, Decreased functional mobility , Decreased strength, Decreased safe awareness, Decreased endurance, Decreased balance, Decreased high-level IADLs  Treatment Diagnosis: Impaired self care status  Prognosis: Good  Decision Making: Medium Complexity  REQUIRES OT FOLLOW UP: Yes  Discharge Recommendations: Continue to assess pending progress  Plan  Times per week: 5-7  Times per day: Twice a day  Current Treatment Recommendations: Self-Care / ADL, Strengthening, Balance Training, Functional Mobility Training, Endurance Training, Safety Education & Training, Patient/Caregiver Education & Training, Equipment Evaluation, Education, & procurement, Home Management Training       Equipment Recommendations  Equipment Needed:  (TBD)     08/14/21 0936 08/14/21 1325   OT Individual Minutes   Time In [id number]   Time Out [id number]   TORQRFA 70 [id number]   Time Code Minutes    Timed Code Treatment Minutes 47 Minutes 40 Minutes     Electronically signed by Ngoc Wall OT on 8/14/21 at 3:27 PM EDT

## 2021-08-14 NOTE — PROGRESS NOTES
Physical Therapy    Facility/Department: [facility]  Initial Assessment    NAME: Robbie Padilla  : 1940  MRN: [id number]    Date of Service: 2021    Discharge Recommendations:  Home with assist PRN   PT Equipment Recommendations  Equipment Needed: Yes  Mobility Devices: Berneice Manderson: Rolling    Assessment   Body structures, Functions, Activity limitations: Decreased functional mobility ; Decreased ROM; Decreased strength;Decreased endurance;Decreased balance  Assessment: Impaired mobility due to L Hip Fx/ Hemiarthroplasty and decreased tolerance to activity  Decision Making: Low Complexity  History: Hip fracture  Exam: decreased ROM, strength,balance, endurance, gait  Clinical Presentation: stable  REQUIRES PT FOLLOW UP: Yes  Activity Tolerance  Activity Tolerance: Patient limited by endurance       Patient Diagnosis(es): L Hip Fx- s/p Hemiarthroplasty     has a past medical history of Atrial fibrillation ([address] has a past surgical history that includes hip surgery (Left, 2021) and Upper gastrointestinal endoscopy (N/A, 8/10/2021). Restrictions  Restrictions/Precautions  Restrictions/Precautions: Weight Bearing, Fall Risk  Required Braces or Orthoses?: No  Implants present? : Metal implants (L hemiarthroplasty)  Lower Extremity Weight Bearing Restrictions  Left Lower Extremity Weight Bearing: Weight Bearing As Tolerated  Vision/Hearing  Vision: Within Functional Limits  Hearing: Within functional limits     Subjective  General  Patient assessed for rehabilitation services?: Yes  Family / Caregiver Present: No  Follows Commands: Within Functional Limits  Other (Comment): pt began having L thigh pain approx 2 months prior to admit and had to start using a cane and living on main floor due to pain with mobility. pt then fell at the end of July and was on floor for 5 days.  pt had Hemiarthroplasty on 21  Subjective  Subjective: pt very pleasant and eager to get out of bed  Pain Screening  Patient Currently in Pain: Yes  Pain Assessment  Pain Assessment: 0-10  Pain Level: 1  Pain Type: Surgical pain  Pain Location: Hip  Pain Orientation: Left  Pain Descriptors: Sore (stiff)  Vital Signs  Patient Currently in Pain: Yes       Orientation  Orientation  Overall Orientation Status: Within Normal Limits  Social/Functional History  Social/Functional History  Lives With: Alone  Type of Home: House  Home Layout: Two level, Bed/Bath upstairs, Laundry in basement  Home Access: Stairs to enter with rails  Entrance Stairs - Number of Steps: 4; 13-14 steps up to the second floor with railing on right side. Pt reports using cane on other side for stairs. Entrance Stairs - Rails: Both  Bathroom Shower/Tub: Tub only (upstairs, had stopped tub bathe,unable access- spongebath in the kitchen.)  Bathroom Toilet: Standard (upstairs- no toilet on main - Pt report voiding urine into container and emptied into the sink. Pt reports that he would give himself enough time when needing to have a bowel movement to get upstairs.)  Bathroom Equipment:  (Pt reports that friend is looking into installing grab bars in the bathroom)  Home Equipment: [address] [facility], Standard walker  [employer] Help From: Neighbor  ADL Assistance: Independent (could not reach feet to bathe, don socks)  Homemaking Assistance: Needs assistance (Pt cooks, orders groceries, and clean )  Homemaking Responsibilities: Yes (ordered food online, did own cook, dishes, needed A laundry,)  Ambulation Assistance: Independent (with cane in house only)  Transfer Assistance: Independent  Active : No  Patient's  Info: Pt reports that he walks to close by places or his friend will drive him   IADL Comments: injury 2 months ago- prior indep all advanced adls. Pt reports that prior to injury he was sleeping on a flat bed and since injury he was sleeping on the main floor on the couch.  (has track phone, keeps off)  Additional Comments: Friend lives across the street and is not working at this time. - mowed grass. Pt reports that he has a neighbor that is also able to assist as needed. Pt reports that he has siblings that live close by but his has not talked to them in years. Objective     Observation/Palpation  Observation: moderate edema L LE especially foot and ankle    AROM RLE (degrees)  RLE AROM: WNL  AROM LLE (degrees)  LLE AROM : WFL  L Hip Flexion 0-125: 90 degrees in sitting  L Hip ABduction 0-45: 15 degrees in supine  L Knee Flexion 0-145: 100 degrees sitting  L Knee Extension 0: 0 degrees supine  L Ankle Dorsiflexion 0-20: 10 degrees sitting  Strength RLE  Comment: grossly 3/3+  Strength LLE  L Hip Flexion: 3-/5  L Knee Flexion: 3+/5  L Knee Extension: 3/5  L Ankle Dorsiflexion: 3+/5        Bed mobility  Rolling to Left: Minimal assistance  Rolling to Right: Stand by assistance  Supine to Sit: Moderate assistance (use of rail and head of bed slightly elevated)  Sit to Supine: Minimal assistance (assist to L LE)  Scooting: Contact guard assistance  Comment: mobility towards L side of bed as this is the set-up the pt has at home  Transfers  Sit to Stand: Minimal Assistance; Moderate Assistance  Stand to sit: Minimal Assistance;Contact guard assistance  Bed to Chair: Contact guard assistance (with RW)  Comment: pt requiring less assistance when transfering to and from the bed when raised to a higher height  Ambulation  Ambulation?: Yes  WB Status: WBAT  Ambulation 1  Device: Rolling Walker  Assistance: Contact guard assistance  Gait Deviations: Decreased step length;Decreased step height;Slow Danuta  Distance: 15ft in AM and 25ft in PM  Stairs/Curb  Stairs?: No     Balance  Sitting - Static: Fair;+ (SBA)  Sitting - Dynamic: Fair (CGA)  Standing - Static: Fair (CGA with RW)  Standing - Dynamic: Fair (CGA with RW)  Other exercises  Other exercises 1: active/assisted exercises L LE in supine 10x2ea  Other exercises 2: active exercises with/without T-band B LE seated 10x2 ea  Other exercises 3: sit to stand x 5     Plan   Plan  Times per week: 1.5hrs/day x 5-7days/wk  Current Treatment Recommendations: Strengthening, ROM, Balance Training, Functional Mobility Training, Transfer Training, Stair training, Gait Training, Endurance Training, Safety Education & Training, Patient/Caregiver Education & Training, Home Exercise Program      Goals  Short term goals  Time Frame for Short term goals: 7-10 days  Short term goal 1: bed mobility with SBA  Short term goal 2: transfers with supervision  Short term goal 3: gait with RW/SBA x 75-100ft  Short term goal 4: standing balance to Fair+ for ADLs  Long term goals  Time Frame for Long term goals : time of discharge  Long term goal 1: mod-I bed mobility  Long term goal 2: mod-I transfers  Long term goal 3: mod-I gait x 150ft with RW  Long term goal 4: flight of stairs with rail and cane mod-I  Long term goal 5: standing balance to Good for safe ADLs  Patient Goals   Patient goals : get stronger       Therapy Time   Individual Concurrent Group Co-treatment   Time In 3115(2068)         Time Out 2677(6963)         Minutes 42(52)         Timed Code Treatment Minutes: 69 Minutes   Total treatment time: YURY Moon 8, PT

## 2021-08-14 NOTE — H&P
Physical Medicine & Rehabilitation History and Physical  [facility] Acute Rehabilitation Unit     Primary care provider: No primary care provider on file. Chief Complaint and Reason for Rehabilitation Admission:   ADL and Mobility deficits secondary to L hip fracture    History of Present Illness:  Iliana Serrano  is a 79 y.o. right-handed male admitted to the [address] [place] [address] unit on 8/13/2021. He was originally admitted to [facility] on 8/3/21 for fall from standing height at home with L hip fracture. He reportedly was laying on the floor for 5 days before he was able to call for help. He was found to have atrial fibrillation with RVR in the ED and was started on diltiazem and heparin drips. He was also found to have left hip fracture and rhabdomylolysis. He has multiple wounds, including buttock, sacrum, bilateral elbow, and bilateral knee wounds. He underwent left hip hemiarthroplasty on 8/5/21 (Dr. Roslyn Rangel). Extubated 8/6/21. He remains on diltiazem and heparin drips at this time. He is currently requiring assistance for self-care activities and mobility prompting this admission. He reports mild aching pain R hip which is responding to Tylenol.      Premorbid function:  Independent    Current Function:  PT:  Restrictions/Precautions: Weight Bearing, Fall Risk  Implants present? : Metal implants (L hemiarthroplasty)  Left Lower Extremity Weight Bearing: Weight Bearing As Tolerated   Transfers  Sit to Stand: Minimal Assistance, Moderate Assistance  Stand to sit: Minimal Assistance, Contact guard assistance  Bed to Chair: Contact guard assistance (with RW)  Comment: pt requiring less assistance when transfering to and from the bed when raised to a higher height  WB Status: WBAT  Ambulation 1  Device: Rolling Walker  Assistance: Contact guard assistance  Gait Deviations: Decreased step length, Decreased step height, Slow Danuta  Distance: 15ft in AM and 25ft in placement and safety with wheelchair follow. Bed mobility  Rolling to Left: Minimal assistance  Rolling to Right: Stand by assistance  Supine to Sit: Moderate assistance (use of rail and head of bed slightly elevated)  Sit to Supine: Minimal assistance (assist to L LE)  Scooting: Contact guard assistance  Comment: mobility towards L side of bed as this is the set-up the pt has at home  Transfers  Sit to stand: Moderate assistance (Mod - min )  Stand to sit: Moderate assistance (Mod - Min)  Transfer Comments: Verbal cues for hand placement and safety. Pt required increased assistance with fatigue and lower height surfaces. Toilet Transfers  Toilet - Technique: Ambulating  Equipment Used: Raised toilet seat with rails  Toilet Transfer: Moderate assistance  Toilet Transfers Comments: Verbal cues for hand placement and safety             SPEECH:      Past Medical History:      Diagnosis Date    Atrial fibrillation ([address]     with RVR       Past Surgical History:      Procedure Laterality Date    HIP SURGERY Left 8/5/2021    HIP HEMIARTHROPLASTY performed by Nael Floyd MD at [facility] N/A 8/10/2021    EGD BIOPSY performed by Mary Adame MD at [facility] ENDO       Allergies:    Patient has no known allergies.     Medications   Scheduled Meds:   polyethylene glycol  17 g Oral Daily    amiodarone  200 mg Oral Daily    dilTIAZem  30 mg Oral 2 times per day    metoprolol tartrate  50 mg Oral BID    pantoprazole  40 mg Oral BID AC    furosemide  20 mg Oral BID     Continuous Infusions:  PRN Meds:.acetaminophen, senna, bisacodyl, HYDROcodone-acetaminophen, melatonin     Social History:  Lives With: Alone  Type of Home: House  Home Layout: Two level, Bed/Bath upstairs  Home Access: Stairs to enter with rails  Entrance Stairs - Number of Steps: 4  Entrance Stairs - Rails: Both  Bathroom Shower/Tub: Tub only  Bathroom Toilet: Standard  Home Equipment: Cane, Standard walker  Receives Help From: Family  ADL Assistance: Independent  Ambulation Assistance: Independent  Transfer Assistance: Independent  Active : No  Additional Comments: Friend lives across the street and is not working at this time. Social History     Socioeconomic History    Marital status: Single     Spouse name: Not on file    Number of children: Not on file    Years of education: Not on file    Highest education level: Not on file   Occupational History    Not on file   Tobacco Use    Smoking status: Never Smoker    Smokeless tobacco: Never Used   Substance and Sexual Activity    Alcohol use: Never    Drug use: Not on file    Sexual activity: Not on file   Other Topics Concern    Not on file   Social History Narrative    Not on file     Social Determinants of Health     Financial Resource Strain:     Difficulty of Paying Living Expenses:    Food Insecurity:     Worried About Running Out of Food in the Last Year:     [address] in the Last Year:    Transportation Needs:     Lack of Transportation (Medical):  Lack of Transportation (Non-Medical):    Physical Activity:     Days of Exercise per Week:     Minutes of Exercise per Session:    Stress:     Feeling of Stress :    Social Connections:     Frequency of Communication with Friends and Family:     Frequency of Social Gatherings with Friends and Family:     Attends [employer] Services:     Active Member of Clubs or Organizations:     Attends Club or Organization Meetings:     Marital Status:    Intimate Partner Violence:     Fear of Current or Ex-Partner:     Emotionally Abused:     Physically Abused:     Sexually Abused:        Family History:   No family history on file.     Diagnostics:     XR CHEST PORTABLE   Final Result   Stable tracheal tube tip at the level of upper T4 vertebra.       Faint right lower lung opacity, most likely infectious or inflammatory in the   appropriate clinical setting.           XR HIP for: CHOL, HDL, TRIG  LIVER PROFILE: No results for input(s): AST, ALT, ALB, BILIDIR, BILITOT, ALKPHOS in the last 72 hours. Review of Systems:  CONSTITUTIONAL:  Denies fevers, chills, sweats or fatigue. EYES:  Denies diplopia, blind spots, blurring. HEENT:  Denies hearing loss, trouble chewing or swallowing. RESPIRATORY:  No wheezing, coughing, shortness of breath. CARDIOVASCULAR:  Denies chest pain, palpitations, lightheadedness. GASTROINTESTINAL:  Denies heartburn, nausea, constipation, diarrhea, abdominal pain. GENITOURINARY:  No urgency, frequency, incontinence, dysuria. ENDOCRINE:  Denies hot or cold intolerance. MUSCULOSKELETAL:  Denies back pain, neck pain. Reports R hip pain  NEUROLOGICAL:  Denies focal numbness, tingling, balance loss, headache. BEHAVIOR/PSYCH:  Denies depression, anxiety, memory loss, insomnia. SKIN:  No ulcers, rash, bruises. Physical Exam:  BP (!) 131/55   Pulse 67   Temp 97.9 °F (36.6 °C) (Oral)   Resp 16   Ht 5' 7\" (1.702 m)   Wt 178 lb (80.7 kg)   SpO2 94%   BMI 27.88 kg/m²     GEN: Well developed, well nourished, in NAD  HEENT:  NCAT. PERRL. EOMI. Mucous membranes pink and moist.   PULM:  Clear to ausculation. No rales or rhonchi. Respirations WNL and unlabored. CV:  Regular rate rhythm. No murmurs or gallops. GI:  Abdomen soft. Nontender. Non-distended. BS + and equal.    NEUROLOGICAL: A&O x3. Sensation intact to light touch. DTRs 2+. MSK:  Functional ROM BUE and RLEs. Weakness impairs AROM L hip. Motor testing 5/5 key muscles BUE and RLEs. L hip flexion 2/5. L knee extension 3/5. Javier Kody SKIN: Warm dry and intact. Good turgor. L lateral hip incision with dressing CD&I. Coccyx wound. EXTREMITIES:  No calf tenderness to palpation. No edema BLEs. Javier Kody PSYCH: Mood WNL. Appropriately interactive. Affect WNL. Principal Diagnosis/plan:  The patient is a 79y.o. year old with ADL and Mobility deficits secondary to L hip fracture.     He will require close medical monitoring for the comorbidities listed below. He will benefit from intensive interdisciplinary therapies and rehab nursing care and is appropriate for inpatient rehabilitation. The post admission physician evaluation (SHEREEN) is consistent with the pre-admission assessment. See above findings to reflect the elements required in the SHEREEN. Patient's admitting condition is consistent with the findings of the preadmission assessment by the rehabilitation admissions coordinator. Diagnoses/plan:    1. L hip fracture:  S/p hemiarthroplasty by Dr. Ale Watkins 8/5/21. PT/OT for gait, mobility, strengthening, endurance, ADLs, and self care. WBAT. Has Tylenol or Norco prn pain. 2. A Fib with RVR: rate controlled. On amiodarone, Cardizem, Metoprolol. No anticoagulation per GI. 3. Rhabdomyolysis: Improved. Will monitor BMP weekly  4. Anemia secondary to GI bleed/ulcer: GI following. On PPI BID, soft diet, received 1 unit PRBCs 8/13/21. Hb improved today at 8.6. Will monitor q MWF.  5. Wounds/abrasions: wound care to follow  6. Insomnia: has melatonin prn  7. Hypokalemia: Improved. On Lasix. Monitoring and will replace prn but has been stable. 8. Pulmonary infiltrates/effusion: using incentive spirometer  9. Bowel Management: Miralax daily, senokot prn, dulcolax prn. 10. DVT Prophylaxis:  SCD's while in bed, SUE's and chemoprophylaxis contraindicated  11. Internal medicine for medical management      Estimated Length of Stay:  2 weeks. Prognosis  fair    Goals    Home at Independent  Supervision at Discharge: None      Leandra Cool MD     This note is created with the assistance of a speech recognition program.  While intending to generate a document that actually reflects the content of the visit, the document can still have some errors including those of syntax and sound a like substitutions which may escape proof reading.   In such instances, actual meaning can be extrapolated by contextual diversion.

## 2021-08-14 NOTE — PLAN OF CARE
Problem: Skin Integrity:  Goal: Will show no infection signs and symptoms  Description: Will show no infection signs and symptoms  Outcome: Ongoing  Goal: Absence of new skin breakdown  Description: Absence of new skin breakdown  Outcome: Ongoing     Problem: Falls - Risk of:  Goal: Will remain free from falls  Description: Will remain free from falls  Outcome: Ongoing  Goal: Absence of physical injury  Description: Absence of physical injury  Outcome: Ongoing     Problem: Pain:  Goal: Pain level will decrease  Description: Pain level will decrease  Outcome: Ongoing  Goal: Control of acute pain  Description: Control of acute pain  Outcome: Ongoing  Goal: Control of chronic pain  Description: Control of chronic pain  Outcome: Ongoing     Problem: Musculor/Skeletal Functional Status  Goal: Highest potential functional level  Outcome: Ongoing

## 2021-08-14 NOTE — DISCHARGE SUMMARY
[address]    Discharge Summary     Patient ID: Palak Egnle  :  1940   MRN: [id number]     ACCOUNT:  [de-identified]   Patient's PCP: No primary care provider on file.   Admit Date: 8/3/2021   Discharge Date: 2021  Length of Stay: 10  Code Status:  Full Code  Admitting Physician: Cony Hoffman MD  Discharge Physician: Benigno Slater MD     Active Discharge Diagnoses:       Primary Problem  Anemia      Hospital Problems  Active Hospital Problems    Diagnosis Date Noted    Paroxysmal atrial fibrillation ([address] [I48.0]     Heart failure ([address] [I50.9] 08/10/2021    Anemia [D64.9] 2021    Closed fracture of left hip with routine healing [S72.002D] 2021    Atrial fibrillation with RVR ([address] [I48.91] 2021       Admission Condition:  poor     Discharged Condition: good    Hospital Stay:       Hospital Course:  Palak Engle is a 79 y.o. male who was admitted for the management of   Anemia , presented to ER withLeg Pain and Irregular Heart Beat    This 79-year-old male patient presented to the ED with lower lumbar weakness and was evaluated and showed to have A. fib with RVR and rhabdomyolysis, is post op day 6, left hip hemiarthroplasty.    Patient heart rate is regular and rhythmic  Diltiazem and amiodarone GTT were stopped diltiazem and amiodarone p.o. were started and the patient continues to take Lopressor.     Multiple wounds are noted since admission and are being evaluated and healing well, including stage III pressure ulcers on the buttock and an unstagable sacral ulcer, traumatic injury of the elbows and knees.     EGD showed a nonbleeding duodenal ulcer and was started on PPI  Patient might be transfused as the atrial fibrillation might be due to CAD hence the patient could be transfused.      Bilateral lower extremity Dopplers were ordered as clearance prior to transfer to ARU.  2021 Dopplers bilateral lower extremities were read as negative for DVT. Patient was found to be positive for H. pylori on gastric biopsy. Gastroenterology mended outpatient treatment for this, therefore no treatment was started. Cardiology agreed that prophylactic Lovenox could be administered to this patient, however no anticoagulation or long-term anticoagulation for A. fib due to anemia and history of falls. Acute rehab unit will accept patient for rehab, discharge ready. Significant therapeutic interventions: Left hip hemiarthroplasty, blood transfusion 1 unit PRBCs    Significant Diagnostic Studies:   Labs / Micro:  Biopsy positive for H. pylori    Radiology:    ECHO Complete 2D W Doppler W Color    Result Date: 8/4/2021  [facility] Transthoracic Echocardiography Report (TTE)  Patient Name Melissa Kingsley  Date of Study                 08/04/2021   Date of      1940  Gender                        Male  Birth   Age          79 year(s)  Race                             Room Number  2087        Height:                       66.93 inch, 170 cm   Corporate ID [id number]    Weight:                       141 pounds, 64 kg  #   Patient Acct [de-identified]   BSA:           1.74 m^2       BMI:      22.15  #                                                                kg/m^2   MR #         [id number]      Sonographer                   Eva Gan   Accession #  [id number]  Interpreting Physician        [address] [place] [address]   Fellow                   Referring Nurse Practitioner   Interpreting             Referring Physician           [address] [place] [address]  Fellow  Type of Study   TTE procedure:2D Echocardiogram, M-Mode, Doppler, Color Doppler. Procedure Date Date: 08/04/2021 Start: 10:15 AM Study Location: [address] [place] [address] Technical Quality: Fair visualization Indications:Elevated troponin and Atrial fibrillation. Patient Status: Inpatient Height: 66.93 inches Weight: 141. 11 pounds BSA: 1.74 m^2 BMI: 22.15 kg/m^2 Rhythm: Atrial fibrillation HR: 129 bpm BP: 98/56 mmHg CONCLUSIONS Summary Normal left ventricle size and function with an estimated EF > 55%. Mild left ventricular hypertrophy. Left atrial dilatation. Aortic valve was not well visualized. Mild to moderate aortic insufficiency. Mild mitral regurgitation. Mild tricuspid regurgitation. Normal right ventricular systolic pressure. IVC Increased diameter, but still has inspiratory variation. Signature ----------------------------------------------------------------------------  Electronically signed by Eva Gan(Sonographer) on 08/04/2021 10:48  AM ---------------------------------------------------------------------------- ----------------------------------------------------------------------------  Electronically signed by Nico Hilton(Interpreting physician) on 08/04/2021  11:46 AM ---------------------------------------------------------------------------- FINDINGS Left Atrium Left atrial dilatation. Left Ventricle Normal left ventricle size and function with an estimated EF > 55%. No segmental wall motion abnormalities seen. Mild left ventricular hypertrophy. Right Atrium Right atrium is normal in size. Right Ventricle Normal right ventricular size and function. Mitral Valve Normal mitral valve structure and function. Mild mitral regurgitation. Aortic Valve Aortic valve was not well visualized. Mild to moderate aortic insufficiency. No aortic stenosis. Tricuspid Valve Normal tricuspid valve structure and function. Mild tricuspid regurgitation. Normal right ventricular systolic pressure. Pulmonic Valve Pulmonic valve not well visualized but Doppler velocities are normal. No pulmonic insufficiency. Pericardial Effusion No significant pericardial effusion is seen. Miscellaneous Normal aortic root dimension. E/e' average 5.3 IVC Increased diameter, but still has inspiratory variation.  M-mode / 2D Measurements & Calculations:   LVIDd:4.82 cm(3.7 - 5.6 cm)      Diastolic Volume:113 ml  LVIDs:3.05 cm(2.2 - 4.0 cm)      Systolic HRLGBN:06.4 ml  KCVO:0.04 cm(0.6 - 1.1 cm)       Aortic Root:3.4 cm(2.0 - 3.7 cm)  LVPWd:1.22 cm(0.6 - 1.1 cm)      LA Dimension: 4.9 cm(1.9 - 4.0 cm)  Fractional Shortenin.72 %    LA volume/Index: 43.8 ml /25m^2  Calculated LVEF (%): 71.5 %      LVOT:2.4 cm   Mitral:                                Aortic   Peak E-Wave: 0.84 m/s                  Peak Velocity: 1.20 m/s                                         Mean Velocity: 0.88 m/s  Peak Gradient: 2.82 mmHg               Peak Gradient: 5.76 mmHg  Deceleration Time: 173 msec            Mean Gradient: 5 mmHg                                         AI P1/2t: 441 msec                                          Area (continuity): 1.83 cm^2                                         AV VTI: 24.9 cm   Tricuspid:                             Pulmonic:   Estimated RVSP: 19 mmHg  Peak TR Velocity: 1.69 m/s  Peak TR Gradient: 11.4244 mmHg  Estimated RA Pressure: 8 mmHg                                         Estimated PASP: 19.42 mmHg  Septal Wall E' velocity:0.12 m/s Lateral Wall E' velocity:0.12 m/s    XR HIP LEFT (1 VIEW)    Result Date: 2021  EXAMINATION: ONE XRAY VIEW OF THE LEFT HIP 2021 8:31 pm COMPARISON: 2021 HISTORY: ORDERING SYSTEM PROVIDED HISTORY: sp left hip hemiarthroplasty TECHNOLOGIST PROVIDED HISTORY: sp left hip hemiarthroplasty Reason for Exam: post op Acuity: Unknown Type of Exam: Unknown FINDINGS: There is interval left hip arthroplasty for repair of the left femoral neck fracture. There is edema and emphysema in the overlying soft tissues, consistent with recent surgery. Diffuse bone demineralization. Left hip arthroplasty for fixation of the left femoral neck fracture.      CT HEAD WO CONTRAST    Result Date: 8/3/2021  EXAMINATION: CT OF THE HEAD WITHOUT CONTRAST  8/3/2021 1:55 pm TECHNIQUE: CT of the head was performed without the administration of intravenous contrast. Dose modulation, iterative reconstruction, and/or weight based adjustment of the mA/kV was utilized to reduce the radiation dose to as low as reasonably achievable. COMPARISON: None. HISTORY: ORDERING SYSTEM PROVIDED HISTORY: Fall TECHNOLOGIST PROVIDED HISTORY: Fall Decision Support Exception - unselect if not a suspected or confirmed emergency medical condition->Emergency Medical Condition (MA) Reason for Exam: pt fell 5 days ago, in afib Acuity: Unknown Type of Exam: Unknown FINDINGS: BRAIN/VENTRICLES: There is no acute intracranial hemorrhage, mass effect or midline shift. No abnormal extra-axial fluid collection. The gray-white differentiation is maintained without evidence of an acute infarct. There is no evidence of hydrocephalus. ORBITS: The visualized portion of the orbits demonstrate no acute abnormality. SINUSES: The visualized paranasal sinuses and mastoid air cells demonstrate no acute abnormality. SOFT TISSUES/SKULL:  No acute abnormality of the visualized skull or soft tissues. No acute intracranial abnormality. CT CERVICAL SPINE WO CONTRAST    Result Date: 8/3/2021  EXAMINATION: CT OF THE CERVICAL SPINE WITHOUT CONTRAST 8/3/2021 1:55 pm TECHNIQUE: CT of the cervical spine was performed without the administration of intravenous contrast. Multiplanar reformatted images are provided for review. Dose modulation, iterative reconstruction, and/or weight based adjustment of the mA/kV was utilized to reduce the radiation dose to as low as reasonably achievable. COMPARISON: None. HISTORY: ORDERING SYSTEM PROVIDED HISTORY: Fall TECHNOLOGIST PROVIDED HISTORY: Fall Decision Support Exception - unselect if not a suspected or confirmed emergency medical condition->Emergency Medical Condition (MA) Reason for Exam: pt fell 5 days ago, in afib Acuity: Unknown Type of Exam: Unknown FINDINGS: BONES/ALIGNMENT: There is no acute fracture or traumatic malalignment.  DEGENERATIVE CHANGES: No significant degenerative changes. SOFT TISSUES: There is no prevertebral soft tissue swelling. No acute abnormality of the cervical spine. XR CHEST PORTABLE    Result Date: 8/12/2021  EXAMINATION: ONE XRAY VIEW OF THE CHEST 8/12/2021 7:59 am COMPARISON: August 10, 2021 chest exam HISTORY: ORDERING SYSTEM PROVIDED HISTORY: Follow-up CHF TECHNOLOGIST PROVIDED HISTORY: Follow-up CHF Reason for Exam: sob Acuity: Unknown Type of Exam: Unknown FINDINGS: Stable cardiopericardial silhouette/mild tortuosity of the thoracic aorta Interval increase in now moderate asymmetric, right greater than left, interstitial/alveolar infiltrates with interval increase in small left and minimal right pleural effusions     Increasing now moderate asymmetric infiltrates with small pleural effusions, related to progression in vascular congestion and/or pneumonia     XR CHEST PORTABLE    Result Date: 8/10/2021  EXAMINATION: ONE XRAY VIEW OF THE CHEST 8/10/2021 10:34 am COMPARISON: Chest radiograph performed 08/06/2021. HISTORY: ORDERING SYSTEM PROVIDED HISTORY: Dyspnea, rales on exam TECHNOLOGIST PROVIDED HISTORY: Dyspnea, rales on exam Reason for Exam: Dyspnea, rales on exam Acuity: Acute Type of Exam: Subsequent/Follow-up Additional signs and symptoms: Dyspnea, rales on exam FINDINGS: There is a right mid and lower lung infiltrate. There are small bilateral effusions. The mediastinal structures are unremarkable. The upper abdomen unremarkable. The extrathoracic soft tissues are unremarkable. Right mid and lower lung infiltrates with small bilateral effusions.      XR CHEST PORTABLE    Result Date: 8/6/2021  EXAMINATION: ONE XRAY VIEW OF THE CHEST 8/6/2021 5:44 am COMPARISON: 5 August 2021 HISTORY: ORDERING SYSTEM PROVIDED HISTORY: ETT placement TECHNOLOGIST PROVIDED HISTORY: ETT placement Reason for Exam: ETT placement Acuity: Unknown Type of Exam: Ongoing Additional signs and symptoms: ETT placement Relevant Medical/Surgical History: ETT placement FINDINGS: AP portable view of the chest electronically labeled regarding sides. Tracheal tube 5 cm proximal to the kassidy. Extreme lung apices not included on this radiograph. Patchy opacity within the right lower lung. No effusions or pneumothoraces. Cardiomediastinal silhouette is within normal limits. Remaining visualized osseous and soft tissues are unchanged. Stable tracheal tube tip at the level of upper T4 vertebra. Faint right lower lung opacity, most likely infectious or inflammatory in the appropriate clinical setting. XR CHEST PORTABLE    Result Date: 8/5/2021  EXAMINATION: ONE XRAY VIEW OF THE CHEST 8/5/2021 8:31 pm COMPARISON: 08/03/2021 HISTORY: ORDERING SYSTEM PROVIDED HISTORY: ETT placement TECHNOLOGIST PROVIDED HISTORY: ETT placement Reason for Exam: ET tube placement Acuity: Unknown Type of Exam: Unknown FINDINGS: Endotracheal tube terminates 5.5 cm above the kassidy. Cardiomediastinal silhouette is unchanged in size. Aortic atherosclerosis. No large pleural effusion or pneumothorax. There are faint bibasilar pulmonary opacities. No acute osseous abnormality. 1. Endotracheal tube in expected position. 2. Faint bibasilar opacities, which may be due to atelectasis. XR CHEST PORTABLE    Result Date: 8/3/2021  EXAMINATION: ONE XRAY VIEW OF THE CHEST 8/3/2021 11:41 am COMPARISON: None. HISTORY: ORDERING SYSTEM PROVIDED HISTORY: AMS TECHNOLOGIST PROVIDED HISTORY: AMS Reason for Exam: AMS Acuity: Unknown Type of Exam: Unknown FINDINGS: Patient is slightly rotated. Heart size within normal limits without evidence of vascular congestion. Aside from minor chronic appearing changes lungs are grossly clear. Probable skin fold projects in the left lower chest.  No focal lung consolidation is seen. No significant pleural effusions. Gas distended bowel loops beneath the left hemidiaphragm.  Monitor leads overlie the chest.  The bones are osteopenic with degenerative changes; questionable age-indeterminate slight loss of height of lower thoracic vertebral bodies. No acute cardiopulmonary process suspected. CT HIP LEFT WO CONTRAST    Result Date: 8/3/2021  EXAMINATION: CT OF THE LEFT HIP WITHOUT CONTRAST 8/3/2021 1:55 pm TECHNIQUE: CT of the left hip was performed without the administration of intravenous contrast.  Multiplanar reformatted images are provided for review. Dose modulation, iterative reconstruction, and/or weight based adjustment of the mA/kV was utilized to reduce the radiation dose to as low as reasonably achievable. COMPARISON: Pelvis and left hip radiographs August 3, 2021 HISTORY ORDERING SYSTEM PROVIDED HISTORY: ?acetabulum fx TECHNOLOGIST PROVIDED HISTORY: ?acetabulum fx Decision Support Exception - unselect if not a suspected or confirmed emergency medical condition->Emergency Medical Condition (MA) Reason for Exam: pt fell 5 days ago, in afib, lt hip pain Acuity: Unknown Type of Exam: Unknown FINDINGS: Bones: Redemonstration of displaced transcervical left femoral neck fracture which is subacute in appearance. No additional fractures are identified. The bones are osteopenic. No suspicious lytic or sclerotic osseous lesions are evident. Soft Tissue: Subcutaneous and intramuscular edema adjacent to the fracture site. No organized collections are identified. No subcutaneous gas. Visualized intrapelvic structures appear grossly unremarkable. Moderate amount of stool present at the rectal vault. Joint: Anatomic alignment of the left hip. Mild-to-moderate degenerative changes of the left hip. Chondrocalcinosis noted. 1. Displaced fracture of the left transcervical femoral neck which is subacute in appearance. 2. No additional fractures are identified. 3. Mild-to-moderate degenerative change of the left hip. Chondrocalcinosis also present.      VL Lower Extremity Bilateral Venous Duplex    Result Date: 8/13/2021    [facility]  [facility] Lower Extremities DVT Study Procedure   Patient Name   Mei Leaver  Date of Study           08/13/2021   Date of Birth  1940  Gender                  Male   Age            79 year(s)  Race                       Room Number    2111        Height:                 66.93 inch, 170 cm   Corporate ID # [id number]    Weight:                 141 pounds, 64 kg   Patient Acct # [de-identified]   BSA:        1.74 m^2    BMI:      22.15 kg/m^2   MR #           [id number]      Sonographer             Chase Rooney RVT   Accession #    [id number]  Interpreting Physician  Nathalie Cortes   Referring                  Referring Physician     Jeyson Reese  Nurse  Practitioner  Procedure Type of Study:   Veins: Lower Extremities DVT Study, Venous Scan Lower Bilateral.  Indications for Study:R/O DVT. Patient Status:Out Patient. Technical Quality:Adequate visualization. Conclusions   Summary   Simultaneous real time imaging utilizing B-Mode, color doppler and  spectral waveform analysis was performed on the bilateral lower  extremities for venous examination of the deep and superficial systems. Findings are:   Right:  No evidence of deep or superficial venous thrombosis. Left:  No evidence of deep or superficial venous thrombosis.    Signature   ----------------------------------------------------------------  Electronically signed by Chase Rooney RVT(Sonographer) on  08/13/2021 11:08 AM  ----------------------------------------------------------------   ----------------------------------------------------------------  Electronically signed by Joi Dias(Interpreting  physician) on 08/13/2021 11:57 AM  ----------------------------------------------------------------  Findings:   Right Impression:                    Left Impression:  The common femoral, femoral,         The common femoral, femoral,  popliteal and tibial veins           popliteal and tibial veins  demonstrate normal compressibility   demonstrate normal compressibility  and augmentation. and augmentation. Normal compressibility of the great  Normal compressibility of the great  saphenous vein. saphenous vein. Normal compressibility of the small  Normal compressibility of the small  saphenous vein. saphenous vein. Enlarged lymph nodes are noted at  the groin level. Calcific plaque noted in the right  common femoral artery. Velocities are measured in cm/s ; Diameters are measured in cm Right Lower Extremities DVT Study Measurements Right 2D Measurements +------------------------------------+----------+---------------+----------+ ! Location                            ! Visualized! Compressibility! Thrombosis! +------------------------------------+----------+---------------+----------+ ! Common Femoral                      !Yes       ! Yes            ! None      ! +------------------------------------+----------+---------------+----------+ ! Prox Femoral                        !Yes       ! Yes            ! None      ! +------------------------------------+----------+---------------+----------+ ! Mid Femoral                         !Yes       ! Yes            ! None      ! +------------------------------------+----------+---------------+----------+ ! Dist Femoral                        !Yes       ! Yes            ! None      ! +------------------------------------+----------+---------------+----------+ ! Deep Femoral                        !Yes       ! Yes            ! None      ! +------------------------------------+----------+---------------+----------+ ! Popliteal                           !Yes       ! Yes            ! None      ! +------------------------------------+----------+---------------+----------+ ! Sapheno Femoral Junction            ! Yes       ! Yes            ! None      ! +------------------------------------+----------+---------------+----------+ ! PTV                                 ! Yes       ! Yes !None      ! +------------------------------------+----------+---------------+----------+ ! Peroneal                            !Yes       ! Yes            ! None      ! +------------------------------------+----------+---------------+----------+ ! Gastroc                             ! Yes       ! Yes            ! None      ! +------------------------------------+----------+---------------+----------+ ! GSV Thigh                           ! Yes       ! Yes            ! None      ! +------------------------------------+----------+---------------+----------+ ! GSV Knee                            ! Yes       ! Yes            ! None      ! +------------------------------------+----------+---------------+----------+ ! GSV Ankle                           ! Yes       ! Yes            ! None      ! +------------------------------------+----------+---------------+----------+ ! SSV                                 ! Yes       ! Yes            ! None      ! +------------------------------------+----------+---------------+----------+ Left Lower Extremities DVT Study Measurements Left 2D Measurements +------------------------------------+----------+---------------+----------+ ! Location                            ! Visualized! Compressibility! Thrombosis! +------------------------------------+----------+---------------+----------+ ! Common Femoral                      !Yes       ! Yes            ! None      ! +------------------------------------+----------+---------------+----------+ ! Prox Femoral                        !Yes       ! Yes            ! None      ! +------------------------------------+----------+---------------+----------+ ! Mid Femoral                         !Yes       ! Yes            ! None      ! +------------------------------------+----------+---------------+----------+ ! Dist Femoral                        !Yes       ! Yes            ! None      ! +------------------------------------+----------+---------------+----------+ ! Deep Femoral !Yes       !Yes            ! None      ! +------------------------------------+----------+---------------+----------+ ! Popliteal                           !Yes       ! Yes            ! None      ! +------------------------------------+----------+---------------+----------+ ! Sapheno Femoral Junction            ! Yes       ! Yes            ! None      ! +------------------------------------+----------+---------------+----------+ ! PTV                                 ! Yes       ! Yes            ! None      ! +------------------------------------+----------+---------------+----------+ ! Peroneal                            !Yes       ! Yes            ! None      ! +------------------------------------+----------+---------------+----------+ ! Gastroc                             ! Yes       ! Yes            ! None      ! +------------------------------------+----------+---------------+----------+ ! GSV Thigh                           ! Yes       ! Yes            ! None      ! +------------------------------------+----------+---------------+----------+ ! GSV Knee                            ! Yes       ! Yes            ! None      ! +------------------------------------+----------+---------------+----------+ ! GSV Ankle                           ! Yes       ! Yes            ! None      ! +------------------------------------+----------+---------------+----------+ ! SSV                                 ! Yes       ! Yes            ! None      ! +------------------------------------+----------+---------------+----------+    XR HIP 2-3 VW W PELVIS LEFT    Result Date: 8/3/2021  EXAMINATION: ONE XRAY VIEW OF THE PELVIS AND TWO XRAY VIEWS LEFT HIP 8/3/2021 8:41 am COMPARISON: None.  HISTORY: ORDERING SYSTEM PROVIDED HISTORY: fall TECHNOLOGIST PROVIDED HISTORY: fall Reason for Exam: fall, left hip pain Acuity: Unknown Type of Exam: Unknown FINDINGS: There is a left femoral neck fracture with impaction, varus angulation, and proximal migration of the greater trochanter which is essentially at the level of the acetabulum. The femoral head appears to remain appropriately seated in the acetabular fossa. Possible transverse lucency through the medial left acetabulum. 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 Left femoral neck fracture with impaction, varus angulation, and proximal migration of the greater trochanter. Possible lucency through the medial left acetabulum, though radiographic evaluation is limited by osseous demineralization. Consider CT. Consultations:    Consults:     Final Specialist Recommendations/Findings:   IP CONSULT TO ORTHOPEDIC SURGERY  IP CONSULT TO INTERNAL MEDICINE  IP CONSULT TO CARDIOLOGY  IP CONSULT TO SOCIAL WORK  IP CONSULT TO PHYSICAL MEDICINE REHAB  IP CONSULT TO DIETITIAN  IP CONSULT TO PULMONOLOGY  IP CONSULT TO GI  IP CONSULT TO DIETITIAN  IP CONSULT TO DIETITIAN  IP CONSULT TO PHYSICAL MEDICINE REHAB  IP CONSULT TO DIETITIAN  IP CONSULT TO INTERNAL MEDICINE  IP CONSULT TO ORTHOPEDIC SURGERY  IP CONSULT TO PHYSICAL MEDICINE REHAB      The patient was seen and examined on day of discharge and this discharge summary is in conjunction with any daily progress note from day of discharge. Discharge plan:       Disposition: Discharge/Readmit    Physician Follow Up:     Carolyn Harrington MD  [address] [id number] [phone]      New PCP - Call to schedule new patient appointment    [facility]  [address] [place] [address]. [address]  In 2 weeks      Lalo Zavala MD  [address] [place] [address].  [address] [phone]    In 1 week  For suture removal, For wound re-check     Follow-up with gastroenterology outpatient for treatment of H. Pylori.     Requiring Further Evaluation/Follow Up POST HOSPITALIZATION/Incidental Findings: Follow-up for H. pylori infection    Diet: Full liquid diet at discharge    Activity: As tolerated, patient to ARU    Instructions to Patient: Please follow-up with gastroenterology for treatment of H. pylori stomach infection    Discharge Medications:      Medication List      ASK your doctor about these medications    ibuprofen 200 MG tablet  Commonly known as: ADVIL;MOTRIN     therapeutic multivitamin-minerals tablet            Time Spent on discharge is  45 mins in patient examination, evaluation, counseling as well as medication reconciliation, prescriptions for required medications, discharge plan and follow up. Electronically signed by   Manuela Barth MD  8/14/2021  1:45 PM      Thank you Dr. Nahun Berumen primary care provider on file. for the opportunity to be involved in this patient's care.

## 2021-08-15 PROCEDURE — 6370000000 HC RX 637 (ALT 250 FOR IP)

## 2021-08-15 PROCEDURE — 97116 GAIT TRAINING THERAPY: CPT

## 2021-08-15 PROCEDURE — 97110 THERAPEUTIC EXERCISES: CPT

## 2021-08-15 PROCEDURE — 6370000000 HC RX 637 (ALT 250 FOR IP): Performed by: PHYSICAL MEDICINE & REHABILITATION

## 2021-08-15 PROCEDURE — 6370000000 HC RX 637 (ALT 250 FOR IP): Performed by: NURSE PRACTITIONER

## 2021-08-15 PROCEDURE — 97535 SELF CARE MNGMENT TRAINING: CPT

## 2021-08-15 PROCEDURE — 99222 1ST HOSP IP/OBS MODERATE 55: CPT | Performed by: INTERNAL MEDICINE

## 2021-08-15 PROCEDURE — 97530 THERAPEUTIC ACTIVITIES: CPT

## 2021-08-15 PROCEDURE — 1180000000 HC REHAB R&B

## 2021-08-15 PROCEDURE — 99232 SBSQ HOSP IP/OBS MODERATE 35: CPT | Performed by: PHYSICAL MEDICINE & REHABILITATION

## 2021-08-15 PROCEDURE — APPSS30 APP SPLIT SHARED TIME 16-30 MINUTES: Performed by: NURSE PRACTITIONER

## 2021-08-15 PROCEDURE — 99232 SBSQ HOSP IP/OBS MODERATE 35: CPT | Performed by: INTERNAL MEDICINE

## 2021-08-15 RX ORDER — METRONIDAZOLE 500 MG/1
500 TABLET ORAL EVERY 8 HOURS SCHEDULED
Status: DISCONTINUED | OUTPATIENT
Start: 2021-08-15 | End: 2021-08-27 | Stop reason: HOSPADM

## 2021-08-15 RX ORDER — AMOXICILLIN 500 MG/1
1000 CAPSULE ORAL EVERY 12 HOURS SCHEDULED
Status: DISCONTINUED | OUTPATIENT
Start: 2021-08-15 | End: 2021-08-27 | Stop reason: HOSPADM

## 2021-08-15 RX ADMIN — METOPROLOL TARTRATE 50 MG: 50 TABLET, FILM COATED ORAL at 21:13

## 2021-08-15 RX ADMIN — POLYETHYLENE GLYCOL 3350 17 G: 17 POWDER, FOR SOLUTION ORAL at 09:00

## 2021-08-15 RX ADMIN — DILTIAZEM HYDROCHLORIDE 30 MG: 30 TABLET, FILM COATED ORAL at 21:14

## 2021-08-15 RX ADMIN — PANTOPRAZOLE SODIUM 40 MG: 40 TABLET, DELAYED RELEASE ORAL at 09:00

## 2021-08-15 RX ADMIN — DILTIAZEM HYDROCHLORIDE 30 MG: 30 TABLET, FILM COATED ORAL at 12:15

## 2021-08-15 RX ADMIN — AMOXICILLIN 1000 MG: 500 CAPSULE ORAL at 21:13

## 2021-08-15 RX ADMIN — FUROSEMIDE 20 MG: 20 TABLET ORAL at 08:53

## 2021-08-15 RX ADMIN — METRONIDAZOLE 500 MG: 500 TABLET ORAL at 14:54

## 2021-08-15 RX ADMIN — METRONIDAZOLE 500 MG: 500 TABLET ORAL at 21:15

## 2021-08-15 RX ADMIN — PANTOPRAZOLE SODIUM 40 MG: 40 TABLET, DELAYED RELEASE ORAL at 14:54

## 2021-08-15 RX ADMIN — HYDROCODONE BITARTRATE AND ACETAMINOPHEN 1 TABLET: 5; 325 TABLET ORAL at 11:38

## 2021-08-15 RX ADMIN — FUROSEMIDE 20 MG: 20 TABLET ORAL at 17:37

## 2021-08-15 RX ADMIN — AMIODARONE HYDROCHLORIDE 200 MG: 200 TABLET ORAL at 08:53

## 2021-08-15 ASSESSMENT — PAIN DESCRIPTION - DESCRIPTORS: DESCRIPTORS: ACHING

## 2021-08-15 ASSESSMENT — PAIN SCALES - GENERAL
PAINLEVEL_OUTOF10: 0
PAINLEVEL_OUTOF10: 1
PAINLEVEL_OUTOF10: 1
PAINLEVEL_OUTOF10: 0
PAINLEVEL_OUTOF10: 0
PAINLEVEL_OUTOF10: 1

## 2021-08-15 ASSESSMENT — PAIN DESCRIPTION - ORIENTATION
ORIENTATION: LEFT
ORIENTATION: LEFT

## 2021-08-15 ASSESSMENT — PAIN DESCRIPTION - PAIN TYPE
TYPE: SURGICAL PAIN
TYPE: SURGICAL PAIN

## 2021-08-15 ASSESSMENT — PAIN DESCRIPTION - LOCATION: LOCATION: HIP

## 2021-08-15 NOTE — PLAN OF CARE
Individualized Plan of [facility]    Rehabilitation physician: Dr Racheal Turcios Date: 8/13/2021     Rehabilitation Diagnosis: Closed left hip fracture, initial encounter [facility]) [S72.002A]     Rehabilitation impairments: self care, mobility, pain management and safety    Factors facilitating achievement of predicted outcomes: Friend support, Motivated, Cooperative, Pleasant and Good insight into deficits  Barriers to the achievement of predicted outcomes: Pain, Limited family support, Decreased endurance, Lower extremity weakness, Medical complications, Skin Care and Medication managment    Patient Goals: Improve independence with mobility, Increase overall strength and endurance, Increase balance, Increase independence with activities of daily living, Increase safety awareness, Integrate appropriate pain management plan, Assure adequate nutritional option for discharge and Provide appropriate patient and family education      NURSING:  Nursing goals for Clearance G[place] while on the rehabilitation unit will include:  Adequate number of bowel movements, Urinate with no urinary retention >300ml in bladder, Maintain O2 SATs at an acceptable level during stay, Effective pain management while on the rehabilitation unit, Establish adequate pain control plan for discharge, Absence of skin breakdown while on the rehabilitation unit, Improved skin integrity via assessments including wound measurements, Avoidance of any hospital acquired infections, No signs/symptoms of infection at the wound site, Freedom from injury during hospitalization and Complete education with patient/family with understanding demonstrated regarding disease process and resultant impairment     In order to achieve these goals, nursing interventions may include bowel/bladder training, education for medical assistive devices, medication education, O2 saturation management, energy conservation, stress management techniques, fall prevention, alarms protocol, seating and positioning, skin/wound care, pressure relief instruction, dressing changes, infection protection, DVT prophylaxis, assistance with safe transfers , and/or assistance with bathroom activities and hygiene. PHYSICAL THERAPY:  Goals:        Short term goals  Time Frame for Short term goals: 7-10 days  Short term goal 1: bed mobility with SBA  Short term goal 2: transfers with supervision  Short term goal 3: gait with RW/SBA x 75-100ft  Short term goal 4: standing balance to Fair+ for ADLs  Long term goals  Time Frame for Long term goals : time of discharge  Long term goal 1: mod-I bed mobility  Long term goal 2: mod-I transfers  Long term goal 3: mod-I gait x 150ft with RW  Long term goal 4: flight of stairs with rail and cane mod-I  Long term goal 5: standing balance to Good for safe ADLs    Plan of Care: Pt to be seen by physical therapy services 1 Hour 30 Minutes per day at least 5 out of 7 days per week     Anticipated interventions may include therapeutic exercises, gait training, neuromuscular re-ed, transfer training, community reintegration, bed mobility, w/c mobility and training.       OCCUPATIONAL THERAPY:  Goals:             Short term goals  Time Frame for Short term goals: By 1 week   Short term goal 1: Pt will complete lower body dressing/bathing with min A and good safety with use of AE as needed  Short term goal 2: Pt will complete functional mobility during self care tasks with SBA and good safety with use of least restrictive device  Short term goal 3: Pt will tolerate standing 5+ minutes during functional activity of choice with CGA and good safety  Short term goal 4: Pt will complete functional transfer during self care tasks with CGA and good safety  Short term goal 5: Pt will participate in 30+ minutes of therapeutic exercises/functional activities to increase safety and independence with self care tasks  Long term goals  Time Frame for Long term goals : By discharge  Long term goal 1: Pt will complete BADLs with modified independence and good safety with use of AE as needed  Long term goal 2: Pt will complete functional transfers and mobility with modified independence and good safety with use of least restrictive device  Long term goal 3: Pt will tolerate standing 10+ minutes during funcitonal activity of choice with good safety  Long term goal 4: Pt will verbalize/demonstrate good understanding of home safety/fall prevention strategies to increase safety and independence with self care and mobility  Long term goal 5: Pt will verbalize/demonstrate good understanding of adaptive equipment/adaptive strategies/durable medical equipment to increase safety and independence with self care and mobility  Long term goals 6: Pt will complete simple meal prep/light house keeping with supervision and good safety    Plan of Care: Patient to be seen by occupational therapy services 1 Hour 30 Minutes per day at least 5 out of 7 days per week     Anticipated interventions may include ADL and IADL retraining, strengthening, safety education and training, patient/caregiver education and training, equipment evaluation/ training/procurement, neuromuscular reeducation, wheelchair mobility training. SPEECH THERAPY:   Goals:                      Plan of Care: N/A    CASE MANAGEMENT:  Goals:   Assist patient/family with discharge planning, patient/family counseling,  and coordination with insurance during the inpatient rehabilitation stay. Other members of the multidisciplinary rehabilitation team that will be involved in the patient's plan of care include recreational therapy, dietary, respiratory therapy, and neuropsychology.     Medical issues being managed closely and that require 24 hour availability of a physician:  Wound care, Pain management, Infection protection, DVT prophylaxis, Fall precautions, Fluid/Electrolyte balance, Nutritional status, Anemia and History of heart disease                                           Physician anticipated functional outcomes: Improved independence with functional measures   Estimated length of stay for this admission 2 weeks  Medical Prognosis: Fair  Anticipated disposition: Home. The potential to achieve the above medical and rehabilitative goals is fair. This plan of care has been developed with the assistance and input of the multidisciplinary rehabilitation team.  The plan was reviewed with the patient on 8/15/2021. The patient has had the opportunity to provide input to the therapy team.    I have reviewed this Individualized Plan of Care and agree with its contents. Above documentation has been expanded, modified, adjusted to reflect the findings of my evaluations and goals for the patient.     Physician:  Electronically signed by Caden Jacobo MD on 8/15/21 at 11:24 AM EDT

## 2021-08-15 NOTE — PROGRESS NOTES
height surfaces. Toilet Transfers  Toilet - Technique: Ambulating  Equipment Used: Raised toilet seat with rails  Toilet Transfer: Moderate assistance  Toilet Transfers Comments: Verbal cues for hand placement and safety             SPEECH:      Objective:  /63   Pulse 66   Temp 98.1 °F (36.7 °C)   Resp 16   Ht 5' 7\" (1.702 m)   Wt 178 lb (80.7 kg)   SpO2 91%   BMI 27.88 kg/m²       GEN: Well developed, well nourished, in NAD  HEENT:  NCAT. PERRL. EOMI. Mucous membranes pink and moist.   PULM:  Clear to ausculation. No rales or rhonchi. Respirations WNL and unlabored. CV:  Bradycardic rate regular rhythm. No murmurs or gallops. GI:  Abdomen soft. Nontender. Non-distended. BS + and equal.    NEUROLOGICAL: A&O x3. Sensation intact to light touch. MSK:  Functional ROM BUE and RLEs. Weakness impairs AROM L hip. Motor testing 5/5 key muscles BUE and RLEs. L hip flexion 2/5. L knee extension 3/5. SKIN: Warm dry and intact. Good turgor. L lateral hip incision with dressing CD&I. Coccyx wound - dressing CD&I. EXTREMITIES:  No calf tenderness to palpation. No edema BLEs. Maty Arias PSYCH: Mood WNL. Appropriately interactive. Affect WNL. Diagnostics:     CBC: Recent Labs     08/12/21  1511 08/13/21  0458 08/14/21  0657   WBC 9.9 9.0 9.7   RBC 2.20* 2.14* 2.56*   HGB 7.3* 7.2* 8.6*   HCT 22.5* 22.0* 25.8*   .5* 102.7* 100.6*   RDW 16.3* 16.3* 16.7*    230 232     BMP:   Recent Labs     08/13/21  0458 08/14/21  0657   * 143   K 3.9 4.1   * 106   CO2 31 29   BUN 11 10   CREATININE 0.64* 0.59*   GLUCOSE 96 94     BNP: No results for input(s): BNP in the last 72 hours. PT/INR: No results for input(s): PROTIME, INR in the last 72 hours. APTT: No results for input(s): APTT in the last 72 hours. CARDIAC ENZYMES: No results for input(s): CKMB, CKMBINDEX, TROPONINT in the last 72 hours.     Invalid input(s): CKTOTAL;3  FASTING LIPID PANEL:No results found for: CHOL, HDL, TRIG  LIVER PROFILE: No results for input(s): AST, ALT, ALB, BILIDIR, BILITOT, ALKPHOS in the last 72 hours. Current Medications:   Current Facility-Administered Medications: acetaminophen (TYLENOL) tablet 650 mg, 650 mg, Oral, Q4H PRN  polyethylene glycol (GLYCOLAX) packet 17 g, 17 g, Oral, Daily  senna (SENOKOT) tablet 17.2 mg, 2 tablet, Oral, Daily PRN  bisacodyl (DULCOLAX) suppository 10 mg, 10 mg, Rectal, Daily PRN  amiodarone (CORDARONE) tablet 200 mg, 200 mg, Oral, Daily  dilTIAZem (CARDIZEM) tablet 30 mg, 30 mg, Oral, 2 times per day  HYDROcodone-acetaminophen (NORCO) 5-325 MG per tablet 1 tablet, 1 tablet, Oral, Q6H PRN  melatonin tablet 3 mg, 3 mg, Oral, Nightly PRN  metoprolol tartrate (LOPRESSOR) tablet 50 mg, 50 mg, Oral, BID  pantoprazole (PROTONIX) tablet 40 mg, 40 mg, Oral, BID AC  furosemide (LASIX) tablet 20 mg, 20 mg, Oral, BID      Impression/Plan:   Impaired ADLs, gait, and mobility due to:      1. L hip fracture:  S/p hemiarthroplasty by Dr. Waleska Santiago 8/5/21. PT/OT for gait, mobility, strengthening, endurance, ADLs, and self care. WBAT. Has Tylenol or Norco prn pain. 2. A Fib with RVR: rate controlled. On amiodarone, Cardizem, Metoprolol. No anticoagulation per GI. 3. Rhabdomyolysis: Improved. Monitoring BMP weekly  4. Anemia secondary to GI bleed/ulcer: GI following. On PPI BID, soft diet, received 1 unit PRBCs 8/13/21. Monitoring q MWF.  5. Wounds/abrasions: nursing interventions, wound care consulted to follow  6. Insomnia: has melatonin prn  7. Hypokalemia: Improved. On Lasix. Monitoring and will replace prn but has been stable. 8. Pulmonary infiltrates/effusion: using incentive spirometer  9. Bowel Management: Miralax daily, senokot prn, dulcolax prn. 10. DVT Prophylaxis:  SCD's while in bed, SUE's and chemoprophylaxis contraindicated  11.  Internal medicine for medical management    Electronically signed by Freedom Sue MD on 8/15/2021 at 11:20 AM      This note is created with the assistance of a speech recognition program.  While intending to generate a document that actually reflects the content of the visit, the document can still have some errors including those of syntax and sound a like substitutions which may escape proof reading. In such instances, actual meaning can be extrapolated by contextual diversion.

## 2021-08-15 NOTE — PROGRESS NOTES
Physician Progress Note      PATIENTLinsameer Rose  CSN #:                  [id number]  :                       1940  ADMIT DATE:       8/3/2021 10:59 AM  DISCH DATE:        2021 6:57 PM  RESPONDING  PROVIDER #:        Maribel Muñiz TEXT:    Pt admitted with atrial fib and has CHF documented in pulmonary progress note   from . If possible, please document in progress notes and discharge   summary further specificity regarding the type and acuity of CHF:    The medical record reflects the following:  Risk Factors: 80yo  Clinical Indicators: CXR--> increasing now moderate asymmetric infiltrates   with small pleural effusions, related to progression in vascular congestion   and/or pneumonia; 8/10 proBNP 2198-->1153-->1136;  echo EF >55%; per   pulmonary progress note--> xray has not significantly improved and he still   has evidence of heart failure with bilateral crackles and lower extremity   edema  Treatment: IV Lasix BID, CXR, proBNP levels, echo this admission, cardio   consult, monitoring  Options provided:  -- Acute on Chronic Diastolic CHF/HFpEF  -- Acute on Chronic Systolic and Diastolic CHF  -- Acute Diastolic CHF/HFpEF  -- Acute Systolic and Diastolic CHF  -- Other - I will add my own diagnosis  -- Disagree - Not applicable / Not valid  -- Disagree - Clinically unable to determine / Unknown  -- Refer to Clinical Documentation Reviewer    PROVIDER RESPONSE TEXT:    This patient is in acute diastolic CHF/HFpEF.     Query created by: Anna Burton on 2021 7:59 AM      Electronically signed by:  Ellen Walsh 8/15/2021 2:08 PM

## 2021-08-15 NOTE — PROGRESS NOTES
[facility]    Gastroenterology Daily Progress Note      Patient:   Shay Mota   :    1940   Facility:   [facility]  Date:     8/15/2021  Consultant:   ANDRES Oswald CNP, CNP      SUBJECTIVE  79 y.o. male admitted 2021 with Closed left hip fracture, initial encounter ([facility] [address] Nazia Alexander and seen for duodenal ulcer and h pylori. The pt was seen and examined. hgb 8. 6. no c/o abdominal pain or nausea. No BM overnight.  .        OBJECTIVE  Scheduled Meds:   polyethylene glycol  17 g Oral Daily    amiodarone  200 mg Oral Daily    dilTIAZem  30 mg Oral 2 times per day    metoprolol tartrate  50 mg Oral BID    pantoprazole  40 mg Oral BID AC    furosemide  20 mg Oral BID       Vital Signs:  /63   Pulse 66   Temp 98.1 °F (36.7 °C)   Resp 16   Ht 5' 7\" (1.702 m)   Wt 178 lb (80.7 kg)   SpO2 91%   BMI 27.88 kg/m²      Physical Exam:   General Appearance: alert and oriented to person, place and time, well-developed and well-nourished, in no acute distress  Skin: warm and dry, no rash or erythema  Head: normocephalic and atraumatic  Eyes: pupils equal, round, and reactive to light, extraocular eye movements intact, conjunctivae normal  ENT: hearing grossly normal bilaterally  Neck: neck supple and non tender without mass, no thyromegaly or thyroid nodules, no cervical lymphadenopathy   Pulmonary/Chest: clear to auscultation bilaterally- no wheezes, rales or rhonchi, normal air movement, no respiratory distress  Cardiovascular: normal rate, regular rhythm, normal S1 and S2, no murmurs, rubs, clicks or gallops, distal pulses intact, no carotid bruits  Abdomen: soft, non-tender, non-distended, normal bowel sounds, no masses or organomegaly  Extremities: no cyanosis, clubbing or edema  Musculoskeletal: normal range of motion, no joint swelling, deformity or tenderness  Neurologic: no cranial nerve deficit and muscle strength normal    Lab and Imaging Review CBC  Recent Labs     08/12/21  1511 08/13/21  0458 08/14/21  0657   WBC 9.9 9.0 9.7   HGB 7.3* 7.2* 8.6*   HCT 22.5* 22.0* 25.8*   .5* 102.7* 100.6*    230 232       BMP  Recent Labs     08/13/21  0458 08/14/21  0657   * 143   K 3.9 4.1   * 106   CO2 31 29   BUN 11 10   CREATININE 0.64* 0.59*   GLUCOSE 96 94   CALCIUM 7.9* 7.8*     Findings:egd dr Erlinda Castillo 8/10/21     Retropharyngeal area was grossly normal appearing     Esophagus: normal     Stomach:    Fundus and Cardia Examined in Retroflexed View: normal    Body: normal    Antrum: abnormal: Has antral erosions. Random biopsies from the body and antrum to evaluate H. pylori. Duodenum:     Descending: normal    Bulb: abnormal: Huge ulcer in the apex of the bulb. No stigmata of visible vessel. No signs of acute bleeding. ASSESSMENT/plan  1. Anemia with duodenal ulcer per endoscopy  -continue ppi bid  -trend hh and keep hgb >7  -diet as tolerated    2.h pylori positive  Will start amoxil, flagyl and continue ppi, meds will need to continue for 14 days. This plan was formulated in collaboration with Dr. Erlinda Castillo.     Electronically signed by: ANDRES Qiu - CNP on 8/15/2021 at 1:19 PM

## 2021-08-15 NOTE — PROGRESS NOTES
Physical Therapy  Facility/Department: [facility]  Daily Treatment Note  NAME: Sruthi Orozco  : 1940  MRN: [id number]    Date of Service: 8/15/2021    Discharge Recommendations:  Home with assist PRN   PT Equipment Recommendations  Equipment Needed: Yes  Walker: Rolling    Assessment   Body structures, Functions, Activity limitations: Decreased functional mobility ; Decreased ROM; Decreased strength;Decreased endurance;Decreased balance  Assessment: Impaired mobility due to L Hip Fx/ Hemiarthroplasty and decreased tolerance to activity (pt demonstrated decreased seated balance without back or UE support- needs trunck strengthening)  Specific instructions for Next Treatment: seated trunk strengthening/balance activities  Decision Making: Low Complexity  REQUIRES PT FOLLOW UP: Yes  Activity Tolerance  Activity Tolerance: Patient limited by endurance     Patient Diagnosis(es): There were no encounter diagnoses. has a past medical history of Atrial fibrillation ([facility] [address] has a past surgical history that includes hip surgery (Left, 2021) and Upper gastrointestinal endoscopy (N/A, 8/10/2021). Restrictions  Restrictions/Precautions  Restrictions/Precautions: Weight Bearing, Fall Risk  Required Braces or Orthoses?: No  Implants present? : Metal implants (L hemiarthroplasty)  Lower Extremity Weight Bearing Restrictions  Left Lower Extremity Weight Bearing: Weight Bearing As Tolerated  Subjective   General  Family / Caregiver Present: No  Subjective  Subjective: pt reports feelimg tired in the PM  Pain Screening  Patient Currently in Pain: Yes  Pain Assessment  Pain Assessment: 0-10  Pain Level: 1  Pain Type: Surgical pain  Pain Orientation: Left  Pain Descriptors: Aching  Vital Signs  Patient Currently in Pain: Yes       Objective   Bed mobility  Rolling to Right: Minimal assistance (on mat table)  Supine to Sit: Minimal assistance  Sit to Supine:  Moderate assistance (B LE)  Comment: verbal cues for all bed mobility  Transfers  Sit to Stand: Minimal Assistance; Moderate Assistance  Stand to sit: Contact guard assistance  Comment: amount of assist needed to stand depends on height of surface pt sitting on. verbal cues to scoot forward and shift wt over his feet prior to standing; otherwise pt keeps wt too fat posterior  Ambulation  Ambulation?: Yes  More Ambulation?: Yes  Ambulation 1  Surface: level tile  Device: Rolling Walker  Assistance: Contact guard assistance  Gait Deviations: Decreased step length;Decreased step height  Distance: Im AM 35ft and 30ft  Ambulation 2  Surface - 2: level tile  Device 2: Rolling Walker  Assistance 2: Contact guard assistance  Gait Deviations: Decreased step length;Decreased step height  Distance:  In PM 55ft  Stairs/Curb  Stairs?: No     Balance  Comments: pt unable to sit edge of mat table without use of UEs for more than 10sec  Other exercises  Other exercises 1: active/assisted exercises L LE in supine and hooklying 15x2ea  Other exercises 2: active exercises with/without T-band B LE seated 15x2 ea  Other exercises 3: sit to stand x 5  Other exercises 4: standing B LE exercises x 10ea  Other exercises 5: assisted hip abd/ER in side-lying  Other exercises 6: Nustep L4 x 5min      Goals  Short term goals  Time Frame for Short term goals: 7-10 days  Short term goal 1: bed mobility with SBA  Short term goal 2: transfers with supervision  Short term goal 3: gait with RW/SBA x 75-100ft  Short term goal 4: standing balance to Fair+ for ADLs  Long term goals  Time Frame for Long term goals : time of discharge  Long term goal 1: mod-I bed mobility  Long term goal 2: mod-I transfers  Long term goal 3: mod-I gait x 150ft with RW  Long term goal 4: flight of stairs with rail and cane mod-I  Long term goal 5: standing balance to Good for safe ADLs  Patient Goals   Patient goals : get stronger    Plan    Plan  Times per week: 1.5hrs/day x 5-7days/wk  Specific instructions for Next Treatment: seated trunk strengthening/balance activities  Current Treatment Recommendations: Strengthening, ROM, Balance Training, Functional Mobility Training, Transfer Training, Stair training, Gait Training, Endurance Training, Safety Education & Training, Patient/Caregiver Education & Training, Home Exercise Program     Therapy Time   Individual Concurrent Group Co-treatment   Time In [phone](3936)         Time Out 1400(1020)         Minutes 46(48)                 Mk Howard, PT

## 2021-08-15 NOTE — PLAN OF CARE
Problem: Skin Integrity:  Goal: Will show no infection signs and symptoms  Description: Will show no infection signs and symptoms  Outcome: Ongoing  Goal: Absence of new skin breakdown  Description: Absence of new skin breakdown  8/15/2021 1422 by Raudel Manzanares RN  Outcome: Ongoing  8/15/2021 0641 by Lizette Sher RN  Outcome: Ongoing  Note: Skin assessment completed this shift. Nutrition and Hydration status assessed. Marcos Score as charted. Pressure Relief Overlay remains intact and inflated to patient's bed throughout the shift. Bilateral heels remain elevated on pillows throughout the shift. Patient tolerates repositioning by staff at least every 2 hours for comfort and to prevent breakdown. Patient verbalizes understanding of pressure ulcer prevention measures. Skin integrity maintained. No new skin breakdown noted. Shira / Incontinence care provided as needed throughout the shift. See assessment regarding multiple wounds in various stages of healing. Incision to Lt. Hip with rob, no drainage, 1+ edema. Gaviota Morse changed this shift. Problem: Falls - Risk of:  Goal: Will remain free from falls  Description: Will remain free from falls  8/15/2021 1422 by Raudel Manzanares RN  Outcome: Ongoing  8/15/2021 0641 by Lizette Sher RN  Outcome: Ongoing  Note: No falls or injuries sustained at this time. No attempts to get out of bed without nursing assistance. Call light within reach and pt. uses appropriately for assistance. Siderails up x 2. Nonskid footwear remains on. Bed in low and locked position. Hourly nursing rounds made. Pt. Alert and oriented, aware of limitations. Pt. uses assistive devices appropriately. Pt. understands individual fall risk factors. Pt. reoriented to surroundings and reminded to use call light with each nurse/patient interaction. Bed alarm / Personal alarm remains engaged throughout the shift as a precaution.      Goal: Absence of physical injury  Description: Absence of physical injury  Outcome: Ongoing     Problem: Pain:  Goal: Pain level will decrease  Description: Pain level will decrease  Outcome: Ongoing  Goal: Control of acute pain  Description: Control of acute pain  8/15/2021 1422 by Miguel A Uriostegui RN  Outcome: Ongoing  8/15/2021 0641 by Ashwini Felix RN  Note: Pain assessment completed. Pt. able to rest.  Pt. denies pain this shift. Pt. denies need for oral analgesic. Verbalizes understanding of nonpharmacologic strategies that provide comfort. Pt. repositioned for comfort. Nonverbal cues indicate absence of pain.     Goal: Control of chronic pain  Description: Control of chronic pain  Outcome: Ongoing     Problem: Musculor/Skeletal Functional Status  Goal: Highest potential functional level  Outcome: Ongoing

## 2021-08-15 NOTE — PROGRESS NOTES
[address] [facility]    ACUTE REHABILITATION OCCUPATIONAL THERAPY  DAILY NOTE    Date: 8/15/21  Patient Name: Paige Hannah      Room: 6534/7252-04    MRN: [id number]   : 1940  (79 y.o.)  Gender: male   Referring Practitioner: Leticia Coleman MD  Diagnosis: Left hip fracture  Additional Pertinent Hx: Amy Espinoza a 79 y. o. male with no known past medical history admitted to [facility] 8/3/2021.  He initially presented after a fall from standing at home. He reportedly was laying on the floor for 5 days before he was able to call for help. He was found to have atrial fibrillation with RVR in the ED and was started on diltiazem and heparin drips. He was also found to have left hip fracture and rhabdomylolysis. He has multiple wounds, including buttock, sacrum, bilateral elbow, and bilateral knee wounds. He underwent left hip hemiarthroplasty on 21 (Dr. Edin Gallagher). Pt now presents to ARU 21     Restrictions  Restrictions/Precautions: Weight Bearing, Fall Risk  Implants present? : Metal implants (L hemiarthroplasty)  Left Lower Extremity Weight Bearing: Weight Bearing As Tolerated  Required Braces or Orthoses?: No    Subjective  Subjective: \"I can take a shower\"  Comments: Pt was pleasant and motivated to participate in OT. Ok per [facility] [employer] for shower  Patient Currently in Pain: Yes  Pain Level: 1  Pain Location: Hip  Pain Orientation: Left  Restrictions/Precautions: Weight Bearing; Fall Risk  Overall Orientation Status: Within Functional Limits     Pain Assessment  Pain Assessment: 0-10  Pain Level: 1  Pain Type: Surgical pain  Pain Location: Hip  Pain Orientation: Left  Pain Descriptors: Discomfort    Objective  Cognition  Overall Cognitive Status: WFL  Perception  Overall Perceptual Status: WFL  Balance  Sitting Balance: Stand by assistance  Standing Balance: Contact guard assistance  Bed mobility  Sit to Supine: Moderate assistance (A with BLE)  Transfers  Sit to stand:  Moderate hygiene and clothing management)  Additional Comments: OT facilitated pts engagment in showering with use of grab bars, tub bench, hand held shower, and long handle sponge. Pt demonstrated increased independence with lower body bathing requiring A with bottom hygiene while standing. Pt educated on use of reacher to assist with lower body dressing with pt requiring assistance at this time. Pt would benefit from further education and practice. Assessment  Performance deficits / Impairments: Decreased ADL status; Decreased functional mobility ; Decreased strength;Decreased safe awareness;Decreased endurance;Decreased balance;Decreased high-level IADLs  Prognosis: Good  Activity Tolerance: Patient Tolerated treatment well  Safety Devices in place: Yes          Patient Education: safety with transfers, AE to increase independence with lower body self care tasks, BUE exercises  Patient Goals   Patient goals : \"To be able to do all that you just mentioned. \" (in regards to independence with ADL and IADL tasks)  Learner:patient  Method: demonstration and explanation       Outcome: needs reinforcement        Plan  Plan  Times per week: 5-7  Times per day: Twice a day  Current Treatment Recommendations: Self-Care / ADL, Strengthening, Balance Training, Functional Mobility Training, Endurance Training, Safety Education & Training, Patient/Caregiver Education & Training, Equipment Evaluation, Education, & procurement, Home Management Training  Patient Goals   Patient goals : \"To be able to do all that you just mentioned. \" (in regards to independence with ADL and IADL tasks)  Short term goals  Time Frame for Short term goals: By 1 week   Short term goal 1: Pt will complete lower body dressing/bathing with min A and good safety with use of AE as needed  Short term goal 2: Pt will complete functional mobility during self care tasks with SBA and good safety with use of least restrictive device  Short term goal 3: Pt will tolerate standing 5+ minutes during functional activity of choice with CGA and good safety  Short term goal 4: Pt will complete functional transfer during self care tasks with CGA and good safety  Short term goal 5: Pt will participate in 30+ minutes of therapeutic exercises/functional activities to increase safety and independence with self care tasks  Long term goals  Time Frame for Long term goals : By discharge  Long term goal 1: Pt will complete BADLs with modified independence and good safety with use of AE as needed  Long term goal 2: Pt will complete functional transfers and mobility with modified independence and good safety with use of least restrictive device  Long term goal 3: Pt will tolerate standing 10+ minutes during funcitonal activity of choice with good safety  Long term goal 4: Pt will verbalize/demonstrate good understanding of home safety/fall prevention strategies to increase safety and independence with self care and mobility  Long term goal 5: Pt will verbalize/demonstrate good understanding of adaptive equipment/adaptive strategies/durable medical equipment to increase safety and independence with self care and mobility  Long term goals 6: Pt will complete simple meal prep/light house keeping with supervision and good safety     08/15/21 1036 08/15/21 1412   OT Individual Minutes   Time In [id number]   Time Out 0303 0489   Minutes 63 37   Time Code Minutes    Timed Code Treatment Minutes 63 Minutes 37 Minutes     Electronically signed by Khoa Farias OT on 8/15/21 at 3:35 PM EDT

## 2021-08-16 LAB
ABO/RH: NORMAL
ANTIBODY SCREEN: NEGATIVE
ARM BAND NUMBER: NORMAL
BLD PROD TYP BPU: NORMAL
BLOOD BANK COMMENT: NORMAL
CROSSMATCH RESULT: NORMAL
DISPENSE STATUS BLOOD BANK: NORMAL
EXPIRATION DATE: NORMAL
HCT VFR BLD CALC: 27.1 % (ref 41–53)
HEMOGLOBIN: 8.9 G/DL (ref 13.5–17.5)
MCH RBC QN AUTO: 33 PG (ref 26–34)
MCHC RBC AUTO-ENTMCNC: 32.9 G/DL (ref 31–37)
MCV RBC AUTO: 100.2 FL (ref 80–100)
NRBC AUTOMATED: ABNORMAL PER 100 WBC
PDW BLD-RTO: 17.1 % (ref 11.5–14.9)
PLATELET # BLD: 260 K/UL (ref 150–450)
PMV BLD AUTO: 7.6 FL (ref 6–12)
RBC # BLD: 2.71 M/UL (ref 4.5–5.9)
TRANSFUSION STATUS: NORMAL
UNIT DIVISION: 0
UNIT NUMBER: NORMAL
WBC # BLD: 8 K/UL (ref 3.5–11)

## 2021-08-16 PROCEDURE — 1180000000 HC REHAB R&B

## 2021-08-16 PROCEDURE — 97530 THERAPEUTIC ACTIVITIES: CPT

## 2021-08-16 PROCEDURE — 97110 THERAPEUTIC EXERCISES: CPT

## 2021-08-16 PROCEDURE — 6370000000 HC RX 637 (ALT 250 FOR IP)

## 2021-08-16 PROCEDURE — 97535 SELF CARE MNGMENT TRAINING: CPT

## 2021-08-16 PROCEDURE — 99232 SBSQ HOSP IP/OBS MODERATE 35: CPT | Performed by: PHYSICAL MEDICINE & REHABILITATION

## 2021-08-16 PROCEDURE — 6370000000 HC RX 637 (ALT 250 FOR IP): Performed by: NURSE PRACTITIONER

## 2021-08-16 PROCEDURE — 85027 COMPLETE CBC AUTOMATED: CPT

## 2021-08-16 PROCEDURE — 99232 SBSQ HOSP IP/OBS MODERATE 35: CPT | Performed by: INTERNAL MEDICINE

## 2021-08-16 PROCEDURE — 97116 GAIT TRAINING THERAPY: CPT

## 2021-08-16 PROCEDURE — 36415 COLL VENOUS BLD VENIPUNCTURE: CPT

## 2021-08-16 PROCEDURE — APPSS30 APP SPLIT SHARED TIME 16-30 MINUTES: Performed by: NURSE PRACTITIONER

## 2021-08-16 PROCEDURE — 6370000000 HC RX 637 (ALT 250 FOR IP): Performed by: PHYSICAL MEDICINE & REHABILITATION

## 2021-08-16 RX ADMIN — DILTIAZEM HYDROCHLORIDE 30 MG: 30 TABLET, FILM COATED ORAL at 08:55

## 2021-08-16 RX ADMIN — FUROSEMIDE 20 MG: 20 TABLET ORAL at 17:05

## 2021-08-16 RX ADMIN — METOPROLOL TARTRATE 50 MG: 50 TABLET, FILM COATED ORAL at 08:56

## 2021-08-16 RX ADMIN — AMOXICILLIN 1000 MG: 500 CAPSULE ORAL at 08:55

## 2021-08-16 RX ADMIN — DILTIAZEM HYDROCHLORIDE 30 MG: 30 TABLET, FILM COATED ORAL at 21:56

## 2021-08-16 RX ADMIN — METRONIDAZOLE 500 MG: 500 TABLET ORAL at 21:54

## 2021-08-16 RX ADMIN — PANTOPRAZOLE SODIUM 40 MG: 40 TABLET, DELAYED RELEASE ORAL at 17:05

## 2021-08-16 RX ADMIN — PANTOPRAZOLE SODIUM 40 MG: 40 TABLET, DELAYED RELEASE ORAL at 06:13

## 2021-08-16 RX ADMIN — AMIODARONE HYDROCHLORIDE 200 MG: 200 TABLET ORAL at 08:56

## 2021-08-16 RX ADMIN — METOPROLOL TARTRATE 50 MG: 50 TABLET, FILM COATED ORAL at 21:56

## 2021-08-16 RX ADMIN — METRONIDAZOLE 500 MG: 500 TABLET ORAL at 17:06

## 2021-08-16 RX ADMIN — FUROSEMIDE 20 MG: 20 TABLET ORAL at 06:13

## 2021-08-16 RX ADMIN — AMOXICILLIN 1000 MG: 500 CAPSULE ORAL at 21:54

## 2021-08-16 RX ADMIN — POLYETHYLENE GLYCOL 3350 17 G: 17 POWDER, FOR SOLUTION ORAL at 08:55

## 2021-08-16 RX ADMIN — METRONIDAZOLE 500 MG: 500 TABLET ORAL at 06:16

## 2021-08-16 ASSESSMENT — PAIN SCALES - GENERAL
PAINLEVEL_OUTOF10: 0
PAINLEVEL_OUTOF10: 1
PAINLEVEL_OUTOF10: 0

## 2021-08-16 ASSESSMENT — PAIN DESCRIPTION - DESCRIPTORS: DESCRIPTORS: ACHING

## 2021-08-16 ASSESSMENT — PAIN DESCRIPTION - LOCATION: LOCATION: HIP

## 2021-08-16 ASSESSMENT — PAIN DESCRIPTION - ORIENTATION: ORIENTATION: LEFT

## 2021-08-16 ASSESSMENT — PAIN DESCRIPTION - PAIN TYPE: TYPE: ACUTE PAIN;SURGICAL PAIN

## 2021-08-16 NOTE — CARE COORDINATION
Iraida Ham, RN   Registered Nurse   Case Management   Progress Notes       Signed   Date of Service:  2021  4:36 PM         Related encounter: ED to Hosp-Admission (Discharged) from 8/3/2021 in [place] [address]  Acute Inpatient Rehab Preadmission Assessment     Patient Name: Hudson Sen        MRN:   [id number]    : 1940  (79 y.o.)  Gender: male      [place]   [place] [facility]  [place]? [facility] [address]   []? [facility] [facility]   []? [facility]   []? Outside Admission - Location:                                 [x]? Initial         []? Updated     Date of Onset / Admission to the acute hospital:  8/3/21     Inpatient Rehabilitation Admitting Diagnosis:  Left hip fracture        Did patient have surgery/procedures? []? No  [x]? Yes:      OPERATION: Upper GI endoscopy with Biopsy     Procedure: Left hip hemiarthroplasty        Physicians: Audelia Asher, Anthony, Yobani Buenrostro     Risk for clinical complications: High     Co-morbidities:       1. Atrial fibrillation with RVR  2. Rhabdomyolysis   3. Multiple wound/abrasions  4. Anemia  5. Hypokalemia   6. Chest x-ray showing moderate asymmetric infiltrates  7. Huge duodenal ulcer, antral erosion  8. Pain  9. Leukocytosis  10. Chronic dermatitis right leg  11. Jimenes Catheter           Financial Information     Primary insurance:  [x]? Medicare     []? Medicare HMO      []? [employer]    []? Medicaid      []? Medicaid HMO       []? Workers Compensation        []? Personal Pay     Secondary Insurance:  []? Medicare     []? Medicare HMO      []? [employer]    []? Medicaid      []? Medicaid HMO        []? Workers Compensation      [x]? None     Precautions:   []? Cardiac Precautions:            [x]? Total hip precautions:           [x]? Weight Bearing status:  LLE WBAT  [x]? Safety Precautions/Concerns:  Fall Risk, General Precautions  []? Visually impaired:                 []?Hard of Hearing:      Isolation Precautions: []?Yes              [x]? No  If Yes:   []? Droplet  []? Contact           []? Airborne     []? VRE     []? MRSA        []? C-diff         []? TB             []? ESBL         []? MDRO          []? Other:                        Physiatrist:  [x]? Dr. Braun Pain     []? Dr. Tessy Calvo  []? Dr. Peguero Car  []? Dr. Joey Antony     Patients Occupation: Retired     Reviewed Lab and Diagnostic reports from Current Admission: Yes     Patients Prior Functional  Level: Prior Function  Receives Help From: Neighbor  ADL Assistance: Independent (could not reach feet to bathe, don socks)  Homemaking Assistance: Needs assistance  Ambulation Assistance: Independent (with cane in house only)  Transfer Assistance: Independent  Additional Comments: Friend lives across the street and is not working at this time. - mowed grass since injury 2 months ago- pt reports was carrying laundry upstairs with sudden pain L thigh and legs gave out and fell to knees, since then mobility has been limited.     History of current illness, per PM&R Consult:  Rehana Sanchez a 79 y. o. male with no known past medical history admitted to [facility] 8/3/2021.       He initially presented after a fall from standing at home. [facility] reportedly was laying on the floor for 5 days before he was able to call for help. [facility] was found to have atrial fibrillation with RVR in the ED and was started on diltiazem and heparin drips.  He was also found to have left hip fracture and rhabdomylolysis. [facility] has multiple wounds, including buttock, sacrum, bilateral elbow, and bilateral knee wounds.  He underwent left hip hemiarthroplasty on 8/5/21 (Dr. Ale Watkins).  Extubated 8/6/21. [facility] remains on diltiazem and heparin drips at this time.     He currently reports doing pretty well. [facility] is sitting up in the bedside chair and rates pain in the left hip as 1 out of 10.  He denies any other acute concerns.        Prognosis: Fair     Current functional status for upper extremity ADLs:  UE Bathing: Setup, Maximum assistance  UE Dressing: Setup, Maximum assistance     Current functional status for lower extremity ADLs:  LE Bathing: Dependent/Total  LE Dressing: Moderate assistance (footlevel dressing only addressed)     Current functional status for bed, chair, wheelchair transfers:  Transfers  Sit to Stand: Minimal Assistance  Stand to sit: Contact guard assistance  Bed to Chair: Contact guard assistance  Stand Pivot Transfers: Contact guard assistance  Comment: Cues for sequencing and safety     Current functional status for toilet transfers:  Minimal Assistance     Current functional status for locomotion:  Ambulation  Ambulation?: Yes  WB Status: WBAT  More Ambulation?: Yes  Ambulation 1  Surface: level tile  Device: Rolling Walker  Assistance: Contact guard assistance  Quality of Gait: forward flexed posture with head and gaze downward, short step length, narrow FAITH, shuffle gait  Gait Deviations: Slow Danuta, Decreased step length, Decreased step height, Shuffles  Distance: 30ft (AM & PM)  Comments: Bed to bedside commode, straight path and turn, cues for longer stride and wider FAITH with upright posture     Current functional status for comprehension: Complete independence     Current functional status for expression: Complete independence     Current functional status for social interaction: Complete independence     Current functional status for problem solving: Complete independence     Current functional status for memory: Complete independence     Current Deficits R/T Impairment: Impaired Functional Mobility and Decreased ADLs     Required Therapy:   [x]? Physical Therapy  [x]? Occupational Therapy   []? Speech Therapy, as appropriate     Additional Services:    [x]?     []? Recreational Therapy, as appropriate    [x]? Nutrition    []? Dialysis  []? Cultural Needs Identified  []? Special Equipment Needs  []?  Other     Rehab Justification:  Needs 3 hrs therapy per day or 15 hours per week:  Yes  Identified Rehab Nursing needs: Yes  Intense Interdisciplinary need:  Yes  Need for 24 hr physician supervision:  Yes  Measurable improved quality of life:  Yes  Willingness to participate:  Yes  Medical Necessity:  Yes  Patient able to tolerate care proposed:   Yes     Expected Discharge Destination/Functional Level:  Home with assist  Expected length of time to achieve that level of improvement: 1-2 weeks  Expected Post Discharge Treatments: Home with possible Home Care     Other information relevant to patient's care needs:  N/A     Acute Inpatient Rehabilitation Disclosure Statement will be provided to patient upon admission to ARU with patient's verbalization of understanding.       I have reviewed and concur with the findings and results of the pre-admission screening assessment completed by the Inpatient Rehabilitation Admissions Coordinator.                  Cosigned by: Valeria Marquez MD at 8/13/2021  5:14 PM

## 2021-08-16 NOTE — CONSULTS
[place] [address] Internal Medicine    CONSULTATION / HISTORY AND PHYSICAL EXAMINATION            Date:   8/15/2021  Patient name:  Robbie Padilla  Date of admission:  8/13/2021  7:39 PM  MRN:   [id number]  Account:  [de-identified]  YOB: 1940  PCP:    No primary care provider on file. Room:   [address] [place] [address]  Code Status:    Full Code    Physician Requesting Consult: Verna Barney MD    Reason for Consult:  medical management    Chief Complaint:     No chief complaint on file. History Obtained From:     Patient medical record nursing staff    History of Present Illness:   Patient admitted to ARU for rehabilitation  He was admitted to [facility] after a mechanical fall, he was found to have left hip fracture, underwent surgery, he was found to have new onset atrial fibrillation,  During hospital stay, patient had drop in hemoglobin, stool was positive for occult blood. Patient was evaluated by GI physician, underwent EGD, patient was found to have nonbleeding duodenal ulcer, not considered a candidate for anticoagulation with history of GI bleed  Patient was started on amoxicillin and Flagyl, with positive H. pylori infection  Patient doing much better  Working with physical therapy  Past Medical History:     Past Medical History:   Diagnosis Date    Atrial fibrillation ([address]     with RVR        Past Surgical History:     Past Surgical History:   Procedure Laterality Date    HIP SURGERY Left 8/5/2021    HIP HEMIARTHROPLASTY performed by Jamilah Sands MD at [address] [place] [address] N/A 8/10/2021    EGD BIOPSY performed by He Diehl MD at [facility]        Medications Prior to Admission:     Prior to Admission medications    Medication Sig Start Date End Date Taking?  Authorizing Provider   ibuprofen (ADVIL;MOTRIN) 200 MG tablet Take 400 mg by mouth every 6 hours as needed for Pain   Yes Historical Provider, MD   Multiple Vitamins-Minerals (THERAPEUTIC MULTIVITAMIN-MINERALS) tablet Take 1 tablet by mouth daily   Yes Historical Provider, MD        Allergies:     Patient has no known allergies. Social History:     Tobacco:    reports that he has never smoked. He has never used smokeless tobacco.  Alcohol:      reports no history of alcohol use. Drug Use:  has no history on file for drug use. Family History:     No family history on file. Review of Systems:     Positive and Negative as described in HPI. CONSTITUTIONAL:  negative for fevers, chills, sweats, fatigue, weight loss  HEENT:  negative for vision, hearing changes, runny nose, throat pain  RESPIRATORY:  negative for shortness of breath, cough, congestion, wheezing. CARDIOVASCULAR:  negative for chest pain, palpitations. GASTROINTESTINAL:  negative for nausea, vomiting, diarrhea, constipation, change in bowel habits, abdominal pain   GENITOURINARY:  negative for difficulty of urination, burning with urination, frequency   INTEGUMENT:  negative for rash, skin lesions, easy bruising   HEMATOLOGIC/LYMPHATIC:  negative for swelling/edema   ALLERGIC/IMMUNOLOGIC:  negative for urticaria , itching  ENDOCRINE:  negative increase in drinking, increase in urination, hot or cold intolerance  MUSCULOSKELETAL: Left hip pain NEUROLOGICAL:  negative for headaches, dizziness, lightheadedness, numbness, pain, tingling extremities  BEHAVIOR/PSYCH:      Physical Exam:     /61   Pulse 65   Temp 98.4 °F (36.9 °C) (Oral)   Resp 18   Ht 5' 7\" (1.702 m)   Wt 178 lb (80.7 kg)   SpO2 93%   BMI 27.88 kg/m²   Temp (24hrs), Av.3 °F (36.8 °C), Min:98.1 °F (36.7 °C), Max:98.4 °F (36.9 °C)    No results for input(s): POCGLU in the last 72 hours.     Intake/Output Summary (Last 24 hours) at 8/15/2021 0192  Last data filed at 8/15/2021 2201  Gross per 24 hour   Intake --   Output 3000 ml   Net -3000 ml       General Appearance:  alert, well appearing, and in no acute distress  Mental status: oriented to person, place, and time with normal affect  Head:  normocephalic, atraumatic. Eye: no icterus, redness, pupils equal and reactive, extraocular eye movements intact, conjunctiva clear  Ear: normal external ear, no discharge, hearing intact  Nose:  no drainage noted  Mouth: mucous membranes moist  Neck: supple, no carotid bruits, thyroid not palpable  Lungs: Bilateral equal air entry, clear to ausculation, no wheezing, rales or rhonchi, normal effort  Cardiovascular: normal rate, regular rhythm, no murmur, gallop, rub. Abdomen: Soft, nontender, nondistended, normal bowel sounds, no hepatomegaly or splenomegaly  Neurologic: There are no new focal motor or sensory deficits, normal muscle tone and bulk, no abnormal sensation, normal speech, cranial nerves II through XII grossly intact  Skin: No gross lesions, rashes, bruising or bleeding on exposed skin area  Extremities: Postsurgical changes in left hip  Psych: Investigations:      Laboratory Testing:  No results found for this or any previous visit (from the past 24 hour(s)). Consultations:   IP CONSULT TO DIETITIAN  IP CONSULT TO SOCIAL WORK  IP CONSULT TO GI  IP CONSULT TO INTERNAL MEDICINE  IP CONSULT TO GI  Assessment :      Primary Problem  <principal problem not specified>    Active Hospital Problems    Diagnosis Date Noted    H pylori ulcer [K27.9, B96.81]     PUD (peptic ulcer disease) [K27.9]     Closed left hip fracture, initial encounter ([address] [S72.002A] 08/13/2021    Atrial fibrillation with RVR ([address] [I48.91] 08/03/2021       Plan:     1. Left hip fracture s/p surgery, pain is controlled  2. Atrial fibrillation, rate controlled, not on anticoagulation with history of GI bleed, on amiodarone, Cardizem  3. GI bleed, s/p EGD, on Protonix, twice a day,  4. H. pylori infection, on amoxicillin and Flagyl  5.  Not on chemoprophylaxis with history of GI bleed        Toma Leal MD  8/15/2021  11:34 PM    Copy sent to Dr. Kirby primary care provider on file. Please note that this chart was generated using voice recognition Dragon dictation software. Although every effort was made to ensure the accuracy of this automated transcription, some errors in transcription may have occurred.

## 2021-08-16 NOTE — PATIENT CARE CONFERENCE
[address]   ACUTE REHABILITATION  TEAM CONFERENCE NOTE  Date: 21  Patient Name: Talib Molina       Room: 6871/5724-16  MRN: [id number]       : 1940  (79 y.o.)     Gender: male       Closed left hip fracture, initial encounter ([facility] [S72.002A]  Diagnosis: Left hip fracture     NURSING  Bladder  Always Continent  Bowel   Always Continent  Date of Last BM: 8/15  Intervention    None     Wounds/Incisions/Ulcers: Incision concerning for - Left hip incision staples healing well, coccyx pressure red with mepalex, abrasion to right hip   Medication Education Program: Patient able to manage medications and being educated by nursing  Pain: Patient's pain is currently controlled with -  Norco Q6 last taken 8/15    Fall Risk:  Falling star program initiated    PHYSICAL THERAPY  Bed mobility  Rolling to Right: Minimal assistance (on mat table)  Supine to Sit: Minimal assistance  Sit to Supine: Minimal assistance  Comment: verbal cues for all bed mobility    Transfers:  Sit to Stand: Contact guard assistance  Stand to sit: Contact guard assistance    WB Status: WBAT  Ambulation 1  Surface: level tile  Device: Rolling Walker  Other Apparatus: Wheelchair follow  Assistance: Contact guard assistance  Quality of Gait: cues to correct forward flexed posture, fair pt response  Gait Deviations: Decreased step length;Decreased step height; Increased FAITH  Distance: 90 feet  Ambulation 2  Surface - 2: level tile  Device 2: Rolling Walker  Assistance 2: Contact guard assistance  Gait Deviations: Decreased step length;Decreased step height  Distance:  In PM 55ft    # Steps : 5  Stairs Height: 4\" (& 6\")  Rails: Bilateral  Device: No Device  Assistance: Contact guard assistance    Equipment Needed: Yes  Mobility Devices: Nikunj Matthews: Rolling    Impaired mobility due to L Hip Fx/ Hemiarthroplasty and decreased tolerance to activity (pt demonstrated decreased seated balance without back or UE support- needs Short term goals  Time Frame for Short term goals: By 1 week   Short term goal 1: Pt will complete lower body dressing/bathing with min A and good safety with use of AE as needed  Short term goal 2: Pt will complete functional mobility during self care tasks with SBA and good safety with use of least restrictive device  Short term goal 3: Pt will tolerate standing 5+ minutes during functional activity of choice with CGA and good safety  Short term goal 4: Pt will complete functional transfer during self care tasks with CGA and good safety  Short term goal 5: Pt will participate in 30+ minutes of therapeutic exercises/functional activities to increase safety and independence with self care tasks    SPEECH THERAPY      NUTRITION  Weight: 178 lb (80.7 kg) / Body mass index is 27.88 kg/m². Patient at risk of malnutrition. Has large duodenal ulcer H. Pylori and antral erosions. Tolerating current diet Dysphagia soft bite sized. Albumin 2.2 low, receiving Ensure high protein 2x daily. Eating well, consuming % of meals and supplements. Weight stable, BMI 27.88, blood glucose 94. Please see nutrition note for details. SOCIAL WORK ASSESSMENT  Assessment:Pt was living independently at home alone prior to hospital admission. He does not drive and walked everywhere he needed to go. He does have neighbors/friends who are willing to assist with pt needs upon discharge. Spoke with pt and Janel Mesa regarding discharge plans. Pt prefers out-pt therapy at [address] [place] [address]; Janel Mesa in agreement and able to provide transportation. Janel Mesa stated he and the neighbors will be watching out for pt when he returns home and assist with needs. Janel Mesa in agreement with discharge plan to home. List of community support services provided with a list of [address] [facility] PCP clinics.          Pre-Admission Status:  Lives With: Alone  Type of Home: House  Home Layout: Two level, Bed/Bath upstairs, Laundry in basement  Home Access: Stairs to enter with training    Bed/bathroom 2nd floor ROm, strengthening, progress to stair training as able. Altered ADL Status related to hip fracture  Training in use of devices, home safety strategies and use of modified care techniques to maximize self caer independence                           Total Self Care Score    Total Mobility Score  Admission Score:  22      Admission Score:  30  Goal:  42/42         Goal:  89/90   `  Discharge Plan   Estimated Discharge Date: 8/27/2021  Home evaluation needed?  Home Evaluation Indication (NO, Requires ReEval, YES/Date): No home evaluation need indicated for patient at this time  Overnight or Day Pass: No  Factors facilitating achievement of predicted outcomes: Friend support, Motivated, Cooperative, Pleasant, Sense of humor and Good insight into deficits  Barriers to the achievement of predicted outcomes: Decreased endurance, Lower extremity weakness, Skin Care, Stairs, and Medication managment    Functional Goals at discharge:  Predicted Outcome: Home alonePATIENT'S LEVEL OF ASSISTANCE: Modified independence  Discharge therapy goals:  PT: Long term goals  Time Frame for Long term goals : time of discharge  Long term goal 1: mod-I bed mobility  Long term goal 2: mod-I transfers  Long term goal 3: mod-I gait x 150ft with RW  Long term goal 4: flight of stairs with rail and cane mod-I  Long term goal 5: standing balance to Good for safe ADLs  OT:Long term goals  Time Frame for Long term goals : By discharge  Long term goal 1: Pt will complete BADLs with modified independence and good safety with use of AE as needed  Long term goal 2: Pt will complete functional transfers and mobility with modified independence and good safety with use of least restrictive device  Long term goal 3: Pt will tolerate standing 10+ minutes during funcitonal activity of choice with good safety  Long term goal 4: Pt will verbalize/demonstrate good understanding of home safety/fall prevention strategies to increase safety and independence with self care and mobility  Long term goal 5: Pt will verbalize/demonstrate good understanding of adaptive equipment/adaptive strategies/durable medical equipment to increase safety and independence with self care and mobility  Long term goals 6: Pt will complete simple meal prep/light house keeping with supervision and good safety  ST:     Team Members Present at Conference:  :  Nathan Castrejon  Occupational Therapist: Michelle Davis OT   Physical Therapist: Saul Grewal PT  Speech Therapist:  N/A  Nurse: Toña Knet RN   Dietary/Nutrition: Duyen Rodgers RD, LD    Pastoral Care: Alveda Show  CMG: Carly Boo, RINA    I approve the established interdisciplinary plan of care as documented within the medical record of Shy Sumner.     Hugo Uriostegui MD

## 2021-08-16 NOTE — CARE COORDINATION
CASE MANAGEMENT NOTE:    Admission Date:  8/13/2021 Mark Isaac is a 79 y.o.  male    Admitted for : Closed left hip fracture, initial encounter ([facility] Saleem Ureña    Met with:  Patient      PCP:  Pt does not have PCP- List of clinics provided                                Insurance:  Medicare      Current Residence/ Living Arrangements:  independently at home             Current Services PTA:  No    Is patient agreeable to VNS: No    Freedom of choice provided:  NA    List of [address] provided: NA    Home Health/ Out pt therapy chosen:  Yes- [facility] in [place]    DME:  straight cane and walker    Home Oxygen: No    Nebulizer: No    CPAP/BIPAP: No    Supplier: N/A    Potential Assistance Needed: No    SNF needed: No    Freedom of choice and list provided: NA    Pharmacy:  [facility]       Does Patient want to use MEDS to BEDS? No    Is patient currently receiving oral anticoagulation therapy? No    Is the Patient an [facility] with Readmission Risk Score greater than 14%? No- 11%  If yes, pt needs a follow up appointment made within 7 days. Family Members/Caregivers that pt would like involved in their care:    Yes    If yes, list name here:  Cristi Lees and neighbor    Transportation Provider:  Ted Miller        Handap placard:  Pt does not drive          Is patient in Isolation/One on One/Altered Mental Status? No  If yes, skip next question. If no, would they like an I-Pad to  use? No  If yes, call [phone]. Mental Health History: Pt denied    Substance use: Pt has not drank since 2003    Discharge Plan:  Pt was living independently at home alone prior to hospital admission. He does not drive and walked everywhere he needed to go. He does have neighbors/friends who are willing to assist with pt needs upon discharge. Spoke with pt and Ted Miller regarding discharge plans. Pt prefers out-pt therapy at [facility]; Ted Miller in agreement and able to provide transportation. Kyara Camejo stated he and the neighbors will be watching out for pt when he returns home and assist with needs. Kyara Camejo in agreement with discharge plan to home. List of community support services provided with a list of [facility] PCP clinics. Patient Health Questionnaire-9 (PHQ-9)    Over the last 2 weeks, how often have you been bothered by any of the following problems? 1. Little Interest or pleasure in doing things? [x] Not at all  [] Several Days  [] More than half the day  []  Nearly every day    2. Feeling down, depressed or hopeless? [x] Not at all  [] Several Days  [] More than half the day  []  Nearly every day    3. Trouble falling or staying asleep, or sleeping too much? [x] Not at all  [] Several Days  [] More than half the day  []  Nearly every day    4. Feeling tired or having little energy? [x] Not at all  [] Several Days  [] More than half the day  []  Nearly every day    5. Poor apettite or overeating? [x] Not at all  [] Several Days  [] More than half the day  []  Nearly every day    6. Feeling bad about yourself-or that you are a failure or have let yourself or your family down? [x] Not at all  [] Several Days  [] More than half the day  []  Nearly every day    7. Trouble concentrating on things, such as reading the newspaper or watching television? [x] Not at all  [] Several Days  [] More than half the day  []  Nearly every day    8. Moving or speaking so slowly that other people could have noticed? Or the opposite-being so fidgety or restless that you have been moving around a lot more than usual?   [x] Not at all  [] Several Days  [] More than half the day  []  Nearly every day    9. Thoughts that you would be better off dead or of hurting yourself in some way?    [x] Not at all  [] Several Days  [] More than half the day  []  Nearly every day    Total Score: 0 pt denied s/s of depresssion    If you checked off any problems, how difficult have these problems made it for you to do your work, take care of things at home, or get along with other people?    [x] Not difficult at all  [] Somewhat Difficult  [] Very Difficult  []  Extremely Difficult       Electronically signed by: CANDE Munroe on 8/16/2021 at 8:05 AM

## 2021-08-16 NOTE — PROGRESS NOTES
Comprehensive Nutrition Assessment    Type and Reason for Visit:  Consult (Eval and treat)    Nutrition Recommendations/Plan: Continue current diet and supplements. Nutrition Assessment:  Patient admitted to acute rehab due for independent self care, mobility, pain management related to closed hip fracture. Eating well, consuming % of meals and supplements. Has duodenal ulcer with H. Pylori and antral erosions, tolerating current diet and supplements. Noted low albumin 2.2 likely due to multiple wounds, receiving and consuming Ensure High Protein 2x daily. Will continue current diet and supplements. Malnutrition Assessment:  Malnutrition Status: At risk for malnutrition (Comment)    Context:  Acute Illness     Findings of the 6 clinical characteristics of malnutrition:  Energy Intake:  7 - 50% or less of estimated energy requirements for 5 or more days  Weight Loss:  No significant weight loss     Body Fat Loss:  No significant body fat loss     Muscle Mass Loss:  No significant muscle mass loss    Fluid Accumulation:  1 - Mild Extremities   Strength:  Not Performed    Estimated Daily Nutrient Needs:  Energy (kcal):  1759 kcal based on [place]- [place]. Jeor and 1.2 factor; Weight Used for Energy Requirements:  Admission     Protein (g):  100-135 gm based 1.5-2.0 gm/kg ideal; Weight Used for Protein Requirements:  Ideal        Fluid (ml/day):   ; Method Used for Fluid Requirements:  1 ml/kcal      Nutrition Related Findings:  Edema: _1 sacral, perineal, RLE, LLE, trace BUE. Bowel sounds active. Last BM 8/15, Meds and labs reviewed      Wounds:  Pressure Injury, Multiple, Stage II, Unstageable, Surgical Incision (Trauma)       Current Nutrition Therapies:    ADULT DIET; Dysphagia - Soft and Bite Sized  Adult Oral Nutrition Supplement;  Low Calorie/High Protein Oral Supplement    Anthropometric Measures:  · Height: 5' 7\" (170.2 cm)  · Current Body Weight: 178 lb (80.7 kg)   · Admission Body Weight: 178 lb (80.7 kg)    · Usual Body Weight: 177 lb (80.3 kg)     · Ideal Body Weight: 148 lbs; % Ideal Body Weight 120.3 %   · BMI: 27.9  · BMI Categories: Overweight (BMI 25.0-29. 9)       Nutrition Diagnosis:   · Increased nutrient needs related to other (comment) (Healing) as evidenced by wounds    Nutrition Interventions:   Food and/or Nutrient Delivery:  Continue Current Diet, Continue Oral Nutrition Supplement  Nutrition Education/Counseling:  No recommendation at this time   Coordination of Nutrition Care:  Continue to monitor while inpatient    Goals:  Meet over 75% of estimated nutrition needs       Nutrition Monitoring and Evaluation:   Behavioral-Environmental Outcomes:  None Identified   Food/Nutrient Intake Outcomes:  Food and Nutrient Intake, Supplement Intake  Physical Signs/Symptoms Outcomes:  Biochemical Data, Weight, Skin, Fluid Status or Edema     Discharge Planning:    Continue Oral Nutrition Supplement, Continue current diet       Some areas of assessment may be incomplete due to COVID-19 precautions    Devin Phelps RD, LD  Office phone [phone]

## 2021-08-16 NOTE — PROGRESS NOTES
200 MG tablet Take 400 mg by mouth every 6 hours as needed for Pain   Yes Historical Provider, MD   Multiple Vitamins-Minerals (THERAPEUTIC MULTIVITAMIN-MINERALS) tablet Take 1 tablet by mouth daily   Yes Historical Provider, MD        Allergies:     Patient has no known allergies. Social History:     Tobacco:    reports that he has never smoked. He has never used smokeless tobacco.  Alcohol:      reports no history of alcohol use. Drug Use:  has no history on file for drug use. Family History:     No family history on file. Review of Systems:     Positive and Negative as described in HPI. CONSTITUTIONAL:  negative for fevers, chills, sweats, fatigue, weight loss  HEENT:  negative for vision, hearing changes, runny nose, throat pain  RESPIRATORY:  negative for shortness of breath, cough, congestion, wheezing. CARDIOVASCULAR:  negative for chest pain, palpitations. GASTROINTESTINAL:  negative for nausea, vomiting, diarrhea, constipation, change in bowel habits, abdominal pain   GENITOURINARY:  negative for difficulty of urination, burning with urination, frequency   INTEGUMENT:  negative for rash, skin lesions, easy bruising   HEMATOLOGIC/LYMPHATIC:  negative for swelling/edema   ALLERGIC/IMMUNOLOGIC:  negative for urticaria , itching  ENDOCRINE:  negative increase in drinking, increase in urination, hot or cold intolerance  MUSCULOSKELETAL: Left hip pain NEUROLOGICAL:  negative for headaches, dizziness, lightheadedness, numbness, pain, tingling extremities  BEHAVIOR/PSYCH:      Physical Exam:     /63   Pulse 64   Temp 98.4 °F (36.9 °C) (Oral)   Resp 14   Ht 5' 7\" (1.702 m)   Wt 178 lb (80.7 kg)   SpO2 93%   BMI 27.88 kg/m²   Temp (24hrs), Av.4 °F (36.9 °C), Min:98.4 °F (36.9 °C), Max:98.4 °F (36.9 °C)    No results for input(s): POCGLU in the last 72 hours.     Intake/Output Summary (Last 24 hours) at 2021 1702  Last data filed at 2021 0618  Gross per 24 hour   Intake -- Output 2325 ml   Net -2325 ml       General Appearance:  alert, well appearing, and in no acute distress  Mental status: oriented to person, place, and time with normal affect  Head:  normocephalic, atraumatic. Eye: no icterus, redness, pupils equal and reactive, extraocular eye movements intact, conjunctiva clear  Ear: normal external ear, no discharge, hearing intact  Nose:  no drainage noted  Mouth: mucous membranes moist  Neck: supple, no carotid bruits, thyroid not palpable  Lungs: Bilateral equal air entry, clear to ausculation, no wheezing, rales or rhonchi, normal effort  Cardiovascular: normal rate, regular rhythm, no murmur, gallop, rub. Abdomen: Soft, nontender, nondistended, normal bowel sounds, no hepatomegaly or splenomegaly  Neurologic: There are no new focal motor or sensory deficits, normal muscle tone and bulk, no abnormal sensation, normal speech, cranial nerves II through XII grossly intact  Skin: No gross lesions, rashes, bruising or bleeding on exposed skin area  Extremities: Postsurgical changes in left hip  Psych:      Investigations:      Laboratory Testing:  Recent Results (from the past 24 hour(s))   CBC    Collection Time: 08/16/21  6:29 AM   Result Value Ref Range    WBC 8.0 3.5 - 11.0 k/uL    RBC 2.71 (L) 4.5 - 5.9 m/uL    Hemoglobin 8.9 (L) 13.5 - 17.5 g/dL    Hematocrit 27.1 (L) 41 - 53 %    .2 (H) 80 - 100 fL    MCH 33.0 26 - 34 pg    MCHC 32.9 31 - 37 g/dL    RDW 17.1 (H) 11.5 - 14.9 %    Platelets 867 489 - 186 k/uL    MPV 7.6 6.0 - 12.0 fL    NRBC Automated NOT REPORTED per 100 WBC           Consultations:   IP CONSULT TO DIETITIAN  IP CONSULT TO SOCIAL WORK  IP CONSULT TO GI  IP CONSULT TO INTERNAL MEDICINE  IP CONSULT TO GI  Assessment :      Primary Problem  <principal problem not specified>    Active Hospital Problems    Diagnosis Date Noted    H pylori ulcer [K27.9, B96.81]     PUD (peptic ulcer disease) [K27.9]     Closed left hip fracture, initial encounter ([facility] Cielo Faith 08/13/2021    Atrial fibrillation with RVR ([facility] [I48.91] 08/03/2021       Plan:     1. Left hip fracture s/p surgery, pain is controlled  2. Atrial fibrillation, rate controlled, not on anticoagulation with history of GI bleed, on amiodarone, Cardizem  3. GI bleed, s/p EGD, on Protonix, twice a day,  4. H. pylori infection, on amoxicillin and Flagyl  5. Not on chemoprophylaxis with history of GI bleed        Marvin Landa MD  8/16/2021  5:02 PM    Copy sent to Dr. Kirby primary care provider on file. Please note that this chart was generated using voice recognition Dragon dictation software. Although every effort was made to ensure the accuracy of this automated transcription, some errors in transcription may have occurred.

## 2021-08-16 NOTE — PROGRESS NOTES
[facility]   Acute Rehabilitation Physical Therapy Progress Note    Date: 21  Patient Name: Vaibhav Del Cid       Room: 9349/6084-33  MRN: [id number]   Account: [de-identified]   : 1940  (79 y.o.) Gender: male     Referring Practitioner: Fiona Kelly MD  Diagnosis: Left hip fracture  Past Medical History:  has a past medical history of Atrial fibrillation ([address] Past Surgical History:   has a past surgical history that includes hip surgery (Left, 2021) and Upper gastrointestinal endoscopy (N/A, 8/10/2021). Additional Pertinent Hx: closed L hip fx    Overall Orientation Status: Within Normal Limits  Restrictions/Precautions  Restrictions/Precautions: Weight Bearing; Fall Risk  Required Braces or Orthoses?: No  Implants present? : Metal implants (L hemiarthroplasty)  Lower Extremity Weight Bearing Restrictions  Left Lower Extremity Weight Bearing: Weight Bearing As Tolerated    Subjective: Pt reports severe fatigue in the PM       Vital Signs  Patient Currently in Pain: No        Bed Mobility:   Bed mobility  Supine to Sit: Minimal assistance  Sit to Supine: Minimal assistance    Transfers:  Sit to Stand: Contact guard assistance  Stand to sit: Contact guard assistance         WB Status: WBAT  Ambulation 1  Surface: level tile  Device: Rolling Walker  Other Apparatus: Wheelchair follow  Assistance: Contact guard assistance  Quality of Gait: cues to correct forward flexed posture, fair pt response  Gait Deviations: Decreased step length;Decreased step height; Increased FAITH  Distance: 90 feet        Stairs/Curb  Stairs?: Yes  Stairs  # Steps : 5  Stairs Height: 4\" (& 6\")  Rails: Bilateral  Device: No Device  Assistance: Contact guard assistance         BALANCE Posture: Fair  Sitting - Static: Fair;+  Sitting - Dynamic: Fair  Standing - Static: Fair  Standing - Dynamic: Fair    EXERCISES    Other exercises?: Yes  Other exercises 1: Supine and hooklying AAROM exercises: LLE 15x  Other exercises 2: Seated BLE exercises: 15x (Orange Theraband for all except LLE hip flexion)  Other exercises 3: Standing BLE exercises: 3-way hip 10x  Other exercises 4: Nustep L4 x 10 min (seat at 10; handle at 11)  Other exercises 5: UBE: 3min forward, 3min backward           Activity Tolerance: Patient limited by endurance, Patient limited by fatigue  PT Equipment Recommendations  Equipment Needed: Yes  Walker: Rolling       Current Treatment Recommendations: Strengthening, ROM, Balance Training, Functional Mobility Training, Transfer Training, Stair training, Gait Training, Endurance Training, Safety Education & Training, Patient/Caregiver Education & Training, Home Exercise Program    Conditions Requiring Skilled Therapeutic Intervention  Body structures, Functions, Activity limitations: Decreased functional mobility ; Decreased ROM; Decreased strength;Decreased endurance;Decreased balance  Assessment: Impaired mobility due to L Hip Fx/ Hemiarthroplasty and decreased tolerance to activity (pt demonstrated decreased seated balance without back or UE support- needs trunck strengthening)  Decision Making: Low Complexity  REQUIRES PT FOLLOW UP: Yes  Discharge Recommendations: Home with assist PRN    Goals  Short term goals  Time Frame for Short term goals: 7-10 days  Short term goal 1: bed mobility with SBA  Short term goal 2: transfers with supervision  Short term goal 3: gait with RW/SBA x 75-100ft  Short term goal 4: standing balance to Fair+ for ADLs  Long term goals  Time Frame for Long term goals : time of discharge  Long term goal 1: mod-I bed mobility  Long term goal 2: mod-I transfers  Long term goal 3: mod-I gait x 150ft with RW  Long term goal 4: flight of stairs with rail and cane mod-I  Long term goal 5: standing balance to Good for safe ADLs       08/16/21 1044 08/16/21 1342   PT Individual Minutes   Time In 1044 1342   Time Out 1122 1434   Minutes 38 52       Electronically signed by Richard Garcia PTA on 8/16/21 at 5:05 PM EDT

## 2021-08-16 NOTE — PROGRESS NOTES
Physical Medicine & Rehabilitation  Progress Note      Subjective:      79year-old male with L hip fracture after fall. Patient is doing well today. He reports pain is mild. He denies any new issues with sleep, appetite, bowel, or bladder. ROS:  Denies fevers, chills, sweats. No chest pain, palpitations, lightheadedness. Denies coughing, wheezing or shortness of breath. Denies abdominal pain, nausea, diarrhea or constipation. No new areas of joint pain. Denies new areas of numbness or weakness. Denies new anxiety or depression issues. No new skin problems. Rehabilitation:   Progressing in therapies.     PT:  Restrictions/Precautions: Weight Bearing, Fall Risk  Implants present? : Metal implants (L hemiarthroplasty)  Left Lower Extremity Weight Bearing: Weight Bearing As Tolerated   Transfers  Sit to Stand: Minimal Assistance, Moderate Assistance  Stand to sit: Contact guard assistance  Bed to Chair: Contact guard assistance (with RW)  Comment: amount of assist needed to stand depends on height of surface pt sitting on. verbal cues to scoot forward and shift wt over his feet prior to standing; otherwise pt keeps wt too fat posterior  WB Status: WBAT  Ambulation 1  Surface: level tile  Device: Rolling Walker  Assistance: Contact guard assistance  Gait Deviations: Decreased step length, Decreased step height  Distance: Im AM 35ft and 30ft    Transfers  Sit to Stand: Minimal Assistance, Moderate Assistance  Stand to sit: Contact guard assistance  Bed to Chair: Contact guard assistance (with RW)  Comment: amount of assist needed to stand depends on height of surface pt sitting on. verbal cues to scoot forward and shift wt over his feet prior to standing; otherwise pt keeps wt too fat posterior  Ambulation  Ambulation?: Yes  WB Status: WBAT  More Ambulation?: Yes  Ambulation 1  Surface: level tile  Device: Rolling Walker  Assistance: Contact guard assistance  Gait Deviations: Decreased step length, Decreased step height  Distance: Im AM 35ft and 30ft    Surface: level tile  Ambulation 1  Surface: level tile  Device: Rolling Walker  Assistance: Contact guard assistance  Gait Deviations: Decreased step length, Decreased step height  Distance: Im AM 35ft and 30ft    OT:  ADL  Equipment Provided: Long-handled sponge, Reacher  Feeding: Setup, Dentures  Grooming: Setup  UE Bathing: Stand by assistance  LE Bathing: Increased time to complete, Contact guard assistance, Setup  UE Dressing: Setup  LE Dressing: Minimal assistance (A with threading with reacher and footwear)  Toileting: None  Additional Comments: pt demo G carryover c AE for LB dressing, pt requests to wash up at sink at [facility] level this date. pt declines washing feet, pt stands at sink with 0-1 UE support during washing of caity/buttocks and LB clothing mgt with CGA, no LOB noted         Balance  Sitting Balance: Stand by assistance  Standing Balance: Contact guard assistance   Standing Balance  Time: 1-2 minutes x 8  Activity: Functional transfers, functional mobility, lower body dressing, lower body bathing, toileting  Comment: with RW  Functional Mobility  Functional - Mobility Device: Rolling Walker  Activity: To/from bathroom  Assist Level: Contact guard assistance  Functional Mobility Comments: pt demo slow movements, G safety noted     Bed mobility  Rolling to Left: Minimal assistance  Rolling to Right: Stand by assistance  Supine to Sit: Contact guard assistance  Sit to Supine: Moderate assistance (B LE)  Scooting: Stand by assistance (to EOB)  Comment: VCs for initiation and sequencing, bed flat   Transfers  Sit to stand: Moderate assistance (Mod A for initial stand from bed then improves to CGA)  Stand to sit: Contact guard assistance  Transfer Comments: VCs to slow descent when sitting   Toilet Transfers  Toilet - Technique: Ambulating  Equipment Used: Raised toilet seat with rails  Toilet Transfer:  Moderate assistance  Toilet Transfers Comments: Verbal cues for hand placement and safety     Shower Transfers  Shower - Transfer To: Transfer tub bench  Shower - Technique: Ambulating, Stand pivot (Functional mobilty in; Stand pivot out due to fatigue)  Shower Transfers: Contact Guard  Shower Transfers Comments: Verbal cues for safety and sequencing over ramp and threshold with increased time. SPEECH:      Objective:  /63   Pulse 64   Temp 98.4 °F (36.9 °C) (Oral)   Resp 14   Ht 5' 7\" (1.702 m)   Wt 178 lb (80.7 kg)   SpO2 93%   BMI 27.88 kg/m²       GEN: Well developed, well nourished, in NAD  HEENT:  NCAT. PERRL. EOMI. Mucous membranes pink and moist.   PULM:  Clear to ausculation. No rales or rhonchi. Respirations WNL and unlabored. CV:  Bradycardic rate regular rhythm. No murmurs or gallops. GI:  Abdomen soft. Nontender. Non-distended. BS + and equal.    NEUROLOGICAL: A&O x3. Sensation intact to light touch. MSK:  Functional ROM BUE and RLEs. Weakness impairs AROM L hip. Motor testing 5/5 key muscles BUE and RLEs. L hip flexion 2/5. L knee extension 3/5. SKIN: Warm dry and intact. Good turgor. L lateral hip incision with dressing CD&I. Coccyx wound - dressing CD&I. EXTREMITIES:  No calf tenderness to palpation. No edema BLEs. Hasmukh Bonds PSYCH: Mood WNL. Appropriately interactive. Affect WNL. Diagnostics:     CBC:   Recent Labs     08/14/21  0657 08/16/21  0629   WBC 9.7 8.0   RBC 2.56* 2.71*   HGB 8.6* 8.9*   HCT 25.8* 27.1*   .6* 100.2*   RDW 16.7* 17.1*    260     BMP:   Recent Labs     08/14/21  0657      K 4.1      CO2 29   BUN 10   CREATININE 0.59*   GLUCOSE 94     BNP: No results for input(s): BNP in the last 72 hours. PT/INR: No results for input(s): PROTIME, INR in the last 72 hours. APTT: No results for input(s): APTT in the last 72 hours. CARDIAC ENZYMES: No results for input(s): CKMB, CKMBINDEX, TROPONINT in the last 72 hours.     Invalid input(s): CKTOTAL;3  FASTING LIPID PANEL:No results found for: CHOL, HDL, TRIG  LIVER PROFILE: No results for input(s): AST, ALT, ALB, BILIDIR, BILITOT, ALKPHOS in the last 72 hours. Current Medications:   Current Facility-Administered Medications: metroNIDAZOLE (FLAGYL) tablet 500 mg, 500 mg, Oral, 3 times per day  amoxicillin (AMOXIL) capsule 1,000 mg, 1,000 mg, Oral, 2 times per day  acetaminophen (TYLENOL) tablet 650 mg, 650 mg, Oral, Q4H PRN  polyethylene glycol (GLYCOLAX) packet 17 g, 17 g, Oral, Daily  senna (SENOKOT) tablet 17.2 mg, 2 tablet, Oral, Daily PRN  bisacodyl (DULCOLAX) suppository 10 mg, 10 mg, Rectal, Daily PRN  amiodarone (CORDARONE) tablet 200 mg, 200 mg, Oral, Daily  dilTIAZem (CARDIZEM) tablet 30 mg, 30 mg, Oral, 2 times per day  HYDROcodone-acetaminophen (NORCO) 5-325 MG per tablet 1 tablet, 1 tablet, Oral, Q6H PRN  melatonin tablet 3 mg, 3 mg, Oral, Nightly PRN  metoprolol tartrate (LOPRESSOR) tablet 50 mg, 50 mg, Oral, BID  pantoprazole (PROTONIX) tablet 40 mg, 40 mg, Oral, BID AC  furosemide (LASIX) tablet 20 mg, 20 mg, Oral, BID      Impression/Plan:   Impaired ADLs, gait, and mobility due to:      1. L hip fracture:  S/p hemiarthroplasty by Dr. Víctor Velez 8/5/21. PT/OT for gait, mobility, strengthening, endurance, ADLs, and self care. WBAT. Has Tylenol or Norco prn pain. 2. A Fib with RVR: rate controlled. On amiodarone, Cardizem, Metoprolol. No anticoagulation per GI. 3. Rhabdomyolysis: Improved. Monitoring BMP weekly  4. Anemia secondary to GI bleed/ulcer: GI following. On PPI BID, soft diet, received 1 unit PRBCs 8/13/21. Monitoring q MWF.  5. H Pylori ulcer: on 14 days Flagyl and amoxicillin until 8/29/21 per GI. 6. Wounds/abrasions: nursing interventions, wound care consulted to follow  7. Insomnia: has melatonin prn  8. Hypokalemia: Improved. On Lasix. Monitoring and will replace prn but has been stable. 9. Pulmonary infiltrates/effusion: using incentive spirometer  10. Bowel Management: Miralax daily, senokot prn, dulcolax prn.   11. DVT Prophylaxis:  SCD's while in bed, SUE's and chemoprophylaxis contraindicated  12. Internal medicine for medical management    Electronically signed by Lizabeth Verma MD on 8/16/2021 at 10:31 AM      This note is created with the assistance of a speech recognition program.  While intending to generate a document that actually reflects the content of the visit, the document can still have some errors including those of syntax and sound a like substitutions which may escape proof reading. In such instances, actual meaning can be extrapolated by contextual diversion.

## 2021-08-16 NOTE — PROGRESS NOTES
[address] [facility]    ACUTE REHABILITATION OCCUPATIONAL THERAPY  DAILY NOTE    Date: 21  Patient Name: Sruthi Orozco      Room: 1720/9065-15    MRN: [id number]   : 1940  (79 y.o.)  Gender: male   Referring Practitioner: Tobias Huff MD  Diagnosis: Left hip fracture  Additional Pertinent Hx: Annalisa Tolliver a 79 y. o. male with no known past medical history admitted to [facility] 8/3/2021.  He initially presented after a fall from standing at home. He reportedly was laying on the floor for 5 days before he was able to call for help. He was found to have atrial fibrillation with RVR in the ED and was started on diltiazem and heparin drips. He was also found to have left hip fracture and rhabdomylolysis. He has multiple wounds, including buttock, sacrum, bilateral elbow, and bilateral knee wounds. He underwent left hip hemiarthroplasty on 21 (Dr. Ita Sam). Pt now presents to ARU 21     Restrictions  Restrictions/Precautions: Weight Bearing, Fall Risk  Implants present? : Metal implants (L hemiarthroplasty)  Left Lower Extremity Weight Bearing: Weight Bearing As Tolerated  Required Braces or Orthoses?: No    Subjective  Subjective: \"I just want to wash up at the sink this morning. \"  Comments: Pt was pleasant and motivated. Patient Currently in Pain: Yes  Pain Level: 1 (PM:2/10)  Pain Location: Hip  Pain Orientation: Left  Restrictions/Precautions: Weight Bearing; Fall Risk  Overall Orientation Status: Within Functional Limits     Pain Assessment  Pain Assessment: 0-10  Pain Level: 1 (PM:2/10)  Pain Type: Acute pain, Surgical pain  Pain Location: Hip  Pain Orientation: Left  Pain Descriptors: Aching    Objective  Cognition  Overall Cognitive Status: WFL  Perception  Overall Perceptual Status: WFL  Balance  Sitting Balance: Stand by assistance  Standing Balance: Contact guard assistance (CGA-SBA)  Bed mobility  Rolling to Right: Stand by assistance  Supine to Sit: Contact guard assistance  Scooting: Stand by assistance (to EOB)  Comment: VCs for initiation and sequencing, bed flat   Transfers  Sit to stand: Moderate assistance (Mod A for initial stand from bed then improves to CGA)  Stand to sit: Contact guard assistance  Transfer Comments: VCs to slow descent when sitting  Standing Balance  Time: AM: 1-2 minutes x 8  PM: 8 minutes, ~2 minutes  Activity: Functional transfers, functional mobility, clothing mgt, lower body bathing, PM: functional mobility, pt engages in dynamic stand activity ie place cards in slots on wall reaching above shoulder level and crossing midline with BUE with SBA/CGA. Pt demo G safety with checking WC brakes are engaged prior tro stand. no LOB noted  Comment: with RW  Functional Mobility  Functional - Mobility Device: Rolling Walker  Activity: To/from bathroom room mobility  Assist Level: Contact guard assistance  Functional Mobility Comments: pt demo slow movements, G safety noted     Type of ROM/Therapeutic Exercise  Type of ROM/Therapeutic Exercise: Free weights  Comment: pt engages in BUE exercises to increase strength and overall endurance to increase safety and independence with self care tasks. Pt completed 15 reps with 3# weight with rest breaks as needed. Exercises  Scapular Protraction: x  Scapular Retraction: x  Shoulder Flexion: x  Shoulder Extension: x  Elbow Flexion: x  Elbow Extension: x  Grasp/Release: BUE, hand gripper set on 3rd hole x 20 reps                        ADL  Equipment Provided: Long-handled sponge;Reacher  Feeding: Setup; Dentures  Grooming: Setup  UE Bathing: Stand by assistance  LE Bathing: Increased time to complete;Contact guard assistance;Setup  UE Dressing: Setup  LE Dressing: Minimal assistance (A with threading with reacher, mod I utilizing sock aid)  Toileting: None  Additional Comments: pt lakesha SINGH carryover c AE for LB dressing, pt requests to wash up at sink at [facility] level this date. pt declines washing feet,  pt stands at sink with 0-1 UE support during washing of caity/buttocks and LB clothing mgt with CGA, no LOB noted          Assessment  Performance deficits / Impairments: Decreased ADL status; Decreased functional mobility ; Decreased strength;Decreased safe awareness;Decreased endurance;Decreased balance;Decreased high-level IADLs  Prognosis: Good  Activity Tolerance: Patient Tolerated treatment well  Safety Devices in place: Yes          Patient Education: OT POC, HOME SAFETY/fall prevention  Patient Goals   Patient goals : \"To be able to do all that you just mentioned. \" (in regards to independence with ADL and IADL tasks)  Learner:patient  Method: demonstration and explanation       Outcome: acknowledged understanding        Plan  Plan  Times per week: 5-7  Times per day: Twice a day  Current Treatment Recommendations: Self-Care / ADL, Strengthening, Balance Training, Functional Mobility Training, Endurance Training, Safety Education & Training, Patient/Caregiver Education & Training, Equipment Evaluation, Education, & procurement, Home Management Training  Patient Goals   Patient goals : \"To be able to do all that you just mentioned. \" (in regards to independence with ADL and IADL tasks)  Short term goals  Time Frame for Short term goals: By 1 week   Short term goal 1: Pt will complete lower body dressing/bathing with min A and good safety with use of AE as needed  Short term goal 2: Pt will complete functional mobility during self care tasks with SBA and good safety with use of least restrictive device  Short term goal 3: Pt will tolerate standing 5+ minutes during functional activity of choice with CGA and good safety  Short term goal 4: Pt will complete functional transfer during self care tasks with CGA and good safety  Short term goal 5: Pt will participate in 30+ minutes of therapeutic exercises/functional activities to increase safety and independence with self care tasks  Long term goals  Time Frame for Long term goals : By discharge  Long term goal 1: Pt will complete BADLs with modified independence and good safety with use of AE as needed  Long term goal 2: Pt will complete functional transfers and mobility with modified independence and good safety with use of least restrictive device  Long term goal 3: Pt will tolerate standing 10+ minutes during funcitonal activity of choice with good safety  Long term goal 4: Pt will verbalize/demonstrate good understanding of home safety/fall prevention strategies to increase safety and independence with self care and mobility  Long term goal 5: Pt will verbalize/demonstrate good understanding of adaptive equipment/adaptive strategies/durable medical equipment to increase safety and independence with self care and mobility  Long term goals 6: Pt will complete simple meal prep/light house keeping with supervision and good safety       Electronically signed by SHIRA Saunders on 8/16/21 at 2:15 PM EDT         08/16/21 0911 08/16/21 1355   OT Individual Minutes   Time In [id number]   Time Out [id number]   [id number] 17 22

## 2021-08-16 NOTE — PROGRESS NOTES
[facility]    Gastroenterology Daily Progress Note      Patient:   Nikhil Wall   :    1940   Facility:   [facility]  Date:     2021  Consultant:   ANDRES Norton CNP, CNP      SUBJECTIVE  79 y.o. male admitted 2021 with Closed left [facility] fracture[facility] Adithya [facility] and seen for duodenal ulcer with h pylori. The pt was seen and examined. Tolerating the h pylori treatment. hgb 8.9 no BM overnight no abdominal pain.          OBJECTIVE  Scheduled Meds:   metroNIDAZOLE  500 mg Oral 3 times per day    amoxicillin  1,000 mg Oral 2 times per day    polyethylene glycol  17 g Oral Daily    amiodarone  200 mg Oral Daily    dilTIAZem  30 mg Oral 2 times per day    metoprolol tartrate  50 mg Oral BID    pantoprazole  40 mg Oral BID AC    furosemide  20 mg Oral BID       Vital Signs:  /61   Pulse 65   Temp 98.4 °F (36.9 °C) (Oral)   Resp 18   Ht 5' 7\" (1.702 m)   Wt 178 lb (80.7 kg)   SpO2 93%   BMI 27.88 kg/m²      Physical Exam:   General Appearance: alert and oriented to person, place and time, well-developed and well-nourished, in no acute distress  Skin: warm and dry, no rash or erythema  Head: normocephalic and atraumatic  Eyes: pupils equal, round, and reactive to light, extraocular eye movements intact, conjunctivae normal  ENT: hearing grossly normal bilaterally  Neck: neck supple and non tender without mass, no thyromegaly or thyroid nodules, no cervical lymphadenopathy   Pulmonary/Chest: clear to auscultation bilaterally- no wheezes, rales or rhonchi, normal air movement, no respiratory distress  Cardiovascular: normal rate, regular rhythm, normal S1 and S2, no murmurs, rubs, clicks or gallops, distal pulses intact, no carotid bruits  Abdomen: soft, non-tender, non-distended, normal bowel sounds, no masses or organomegaly  Extremities: no cyanosis, clubbing or edema  Musculoskeletal: normal range of motion, no joint swelling, deformity or tenderness  Neurologic: no cranial nerve deficit and muscle strength normal    Lab and Imaging Review     CBC  Recent Labs     08/14/21  0657 08/16/21  0629   WBC 9.7 8.0   HGB 8.6* 8.9*   HCT 25.8* 27.1*   .6* 100.2*    260       BMP  Recent Labs     08/14/21  0657      K 4.1      CO2 29   BUN 10   CREATININE 0.59*   GLUCOSE 94   CALCIUM 7.8*       Findings:egd dr Blu Foreman 8/10/21     Retropharyngeal area was grossly normal appearing     Esophagus: normal     Stomach:    Fundus and Cardia Examined in Retroflexed View: normal    Body: normal    Antrum: abnormal: Has antral erosions. Random biopsies from the body and antrum to evaluate H. pylori. Duodenum:     Descending: normal    Bulb: abnormal: Huge ulcer in the apex of the bulb.  No stigmata of visible vessel.  No signs of acute bleeding. ASSESSMENT/plan  1.  Anemia with duodenal ulcer per endoscopy  -continue ppi bid  -trend hh and keep hgb >7  -diet as tolerated    2. h pylori positive, tolerating treatment  -meds to continue for a total of 14 days  -will need outpt follow up to make sure infection is eradicated      Case d/w md GI signing off    This plan was formulated in collaboration with Dr. Claudia Cummings     Electronically signed by: ANDRES López CNP on 8/16/2021 at 7:50 AM

## 2021-08-17 PROCEDURE — 6370000000 HC RX 637 (ALT 250 FOR IP): Performed by: NURSE PRACTITIONER

## 2021-08-17 PROCEDURE — 1180000000 HC REHAB R&B

## 2021-08-17 PROCEDURE — 99231 SBSQ HOSP IP/OBS SF/LOW 25: CPT | Performed by: PHYSICAL MEDICINE & REHABILITATION

## 2021-08-17 PROCEDURE — 99232 SBSQ HOSP IP/OBS MODERATE 35: CPT | Performed by: INTERNAL MEDICINE

## 2021-08-17 PROCEDURE — 97110 THERAPEUTIC EXERCISES: CPT

## 2021-08-17 PROCEDURE — 97535 SELF CARE MNGMENT TRAINING: CPT

## 2021-08-17 PROCEDURE — 97116 GAIT TRAINING THERAPY: CPT

## 2021-08-17 PROCEDURE — 6370000000 HC RX 637 (ALT 250 FOR IP)

## 2021-08-17 PROCEDURE — 6370000000 HC RX 637 (ALT 250 FOR IP): Performed by: PHYSICAL MEDICINE & REHABILITATION

## 2021-08-17 PROCEDURE — 97530 THERAPEUTIC ACTIVITIES: CPT

## 2021-08-17 RX ADMIN — METRONIDAZOLE 500 MG: 500 TABLET ORAL at 20:55

## 2021-08-17 RX ADMIN — FUROSEMIDE 20 MG: 20 TABLET ORAL at 08:07

## 2021-08-17 RX ADMIN — FUROSEMIDE 20 MG: 20 TABLET ORAL at 17:02

## 2021-08-17 RX ADMIN — AMIODARONE HYDROCHLORIDE 200 MG: 200 TABLET ORAL at 08:08

## 2021-08-17 RX ADMIN — PANTOPRAZOLE SODIUM 40 MG: 40 TABLET, DELAYED RELEASE ORAL at 15:02

## 2021-08-17 RX ADMIN — METOPROLOL TARTRATE 50 MG: 50 TABLET, FILM COATED ORAL at 08:07

## 2021-08-17 RX ADMIN — METRONIDAZOLE 500 MG: 500 TABLET ORAL at 15:02

## 2021-08-17 RX ADMIN — AMOXICILLIN 1000 MG: 500 CAPSULE ORAL at 20:56

## 2021-08-17 RX ADMIN — METRONIDAZOLE 500 MG: 500 TABLET ORAL at 06:43

## 2021-08-17 RX ADMIN — AMOXICILLIN 1000 MG: 500 CAPSULE ORAL at 08:07

## 2021-08-17 RX ADMIN — DILTIAZEM HYDROCHLORIDE 30 MG: 30 TABLET, FILM COATED ORAL at 08:10

## 2021-08-17 RX ADMIN — PANTOPRAZOLE SODIUM 40 MG: 40 TABLET, DELAYED RELEASE ORAL at 06:21

## 2021-08-17 NOTE — PROGRESS NOTES
[facility] Internal Medicine    CONSULTATION / HISTORY AND PHYSICAL EXAMINATION            Date:   8/17/2021  Patient name:  Desiree Fishman  Date of admission:  8/13/2021  7:39 PM  MRN:   [id number]  Account:  [de-identified]  YOB: 1940  PCP:    No primary care provider on file. Room:   [address] [place] [address]  Code Status:    Full Code    Physician Requesting Consult: Berto Aponte MD    Reason for Consult:  medical management    Chief Complaint:     No chief complaint on file. History Obtained From:     Patient medical record nursing staff    History of Present Illness:   Patient admitted to ARU for rehabilitation  He was [facility] to [facility] after a mechanical fall, he was found to have left hip fracture, underwent surgery, he was found to have new onset atrial fibrillation,  During hospital stay, patient had drop in hemoglobin, stool was positive for occult blood. Patient was evaluated by GI physician, underwent EGD, patient was found to have nonbleeding duodenal ulcer, not considered a candidate for anticoagulation with history of GI bleed  Patient was started on amoxicillin and Flagyl, with positive H. pylori infection  Patient doing much better  Working with physical therapy    8/17   Patient resting comfortably  He worked with physical therapy in the morning and afternoon  Pain is improved  Past Medical History:     Past Medical History:   Diagnosis Date    Atrial fibrillation ([address]     with RVR        Past Surgical History:     Past Surgical History:   Procedure Laterality Date    HIP SURGERY Left 8/5/2021    HIP HEMIARTHROPLASTY performed by Justin Dukes MD at [facility] N/A 8/10/2021    EGD BIOPSY performed by Sangeeta Estrada MD at [facility]        Medications Prior to Admission:     Prior to Admission medications    Medication Sig Start Date End Date Taking?  Authorizing Provider   ibuprofen (ADVIL;MOTRIN) 200 MG tablet Take 400 mg by mouth every 6 hours as needed for Pain   Yes Historical Provider, MD   Multiple Vitamins-Minerals (THERAPEUTIC MULTIVITAMIN-MINERALS) tablet Take 1 tablet by mouth daily   Yes Historical Provider, MD        Allergies:     Patient has no known allergies. Social History:     Tobacco:    reports that he has never smoked. He has never used smokeless tobacco.  Alcohol:      reports no history of alcohol use. Drug Use:  has no history on file for drug use. Family History:     No family history on file. Review of Systems:     Positive and Negative as described in HPI. CONSTITUTIONAL:  negative for fevers, chills, sweats, fatigue, weight loss  HEENT:  negative for vision, hearing changes, runny nose, throat pain  RESPIRATORY:  negative for shortness of breath, cough, congestion, wheezing. CARDIOVASCULAR:  negative for chest pain, palpitations. GASTROINTESTINAL:  negative for nausea, vomiting, diarrhea, constipation, change in bowel habits, abdominal pain   GENITOURINARY:  negative for difficulty of urination, burning with urination, frequency   INTEGUMENT:  negative for rash, skin lesions, easy bruising   HEMATOLOGIC/LYMPHATIC:  negative for swelling/edema   ALLERGIC/IMMUNOLOGIC:  negative for urticaria , itching  ENDOCRINE:  negative increase in drinking, increase in urination, hot or cold intolerance  MUSCULOSKELETAL: Left hip pain NEUROLOGICAL:  negative for headaches, dizziness, lightheadedness, numbness, pain, tingling extremities  BEHAVIOR/PSYCH:      Physical Exam:     BP (!) 124/58   Pulse 61   Temp 98.1 °F (36.7 °C)   Resp 16   Ht 5' 7\" (1.702 m)   Wt 178 lb (80.7 kg)   SpO2 92%   BMI 27.88 kg/m²   Temp (24hrs), Av.1 °F (36.7 °C), Min:98.1 °F (36.7 °C), Max:98.1 °F (36.7 °C)    No results for input(s): POCGLU in the last 72 hours.   No intake or output data in the 24 hours ending 21 1128    General Appearance:  alert, well appearing, and in no acute distress  Mental status: oriented to person, place, and time with normal affect  Head:  normocephalic, atraumatic. Eye: no icterus, redness, pupils equal and reactive, extraocular eye movements intact, conjunctiva clear  Ear: normal external ear, no discharge, hearing intact  Nose:  no drainage noted  Mouth: mucous membranes moist  Neck: supple, no carotid bruits, thyroid not palpable  Lungs: Bilateral equal air entry, clear to ausculation, no wheezing, rales or rhonchi, normal effort  Cardiovascular: normal rate, regular rhythm, no murmur, gallop, rub. Abdomen: Soft, nontender, nondistended, normal bowel sounds, no hepatomegaly or splenomegaly  Neurologic: There are no new focal motor or sensory deficits, normal muscle tone and bulk, no abnormal sensation, normal speech, cranial nerves II through XII grossly intact  Skin: No gross lesions, rashes, bruising or bleeding on exposed skin area  Extremities: Postsurgical changes in left hip  Psych: Investigations:      Laboratory Testing:  No results found for this or any previous visit (from the past 24 hour(s)). Consultations:   IP CONSULT TO DIETITIAN  IP CONSULT TO SOCIAL WORK  IP CONSULT TO GI  IP CONSULT TO INTERNAL MEDICINE  IP CONSULT TO GI  Assessment :      Primary Problem  <principal problem not specified>    Active Hospital Problems    Diagnosis Date Noted    H pylori ulcer [K27.9, B96.81]     PUD (peptic ulcer disease) [K27.9]     Closed left hip fracture, initial encounter ([address] [S72.002A] 08/13/2021    Atrial fibrillation with RVR ([address] [I48.91] 08/03/2021       Plan:     1. Left hip fracture s/p surgery, pain is controlled  2. Atrial fibrillation, rate controlled, not on anticoagulation with history of GI bleed, on amiodarone, Cardizem  3. GI bleed, s/p EGD, on Protonix, twice a day,  4. H. pylori infection, on amoxicillin and Flagyl  5.  Not on chemoprophylaxis with history of GI bleed        Carlotta Zhou MD  8/17/2021  5:58 PM    Copy sent to Dr. Kirby primary care provider on file. Please note that this chart was generated using voice recognition Dragon dictation software. Although every effort was made to ensure the accuracy of this automated transcription, some errors in transcription may have occurred.

## 2021-08-17 NOTE — PROGRESS NOTES
Physical Medicine & Rehabilitation  Progress Note      Subjective:      79year-old male with L hip fracture after fall. Patient is doing well today. He denies abdominal pain or acid reflux symptoms. Aching L hip pain is mild and well controlled. No new issues with sleep, appetite, bowel, or bladder. ROS:  Denies fevers, chills, sweats. No chest pain, palpitations, lightheadedness. Denies coughing, wheezing or shortness of breath. Denies abdominal pain, nausea, diarrhea or constipation. No new areas of joint pain. Denies new areas of numbness or weakness. Denies new anxiety or depression issues. No new skin problems. Rehabilitation:   Progressing in therapies.     PT:  Restrictions/Precautions: Weight Bearing, Fall Risk  Implants present? : Metal implants  Left Lower Extremity Weight Bearing: Weight Bearing As Tolerated   Transfers  Sit to Stand: Contact guard assistance  Stand to sit: Contact guard assistance  Bed to Chair: Contact guard assistance (with RW)  Stand Pivot Transfers: Minimal Assistance  Comment: amount of assist needed to stand depends on height of surface pt sitting on. verbal cues to scoot forward and shift wt over his feet prior to standing; otherwise pt keeps wt too fat posterior  WB Status: WBAT  Ambulation 1  Surface: level tile  Device: Rolling Walker  Other Apparatus: Wheelchair follow  Assistance: Contact guard assistance  Quality of Gait: cues to correct forward flexed posture, fair pt response  Gait Deviations: Decreased step length, Decreased step height, Increased FAITH  Distance: 90 feet    Transfers  Sit to Stand: Contact guard assistance  Stand to sit: Contact guard assistance  Bed to Chair: Contact guard assistance (with RW)  Stand Pivot Transfers: Minimal Assistance  Comment: amount of assist needed to stand depends on height of surface pt sitting on. verbal cues to scoot forward and shift wt over his feet prior to standing; otherwise pt keeps wt too fat posterior  Ambulation  Ambulation?: Yes  WB Status: WBAT  More Ambulation?: Yes  Ambulation 1  Surface: level tile  Device: Rolling Walker  Other Apparatus: Wheelchair follow  Assistance: Contact guard assistance  Quality of Gait: cues to correct forward flexed posture, fair pt response  Gait Deviations: Decreased step length, Decreased step height, Increased FAITH  Distance: 90 feet    Surface: level tile  Ambulation 1  Surface: level tile  Device: Rolling Walker  Other Apparatus: Wheelchair follow  Assistance: Contact guard assistance  Quality of Gait: cues to correct forward flexed posture, fair pt response  Gait Deviations: Decreased step length, Decreased step height, Increased FAITH  Distance: 90 feet    OT:  ADL  Equipment Provided: Long-handled sponge, Reacher  Feeding: Setup, Dentures  Grooming: Setup  UE Bathing: Stand by assistance  LE Bathing: Increased time to complete, Contact guard assistance, Setup  UE Dressing: Setup  LE Dressing: Minimal assistance (A with threading with reacher, mod I utilizing sock aid)  Toileting: None  Additional Comments: pt demo G carryover c AE for LB dressing, pt requests to wash up at sink at [facility] level this date. pt declines washing feet, pt stands at sink with 0-1 UE support during washing of caity/buttocks and LB clothing mgt with CGA, no LOB noted         Balance  Sitting Balance: Stand by assistance  Standing Balance: Contact guard assistance   Standing Balance  Time: 1-2 min x4   Activity: functional ctivity, ADL activity   Comment: with RW  Functional Mobility  Functional - Mobility Device: Rolling Walker  Activity: To/from bathroom  Assist Level: Contact guard assistance  Functional Mobility Comments: pt demo slow movements, G safety noted     Bed mobility  Rolling to Left: Minimal assistance  Rolling to Right: Stand by assistance  Supine to Sit: Minimal assistance  Sit to Supine: Minimal assistance  Scooting: Stand by assistance (to EOB)  Comment: VCs for initiation and sequencing, bed flat   Transfers  Sit to stand: Minimal assistance (stand from EOB)  Stand to sit: Contact guard assistance  Transfer Comments: VCs to slow descent when sitting   Toilet Transfers  Toilet - Technique: Ambulating  Equipment Used: Raised toilet seat with rails  Toilet Transfer: Moderate assistance  Toilet Transfers Comments: Verbal cues for hand placement and safety     Shower Transfers  Shower - Transfer To: Transfer tub bench  Shower - Technique: Ambulating, Stand pivot (Functional mobilty in; Stand pivot out due to fatigue)  Shower Transfers: Contact Guard  Shower Transfers Comments: Verbal cues for safety and sequencing over ramp and threshold with increased time. SPEECH:      Objective:  BP (!) 124/58   Pulse 61   Temp 98.1 °F (36.7 °C)   Resp 16   Ht 5' 7\" (1.702 m)   Wt 178 lb (80.7 kg)   SpO2 92%   BMI 27.88 kg/m²       GEN: Well developed, well nourished, in NAD  HEENT:  NCAT. PERRL. EOMI. Mucous membranes pink and moist.   PULM:  Clear to ausculation. No rales or rhonchi. Respirations WNL and unlabored. CV:  Bradycardic rate regular rhythm. No murmurs or gallops. GI:  Abdomen soft. Nontender. Non-distended. BS + and equal.    NEUROLOGICAL: A&O x3. Sensation intact to light touch. MSK:  Functional ROM BUE and RLEs. Weakness impairs AROM L hip. Motor testing 5/5 key muscles BUE and RLEs. L hip flexion 4-/5. L knee extension 4/5. SKIN: Warm dry and intact. Good turgor. L lateral hip incision with dressing CD&I. Coccyx wound - dressing CD&I. EXTREMITIES:  No calf tenderness to palpation. No edema BLEs. Ronny Das PSYCH: Mood WNL. Appropriately interactive. Affect WNL. Diagnostics:     CBC:   Recent Labs     08/16/21  0629   WBC 8.0   RBC 2.71*   HGB 8.9*   HCT 27.1*   .2*   RDW 17.1*        BMP:   No results for input(s): NA, K, CL, CO2, PHOS, BUN, CREATININE, GLUCOSE in the last 72 hours.     Invalid input(s): CA  BNP: No results for input(s): BNP in the last 72 hours. PT/INR: No results for input(s): PROTIME, INR in the last 72 hours. APTT: No results for input(s): APTT in the last 72 hours. CARDIAC ENZYMES: No results for input(s): CKMB, CKMBINDEX, TROPONINT in the last 72 hours. Invalid input(s): CKTOTAL;3  FASTING LIPID PANEL:No results found for: CHOL, HDL, TRIG  LIVER PROFILE: No results for input(s): AST, ALT, ALB, BILIDIR, BILITOT, ALKPHOS in the last 72 hours. Current Medications:   Current Facility-Administered Medications: metroNIDAZOLE (FLAGYL) tablet 500 mg, 500 mg, Oral, 3 times per day  amoxicillin (AMOXIL) capsule 1,000 mg, 1,000 mg, Oral, 2 times per day  acetaminophen (TYLENOL) tablet 650 mg, 650 mg, Oral, Q4H PRN  polyethylene glycol (GLYCOLAX) packet 17 g, 17 g, Oral, Daily  senna (SENOKOT) tablet 17.2 mg, 2 tablet, Oral, Daily PRN  bisacodyl (DULCOLAX) suppository 10 mg, 10 mg, Rectal, Daily PRN  amiodarone (CORDARONE) tablet 200 mg, 200 mg, Oral, Daily  dilTIAZem (CARDIZEM) tablet 30 mg, 30 mg, Oral, 2 times per day  HYDROcodone-acetaminophen (NORCO) 5-325 MG per tablet 1 tablet, 1 tablet, Oral, Q6H PRN  melatonin tablet 3 mg, 3 mg, Oral, Nightly PRN  metoprolol tartrate (LOPRESSOR) tablet 50 mg, 50 mg, Oral, BID  pantoprazole (PROTONIX) tablet 40 mg, 40 mg, Oral, BID AC  furosemide (LASIX) tablet 20 mg, 20 mg, Oral, BID      Impression/Plan:   Impaired ADLs, gait, and mobility due to:      1. L hip fracture:  S/p hemiarthroplasty by Dr. Nora Rubio 8/5/21. PT/OT for gait, mobility, strengthening, endurance, ADLs, and self care. WBAT. Has Tylenol or Norco prn pain. 2. A Fib with RVR: rate controlled. On amiodarone, Cardizem, Metoprolol. No anticoagulation per GI. 3. Rhabdomyolysis: Improved. Monitoring BMP weekly  4. Anemia secondary to GI bleed/ulcer: GI following. On PPI BID, soft diet, received 1 unit PRBCs 8/13/21. Monitoring q MWF.  5. H Pylori ulcer: on 14 days Flagyl and amoxicillin until 8/29/21 per GI.   6. Wounds/abrasions: nursing interventions, wound care consulted to follow  7. Insomnia: has melatonin prn  8. Hypokalemia: Improved. On Lasix. Monitoring and will replace prn but has been stable. 9. Pulmonary infiltrates/effusion: using incentive spirometer  10. Bowel Management: Miralax daily, senokot prn, dulcolax prn. 11. DVT Prophylaxis:  SCD's while in bed, SUE's and chemoprophylaxis contraindicated  12. Internal medicine for medical management    Electronically signed by Teresita Neal MD on 8/17/2021 at 9:56 AM      This note is created with the assistance of a speech recognition program.  While intending to generate a document that actually reflects the content of the visit, the document can still have some errors including those of syntax and sound a like substitutions which may escape proof reading. In such instances, actual meaning can be extrapolated by contextual diversion.

## 2021-08-17 NOTE — PROGRESS NOTES
[address]   Acute Rehabilitation Physical Therapy Progress Note    Date: 21  Patient Name: Hudson Sen       Room: 2792/3023-39  MRN: [id number]   Account: [de-identified]   : 1940  (79 y.o.) Gender: male     Referring Practitioner: Regina Munguia MD  Diagnosis: Left hip fracture  Past Medical History:  has a past medical history of Atrial fibrillation ([address] Past Surgical History:   has a past surgical history that includes hip surgery (Left, 2021) and Upper gastrointestinal endoscopy (N/A, 8/10/2021). Additional Pertinent Hx: closed L hip fx    Overall Orientation Status: Within Normal Limits  Restrictions/Precautions  Restrictions/Precautions: Weight Bearing; Fall Risk  Required Braces or Orthoses?: No  Implants present? : Metal implants (L hemiarthroplasty)  Lower Extremity Weight Bearing Restrictions  Left Lower Extremity Weight Bearing: Weight Bearing As Tolerated    Subjective: Pt reports severe fatigue in the PM       Vital Signs  Patient Currently in Pain: No      Transfers:  Sit to Stand: Contact guard assistance  Stand to sit: Contact guard assistance      WB Status: WBAT  Ambulation 1  Surface: level tile  Device: Rolling Walker  Assistance: Contact guard assistance  Quality of Gait: cues to correct forward flexed posture, fair pt response  Gait Deviations: Decreased step length;Decreased step height; Increased FAITH  Distance: 109 feet        Stairs/Curb  Stairs?: Yes  Stairs  # Steps : 10  Stairs Height: 4\" (& 6\")  Rails: Bilateral  Device: No Device  Assistance: Contact guard assistance      BALANCE Posture: Fair  Sitting - Static: Fair;+  Sitting - Dynamic: Fair  Standing - Static: Fair;+  Standing - Dynamic: Fair    EXERCISES    Other exercises?: Yes  Other exercises 1: Supine and hooklying AAROM exercises: LLE 15x  Other exercises 2: Seated BLE exercises: 15x (Orange Theraband for all except LLE hip flexion)  Other exercises 3: Standing BLE exercises: 3-way hip 10x  Other exercises 4: Nustep L4 x 10 min (seat at 10; handle at 11)  Other exercises 5: UBE: 3min forward, 3min backward  Other exercises 6: Marching in place, on balance foam (BUE support on // bars)  Other exercises 7: Hurdles in // bars: 2 laps  Other exercises 8: Balloon tapping while standing in // bars        Activity Tolerance: Patient limited by endurance, Patient limited by fatigue  PT Equipment Recommendations  Equipment Needed: Yes  Walker: Rolling       Current Treatment Recommendations: Strengthening, ROM, Balance Training, Functional Mobility Training, Transfer Training, Stair training, Gait Training, Endurance Training, Safety Education & Training, Patient/Caregiver Education & Training, Home Exercise Program    Conditions Requiring Skilled Therapeutic Intervention  Body structures, Functions, Activity limitations: Decreased functional mobility ; Decreased ROM; Decreased strength;Decreased endurance;Decreased balance  Assessment: Impaired mobility due to L Hip Fx/ Hemiarthroplasty and decreased tolerance to activity (pt demonstrated decreased seated balance without back or UE support- needs trunck strengthening)  Decision Making: Low Complexity  REQUIRES PT FOLLOW UP: Yes  Discharge Recommendations: Home with assist PRN    Goals  Short term goals  Time Frame for Short term goals: 7-10 days  Short term goal 1: bed mobility with SBA  Short term goal 2: transfers with supervision  Short term goal 3: gait with RW/SBA x 75-100ft  Short term goal 4: standing balance to Fair+ for ADLs  Long term goals  Time Frame for Long term goals : time of discharge  Long term goal 1: mod-I bed mobility  Long term goal 2: mod-I transfers  Long term goal 3: mod-I gait x 150ft with RW  Long term goal 4: flight of stairs with rail and cane mod-I  Long term goal 5: standing balance to Good for safe ADLs       08/17/21 1030 08/17/21 1304   PT Individual Minutes   Time In 1030 1304   Time Out 1120 1344   Minutes 50 40 Electronically signed by Makenna Brady PTA on 8/17/21 at 4:41 PM EDT

## 2021-08-17 NOTE — PROGRESS NOTES
[address] [facility]    ACUTE REHABILITATION OCCUPATIONAL THERAPY  DAILY NOTE    Date: 21  Patient Name: Robbie Padilla      Room: 3004/2271-46    MRN: [id number]   : 1940  (79 y.o.)  Gender: male   Referring Practitioner: Verna Barney MD  Diagnosis: Left hip fracture  Additional Pertinent Hx: Abhay Dutta a 79 y. o. male with no known past medical history admitted to [facility] 8/3/2021.  He initially presented after a fall from standing at home. He reportedly was laying on the floor for 5 days before he was able to call for help. He was found to have atrial fibrillation with RVR in the ED and was started on diltiazem and heparin drips. He was also found to have left hip fracture and rhabdomylolysis. He has multiple wounds, including buttock, sacrum, bilateral elbow, and bilateral knee wounds. He underwent left hip hemiarthroplasty on 21 (Dr. Roverto Hudson). Pt now presents to ARU 21     Restrictions  Restrictions/Precautions: Weight Bearing, Fall Risk  Implants present? : Metal implants (L hemiarthroplasty)  Left Lower Extremity Weight Bearing: Weight Bearing As Tolerated  Required Braces or Orthoses?: No  Equipment Used: Wheelchair, Bed    Subjective  Subjective: \" I don't haveany pain\"   Comments: Pt is pleasant and cooperative   Patient Currently in Pain: Denies  Restrictions/Precautions: Weight Bearing; Fall Risk  Overall Orientation Status: Within Functional Limits          Objective  Cognition  Overall Cognitive Status: WFL  Perception  Overall Perceptual Status: WFL  Balance  Sitting Balance: Stand by assistance  Standing Balance: Contact guard assistance  Bed mobility  Supine to Sit: Minimal assistance  Sit to Supine: Minimal assistance  Comment: assist lifting legs into bed   Transfers  Sit to stand: Minimal assistance (stand from EOB)  Standing Balance  Time: 1-2 min x4   Activity: functional ctivity, ADL activity   Comment: with RW  Functional Mobility  Functional - Mobility Device: Rolling Walker  Activity: To/from bathroom  Assist Level: Contact guard assistance  Functional Mobility Comments: pt demo slow movements, G safety noted     Type of ROM/Therapeutic Exercise  Type of ROM/Therapeutic Exercise: Free weights  Comment: Pt engaged in BUE exercises with 3# free weights. Completed to increase strength and endurance for ADL and IADL activities r  Exercises  Scapular Protraction: x  Scapular Retraction: x  Shoulder Depression: x  Shoulder Elevation: x  Shoulder Flexion: x  Shoulder Extension: x  Elbow Flexion: x  Elbow Extension: x  Supination: x  Pronation: x  Wrist Flexion: x  Wrist Extension: x            ADL  Grooming: Setup  UE Bathing: Stand by assistance  LE Bathing: Increased time to complete;Contact guard assistance;Setup  UE Dressing: Setup  LE Dressing: Minimal assistance (assist threading RLE into brief. )  Additional Comments: Pt seated at sink for ADL actvies           Assessment  Performance deficits / Impairments: Decreased ADL status; Decreased functional mobility ; Decreased strength;Decreased safe awareness;Decreased endurance;Decreased balance;Decreased high-level IADLs  Prognosis: Good  Activity Tolerance: Patient Tolerated treatment well  Safety Devices in place: Yes  Type of devices: All fall risk precautions in place;Call light within reach;Gait belt;Patient at risk for falls; Left in chair;Left in bed;Nurse notified          Patient Education:  Patient Goals   Patient goals : \"To be able to do all that you just mentioned. \" (in regards to independence with ADL and IADL tasks)  Learner:patient  Method: demonstration and explanation       Outcome: acknowledged understanding        Plan  Plan  Times per week: 5-7  Times per day: Twice a day  Current Treatment Recommendations: Self-Care / ADL, Strengthening, Balance Training, Functional Mobility Training, Endurance Training, Safety Education & Training, Patient/Caregiver Education & Training, Equipment Evaluation, Education, & procurement, Home Management Training  Patient Goals   Patient goals : \"To be able to do all that you just mentioned. \" (in regards to independence with ADL and IADL tasks)  Short term goals  Time Frame for Short term goals: By 1 week   Short term goal 1: Pt will complete lower body dressing/bathing with min A and good safety with use of AE as needed  Short term goal 2: Pt will complete functional mobility during self care tasks with SBA and good safety with use of least restrictive device  Short term goal 3: Pt will tolerate standing 5+ minutes during functional activity of choice with CGA and good safety  Short term goal 4: Pt will complete functional transfer during self care tasks with CGA and good safety  Short term goal 5: Pt will participate in 30+ minutes of therapeutic exercises/functional activities to increase safety and independence with self care tasks  Long term goals  Time Frame for Long term goals : By discharge  Long term goal 1: Pt will complete BADLs with modified independence and good safety with use of AE as needed  Long term goal 2: Pt will complete functional transfers and mobility with modified independence and good safety with use of least restrictive device  Long term goal 3: Pt will tolerate standing 10+ minutes during funcitonal activity of choice with good safety  Long term goal 4: Pt will verbalize/demonstrate good understanding of home safety/fall prevention strategies to increase safety and independence with self care and mobility  Long term goal 5: Pt will verbalize/demonstrate good understanding of adaptive equipment/adaptive strategies/durable medical equipment to increase safety and independence with self care and mobility  Long term goals 6: Pt will complete simple meal prep/light house keeping with supervision and good safety        08/17/21 0833 08/17/21 1539   OT Individual Minutes   Time In 9920 [id number]   Time Out 0933 1519   Minutes 60 34 Electronically signed by SHIRA Davis on 8/17/21 at 3:40 PM EDT

## 2021-08-17 NOTE — FLOWSHEET NOTE
08/17/21 1931   Encounter Summary   Services provided to: Patient   Referral/Consult From: [facility]   (8-17-21)   Complexity of Encounter Low   Length of Encounter 15 minutes   Spiritual Assessment Completed Yes   Spiritual/Muslim   Type Spiritual support   Assessment Calm; Approachable;Coping   Intervention Explored feelings, thoughts, concerns; Active listening;Prayer   Outcome Expressed gratitude;Engaged in conversation;Coping;Receptive

## 2021-08-18 LAB
ANION GAP SERPL CALCULATED.3IONS-SCNC: 8 MMOL/L (ref 9–17)
BUN BLDV-MCNC: 23 MG/DL (ref 8–23)
BUN/CREAT BLD: ABNORMAL (ref 9–20)
CALCIUM SERPL-MCNC: 8.4 MG/DL (ref 8.6–10.4)
CHLORIDE BLD-SCNC: 104 MMOL/L (ref 98–107)
CO2: 28 MMOL/L (ref 20–31)
CREAT SERPL-MCNC: 0.67 MG/DL (ref 0.7–1.2)
GFR AFRICAN AMERICAN: >60 ML/MIN
GFR NON-AFRICAN AMERICAN: >60 ML/MIN
GFR SERPL CREATININE-BSD FRML MDRD: ABNORMAL ML/MIN/{1.73_M2}
GFR SERPL CREATININE-BSD FRML MDRD: ABNORMAL ML/MIN/{1.73_M2}
GLUCOSE BLD-MCNC: 106 MG/DL (ref 70–99)
HCT VFR BLD CALC: 30.3 % (ref 41–53)
HEMOGLOBIN: 9.9 G/DL (ref 13.5–17.5)
MCH RBC QN AUTO: 32.5 PG (ref 26–34)
MCHC RBC AUTO-ENTMCNC: 32.6 G/DL (ref 31–37)
MCV RBC AUTO: 99.6 FL (ref 80–100)
NRBC AUTOMATED: ABNORMAL PER 100 WBC
PDW BLD-RTO: 16.5 % (ref 11.5–14.9)
PLATELET # BLD: 293 K/UL (ref 150–450)
PMV BLD AUTO: 8 FL (ref 6–12)
POTASSIUM SERPL-SCNC: 4.2 MMOL/L (ref 3.7–5.3)
RBC # BLD: 3.04 M/UL (ref 4.5–5.9)
SODIUM BLD-SCNC: 140 MMOL/L (ref 135–144)
WBC # BLD: 6.5 K/UL (ref 3.5–11)

## 2021-08-18 PROCEDURE — 97116 GAIT TRAINING THERAPY: CPT

## 2021-08-18 PROCEDURE — 6370000000 HC RX 637 (ALT 250 FOR IP): Performed by: NURSE PRACTITIONER

## 2021-08-18 PROCEDURE — 97110 THERAPEUTIC EXERCISES: CPT

## 2021-08-18 PROCEDURE — 97530 THERAPEUTIC ACTIVITIES: CPT

## 2021-08-18 PROCEDURE — 97535 SELF CARE MNGMENT TRAINING: CPT

## 2021-08-18 PROCEDURE — 6370000000 HC RX 637 (ALT 250 FOR IP)

## 2021-08-18 PROCEDURE — 80048 BASIC METABOLIC PNL TOTAL CA: CPT

## 2021-08-18 PROCEDURE — 1180000000 HC REHAB R&B

## 2021-08-18 PROCEDURE — 85027 COMPLETE CBC AUTOMATED: CPT

## 2021-08-18 PROCEDURE — 99231 SBSQ HOSP IP/OBS SF/LOW 25: CPT | Performed by: PHYSICAL MEDICINE & REHABILITATION

## 2021-08-18 PROCEDURE — 36415 COLL VENOUS BLD VENIPUNCTURE: CPT

## 2021-08-18 PROCEDURE — 6370000000 HC RX 637 (ALT 250 FOR IP): Performed by: PHYSICAL MEDICINE & REHABILITATION

## 2021-08-18 PROCEDURE — 99231 SBSQ HOSP IP/OBS SF/LOW 25: CPT | Performed by: INTERNAL MEDICINE

## 2021-08-18 RX ORDER — LANOLIN ALCOHOL/MO/W.PET/CERES
CREAM (GRAM) TOPICAL 2 TIMES DAILY
Status: DISCONTINUED | OUTPATIENT
Start: 2021-08-18 | End: 2021-08-27 | Stop reason: HOSPADM

## 2021-08-18 RX ADMIN — AMOXICILLIN 1000 MG: 500 CAPSULE ORAL at 21:05

## 2021-08-18 RX ADMIN — METOPROLOL TARTRATE 50 MG: 50 TABLET, FILM COATED ORAL at 08:58

## 2021-08-18 RX ADMIN — METOPROLOL TARTRATE 50 MG: 50 TABLET, FILM COATED ORAL at 21:58

## 2021-08-18 RX ADMIN — PANTOPRAZOLE SODIUM 40 MG: 40 TABLET, DELAYED RELEASE ORAL at 06:12

## 2021-08-18 RX ADMIN — AMIODARONE HYDROCHLORIDE 200 MG: 200 TABLET ORAL at 08:58

## 2021-08-18 RX ADMIN — AMOXICILLIN 1000 MG: 500 CAPSULE ORAL at 08:58

## 2021-08-18 RX ADMIN — PANTOPRAZOLE SODIUM 40 MG: 40 TABLET, DELAYED RELEASE ORAL at 16:14

## 2021-08-18 RX ADMIN — METRONIDAZOLE 500 MG: 500 TABLET ORAL at 14:30

## 2021-08-18 RX ADMIN — DILTIAZEM HYDROCHLORIDE 30 MG: 30 TABLET, FILM COATED ORAL at 08:59

## 2021-08-18 RX ADMIN — Medication 3 MG: at 21:05

## 2021-08-18 RX ADMIN — FUROSEMIDE 20 MG: 20 TABLET ORAL at 16:14

## 2021-08-18 RX ADMIN — HYDROCODONE BITARTRATE AND ACETAMINOPHEN 1 TABLET: 5; 325 TABLET ORAL at 21:05

## 2021-08-18 RX ADMIN — DILTIAZEM HYDROCHLORIDE 30 MG: 30 TABLET, FILM COATED ORAL at 21:58

## 2021-08-18 RX ADMIN — METRONIDAZOLE 500 MG: 500 TABLET ORAL at 06:12

## 2021-08-18 RX ADMIN — METRONIDAZOLE 500 MG: 500 TABLET ORAL at 21:58

## 2021-08-18 RX ADMIN — FUROSEMIDE 20 MG: 20 TABLET ORAL at 08:58

## 2021-08-18 RX ADMIN — Medication: at 21:06

## 2021-08-18 ASSESSMENT — PAIN SCALES - WONG BAKER: WONGBAKER_NUMERICALRESPONSE: 0

## 2021-08-18 ASSESSMENT — PAIN SCALES - GENERAL
PAINLEVEL_OUTOF10: 0
PAINLEVEL_OUTOF10: 3
PAINLEVEL_OUTOF10: 0

## 2021-08-18 NOTE — PROGRESS NOTES
[facility]: KIRSTIE MOYA  Acute Rehabilitation Physical Therapy Progress Note    Date: 21  Patient Name: John Padron       Room: [facility]/2850-65  MRN: [id number]   Account: [de-identified]   : 1940  (79 y.o.) Gender: male     Referring Practitioner: Simona Huang MD  Diagnosis: Left hip fracture, hospitalization prolonged (initial admit 8-3)  due to afib, rhabdomyolosis, GI bleed, s/p EGD, on Protonix, twice a day,  Past Medical History:  has a past medical history of Atrial fibrillation ([facility], [place] [address] Past Surgical History:   has a past surgical history that includes hip surgery (Left, 2021) and Upper gastrointestinal endoscopy (N/A, 8/10/2021). Additional Pertinent Hx: closed L hip fx    Overall Orientation Status: Within Normal Limits  Restrictions/Precautions  Restrictions/Precautions: Weight Bearing; Fall Risk  Required Braces or Orthoses?: No  Implants present? : Metal implants (L hemiarthroplasty)  Lower Extremity Weight Bearing Restrictions  Left Lower Extremity Weight Bearing: Weight Bearing As Tolerated    Subjective: Pt reports a little less fatigue in the PM compared to yesterday. Vital Signs  Patient Currently in Pain: No     Bed Mobility:   Bed Mobility  Scooting: Contact guard assistance  Bed mobility  Rolling to Left: Minimal assistance  Rolling to Right: Minimal assistance  Supine to Sit: Minimal assistance  Sit to Supine: Minimal assistance  Scooting: Contact guard assistance  Comment: assist lifting legs into bed    Transfers:  Sit to Stand: Contact guard assistance  Stand to sit: Contact guard assistance           Lateral Transfers: Contact guard assistance  WB Status: WBAT  Ambulation 1  Surface: level tile  Device: Rolling Walker  Assistance: Contact guard assistance  Quality of Gait: cues to correct forward flexed posture, fair pt response  Gait Deviations: Decreased step length;Decreased step height; Increased FAITH  Distance: 144 feet  Ambulation 2  Surface - 2: level tile  Device 2: [address] [facility] 2: Contact guard assistance  Quality of Gait 2: cues to correct forward flexed posture, fair pt response  Gait Deviations: Increased FAITH; Decreased step length;Decreased step height  Distance: 120 feet     Stairs/Curb  Stairs?: Yes  Stairs  # Steps : 10  Stairs Height: 4\" (& 6\")  Rails: Bilateral  Device: No Device  Assistance: Contact guard assistance         BALANCE Posture: Fair  Sitting - Static: Fair;+  Sitting - Dynamic: Fair  Standing - Static: Fair;+  Standing - Dynamic: Fair    EXERCISES    Other exercises?: Yes  Other exercises 1: Supine and hooklying AAROM exercises: LLE 15x  Other exercises 2: Seated BLE exercises: 15x (Orange Theraband for all except LLE hip flexion)  Other exercises 3: Standing BLE exercises: 3-way hip 10x  Other exercises 4: Nustep L4 x 22 min (seat at 10; handle at 11) ; (pt continued this activity longer than PTA requested)  Other exercises 6: Marching in place, on balance foam (BUE support on // bars)  Other exercises 7: Hurdles in // bars: 2 laps  Other exercises 8: Balloon tapping while standing in // bars  Other exercises 9: Balloon tapping using BLE, seated in wheelchair      Activity Tolerance: Patient limited by endurance, Patient limited by fatigue  PT Equipment Recommendations  Equipment Needed: Yes  Walker: Rolling       Current Treatment Recommendations: Strengthening, ROM, Balance Training, Functional Mobility Training, Transfer Training, Stair training, Gait Training, Endurance Training, Safety Education & Training, Patient/Caregiver Education & Training, Home Exercise Program    Conditions Requiring Skilled Therapeutic Intervention  Body structures, Functions, Activity limitations: Decreased functional mobility ; Decreased ROM; Decreased strength;Decreased endurance;Decreased balance  Assessment: Impaired mobility due to L Hip Fx/ Hemiarthroplasty and decreased tolerance to activity (pt demonstrated decreased seated balance without back or UE support- needs trunck strengthening)  Decision Making: Low Complexity  REQUIRES PT FOLLOW UP: Yes  Discharge Recommendations: Home with assist PRN    Goals  Short term goals  Time Frame for Short term goals: 7-10 days  Short term goal 1: bed mobility with SBA  Short term goal 2: transfers with supervision  Short term goal 3: gait with RW/SBA x 75-100ft  Short term goal 4: standing balance to Fair+ for ADLs  Long term goals  Time Frame for Long term goals : time of discharge  Long term goal 1: mod-I bed mobility  Long term goal 2: mod-I transfers  Long term goal 3: mod-I gait x 150ft with RW  Long term goal 4: flight of stairs with rail and cane mod-I  Long term goal 5: standing balance to Good for safe ADLs       08/18/21 1041 08/18/21 1314   PT Individual Minutes   Time In 1041 1314   Time Out 1132 1354   Minutes 51 40       Electronically signed by Kristina Ayon PTA on 8/18/21 at 5:14 PM EDT

## 2021-08-18 NOTE — PROGRESS NOTES
Physical Medicine & Rehabilitation  Progress Note      Subjective:      79year-old male with L hip fracture after fall. Patient is doing well again today. L hip pain is mild and controlled. No new issues with abdominal pain, sleep, appetite, bowel, or bladder. ROS:  Denies fevers, chills, sweats. No chest pain, palpitations, lightheadedness. Denies coughing, wheezing or shortness of breath. Denies abdominal pain, nausea, diarrhea or constipation. No new areas of joint pain. Denies new areas of numbness or weakness. Denies new anxiety or depression issues. No new skin problems. Rehabilitation:   Progressing in therapies.     PT:  Restrictions/Precautions: Weight Bearing, Fall Risk  Implants present? : Metal implants (L hemiarthroplasty)  Left Lower Extremity Weight Bearing: Weight Bearing As Tolerated   Transfers  Sit to Stand: Contact guard assistance  Stand to sit: Contact guard assistance  Bed to Chair: Contact guard assistance (with RW)  Stand Pivot Transfers: Minimal Assistance  Comment: amount of assist needed to stand depends on height of surface pt sitting on. verbal cues to scoot forward and shift wt over his feet prior to standing; otherwise pt keeps wt too fat posterior  WB Status: WBAT  Ambulation 1  Surface: level tile  Device: Rolling Walker  Other Apparatus: Wheelchair follow  Assistance: Contact guard assistance  Quality of Gait: cues to correct forward flexed posture, fair pt response  Gait Deviations: Decreased step length, Decreased step height, Increased FAITH  Distance: 109 feet    Transfers  Sit to Stand: Contact guard assistance  Stand to sit: Contact guard assistance  Bed to Chair: Contact guard assistance (with RW)  Stand Pivot Transfers: Minimal Assistance  Comment: amount of assist needed to stand depends on height of surface pt sitting on. verbal cues to scoot forward and shift wt over his feet prior to standing; otherwise pt keeps wt too fat posterior  Ambulation  Ambulation?: Yes  WB Status: WBAT  More Ambulation?: Yes  Ambulation 1  Surface: level tile  Device: Rolling Walker  Other Apparatus: Wheelchair follow  Assistance: Contact guard assistance  Quality of Gait: cues to correct forward flexed posture, fair pt response  Gait Deviations: Decreased step length, Decreased step height, Increased FAITH  Distance: 109 feet    Surface: level tile  Ambulation 1  Surface: level tile  Device: Rolling Walker  Other Apparatus: Wheelchair follow  Assistance: Contact guard assistance  Quality of Gait: cues to correct forward flexed posture, fair pt response  Gait Deviations: Decreased step length, Decreased step height, Increased FAITH  Distance: 109 feet    OT:  ADL  Equipment Provided: Long-handled sponge, Reacher  Feeding: Setup, Dentures  Grooming: Setup  UE Bathing: Stand by assistance  LE Bathing: Increased time to complete, Contact guard assistance, Setup  UE Dressing: Setup  LE Dressing: Minimal assistance (assist threading RLE into brief. )  Toileting: None  Additional Comments: Pt seated at sink for ADL actvies          Balance  Sitting Balance: Stand by assistance  Standing Balance: Contact guard assistance   Standing Balance  Time: 1-2 min x4   Activity: functional ctivity, ADL activity   Comment: with RW  Functional Mobility  Functional - Mobility Device: Rolling Walker  Activity: To/from bathroom  Assist Level: Contact guard assistance  Functional Mobility Comments: pt demo slow movements, G safety noted     Bed mobility  Rolling to Left: Minimal assistance  Rolling to Right: Stand by assistance  Supine to Sit: Minimal assistance  Sit to Supine: Minimal assistance  Scooting: Stand by assistance (to EOB)  Comment: assist lifting legs into bed   Transfers  Sit to stand: Minimal assistance (stand from EOB)  Stand to sit: Contact guard assistance  Transfer Comments: VCs to slow descent when sitting   Toilet Transfers  Toilet - Technique: Ambulating  Equipment Used: Raised toilet seat with rails  Toilet Transfer: Moderate assistance  Toilet Transfers Comments: Verbal cues for hand placement and safety     Shower Transfers  Shower - Transfer To: Transfer tub bench  Shower - Technique: Ambulating, Stand pivot (Functional mobilty in; Stand pivot out due to fatigue)  Shower Transfers: Contact Guard  Shower Transfers Comments: Verbal cues for safety and sequencing over ramp and threshold with increased time. Wheelchair Bed Transfers  Wheelchair/Bed - Technique: Stand step  Equipment Used: Wheelchair, Bed  Level of Asssistance: Contact guard assistance    SPEECH:      Objective:  /63   Pulse 63   Temp 97.9 °F (36.6 °C)   Resp 14   Ht 5' 7\" (1.702 m)   Wt 178 lb (80.7 kg)   SpO2 98%   BMI 27.88 kg/m²       GEN: Well developed, well nourished, in NAD  HEENT:  NCAT. PERRL. EOMI. Mucous membranes pink and moist.   PULM:  Clear to ausculation. No rales or rhonchi. Respirations WNL and unlabored. CV:  Bradycardic rate regular rhythm. No murmurs or gallops. GI:  Abdomen soft. Nontender. Non-distended. BS + and equal.    NEUROLOGICAL: A&O x3. Sensation intact to light touch. MSK:  Functional ROM BUE and RLEs. Weakness impairs AROM L hip. Motor testing 5/5 key muscles BUE and RLEs. L hip flexion 4-/5. L knee extension 4/5. SKIN: Warm dry and intact. Good turgor. L lateral hip incision with dressing CD&I. Coccyx wound - dressing CD&I. EXTREMITIES:  No calf tenderness to palpation. No edema BLEs. Itzel Finnegan PSYCH: Mood WNL. Appropriately interactive. Affect WNL. Diagnostics:     CBC:   Recent Labs     08/16/21  0629 08/18/21  0636   WBC 8.0 6.5   RBC 2.71* 3.04*   HGB 8.9* 9.9*   HCT 27.1* 30.3*   .2* 99.6   RDW 17.1* 16.5*    293     BMP:   No results for input(s): NA, K, CL, CO2, PHOS, BUN, CREATININE, GLUCOSE in the last 72 hours. Invalid input(s): CA  BNP: No results for input(s): BNP in the last 72 hours.   PT/INR: No results for input(s): PROTIME, INR in the last 72 prn  8. Hypokalemia: Improved. On Lasix. Monitoring and will replace prn but has been stable. 9. Pulmonary infiltrates/effusion: using incentive spirometer  10. Bowel Management: Miralax daily, senokot prn, dulcolax prn. 11. DVT Prophylaxis:  SCD's while in bed, SUE's and chemoprophylaxis contraindicated  12. Internal medicine for medical management    Electronically signed by Cece Goldstein MD on 8/18/2021 at 10:18 AM      This note is created with the assistance of a speech recognition program.  While intending to generate a document that actually reflects the content of the visit, the document can still have some errors including those of syntax and sound a like substitutions which may escape proof reading. In such instances, actual meaning can be extrapolated by contextual diversion.

## 2021-08-18 NOTE — PROGRESS NOTES
[address] [facility]   ACUTE REHABILITATION OCCUPATIONAL THERAPY  DAILY NOTE    Date: 21  Patient Name: Nelida Chambers      Room: 7734/9504-30    MRN: [id number]   : 1940  (79 y.o.)  Gender: male   Referring Practitioner: Latonia Gómez MD  Diagnosis: Left hip fracture, hospitalization prolonged (initial admit 8-3)  due to afib, rhabdomyolosis, GI bleed, s/p EGD, on Protonix, twice a day,       Restrictions  Restrictions/Precautions: Weight Bearing, Fall Risk  Implants present? : Metal implants (L hemiarthroplasty)  Left Lower Extremity Weight Bearing: Weight Bearing As Tolerated  Required Braces or Orthoses?: No  Equipment Used: Wheelchair, Bed    Subjective  Subjective: \"It was on top of the microwave, I couldn't reach it. \"  pt's track phone when pt on floor for 5 days post fall and hip fracture. pt agrees to ed to keep  a working phone on his person at all times and notes his neighbor plans to check on him 3 times/ day. Comments: Pt is pleasant and cooperative                 Objective  Cognition  Overall Cognitive Status: WFL  Cognition Comment: with RW, pt demo good safety using 1 UE for balance and 1 for adl tasks and alternating. reviewed ed to obtain assist if on floor and cannot stand after a few minutes. Balance  Sitting Balance: Independent  Standing Balance: Stand by assistance  Bed mobility  Supine to Sit: Stand by assistance (with rail and HOB up 30 degrees)  Transfers  Stand Step Transfers: Stand by assistance  Stand Pivot Transfers: Stand by assistance  Sit to stand: Stand by assistance  Stand to sit: Stand by assistance  Standing Balance  Time: 6-7 min, 4-5 min x 2, 2-3 min x 2 in am, 5-6 min, 6-7 min in pm  Activity: adls with RW  Comment: with RW, pt demo good safety using 1 UE for balance and 1 for adl tasks and alternating. Functional Mobility  Functional - Mobility Device: Rolling Walker  Activity: Retrieve items;Transport items; To/from bathroom  Assist Level: Stand by assistance  Functional Mobility Comments: ambulates with RW and SBA to obtain set up for adls, in and out of bathroom and again pm as pt is requesting toileting. ADL  Equipment Provided: Long-handled sponge;Reacher  Feeding: Setup; Dentures  Grooming: Setup;Minimal assistance (min assist to shave completely around edges due to overgrowth.)  UE Bathing: Setup  LE Bathing: Setup;Stand by assistance (LHS to reach feet)  UE Dressing: Setup  LE Dressing: Setup;Minimal assistance  Toileting: Stand by assistance (wipe and bathe post BM, don pants over hips)  Additional Comments: pt ambulated into bathroom with RW and SBA completing set up. Pt seated at sink for ADL activities. R foot was soaked while pt shaved, thick cream was applied to B feet and ankles (to have eucerin cream tomorrow)  pt declined underpants , prefers pullups due to comfort- is continent. requested toileting and had BM in pm.  then stood to sink to wash hands. Assessment     Activity Tolerance: Patient Tolerated treatment well  Activity Tolerance: pt notes discomfort- not pain L hip.  pt with dark red skin R ankle + foot, per pt has decreased in past 2 months post injury at home as he was unable to apply hydrocortisol cream, OT alerted RN to ask MD re treatment. RN notified pt long term use of hydocortisone is not recommended, MD is ordering eucerin cream to be applied x 2 / day. RN changed bandages in am L hip, B elbows and B knees and checked bandage on bottom. 08/18/21 1424 08/18/21 1428   OT Individual Minutes   Time In 0930 1236   Time Out 1043 1310   Minutes 73 34             Patient Education:  Patient Goals   Patient goals : \"To be able to do all that you just mentioned. \" (in regards to independence with ADL and IADL tasks)  Learner:patient  Method: demonstration and explanation       Outcome: acknowledged understanding  and demonstrated understanding   OT Education  OT Education: IADL Safety;Equipment;Precautions  Patient Education: \"It was on top of the microwave, I couldn't reach it. \"  pt's track phone when pt on floor for 5 days post fall and hip fracture. pt agrees to ed to keep  a working phone on his person at all times and notes his neighbor plans to check on him 3 times/ day. Plan  Plan  Times per week: 5-7  Times per day: Twice a day  Current Treatment Recommendations: Self-Care / ADL, Strengthening, Balance Training, Functional Mobility Training, Endurance Training, Safety Education & Training, Patient/Caregiver Education & Training, Equipment Evaluation, Education, & procurement, Home Management Training  Patient Goals   Patient goals : \"To be able to do all that you just mentioned. \" (in regards to independence with ADL and IADL tasks)  Short term goals  Time Frame for Short term goals: By 1 week   Short term goal 1: Pt will complete lower body dressing/bathing with min A and good safety with use of AE as needed  Short term goal 2: Pt will complete functional mobility during self care tasks with SBA and good safety with use of least restrictive device  Short term goal 3: Pt will tolerate standing 5+ minutes during functional activity of choice with CGA and good safety  Short term goal 4: Pt will complete functional transfer during self care tasks with CGA and good safety  Short term goal 5: Pt will participate in 30+ minutes of therapeutic exercises/functional activities to increase safety and independence with self care tasks  Long term goals  Time Frame for Long term goals : By discharge  Long term goal 1: Pt will complete BADLs with modified independence and good safety with use of AE as needed  Long term goal 2: Pt will complete functional transfers and mobility with modified independence and good safety with use of least restrictive device  Long term goal 3: Pt will tolerate standing 10+ minutes during funcitonal activity of choice with good safety  Long term goal 4: Pt will

## 2021-08-18 NOTE — PROGRESS NOTES
[facility] Internal Medicine    CONSULTATION / HISTORY AND PHYSICAL EXAMINATION            Date:   8/18/2021  Patient name:  Jennifer Milligan  Date of admission:  8/13/2021  7:39 PM  MRN:   [id number]  Account:  [de-identified]  YOB: 1940  PCP:    No primary care provider on file. Room:   [address] [place] [address]  Code Status:    Full Code    Physician Requesting Consult: Lázaro Iglesias MD    Reason for Consult:  medical management    Chief Complaint:     No chief complaint on file. History Obtained From:     Patient medical record nursing staff    History of Present Illness:   Patient admitted to ARU for rehabilitation  He was admitted to [facility] after a mechanical fall, he was found to have left hip fracture, underwent surgery, he was found to have new onset atrial fibrillation,  During hospital stay, patient had drop in hemoglobin, stool was positive for occult blood. Patient was evaluated by GI physician, underwent EGD, patient was found to have nonbleeding duodenal ulcer, not considered a candidate for anticoagulation with history of GI bleed  Patient was started on amoxicillin and Flagyl, with positive H. pylori infection  Patient doing much better  Working with physical therapy    8/18  Patient resting comfortably  He worked with physical therapy in the morning and afternoon  Pain is improved  Past Medical History:     Past Medical History:   Diagnosis Date    Atrial fibrillation ([address]     with RVR        Past Surgical History:     Past Surgical History:   Procedure Laterality Date    HIP SURGERY Left 8/5/2021    HIP HEMIARTHROPLASTY performed by Sheri Lesch, MD at [address] [place] [address] N/A 8/10/2021    EGD BIOPSY performed by Jessica Tucker MD at [facility]        Medications Prior to Admission:     Prior to Admission medications    Medication Sig Start Date End Date Taking?  Authorizing Provider   ibuprofen (ADVIL;MOTRIN) 200 MG tablet Take 400 mg by mouth every 6 hours as needed for Pain   Yes Historical Provider, MD   Multiple Vitamins-Minerals (THERAPEUTIC MULTIVITAMIN-MINERALS) tablet Take 1 tablet by mouth daily   Yes Historical Provider, MD        Allergies:     Patient has no known allergies. Social History:     Tobacco:    reports that he has never smoked. He has never used smokeless tobacco.  Alcohol:      reports no history of alcohol use. Drug Use:  has no history on file for drug use. Family History:     No family history on file. Review of Systems:     Positive and Negative as described in HPI. CONSTITUTIONAL:  negative for fevers, chills, sweats, fatigue, weight loss  HEENT:  negative for vision, hearing changes, runny nose, throat pain  RESPIRATORY:  negative for shortness of breath, cough, congestion, wheezing. CARDIOVASCULAR:  negative for chest pain, palpitations. GASTROINTESTINAL:  negative for nausea, vomiting, diarrhea, constipation, change in bowel habits, abdominal pain   GENITOURINARY:  negative for difficulty of urination, burning with urination, frequency   INTEGUMENT:  negative for rash, skin lesions, easy bruising   HEMATOLOGIC/LYMPHATIC:  negative for swelling/edema   ALLERGIC/IMMUNOLOGIC:  negative for urticaria , itching  ENDOCRINE:  negative increase in drinking, increase in urination, hot or cold intolerance  MUSCULOSKELETAL: Left hip pain NEUROLOGICAL:  negative for headaches, dizziness, lightheadedness, numbness, pain, tingling extremities  BEHAVIOR/PSYCH:      Physical Exam:     /63   Pulse 63   Temp 97.9 °F (36.6 °C)   Resp 14   Ht 5' 7\" (1.702 m)   Wt 178 lb (80.7 kg)   SpO2 98%   BMI 27.88 kg/m²   Temp (24hrs), Av.1 °F (36.7 °C), Min:97.9 °F (36.6 °C), Max:98.2 °F (36.8 °C)    No results for input(s): POCGLU in the last 72 hours.     Intake/Output Summary (Last 24 hours) at 2021 1709  Last data filed at 2021 0204  Gross per 24 hour   Intake --   Output 700 ml Net -700 ml       General Appearance:  alert, well appearing, and in no acute distress  Mental status: oriented to person, place, and time with normal affect  Head:  normocephalic, atraumatic. Eye: no icterus, redness, pupils equal and reactive, extraocular eye movements intact, conjunctiva clear  Ear: normal external ear, no discharge, hearing intact  Nose:  no drainage noted  Mouth: mucous membranes moist  Neck: supple, no carotid bruits, thyroid not palpable  Lungs: Bilateral equal air entry, clear to ausculation, no wheezing, rales or rhonchi, normal effort  Cardiovascular: normal rate, regular rhythm, no murmur, gallop, rub. Abdomen: Soft, nontender, nondistended, normal bowel sounds, no hepatomegaly or splenomegaly  Neurologic: There are no new focal motor or sensory deficits, normal muscle tone and bulk, no abnormal sensation, normal speech, cranial nerves II through XII grossly intact  Skin: No gross lesions, rashes, bruising or bleeding on exposed skin area  Extremities: Postsurgical changes in left hip  Psych:      Investigations:      Laboratory Testing:  Recent Results (from the past 24 hour(s))   CBC    Collection Time: 08/18/21  6:36 AM   Result Value Ref Range    WBC 6.5 3.5 - 11.0 k/uL    RBC 3.04 (L) 4.5 - 5.9 m/uL    Hemoglobin 9.9 (L) 13.5 - 17.5 g/dL    Hematocrit 30.3 (L) 41 - 53 %    MCV 99.6 80 - 100 fL    MCH 32.5 26 - 34 pg    MCHC 32.6 31 - 37 g/dL    RDW 16.5 (H) 11.5 - 14.9 %    Platelets 509 027 - 150 k/uL    MPV 8.0 6.0 - 12.0 fL    NRBC Automated NOT REPORTED per 100 WBC           Consultations:   IP CONSULT TO DIETITIAN  IP CONSULT TO SOCIAL WORK  IP CONSULT TO GI  IP CONSULT TO INTERNAL MEDICINE  IP CONSULT TO GI  Assessment :      Primary Problem  <principal problem not specified>    Active Hospital Problems    Diagnosis Date Noted    H pylori ulcer [K27.9, B96.81]     PUD (peptic ulcer disease) [K27.9]     Closed left hip fracture, initial encounter ([facility] [place] 08/13/2021    Atrial fibrillation with RVR ([facility] [I48.91] 08/03/2021       Plan:     1. Left hip fracture s/p surgery, pain is controlled  2. Atrial fibrillation, rate controlled, not on anticoagulation with history of GI bleed, on amiodarone, Cardizem  3. GI bleed, s/p EGD, on Protonix, twice a day,  4. H. pylori infection, on amoxicillin and Flagyl  5. Not on chemoprophylaxis with history of GI bleed        Sophie Mason MD  8/18/2021  5:09 PM    Copy sent to Dr. Maldonado Gonzalo primary care provider on file. Please note that this chart was generated using voice recognition Dragon dictation software. Although every effort was made to ensure the accuracy of this automated transcription, some errors in transcription may have occurred.

## 2021-08-18 NOTE — PLAN OF CARE
Problem: Skin Integrity:  Goal: Will show no infection signs and symptoms  Description: Will show no infection signs and symptoms  8/18/2021 1432 by Reynold Mccoy RN  Outcome: Ongoing  8/18/2021 0551 by Annalisa Gustafson RN  Outcome: Ongoing  Goal: Absence of new skin breakdown  Description: Absence of new skin breakdown  8/18/2021 1432 by Reynold Mccoy RN  Outcome: Ongoing  8/18/2021 0551 by Annalisa Gustafson RN  Outcome: Ongoing     Problem: Falls - Risk of:  Goal: Will remain free from falls  Description: Will remain free from falls  8/18/2021 1432 by Reynold Mccoy RN  Outcome: Ongoing  8/18/2021 0551 by Annalisa Gustafson RN  Outcome: Ongoing  Goal: Absence of physical injury  Description: Absence of physical injury  8/18/2021 1432 by Reynold Mccoy RN  Outcome: Ongoing  8/18/2021 0551 by Annalisa Gustafson RN  Outcome: Ongoing     Problem: Pain:  Goal: Pain level will decrease  Description: Pain level will decrease  8/18/2021 1432 by Reynold Mccoy RN  Outcome: Ongoing  8/18/2021 0551 by Annalisa Gustafson RN  Outcome: Ongoing  Goal: Control of acute pain  Description: Control of acute pain  8/18/2021 1432 by Reynold Mccoy RN  Outcome: Ongoing  8/18/2021 0551 by Annalisa Gustafson RN  Outcome: Ongoing  Goal: Control of chronic pain  Description: Control of chronic pain  8/18/2021 1432 by Reynold Mccoy RN  Outcome: Ongoing  8/18/2021 0551 by Annalisa Gustafson RN  Outcome: Ongoing     Problem: Musculor/Skeletal Functional Status  Goal: Highest potential functional level  8/18/2021 1432 by Reynold Mccoy RN  Outcome: Ongoing  8/18/2021 0551 by Annalisa Gustafson RN  Outcome: Ongoing     Problem: Nutrition  Goal: Optimal nutrition therapy  8/18/2021 1432 by Reynold Mccoy RN  Outcome: Ongoing  8/18/2021 0551 by Annalisa Gustafson RN  Outcome: Ongoing

## 2021-08-19 ENCOUNTER — TELEPHONE (OUTPATIENT)
Dept: GASTROENTEROLOGY | Age: 81
End: 2021-08-19

## 2021-08-19 PROCEDURE — 99232 SBSQ HOSP IP/OBS MODERATE 35: CPT | Performed by: INTERNAL MEDICINE

## 2021-08-19 PROCEDURE — 6370000000 HC RX 637 (ALT 250 FOR IP)

## 2021-08-19 PROCEDURE — 6370000000 HC RX 637 (ALT 250 FOR IP): Performed by: PHYSICAL MEDICINE & REHABILITATION

## 2021-08-19 PROCEDURE — 97116 GAIT TRAINING THERAPY: CPT

## 2021-08-19 PROCEDURE — 97110 THERAPEUTIC EXERCISES: CPT

## 2021-08-19 PROCEDURE — 97530 THERAPEUTIC ACTIVITIES: CPT

## 2021-08-19 PROCEDURE — 1180000000 HC REHAB R&B

## 2021-08-19 PROCEDURE — 99232 SBSQ HOSP IP/OBS MODERATE 35: CPT | Performed by: PHYSICAL MEDICINE & REHABILITATION

## 2021-08-19 PROCEDURE — 6370000000 HC RX 637 (ALT 250 FOR IP): Performed by: NURSE PRACTITIONER

## 2021-08-19 PROCEDURE — 97535 SELF CARE MNGMENT TRAINING: CPT

## 2021-08-19 RX ORDER — TAMSULOSIN HYDROCHLORIDE 0.4 MG/1
0.4 CAPSULE ORAL DAILY
Status: DISCONTINUED | OUTPATIENT
Start: 2021-08-19 | End: 2021-08-27 | Stop reason: HOSPADM

## 2021-08-19 RX ADMIN — DILTIAZEM HYDROCHLORIDE 30 MG: 30 TABLET, FILM COATED ORAL at 21:39

## 2021-08-19 RX ADMIN — HYDROCODONE BITARTRATE AND ACETAMINOPHEN 1 TABLET: 5; 325 TABLET ORAL at 08:09

## 2021-08-19 RX ADMIN — AMOXICILLIN 1000 MG: 500 CAPSULE ORAL at 21:39

## 2021-08-19 RX ADMIN — METRONIDAZOLE 500 MG: 500 TABLET ORAL at 05:52

## 2021-08-19 RX ADMIN — AMIODARONE HYDROCHLORIDE 200 MG: 200 TABLET ORAL at 08:09

## 2021-08-19 RX ADMIN — FUROSEMIDE 20 MG: 20 TABLET ORAL at 08:09

## 2021-08-19 RX ADMIN — Medication 3 MG: at 21:39

## 2021-08-19 RX ADMIN — AMOXICILLIN 1000 MG: 500 CAPSULE ORAL at 08:09

## 2021-08-19 RX ADMIN — PANTOPRAZOLE SODIUM 40 MG: 40 TABLET, DELAYED RELEASE ORAL at 14:40

## 2021-08-19 RX ADMIN — METRONIDAZOLE 500 MG: 500 TABLET ORAL at 21:40

## 2021-08-19 RX ADMIN — Medication: at 08:10

## 2021-08-19 RX ADMIN — FUROSEMIDE 20 MG: 20 TABLET ORAL at 16:55

## 2021-08-19 RX ADMIN — METOPROLOL TARTRATE 50 MG: 50 TABLET, FILM COATED ORAL at 21:39

## 2021-08-19 RX ADMIN — METRONIDAZOLE 500 MG: 500 TABLET ORAL at 14:40

## 2021-08-19 RX ADMIN — PANTOPRAZOLE SODIUM 40 MG: 40 TABLET, DELAYED RELEASE ORAL at 05:52

## 2021-08-19 RX ADMIN — Medication: at 21:39

## 2021-08-19 RX ADMIN — DILTIAZEM HYDROCHLORIDE 30 MG: 30 TABLET, FILM COATED ORAL at 08:10

## 2021-08-19 RX ADMIN — TAMSULOSIN HYDROCHLORIDE 0.4 MG: 0.4 CAPSULE ORAL at 14:42

## 2021-08-19 RX ADMIN — METOPROLOL TARTRATE 50 MG: 50 TABLET, FILM COATED ORAL at 08:10

## 2021-08-19 ASSESSMENT — PAIN SCALES - GENERAL
PAINLEVEL_OUTOF10: 3
PAINLEVEL_OUTOF10: 0
PAINLEVEL_OUTOF10: 2

## 2021-08-19 NOTE — PROGRESS NOTES
[address]   ACUTE REHABILITATION OCCUPATIONAL THERAPY  DAILY NOTE    Date: 21  Patient Name: Tammie Barrios      Room: 6382/3242-28    MRN: [id number]   : 1940  (79 y.o.)  Gender: male   Referring Practitioner: Davon Golden MD  Diagnosis: Left hip fracture, hospitalization prolonged (initial admit 8-3)  due to afib, rhabdomyolosis, GI bleed, s/p EGD, on Protonix, twice a day,  Additional Pertinent Hx: Junior Lux is a 79 y. o. male with no known past medical history admitted to [facility] 8/3/2021.  He initially presented after a fall from standing at home. He reportedly was laying on the floor for 5 days before he was able to call for help. He was found to have atrial fibrillation with RVR in the ED and was started on diltiazem and heparin drips. He was also found to have left hip fracture and rhabdomylolysis. He has multiple wounds, including buttock, sacrum, bilateral elbow, and bilateral knee wounds. He underwent left hip hemiarthroplasty on 21 (Dr. Susu Urena). Pt now presents to ARU 21     Restrictions  Restrictions/Precautions: Weight Bearing, Fall Risk  Implants present? : Metal implants (L hemiarthroplasty. )  Left Lower Extremity Weight Bearing: Weight Bearing As Tolerated  Required Braces or Orthoses?: No  Equipment Used: Wheelchair, Bed    Subjective  Subjective: \"I'm happy I can manage weight on it\" pt states regarding LLE. Comments: Pt is pleasant and cooperative   Patient Currently in Pain: Denies  Restrictions/Precautions: Weight Bearing; Fall Risk  Overall Orientation Status: Within Functional Limits          Objective  Cognition  Overall Cognitive Status: WFL  Perception  Overall Perceptual Status: WFL  Balance  Sitting Balance: Independent  Standing Balance: Stand by assistance  Bed mobility  Supine to Sit: Stand by assistance  Sit to Supine: Stand by assistance  Transfers  Sit to stand: Stand by assistance;Contact guard assistance  Stand to sit: Stand by assistance  Transfer Comments: minimal cueing for proper hand placement. Standing Balance  Time: AM: ~1 min x 2 PM: ~9 min   Activity: AM: functional mobility, LB dressing PM: therapeutic standing activity. Comment: steady, 1-2 UE support, crossing midline. Functional Mobility  Functional - Mobility Device: Rolling Walker  Activity: Retrieve items;Transport items; To/from bathroom  Assist Level: Stand by assistance  Toilet Transfers  Toilet - Technique: Ambulating  Equipment Used: Raised toilet seat with rails  Toilet Transfer: Contact guard assistance     Type of ROM/Therapeutic Exercise  Type of ROM/Therapeutic Exercise: AROM (1# B wrist weights for ROM arch in standng, green theraflex bar x20 upward/downward bends, twisting, 3rd setting spring gripper for increased hand strengthening due to verbalized difficulties with opening containers on tray)         Assessment  Performance deficits / Impairments: Decreased ADL status; Decreased functional mobility ; Decreased strength;Decreased safe awareness;Decreased endurance;Decreased balance;Decreased high-level IADLs  Prognosis: Good  Discharge Recommendations: Continue to assess pending progress  Activity Tolerance: Patient Tolerated treatment well  Safety Devices in place: Yes  Type of devices: All fall risk precautions in place;Call light within reach;Gait belt;Patient at risk for falls; Left in chair;Left in bed;Nurse notified  Equipment Recommendations  Equipment Needed:  (TBD)       Patient Education: educated importance of keeping cell phone nearby in case of another fall or emergency at home to alert for help as needed. Patient Goals   Patient goals : \"To be able to do all that you just mentioned. \" (in regards to independence with ADL and IADL tasks)  Learner:patient  Method: explanation       Outcome: acknowledged understanding of         Plan  Plan  Times per week: 5-7  Times per day: Twice a day  Current Treatment Recommendations: Self-Care / ADL, Strengthening, Balance Training, Functional Mobility Training, Endurance Training, Safety Education & Training, Patient/Caregiver Education & Training, Equipment Evaluation, Education, & procurement, Home Management Training  Patient Goals   Patient goals : \"To be able to do all that you just mentioned. \" (in regards to independence with ADL and IADL tasks)  Short term goals  Time Frame for Short term goals: By 1 week   Short term goal 1: Pt will complete lower body dressing/bathing with min A and good safety with use of AE as needed  Short term goal 2: Pt will complete functional mobility during self care tasks with SBA and good safety with use of least restrictive device  Short term goal 3: Pt will tolerate standing 5+ minutes during functional activity of choice with CGA and good safety  Short term goal 4: Pt will complete functional transfer during self care tasks with CGA and good safety  Short term goal 5: Pt will participate in 30+ minutes of therapeutic exercises/functional activities to increase safety and independence with self care tasks  Long term goals  Time Frame for Long term goals : By discharge  Long term goal 1: Pt will complete BADLs with modified independence and good safety with use of AE as needed  Long term goal 2: Pt will complete functional transfers and mobility with modified independence and good safety with use of least restrictive device  Long term goal 3: Pt will tolerate standing 10+ minutes during funcitonal activity of choice with good safety  Long term goal 4: Pt will verbalize/demonstrate good understanding of home safety/fall prevention strategies to increase safety and independence with self care and mobility  Long term goal 5: Pt will verbalize/demonstrate good understanding of adaptive equipment/adaptive strategies/durable medical equipment to increase safety and independence with self care and mobility  Long term goals 6: Pt will complete simple meal prep/light house keeping with supervision and good safety        08/19/21 1038 08/19/21 1552   OT Individual Minutes   Time In [id number]   Time Out [id number]   Minutes 36 33     Electronically signed by SHANELLE Martinez on 8/19/21 at 4:04 PM EDT

## 2021-08-19 NOTE — PLAN OF CARE
Problem: Skin Integrity:  Goal: Will show no infection signs and symptoms  Description: Will show no infection signs and symptoms  8/19/2021 1617 by Dwayne Russell RN  Outcome: Ongoing  8/19/2021 1615 by Dwayne Russell RN  Outcome: Ongoing  8/19/2021 0518 by Alton Hackett RN  Outcome: Ongoing  Goal: Absence of new skin breakdown  Description: Absence of new skin breakdown  8/19/2021 1617 by Dwayne Russell RN  Outcome: Ongoing  8/19/2021 1615 by Dwayne Russell RN  Outcome: Ongoing  8/19/2021 0518 by Alton Hackett RN  Outcome: Ongoing     Problem: Falls - Risk of:  Goal: Will remain free from falls  Description: Will remain free from falls  8/19/2021 1617 by Dwayne Russell RN  Outcome: Ongoing  8/19/2021 1615 by Dwayne Russell RN  Outcome: Ongoing  8/19/2021 0518 by Alton Hackett RN  Outcome: Ongoing  Note: Pt remains free of falls this shift.  Approprate safety measures in place   Goal: Absence of physical injury  Description: Absence of physical injury  8/19/2021 1617 by Dwayne Russell RN  Outcome: Ongoing  8/19/2021 1615 by Dwayne Russell RN  Outcome: Ongoing  8/19/2021 0518 by Alton Hackett RN  Outcome: Ongoing     Problem: Musculor/Skeletal Functional Status  Goal: Highest potential functional level  8/19/2021 1617 by Dwayne Russell RN  Outcome: Ongoing  8/19/2021 1615 by Dwayne Russell RN  Outcome: Ongoing  8/19/2021 0518 by Alton Hackett RN  Outcome: Ongoing     Problem: Nutrition  Goal: Optimal nutrition therapy  8/19/2021 1617 by Dwayne Russell RN  Outcome: Ongoing  8/19/2021 1615 by Dwayne Russell RN  Outcome: Ongoing  8/19/2021 0518 by Alton Hackett RN  Outcome: Ongoing

## 2021-08-19 NOTE — PROGRESS NOTES
MG tablet Take 400 mg by mouth every 6 hours as needed for Pain   Yes Historical Provider, MD   Multiple Vitamins-Minerals (THERAPEUTIC MULTIVITAMIN-MINERALS) tablet Take 1 tablet by mouth daily   Yes Historical Provider, MD        Allergies:     Patient has no known allergies. Social History:     Tobacco:    reports that he has never smoked. He has never used smokeless tobacco.  Alcohol:      reports no history of alcohol use. Drug Use:  has no history on file for drug use. Family History:     No family history on file. Review of Systems:     Positive and Negative as described in HPI. CONSTITUTIONAL:  negative for fevers, chills, sweats, fatigue, weight loss  HEENT:  negative for vision, hearing changes, runny nose, throat pain  RESPIRATORY:  negative for shortness of breath, cough, congestion, wheezing. CARDIOVASCULAR:  negative for chest pain, palpitations. GASTROINTESTINAL:  negative for nausea, vomiting, diarrhea, constipation, change in bowel habits, abdominal pain   GENITOURINARY:  negative for difficulty of urination, burning with urination, frequency   INTEGUMENT:  negative for rash, skin lesions, easy bruising   HEMATOLOGIC/LYMPHATIC:  negative for swelling/edema   ALLERGIC/IMMUNOLOGIC:  negative for urticaria , itching  ENDOCRINE:  negative increase in drinking, increase in urination, hot or cold intolerance  MUSCULOSKELETAL: Left hip pain NEUROLOGICAL:  negative for headaches, dizziness, lightheadedness, numbness, pain, tingling extremities  BEHAVIOR/PSYCH:      Physical Exam:     BP (!) 114/59   Pulse 55   Temp 97.9 °F (36.6 °C)   Resp 16   Ht 5' 7\" (1.702 m)   Wt 178 lb (80.7 kg)   SpO2 97%   BMI 27.88 kg/m²   Temp (24hrs), Av °F (36.7 °C), Min:97.9 °F (36.6 °C), Max:98.1 °F (36.7 °C)    No results for input(s): POCGLU in the last 72 hours.     Intake/Output Summary (Last 24 hours) at 2021 1651  Last data filed at 2021 1445  Gross per 24 hour   Intake 240 ml   Output 1000 ml   Net -760 ml       General Appearance:  alert, well appearing, and in no acute distress  Mental status: oriented to person, place, and time with normal affect  Head:  normocephalic, atraumatic. Eye: no icterus, redness, pupils equal and reactive, extraocular eye movements intact, conjunctiva clear  Ear: normal external ear, no discharge, hearing intact  Nose:  no drainage noted  Mouth: mucous membranes moist  Neck: supple, no carotid bruits, thyroid not palpable  Lungs: Bilateral equal air entry, clear to ausculation, no wheezing, rales or rhonchi, normal effort  Cardiovascular: normal rate, regular rhythm, no murmur, gallop, rub. Abdomen: Soft, nontender, nondistended, normal bowel sounds, no hepatomegaly or splenomegaly  Neurologic: There are no new focal motor or sensory deficits, normal muscle tone and bulk, no abnormal sensation, normal speech, cranial nerves II through XII grossly intact  Skin: No gross lesions, rashes, bruising or bleeding on exposed skin area  Extremities: Postsurgical changes in left hip  Psych:      Investigations:      Laboratory Testing:  Recent Results (from the past 24 hour(s))   Basic Metabolic Panel w/ Reflex to MG    Collection Time: 08/18/21  5:28 PM   Result Value Ref Range    Glucose 106 (H) 70 - 99 mg/dL    BUN 23 8 - 23 mg/dL    CREATININE 0.67 (L) 0.70 - 1.20 mg/dL    Bun/Cre Ratio NOT REPORTED 9 - 20    Calcium 8.4 (L) 8.6 - 10.4 mg/dL    Sodium 140 135 - 144 mmol/L    Potassium 4.2 3.7 - 5.3 mmol/L    Chloride 104 98 - 107 mmol/L    CO2 28 20 - 31 mmol/L    Anion Gap 8 (L) 9 - 17 mmol/L    GFR Non-African American >60 >60 mL/min    GFR African American >60 >60 mL/min    GFR Comment          GFR Staging NOT REPORTED            Consultations:   IP CONSULT TO DIETITIAN  IP CONSULT TO SOCIAL WORK  IP CONSULT TO GI  IP CONSULT TO INTERNAL MEDICINE  IP CONSULT TO GI  Assessment :      Primary Problem  <principal problem not specified>    Active Hospital Problems Diagnosis Date Noted    H pylori ulcer [K27.9, B96.81]     PUD (peptic ulcer disease) [K27.9]     Closed left hip fracture, initial encounter ([facility] [S72.002A] 08/13/2021    Atrial fibrillation with RVR ([facility] [I48.91] 08/03/2021       Plan:     1. Left hip fracture s/p surgery, pain is controlled  2. Atrial fibrillation, rate controlled, not on anticoagulation with history of GI bleed, on amiodarone, Cardizem  3. GI bleed, s/p EGD, on Protonix, twice a day,  4. H. pylori infection, on amoxicillin and Flagyl  5. Not on chemoprophylaxis with history of GI bleed  6. Complaints of BPH, starting patient on [facility] Milton Wise MD  8/19/2021  4:51 PM    Copy sent to Dr. Kirby primary care provider on file. Please note that this chart was generated using voice recognition Dragon dictation software. Although every effort was made to ensure the accuracy of this automated transcription, some errors in transcription may have occurred.

## 2021-08-19 NOTE — PROGRESS NOTES
Physical Therapy  Facility/Department: [facility]  Daily Treatment Note  NAME: Vaibhav Del Cid  : 1940  MRN: [id number]    Date of Service: 2021    Discharge Recommendations:  Home with assist PRN   PT Equipment Recommendations  Equipment Needed: Yes  Walker: Rolling    Assessment   Body structures, Functions, Activity limitations: Decreased functional mobility ; Decreased ROM; Decreased strength;Decreased endurance;Decreased balance  Assessment: Impaired mobility due to L Hip Fx/ Hemiarthroplasty and decreased tolerance to activity (pt demonstrated decreased seated balance without back or UE support- needs trunck strengthening)  Specific instructions for Next Treatment: seated trunk strengthening/balance activities  Decision Making: Low Complexity  REQUIRES PT FOLLOW UP: Yes  Activity Tolerance  Activity Tolerance: Patient limited by endurance; Patient limited by fatigue     Patient Diagnosis(es): There were no encounter diagnoses. has a past medical history of Atrial fibrillation ([facility] [address] has a past surgical history that includes hip surgery (Left, 2021) and Upper gastrointestinal endoscopy (N/A, 8/10/2021). Restrictions  Restrictions/Precautions  Restrictions/Precautions: Weight Bearing, Fall Risk  Required Braces or Orthoses?: No  Implants present? : Metal implants (L hemiarthroplasty. )  Lower Extremity Weight Bearing Restrictions  Left Lower Extremity Weight Bearing: Weight Bearing As Tolerated  Subjective   General  Chart Reviewed: Yes  Additional Pertinent Hx: closed L hip fx  Response To Previous Treatment: Patient with no complaints from previous session. Family / Caregiver Present: No  Subjective  Subjective: Pt reports feeling better today, less fatigued.  Pt reported that he has had an overactive bladder and that he is going to talk to the nursing/physician staff  Pain Screening  Patient Currently in Pain: Denies  Vital Signs  Patient Currently in Pain: Denies Orientation  Orientation  Overall Orientation Status: Within Normal Limits    Objective   Bed mobility  Supine to Sit: Stand by assistance  Sit to Supine: Stand by assistance  Scooting: Stand by assistance  Transfers  Sit to Stand: Contact guard assistance  Stand to sit: Contact guard assistance  Stand Pivot Transfers: Contact guard assistance  Lateral Transfers: Contact guard assistance  Ambulation  Ambulation?: Yes  WB Status: WBAT  More Ambulation?: Yes  Ambulation 1  Surface: level tile  Device: Rolling Walker  Assistance: Contact guard assistance  Quality of Gait: cues to correct forward flexed posture, fair pt response  Gait Deviations: Decreased step length;Decreased step height; Increased FAITH  Distance: 198 feet  Ambulation 2  Surface - 2: level tile  Device 2: Rolling Walker  Assistance 2: Contact guard assistance  Quality of Gait 2: cues to correct forward flexed posture, fair pt response  Gait Deviations: Increased FAITH; Decreased step length;Decreased step height  Distance: 200 feet  Stairs/Curb  Stairs?: Yes  Stairs  # Steps : 10  Stairs Height: 4\" (& 6\")  Rails: Right ascending  Device: Single pt cane  Assistance: Contact guard assistance     Balance  Posture: Fair  Sitting - Static: Fair;+  Sitting - Dynamic: Fair  Standing - Static: Fair;+  Standing - Dynamic: Fair  Other exercises  Other exercises?: Yes  Other exercises 1: Supine and hooklying AAROM exercises: LLE 15x  Other exercises 2: Seated BLE exercises: 15x (Orange Theraband for all except LLE hip flexion)  Other exercises 3: Standing BLE exercises: 3-way hip 10x  Other exercises 4: Nustep L4 x 10 min (seat at 10; handle at 11) ;    Other exercises 5: UBE: 4min forward, 3min backward  Other exercises 6: Marching in place, on balance foam (BUE support on // bars)  Other exercises 7: Hurdles in // bars: 2 laps  Other exercises 8: Balloon tapping while standing in // bars  Other exercises 9: Balloon tapping using BLE, seated in wheelchair Goals  Short term goals  Time Frame for Short term goals: 7-10 days  Short term goal 1: bed mobility with SBA  Short term goal 2: transfers with supervision  Short term goal 3: gait with RW/SBA x 75-100ft  Short term goal 4: standing balance to Fair+ for ADLs  Long term goals  Time Frame for Long term goals : time of discharge  Long term goal 1: mod-I bed mobility  Long term goal 2: mod-I transfers  Long term goal 3: mod-I gait x 150ft with RW  Long term goal 4: flight of stairs with rail and cane mod-I  Long term goal 5: standing balance to Good for safe ADLs  Patient Goals   Patient goals : get stronger    Plan    Plan  Times per week: 1.5hrs/day x 5-7days/wk  Specific instructions for Next Treatment: seated trunk strengthening/balance activities  Current Treatment Recommendations: Strengthening, ROM, Balance Training, Functional Mobility Training, Transfer Training, Stair training, Gait Training, Endurance Training, Safety Education & Training, Patient/Caregiver Education & Training, Home Exercise Program     Therapy Time         08/19/21 1032 08/19/21 1300   PT Individual Minutes   Time In 5612 0836   Time Out [id number]   Minutes 60 HALIMA Youssef

## 2021-08-19 NOTE — PLAN OF CARE
Problem: Skin Integrity:  Goal: Will show no infection signs and symptoms  Description: Will show no infection signs and symptoms  8/19/2021 1615 by Frankie Tapia RN  Outcome: Ongoing  8/19/2021 0518 by Ladan Aviles RN  Outcome: Ongoing  Goal: Absence of new skin breakdown  Description: Absence of new skin breakdown  8/19/2021 1615 by Frankie Tapia RN  Outcome: Ongoing  8/19/2021 0518 by Ladan Aviles RN  Outcome: Ongoing     Problem: Falls - Risk of:  Goal: Will remain free from falls  Description: Will remain free from falls  8/19/2021 1615 by Frankie Tapia RN  Outcome: Ongoing  8/19/2021 0518 by Ladan Aviles RN  Outcome: Ongoing  Note: Pt remains free of falls this shift. Approprate safety measures in place   Goal: Absence of physical injury  Description: Absence of physical injury  8/19/2021 1615 by Frankie Tapia RN  Outcome: Ongoing  8/19/2021 0518 by Ladan Aviles RN  Outcome: Ongoing     Problem: Pain:  Goal: Pain level will decrease  Description: Pain level will decrease  8/19/2021 1615 by Frankie Tapia RN  Outcome: Ongoing  8/19/2021 0518 by Ladan Aviles RN  Outcome: Ongoing  Note: Pt Continued to assess pain level with appropriate pain scale.     Goal: Control of acute pain  Description: Control of acute pain  8/19/2021 1615 by Frankie Tapia RN  Outcome: Ongoing  8/19/2021 0518 by Ladan Aviles RN  Outcome: Ongoing  Goal: Control of chronic pain  Description: Control of chronic pain  8/19/2021 1615 by Frankie Tapia RN  Outcome: Ongoing  8/19/2021 0518 by Ladan Aviles RN  Outcome: Ongoing     Problem: Nutrition  Goal: Optimal nutrition therapy  8/19/2021 1615 by Frankie Tapia RN  Outcome: Ongoing  8/19/2021 0518 by Ladan Avlies RN  Outcome: Ongoing

## 2021-08-19 NOTE — PROGRESS NOTES
Physical Medicine & Rehabilitation  Progress Note      Subjective:      79year-old male with L hip fracture after fall. Patient is reporting controlled mild aching L hip pain. Strength and mobility in L leg is continuing to improve. He does note some persistent urinary frequency which has been present at home prior to admission. No symptoms of urgency or burning. No new issues with sleep, appetite, bowel, or bladder. ROS:  Denies fevers, chills, sweats. No chest pain, palpitations, lightheadedness. Denies coughing, wheezing or shortness of breath. Denies abdominal pain, nausea, diarrhea or constipation. No new areas of joint pain. Denies new areas of numbness or weakness. Denies new anxiety or depression issues. No new skin problems. Rehabilitation:   Progressing in therapies.     PT:  Restrictions/Precautions: Weight Bearing, Fall Risk  Implants present? : Metal implants (L hemiarthroplasty. )  Left Lower Extremity Weight Bearing: Weight Bearing As Tolerated   Transfers  Sit to Stand: Contact guard assistance  Stand to sit: Contact guard assistance  Bed to Chair: Contact guard assistance (with RW)  Stand Pivot Transfers: Contact guard assistance  Lateral Transfers: Contact guard assistance  Comment: amount of assist needed to stand depends on height of surface pt sitting on. verbal cues to scoot forward and shift wt over his feet prior to standing; otherwise pt keeps wt too fat posterior  WB Status: WBAT  Ambulation 1  Surface: level tile  Device: Rolling Walker  Other Apparatus: Wheelchair follow  Assistance: Contact guard assistance  Quality of Gait: cues to correct forward flexed posture, fair pt response  Gait Deviations: Decreased step length, Decreased step height, Increased FAITH  Distance: 144 feet    Transfers  Sit to Stand: Contact guard assistance  Stand to sit: Contact guard assistance  Bed to Chair: Contact guard assistance (with RW)  Stand Pivot Transfers: Contact guard assistance  Lateral Transfers: Contact guard assistance  Comment: amount of assist needed to stand depends on height of surface pt sitting on. verbal cues to scoot forward and shift wt over his feet prior to standing; otherwise pt keeps wt too fat posterior  Ambulation  Ambulation?: Yes  WB Status: WBAT  More Ambulation?: Yes  Ambulation 1  Surface: level tile  Device: Rolling Walker  Other Apparatus: Wheelchair follow  Assistance: Contact guard assistance  Quality of Gait: cues to correct forward flexed posture, fair pt response  Gait Deviations: Decreased step length, Decreased step height, Increased FAITH  Distance: 144 feet    Surface: level tile  Ambulation 1  Surface: level tile  Device: Rolling Walker  Other Apparatus: Wheelchair follow  Assistance: Contact guard assistance  Quality of Gait: cues to correct forward flexed posture, fair pt response  Gait Deviations: Decreased step length, Decreased step height, Increased FAITH  Distance: 144 feet    OT:  ADL  Equipment Provided: Long-handled sponge, Reacher  Feeding: Setup, Dentures  Grooming: Setup, Minimal assistance (min assist to shave completely around edges due to overgrowth.)  UE Bathing: Setup  LE Bathing: Setup, Stand by assistance (LHS to reach feet)  UE Dressing: Setup  LE Dressing: Setup, Minimal assistance  Toileting: Stand by assistance (wipe and bathe post BM, don pants over hips)  Additional Comments: pt ambulated into bathroom with RW and SBA completing set up. Pt seated at sink for ADL activities. R foot was soaked while pt shaved, thick cream was applied to B feet and ankles (to have eucerin cream tomorrow)  pt declined underpants , prefers pullups due to comfort- is continent. requested toileting and had BM in pm.  then stood to sink to wash hands.          Balance  Sitting Balance: Independent  Standing Balance: Stand by assistance   Standing Balance  Time: 6-7 min, 4-5 min x 2, 2-3 min x 2 in am, 5-6 min, 6-7 min in pm  Activity: adls with RW  Comment: with RW, pt demo good safety using 1 UE for balance and 1 for adl tasks and alternating. Functional Mobility  Functional - Mobility Device: Rolling Walker  Activity: Retrieve items, Transport items, To/from bathroom  Assist Level: Stand by assistance  Functional Mobility Comments: ambulates with RW and SBA to obtain set up for adls, in and out of bathroom and again pm as pt is requesting toileting. Bed mobility  Rolling to Left: Minimal assistance  Rolling to Right: Minimal assistance  Supine to Sit: Stand by assistance (with rail and HOB up 30 degrees)  Sit to Supine: Minimal assistance  Scooting: Contact guard assistance  Comment: assist lifting legs into bed  Transfers  Stand Step Transfers: Stand by assistance  Stand Pivot Transfers: Stand by assistance  Sit to stand: Stand by assistance  Stand to sit: Stand by assistance  Transfer Comments: VCs to slow descent when sitting   Toilet Transfers  Toilet - Technique: Ambulating  Equipment Used: Raised toilet seat with rails  Toilet Transfer: Moderate assistance  Toilet Transfers Comments: Verbal cues for hand placement and safety     Shower Transfers  Shower - Transfer To: Transfer tub bench  Shower - Technique: Ambulating, Stand pivot (Functional mobilty in; Stand pivot out due to fatigue)  Shower Transfers: Contact Guard  Shower Transfers Comments: Verbal cues for safety and sequencing over ramp and threshold with increased time. Wheelchair Bed Transfers  Wheelchair/Bed - Technique: Stand step  Equipment Used: Wheelchair, Bed  Level of Asssistance: Contact guard assistance    SPEECH:      Objective:  BP (!) 114/59   Pulse 55   Temp 97.9 °F (36.6 °C)   Resp 16   Ht 5' 7\" (1.702 m)   Wt 178 lb (80.7 kg)   SpO2 97%   BMI 27.88 kg/m²       GEN: Well developed, well nourished, in NAD  HEENT:  NCAT. PERRL. EOMI. Mucous membranes pink and moist.   PULM:  Clear to ausculation. No rales or rhonchi. Respirations WNL and unlabored.    CV:  Bradycardic rate regular rhythm. No murmurs or gallops. GI:  Abdomen soft. Nontender. Non-distended. BS + and equal.    NEUROLOGICAL: A&O x3. Sensation intact to light touch. MSK:  Functional ROM BUE and RLEs. Weakness impairs AROM L hip. Motor testing 5/5 key muscles BUE and RLEs. L hip flexion 4/5. L knee extension 4+/5. SKIN: Warm dry and intact. Good turgor. L lateral hip incision with dressing CD&I. Coccyx wound - dressing CD&I. EXTREMITIES:  No calf tenderness to palpation. No edema BLEs. PSYCH: Mood WNL. Appropriately interactive. Affect WNL. Diagnostics:     CBC:   Recent Labs     08/18/21  0636   WBC 6.5   RBC 3.04*   HGB 9.9*   HCT 30.3*   MCV 99.6   RDW 16.5*        BMP:   Recent Labs     08/18/21  1728      K 4.2      CO2 28   BUN 23   CREATININE 0.67*   GLUCOSE 106*     BNP: No results for input(s): BNP in the last 72 hours. PT/INR: No results for input(s): PROTIME, INR in the last 72 hours. APTT: No results for input(s): APTT in the last 72 hours. CARDIAC ENZYMES: No results for input(s): CKMB, CKMBINDEX, TROPONINT in the last 72 hours. Invalid input(s): CKTOTAL;3  FASTING LIPID PANEL:No results found for: CHOL, HDL, TRIG  LIVER PROFILE: No results for input(s): AST, ALT, ALB, BILIDIR, BILITOT, ALKPHOS in the last 72 hours.      Current Medications:   Current Facility-Administered Medications: Hydrocerin cream CREA, , Topical, BID  metroNIDAZOLE (FLAGYL) tablet 500 mg, 500 mg, Oral, 3 times per day  amoxicillin (AMOXIL) capsule 1,000 mg, 1,000 mg, Oral, 2 times per day  acetaminophen (TYLENOL) tablet 650 mg, 650 mg, Oral, Q4H PRN  polyethylene glycol (GLYCOLAX) packet 17 g, 17 g, Oral, Daily  senna (SENOKOT) tablet 17.2 mg, 2 tablet, Oral, Daily PRN  bisacodyl (DULCOLAX) suppository 10 mg, 10 mg, Rectal, Daily PRN  amiodarone (CORDARONE) tablet 200 mg, 200 mg, Oral, Daily  dilTIAZem (CARDIZEM) tablet 30 mg, 30 mg, Oral, 2 times per day  HYDROcodone-acetaminophen (NORCO) 5-325 MG per tablet 1 tablet, 1 tablet, Oral, Q6H PRN  melatonin tablet 3 mg, 3 mg, Oral, Nightly PRN  metoprolol tartrate (LOPRESSOR) tablet 50 mg, 50 mg, Oral, BID  pantoprazole (PROTONIX) tablet 40 mg, 40 mg, Oral, BID AC  furosemide (LASIX) tablet 20 mg, 20 mg, Oral, BID      Impression/Plan:   Impaired ADLs, gait, and mobility due to:      1. L hip fracture:  S/p hemiarthroplasty by Dr. Nirav Anguiano 8/5/21. PT/OT for gait, mobility, strengthening, endurance, ADLs, and self care. WBAT. Has Tylenol or Norco prn pain. 2. A Fib with RVR: rate controlled. On amiodarone, Cardizem, Metoprolol. No anticoagulation per GI. 3. Rhabdomyolysis: Resolved. Monitoring BMP weekly  4. Anemia secondary to GI bleed/ulcer: GI following. On PPI BID, soft diet, received 1 unit PRBCs 8/13/21. Monitoring q MWF.  5. H Pylori ulcer: on 14 days Flagyl and amoxicillin until 8/29/21 per GI. 6. Wounds/abrasions: nursing interventions, wound care consulted to follow  7. Insomnia: has melatonin prn  8. Hypokalemia: Improved. On Lasix. Monitoring and will replace prn but has been stable. 9. Urinary frequency: trial flomax and consider checking PVRs. Some contribution from Lasix anticipated. No current signs/symptoms of UTI. 10. Pulmonary infiltrates/effusion: using incentive spirometer  11. Bowel Management: Miralax daily, senokot prn, dulcolax prn. 12. DVT Prophylaxis:  SCD's while in bed. SUE's and chemoprophylaxis contraindicated  13. Internal medicine for medical management    Electronically signed by Radha Smith MD on 8/19/2021 at 10:05 AM      This note is created with the assistance of a speech recognition program.  While intending to generate a document that actually reflects the content of the visit, the document can still have some errors including those of syntax and sound a like substitutions which may escape proof reading. In such instances, actual meaning can be extrapolated by contextual diversion.

## 2021-08-19 NOTE — PLAN OF CARE
Problem: Skin Integrity:  Goal: Will show no infection signs and symptoms  Outcome: Ongoing  Goal: Absence of new skin breakdown  Outcome: Ongoing     Problem: Falls - Risk of:  Goal: Will remain free from falls  Outcome: Ongoing  Note: Pt remains free of falls this shift. Approprate safety measures in place   Goal: Absence of physical injury  Outcome: Ongoing     Problem: Pain:  Goal: Pain level will decrease  Outcome: Ongoing  Note: Pt Continued to assess pain level with appropriate pain scale.     Goal: Control of acute pain  Outcome: Ongoing  Goal: Control of chronic pain  Outcome: Ongoing     Problem: Musculor/Skeletal Functional Status  Goal: Highest potential functional level  Outcome: Ongoing     Problem: Nutrition  Goal: Optimal nutrition therapy  Outcome: Ongoing

## 2021-08-20 LAB
HCT VFR BLD CALC: 30.7 % (ref 41–53)
HEMOGLOBIN: 9.9 G/DL (ref 13.5–17.5)
MCH RBC QN AUTO: 32.5 PG (ref 26–34)
MCHC RBC AUTO-ENTMCNC: 32.1 G/DL (ref 31–37)
MCV RBC AUTO: 101.3 FL (ref 80–100)
NRBC AUTOMATED: ABNORMAL PER 100 WBC
PDW BLD-RTO: 17.6 % (ref 11.5–14.9)
PLATELET # BLD: 317 K/UL (ref 150–450)
PMV BLD AUTO: 7.8 FL (ref 6–12)
RBC # BLD: 3.03 M/UL (ref 4.5–5.9)
WBC # BLD: 5.3 K/UL (ref 3.5–11)

## 2021-08-20 PROCEDURE — 97530 THERAPEUTIC ACTIVITIES: CPT

## 2021-08-20 PROCEDURE — 6370000000 HC RX 637 (ALT 250 FOR IP): Performed by: PHYSICAL MEDICINE & REHABILITATION

## 2021-08-20 PROCEDURE — 6370000000 HC RX 637 (ALT 250 FOR IP)

## 2021-08-20 PROCEDURE — 99232 SBSQ HOSP IP/OBS MODERATE 35: CPT | Performed by: PHYSICAL MEDICINE & REHABILITATION

## 2021-08-20 PROCEDURE — 6370000000 HC RX 637 (ALT 250 FOR IP): Performed by: NURSE PRACTITIONER

## 2021-08-20 PROCEDURE — 97535 SELF CARE MNGMENT TRAINING: CPT

## 2021-08-20 PROCEDURE — 97116 GAIT TRAINING THERAPY: CPT

## 2021-08-20 PROCEDURE — 99231 SBSQ HOSP IP/OBS SF/LOW 25: CPT | Performed by: INTERNAL MEDICINE

## 2021-08-20 PROCEDURE — 85027 COMPLETE CBC AUTOMATED: CPT

## 2021-08-20 PROCEDURE — 97110 THERAPEUTIC EXERCISES: CPT

## 2021-08-20 PROCEDURE — 36415 COLL VENOUS BLD VENIPUNCTURE: CPT

## 2021-08-20 PROCEDURE — 1180000000 HC REHAB R&B

## 2021-08-20 RX ADMIN — AMIODARONE HYDROCHLORIDE 200 MG: 200 TABLET ORAL at 08:34

## 2021-08-20 RX ADMIN — METOPROLOL TARTRATE 50 MG: 50 TABLET, FILM COATED ORAL at 21:50

## 2021-08-20 RX ADMIN — DILTIAZEM HYDROCHLORIDE 30 MG: 30 TABLET, FILM COATED ORAL at 08:35

## 2021-08-20 RX ADMIN — METRONIDAZOLE 500 MG: 500 TABLET ORAL at 06:08

## 2021-08-20 RX ADMIN — METOPROLOL TARTRATE 50 MG: 50 TABLET, FILM COATED ORAL at 08:34

## 2021-08-20 RX ADMIN — PANTOPRAZOLE SODIUM 40 MG: 40 TABLET, DELAYED RELEASE ORAL at 06:07

## 2021-08-20 RX ADMIN — AMOXICILLIN 1000 MG: 500 CAPSULE ORAL at 08:34

## 2021-08-20 RX ADMIN — FUROSEMIDE 20 MG: 20 TABLET ORAL at 17:01

## 2021-08-20 RX ADMIN — METRONIDAZOLE 500 MG: 500 TABLET ORAL at 21:51

## 2021-08-20 RX ADMIN — Medication: at 21:52

## 2021-08-20 RX ADMIN — FUROSEMIDE 20 MG: 20 TABLET ORAL at 08:35

## 2021-08-20 RX ADMIN — AMOXICILLIN 1000 MG: 500 CAPSULE ORAL at 21:50

## 2021-08-20 RX ADMIN — DILTIAZEM HYDROCHLORIDE 30 MG: 30 TABLET, FILM COATED ORAL at 21:51

## 2021-08-20 RX ADMIN — METRONIDAZOLE 500 MG: 500 TABLET ORAL at 14:43

## 2021-08-20 RX ADMIN — TAMSULOSIN HYDROCHLORIDE 0.4 MG: 0.4 CAPSULE ORAL at 08:34

## 2021-08-20 RX ADMIN — Medication: at 08:35

## 2021-08-20 RX ADMIN — PANTOPRAZOLE SODIUM 40 MG: 40 TABLET, DELAYED RELEASE ORAL at 17:01

## 2021-08-20 ASSESSMENT — PAIN SCALES - GENERAL: PAINLEVEL_OUTOF10: 0

## 2021-08-20 NOTE — PROGRESS NOTES
Physical Therapy  Facility/Department: [facility]  Daily Treatment Note  NAME: Paige Hannah  : 1940  MRN: [id number]    Date of Service: 2021    Discharge Recommendations:  Home with assist PRN   PT Equipment Recommendations  Equipment Needed: Yes  Walker: Rolling    Assessment   Body structures, Functions, Activity limitations: Decreased functional mobility ; Decreased ROM; Decreased strength;Decreased endurance;Decreased balance  Assessment: Impaired mobility due to L Hip Fx/ Hemiarthroplasty and decreased tolerance to activity  Specific instructions for Next Treatment: seated trunk strengthening/balance activities  Decision Making: Low Complexity  REQUIRES PT FOLLOW UP: Yes  Activity Tolerance  Activity Tolerance: Patient limited by endurance; Patient limited by fatigue     Patient Diagnosis(es): There were no encounter diagnoses. has a past medical history of Atrial fibrillation ([facility] [address] has a past surgical history that includes hip surgery (Left, 2021) and Upper gastrointestinal endoscopy (N/A, 8/10/2021). Restrictions  Restrictions/Precautions  Restrictions/Precautions: Weight Bearing, Fall Risk  Required Braces or Orthoses?: No  Implants present? : Metal implants (L hemiarthroplasty)  Lower Extremity Weight Bearing Restrictions  Left Lower Extremity Weight Bearing: Weight Bearing As Tolerated  Subjective   General  Chart Reviewed: Yes  Additional Pertinent Hx: closed L hip fx  Response To Previous Treatment: Patient with no complaints from previous session. Family / Caregiver Present: No  Subjective  Subjective: Patient reports feeling good today. Pt is pleasant and cooperative for therapy.   Pain Screening  Patient Currently in Pain: Denies  Vital Signs  Patient Currently in Pain: Denies  Oxygen Therapy  SpO2: 98 %  O2 Device: None (Room air)      Orientation  Orientation  Overall Orientation Status: Within Normal Limits     Objective   Bed mobility  Supine to Sit: Stand by goals  Time Frame for Long term goals : time of discharge  Long term goal 1: mod-I bed mobility  Long term goal 2: mod-I transfers  Long term goal 3: mod-I gait x 150ft with RW  Long term goal 4: flight of stairs with rail and cane mod-I  Long term goal 5: standing balance to Good for safe ADLs  Patient Goals   Patient goals : get stronger    Plan    Plan  Times per week: 1.5hrs/day x 5-7days/wk  Specific instructions for Next Treatment: seated trunk strengthening/balance activities  Current Treatment Recommendations: Strengthening, ROM, Balance Training, Functional Mobility Training, Transfer Training, Stair training, Gait Training, Endurance Training, Safety Education & Training, Patient/Caregiver Education & Training, Home Exercise Program  Safety Devices  Type of devices: Call light within reach, Nurse notified, All fall risk precautions in place, Left in chair, Gait belt     Therapy Time     08/20/21 1031 08/20/21 1314   PT Individual Minutes   Time In [id number]   Time Out [id number]   Minutes 64 [address], PTA

## 2021-08-20 NOTE — PROGRESS NOTES
[address] [facility]    ACUTE REHABILITATION OCCUPATIONAL THERAPY  DAILY NOTE    Date: 21  Patient Name: Jonathan Bean      Room: 6846/9310-66    MRN: [id number]   : 1940  (79 y.o.)  Gender: male   Referring Practitioner: Naomi Smith MD  Diagnosis: Left hip fracture, hospitalization prolonged (initial admit 8-3)  due to afib, rhabdomyolosis, GI bleed, s/p EGD, on Protonix, twice a day,  Additional Pertinent Hx: Caitlin Lux is a 79 y. o. male with no known past medical history admitted to [facility] 8/3/2021.  He initially presented after a fall from standing at home. He reportedly was laying on the floor for 5 days before he was able to call for help. He was found to have atrial fibrillation with RVR in the ED and was started on diltiazem and heparin drips. He was also found to have left hip fracture and rhabdomylolysis. He has multiple wounds, including buttock, sacrum, bilateral elbow, and bilateral knee wounds. He underwent left hip hemiarthroplasty on 21 (Dr. Annette Evans). Pt now presents to ARU 21     Restrictions  Restrictions/Precautions: Weight Bearing, Fall Risk  Implants present? : Metal implants (L hemiarthroplasty)  Left Lower Extremity Weight Bearing: Weight Bearing As Tolerated  Required Braces or Orthoses?: No  Equipment Used: Wheelchair, Bed    Subjective  Comments: Pt engaged in toileting upon entry with nursing present. Pt pleasant and agreeable to initiation of OT session and completion of shower. Restrictions/Precautions: Weight Bearing; Fall Risk  Overall Orientation Status: Within Functional Limits          Objective  Cognition  Overall Cognitive Status: WFL  Perception  Overall Perceptual Status: WFL  Balance  Sitting Balance: Independent  Standing Balance: Stand by assistance  Bed mobility  Sit to Supine: Stand by assistance  Transfers  Sit to stand: Stand by assistance  Stand to sit: Stand by assistance  Standing Balance  Time: AM: 1-2 min x2, <1 min x 2 Impairments: Decreased ADL status; Decreased functional mobility ; Decreased strength;Decreased safe awareness;Decreased endurance;Decreased balance;Decreased high-level IADLs  Prognosis: Good  Discharge Recommendations: Continue to assess pending progress  Activity Tolerance: Patient Tolerated treatment well  Safety Devices in place: Yes  Type of devices: All fall risk precautions in place;Call light within reach;Gait belt;Patient at risk for falls; Left in chair;Left in bed;Nurse notified  Equipment Recommendations  Equipment Needed:  (TBD)        Plan  Plan  Times per week: 5-7  Times per day: Twice a day  Current Treatment Recommendations: Self-Care / ADL, Strengthening, Balance Training, Functional Mobility Training, Endurance Training, Safety Education & Training, Patient/Caregiver Education & Training, Equipment Evaluation, Education, & procurement, Home Management Training  Patient Goals   Patient goals : \"To be able to do all that you just mentioned. \" (in regards to independence with ADL and IADL tasks)  Short term goals  Time Frame for Short term goals: By 1 week   Short term goal 1: Pt will complete lower body dressing/bathing with min A and good safety with use of AE as needed  Short term goal 2: Pt will complete functional mobility during self care tasks with SBA and good safety with use of least restrictive device  Short term goal 3: Pt will tolerate standing 5+ minutes during functional activity of choice with CGA and good safety  Short term goal 4: Pt will complete functional transfer during self care tasks with CGA and good safety  Short term goal 5: Pt will participate in 30+ minutes of therapeutic exercises/functional activities to increase safety and independence with self care tasks  Long term goals  Time Frame for Long term goals : By discharge  Long term goal 1: Pt will complete BADLs with modified independence and good safety with use of AE as needed  Long term goal 2: Pt will complete functional transfers and mobility with modified independence and good safety with use of least restrictive device  Long term goal 3: Pt will tolerate standing 10+ minutes during funcitonal activity of choice with good safety  Long term goal 4: Pt will verbalize/demonstrate good understanding of home safety/fall prevention strategies to increase safety and independence with self care and mobility  Long term goal 5: Pt will verbalize/demonstrate good understanding of adaptive equipment/adaptive strategies/durable medical equipment to increase safety and independence with self care and mobility  Long term goals 6: Pt will complete simple meal prep/light house keeping with supervision and good safety        08/20/21 1035 08/20/21 1532   OT Individual Minutes   Time In 0935 1402   Time Out 1027 1440   Minutes 52 38     Electronically signed by SHANELLE Martinez on 8/20/21 at 3:51 PM EDT

## 2021-08-20 NOTE — PLAN OF CARE
Problem: Falls - Risk of:  Goal: Will remain free from falls  Description: Will remain free from falls  8/20/2021 0500 by Kaye Salazar RN  Outcome: Met This Shift   No falls or injuries sustained at this time. No attempts to get out of bed without nursing assistance. Call light within reach and pt. uses appropriately for assistance. Siderails up x 2. Nonskid footwear remains on. Bed in low and locked position. Hourly nursing rounds made. Pt. Alert and oriented, aware of limitations, and exhibits good safety judgement. Pt. uses assistive devices appropriately. Pt. understands individual fall risk factors. Pt. reminded to use call light with each nurse/patient interaction. Bed alarm remains engaged throughout the shift as a precaution. Problem: Pain:  Goal: Pain level will decrease  Description: Pain level will decrease  8/20/2021 0500 by Kaye Salazar RN  Outcome: Met This Shift   Pain assessment completed so far this shift. Pt. able to rest without the use of pain medication. Patient denies any pain so far this shift. Will continue to monitor. Problem: Skin Integrity:  Goal: Absence of new skin breakdown  Description: Absence of new skin breakdown  8/20/2021 0500 by Kaye Salazar RN  Outcome: Met This Shift   Skin assessment completed this shift. Nutrition and Hydration status assessed with adequate intake. Marcos Score as charted. Pressure Relief Overlay remains intact and inflated to patient's bed throughout the shift. Bilateral heels remain elevated on pillows throughout the shift. Chair cushion in use for when pt is up to chair. Patient able to reposition self for comfort and to prevent breakdown. Patient verbalizes understanding of pressure ulcer prevention measures. Skin integrity maintained. No new skin breakdown noted. Skin to high risk pressure areas including coccyx and heels are clear. Shira care provided as needed throughout the shift.  FanBread ointment applied to buttocks as a preventative measure. Scattered Abrasions are noted on BUE and BLE. All abrasions are covered with a mepilex. Left Hip incision covered with a dry dressing. No S/S of infection noted. Will continue to monitor.

## 2021-08-20 NOTE — PROGRESS NOTES
Physical Medicine & Rehabilitation  Progress Note      Subjective:      79year-old male with L hip fracture after fall. Patient is doing well today. He does note some mild-moderate aching L hip pain which responds to ice prn. No new issues with sleep, appetite, bowel, or bladder. Strength and mobility continue to improve. ROS:  Denies fevers, chills, sweats. No chest pain, palpitations, lightheadedness. Denies coughing, wheezing or shortness of breath. Denies abdominal pain, nausea, diarrhea or constipation. No new areas of joint pain. Denies new areas of numbness or weakness. Denies new anxiety or depression issues. No new skin problems. Rehabilitation:   Progressing in therapies.     PT:  Restrictions/Precautions: Weight Bearing, Fall Risk  Implants present? : Metal implants (L hemiarthroplasty)  Left Lower Extremity Weight Bearing: Weight Bearing As Tolerated   Transfers  Sit to Stand: Contact guard assistance, Minimal Assistance (Min A from low chair, CGA from w/c)  Stand to sit: Contact guard assistance  Bed to Chair: Contact guard assistance (with RW)  Stand Pivot Transfers: Contact guard assistance  Lateral Transfers: Contact guard assistance  Comment: amount of assist needed to stand depends on height of surface pt sitting on. verbal cues to scoot forward and shift wt over his feet prior to standing; otherwise pt keeps wt too fat posterior  WB Status: WBAT  Ambulation 1  Surface: level tile  Device: Rolling Walker  Other Apparatus: Wheelchair follow  Assistance: Contact guard assistance  Quality of Gait: cues to correct forward flexed posture, fair pt response  Gait Deviations: Decreased step length, Decreased step height, Increased FAITH  Distance: 198 feet    Transfers  Sit to Stand: Contact guard assistance, Minimal Assistance (Min A from low chair, CGA from w/c)  Stand to sit: Contact guard assistance  Bed to Chair: Contact guard assistance (with RW)  Stand Pivot Transfers: Contact guard Standing Balance  Time: AM: ~1 min x 2 PM: ~9 min   Activity: AM: functional mobility, LB dressing PM: therapeutic standing activity. Comment: steady, 1-2 UE support, crossing midline. Functional Mobility  Functional - Mobility Device: Rolling Walker  Activity: Retrieve items, Transport items, To/from bathroom  Assist Level: Contact guard assistance  Functional Mobility Comments: ambulates with RW and SBA to obtain set up for adls, in and out of bathroom and again pm as pt is requesting toileting. Bed mobility  Rolling to Left: Minimal assistance  Rolling to Right: Minimal assistance  Supine to Sit: Stand by assistance  Sit to Supine: Stand by assistance  Scooting: Stand by assistance  Comment: assist lifting legs into bed  Transfers  Stand Step Transfers: Stand by assistance  Stand Pivot Transfers: Stand by assistance  Sit to stand: Stand by assistance, Contact guard assistance  Stand to sit: Stand by assistance  Transfer Comments: minimal cueing for proper hand placement. Toilet Transfers  Toilet - Technique: Ambulating  Equipment Used: Raised toilet seat with rails  Toilet Transfer: Contact guard assistance  Toilet Transfers Comments: Verbal cues for hand placement and safety     Shower Transfers  Shower - Transfer To: Transfer tub bench  Shower - Technique: Ambulating, Stand pivot (Functional mobilty in; Stand pivot out due to fatigue)  Shower Transfers: Contact Guard  Shower Transfers Comments: Verbal cues for safety and sequencing over ramp and threshold with increased time. Wheelchair Bed Transfers  Wheelchair/Bed - Technique: Stand step  Equipment Used: Wheelchair, Bed  Level of Asssistance: Contact guard assistance    SPEECH:      Objective:  /61   Pulse 72   Temp 98.1 °F (36.7 °C) (Oral)   Resp 16   Ht 5' 7\" (1.702 m)   Wt 178 lb (80.7 kg)   SpO2 98%   BMI 27.88 kg/m²       GEN: Well developed, well nourished, in NAD  HEENT:  NCAT. PERRL. EOMI.   Mucous membranes pink and moist.   PULM:  Clear to ausculation. No rales or rhonchi. Respirations WNL and unlabored. CV:  Bradycardic rate regular rhythm. No murmurs or gallops. GI:  Abdomen soft. Nontender. Non-distended. BS + and equal.    NEUROLOGICAL: A&O x3. Sensation intact to light touch. MSK:  Functional ROM BUE and RLEs. Weakness impairs AROM L hip. Motor testing 5/5 key muscles BUE and RLEs. L hip flexion 4/5. L knee extension 4+/5. SKIN: Warm dry and intact. Good turgor. L lateral hip incision with dressing CD&I. Coccyx wound - dressing CD&I. EXTREMITIES:  No calf tenderness to palpation. No edema BLEs. PSYCH: Mood WNL. Appropriately interactive. Affect WNL. Diagnostics:     CBC:   Recent Labs     08/18/21  0636 08/20/21  0748   WBC 6.5 5.3   RBC 3.04* 3.03*   HGB 9.9* 9.9*   HCT 30.3* 30.7*   MCV 99.6 101.3*   RDW 16.5* 17.6*    317     BMP:   Recent Labs     08/18/21  1728      K 4.2      CO2 28   BUN 23   CREATININE 0.67*   GLUCOSE 106*     BNP: No results for input(s): BNP in the last 72 hours. PT/INR: No results for input(s): PROTIME, INR in the last 72 hours. APTT: No results for input(s): APTT in the last 72 hours. CARDIAC ENZYMES: No results for input(s): CKMB, CKMBINDEX, TROPONINT in the last 72 hours. Invalid input(s): CKTOTAL;3  FASTING LIPID PANEL:No results found for: CHOL, HDL, TRIG  LIVER PROFILE: No results for input(s): AST, ALT, ALB, BILIDIR, BILITOT, ALKPHOS in the last 72 hours.      Current Medications:   Current Facility-Administered Medications: tamsulosin (FLOMAX) capsule 0.4 mg, 0.4 mg, Oral, Daily  Hydrocerin cream CREA, , Topical, BID  metroNIDAZOLE (FLAGYL) tablet 500 mg, 500 mg, Oral, 3 times per day  amoxicillin (AMOXIL) capsule 1,000 mg, 1,000 mg, Oral, 2 times per day  acetaminophen (TYLENOL) tablet 650 mg, 650 mg, Oral, Q4H PRN  polyethylene glycol (GLYCOLAX) packet 17 g, 17 g, Oral, Daily  senna (SENOKOT) tablet 17.2 mg, 2 tablet, Oral, Daily PRN  bisacodyl (DULCOLAX) suppository 10 mg, 10 mg, Rectal, Daily PRN  amiodarone (CORDARONE) tablet 200 mg, 200 mg, Oral, Daily  dilTIAZem (CARDIZEM) tablet 30 mg, 30 mg, Oral, 2 times per day  HYDROcodone-acetaminophen (NORCO) 5-325 MG per tablet 1 tablet, 1 tablet, Oral, Q6H PRN  melatonin tablet 3 mg, 3 mg, Oral, Nightly PRN  metoprolol tartrate (LOPRESSOR) tablet 50 mg, 50 mg, Oral, BID  pantoprazole (PROTONIX) tablet 40 mg, 40 mg, Oral, BID AC  furosemide (LASIX) tablet 20 mg, 20 mg, Oral, BID      Impression/Plan:   Impaired ADLs, gait, and mobility due to:      1. L hip fracture:  S/p hemiarthroplasty by Dr. Magy Wilson 8/5/21. PT/OT for gait, mobility, strengthening, endurance, ADLs, and self care. WBAT. Has Tylenol or Norco prn pain. 2. A Fib with RVR: rate controlled. On amiodarone, Cardizem, Metoprolol. No anticoagulation per GI. 3. Rhabdomyolysis: Resolved. Monitoring BMP weekly  4. Anemia secondary to GI bleed/ulcer: GI following. On PPI BID, soft diet, received 1 unit PRBCs 8/13/21. Monitoring q MWF.  5. H Pylori ulcer: on 14 days Flagyl and amoxicillin until 8/29/21 per GI. 6. Wounds/abrasions: nursing interventions, wound care consulted to follow  7. Insomnia: has melatonin prn  8. Hypokalemia: Improved. On Lasix. Monitoring and will replace prn but has been stable. 9. Urinary frequency: on trial of flomax and consider checking PVRs. Some contribution from Lasix anticipated. No current signs/symptoms of UTI. 10. Pulmonary infiltrates/effusion: using incentive spirometer  11. Bowel Management: Miralax daily, senokot prn, dulcolax prn. 12. DVT Prophylaxis:  SCD's while in bed. SUE's and chemoprophylaxis contraindicated  13.  Internal medicine for medical management    Electronically signed by Rosas Hernández MD on 8/20/2021 at 3:02 PM      This note is created with the assistance of a speech recognition program.  While intending to generate a document that actually reflects the content of the visit, the document can still have some errors including those of syntax and sound a like substitutions which may escape proof reading. In such instances, actual meaning can be extrapolated by contextual diversion.

## 2021-08-20 NOTE — PROGRESS NOTES
[facility] Internal Medicine    CONSULTATION / HISTORY AND PHYSICAL EXAMINATION            Date:   8/20/2021  Patient name:  Nelida Chambers  Date of admission:  8/13/2021  7:39 PM  MRN:   [id number]  Account:  [de-identified]  YOB: 1940  PCP:    No primary care provider on file. Room:   [address] [place] [address]  Code Status:    Full Code    Physician Requesting Consult: Latonia Gómez MD    Reason for Consult:  medical management    Chief Complaint:     No chief complaint on file. History Obtained From:     Patient medical record nursing staff    History of Present Illness:   Patient admitted to ARU for rehabilitation  He was admitted to [facility] after a mechanical fall, he was found to have left hip fracture, underwent surgery, he was found to have new onset atrial fibrillation,  During hospital stay, patient had drop in hemoglobin, stool was positive for occult blood. Patient was evaluated by GI physician, underwent EGD, patient was found to have nonbleeding duodenal ulcer, not considered a candidate for anticoagulation with history of GI bleed  Patient was started on amoxicillin and Flagyl, with positive H. pylori infection  Patient doing much better  Working with physical therapy    8/18  Patient resting comfortably  He worked with physical therapy in the morning and afternoon  Pain is improved  Past Medical History:     Past Medical History:   Diagnosis Date    Atrial fibrillation ([address]     with RVR        Past Surgical History:     Past Surgical History:   Procedure Laterality Date    HIP SURGERY Left 8/5/2021    HIP HEMIARTHROPLASTY performed by Daniel Mohan MD at [address] N/[address] 8/10/2021    EGD BIOPSY performed by Arik Michelle MD at [facility]        Medications Prior to Admission:     Prior to Admission medications    Medication Sig Start Date End Date Taking?  Authorizing Provider   ibuprofen (ADVIL;MOTRIN) 200 MG tablet Take 400 mg by mouth every 6 hours as needed for Pain   Yes Historical Provider, MD   Multiple Vitamins-Minerals (THERAPEUTIC MULTIVITAMIN-MINERALS) tablet Take 1 tablet by mouth daily   Yes Historical Provider, MD        Allergies:     Patient has no known allergies. Social History:     Tobacco:    reports that he has never smoked. He has never used smokeless tobacco.  Alcohol:      reports no history of alcohol use. Drug Use:  has no history on file for drug use. Family History:     No family history on file. Review of Systems:     Positive and Negative as described in HPI. CONSTITUTIONAL:  negative for fevers, chills, sweats, fatigue, weight loss  HEENT:  negative for vision, hearing changes, runny nose, throat pain  RESPIRATORY:  negative for shortness of breath, cough, congestion, wheezing. CARDIOVASCULAR:  negative for chest pain, palpitations. GASTROINTESTINAL:  negative for nausea, vomiting, diarrhea, constipation, change in bowel habits, abdominal pain   GENITOURINARY:  negative for difficulty of urination, burning with urination, frequency   INTEGUMENT:  negative for rash, skin lesions, easy bruising   HEMATOLOGIC/LYMPHATIC:  negative for swelling/edema   ALLERGIC/IMMUNOLOGIC:  negative for urticaria , itching  ENDOCRINE:  negative increase in drinking, increase in urination, hot or cold intolerance  MUSCULOSKELETAL: Left hip pain NEUROLOGICAL:  negative for headaches, dizziness, lightheadedness, numbness, pain, tingling extremities  BEHAVIOR/PSYCH:      Physical Exam:     /61   Pulse 72   Temp 98.1 °F (36.7 °C) (Oral)   Resp 16   Ht 5' 7\" (1.702 m)   Wt 178 lb (80.7 kg)   SpO2 98%   BMI 27.88 kg/m²   Temp (24hrs), Av.2 °F (36.8 °C), Min:98.1 °F (36.7 °C), Max:98.2 °F (36.8 °C)    No results for input(s): POCGLU in the last 72 hours.     Intake/Output Summary (Last 24 hours) at 2021 1520  Last data filed at 2021 0846  Gross per 24 hour   Intake --   Output 2650 ml   Net -2650 ml       General Appearance:  alert, well appearing, and in no acute distress  Mental status: oriented to person, place, and time with normal affect  Head:  normocephalic, atraumatic. Eye: no icterus, redness, pupils equal and reactive, extraocular eye movements intact, conjunctiva clear  Ear: normal external ear, no discharge, hearing intact  Nose:  no drainage noted  Mouth: mucous membranes moist  Neck: supple, no carotid bruits, thyroid not palpable  Lungs: Bilateral equal air entry, clear to ausculation, no wheezing, rales or rhonchi, normal effort  Cardiovascular: normal rate, regular rhythm, no murmur, gallop, rub. Abdomen: Soft, nontender, nondistended, normal bowel sounds, no hepatomegaly or splenomegaly  Neurologic: There are no new focal motor or sensory deficits, normal muscle tone and bulk, no abnormal sensation, normal speech, cranial nerves II through XII grossly intact  Skin: No gross lesions, rashes, bruising or bleeding on exposed skin area  Extremities: Postsurgical changes in left hip  Psych:      Investigations:      Laboratory Testing:  Recent Results (from the past 24 hour(s))   CBC    Collection Time: 08/20/21  7:48 AM   Result Value Ref Range    WBC 5.3 3.5 - 11.0 k/uL    RBC 3.03 (L) 4.5 - 5.9 m/uL    Hemoglobin 9.9 (L) 13.5 - 17.5 g/dL    Hematocrit 30.7 (L) 41 - 53 %    .3 (H) 80 - 100 fL    MCH 32.5 26 - 34 pg    MCHC 32.1 31 - 37 g/dL    RDW 17.6 (H) 11.5 - 14.9 %    Platelets 642 073 - 019 k/uL    MPV 7.8 6.0 - 12.0 fL    NRBC Automated NOT REPORTED per 100 WBC           Consultations:   IP CONSULT TO DIETITIAN  IP CONSULT TO SOCIAL WORK  IP CONSULT TO GI  IP CONSULT TO INTERNAL MEDICINE  IP CONSULT TO GI  Assessment :      Primary Problem  <principal problem not specified>    Active Hospital Problems    Diagnosis Date Noted    H pylori ulcer [K27.9, B96.81]     PUD (peptic ulcer disease) [K27.9]     Closed left hip fracture, initial encounter ([facility] [S72.002A] 08/13/2021    Atrial fibrillation with RVR ([facility] [I48.91] 08/03/2021       Plan:     1. Left hip fracture s/p surgery, pain is controlled  2. Atrial fibrillation, rate controlled, not on anticoagulation with history of GI bleed, on amiodarone, Cardizem  3. GI bleed, s/p EGD, on Protonix, twice a day,  4. H. pylori infection, on amoxicillin and Flagyl  5. Not on chemoprophylaxis with history of GI bleed  6. Complaints of BPH, starting patient on 1200 Nick Wise MD  8/20/2021  3:29 PM    Copy sent to Dr. Kirby primary care provider on file. Please note that this chart was generated using voice recognition Dragon dictation software. Although every effort was made to ensure the accuracy of this automated transcription, some errors in transcription may have occurred.

## 2021-08-21 PROCEDURE — 97535 SELF CARE MNGMENT TRAINING: CPT

## 2021-08-21 PROCEDURE — 97530 THERAPEUTIC ACTIVITIES: CPT

## 2021-08-21 PROCEDURE — 6370000000 HC RX 637 (ALT 250 FOR IP): Performed by: NURSE PRACTITIONER

## 2021-08-21 PROCEDURE — 6370000000 HC RX 637 (ALT 250 FOR IP)

## 2021-08-21 PROCEDURE — 97110 THERAPEUTIC EXERCISES: CPT

## 2021-08-21 PROCEDURE — 97116 GAIT TRAINING THERAPY: CPT

## 2021-08-21 PROCEDURE — 99232 SBSQ HOSP IP/OBS MODERATE 35: CPT | Performed by: STUDENT IN AN ORGANIZED HEALTH CARE EDUCATION/TRAINING PROGRAM

## 2021-08-21 PROCEDURE — 99232 SBSQ HOSP IP/OBS MODERATE 35: CPT | Performed by: INTERNAL MEDICINE

## 2021-08-21 PROCEDURE — 1180000000 HC REHAB R&B

## 2021-08-21 PROCEDURE — 6370000000 HC RX 637 (ALT 250 FOR IP): Performed by: PHYSICAL MEDICINE & REHABILITATION

## 2021-08-21 RX ADMIN — METOPROLOL TARTRATE 50 MG: 50 TABLET, FILM COATED ORAL at 08:23

## 2021-08-21 RX ADMIN — PANTOPRAZOLE SODIUM 40 MG: 40 TABLET, DELAYED RELEASE ORAL at 17:06

## 2021-08-21 RX ADMIN — POLYETHYLENE GLYCOL 3350 17 G: 17 POWDER, FOR SOLUTION ORAL at 08:22

## 2021-08-21 RX ADMIN — FUROSEMIDE 20 MG: 20 TABLET ORAL at 08:22

## 2021-08-21 RX ADMIN — METRONIDAZOLE 500 MG: 500 TABLET ORAL at 06:09

## 2021-08-21 RX ADMIN — Medication: at 21:09

## 2021-08-21 RX ADMIN — AMIODARONE HYDROCHLORIDE 200 MG: 200 TABLET ORAL at 08:23

## 2021-08-21 RX ADMIN — TAMSULOSIN HYDROCHLORIDE 0.4 MG: 0.4 CAPSULE ORAL at 08:23

## 2021-08-21 RX ADMIN — Medication: at 08:25

## 2021-08-21 RX ADMIN — DILTIAZEM HYDROCHLORIDE 30 MG: 30 TABLET, FILM COATED ORAL at 21:08

## 2021-08-21 RX ADMIN — METRONIDAZOLE 500 MG: 500 TABLET ORAL at 21:45

## 2021-08-21 RX ADMIN — PANTOPRAZOLE SODIUM 40 MG: 40 TABLET, DELAYED RELEASE ORAL at 06:09

## 2021-08-21 RX ADMIN — METOPROLOL TARTRATE 50 MG: 50 TABLET, FILM COATED ORAL at 21:08

## 2021-08-21 RX ADMIN — FUROSEMIDE 20 MG: 20 TABLET ORAL at 17:06

## 2021-08-21 RX ADMIN — DILTIAZEM HYDROCHLORIDE 30 MG: 30 TABLET, FILM COATED ORAL at 08:23

## 2021-08-21 RX ADMIN — AMOXICILLIN 1000 MG: 500 CAPSULE ORAL at 08:23

## 2021-08-21 RX ADMIN — METRONIDAZOLE 500 MG: 500 TABLET ORAL at 14:27

## 2021-08-21 RX ADMIN — AMOXICILLIN 1000 MG: 500 CAPSULE ORAL at 21:08

## 2021-08-21 ASSESSMENT — PAIN DESCRIPTION - PROGRESSION: CLINICAL_PROGRESSION: GRADUALLY WORSENING

## 2021-08-21 ASSESSMENT — PAIN SCALES - WONG BAKER: WONGBAKER_NUMERICALRESPONSE: 0

## 2021-08-21 NOTE — PROGRESS NOTES
Physical Therapy  Facility/Department: [facility] ACUTE REHAB  Daily Treatment Note  NAME: Francois Ho  : 1940  MRN: [id number]    Date of Service: 2021    Discharge Recommendations:  Home with assist PRN   PT Equipment Recommendations  Equipment Needed: Yes  Walker: Rolling    Assessment   Body structures, Functions, Activity limitations: Decreased functional mobility ; Decreased ROM; Decreased strength;Decreased endurance;Decreased balance  Assessment: Impaired mobility due to L Hip Fx/ Hemiarthroplasty and decreased tolerance to activity (pt demonstrated decreased seated balance without back or UE support- needs trunck strengthening)  Specific instructions for Next Treatment: seated trunk strengthening/balance activities  Decision Making: Low Complexity  REQUIRES PT FOLLOW UP: Yes  Activity Tolerance  Activity Tolerance: Patient limited by endurance; Patient limited by fatigue  Activity Tolerance: Pt was fatigued today and required more rest breaks     Patient Diagnosis(es): There were no encounter diagnoses. has a past medical history of Atrial fibrillation ([facility] [address] has a past surgical history that includes hip surgery (Left, 2021) and Upper gastrointestinal endoscopy (N/A, 8/10/2021). Restrictions  Restrictions/Precautions  Restrictions/Precautions: Weight Bearing, Fall Risk  Required Braces or Orthoses?: No  Implants present? : Metal implants (L hemiarthroplasty)  Lower Extremity Weight Bearing Restrictions  Left Lower Extremity Weight Bearing: Weight Bearing As Tolerated  Subjective   General  Chart Reviewed: Yes  Additional Pertinent Hx: closed L hip fx  Response To Previous Treatment: Patient with no complaints from previous session. Family / Caregiver Present: No  Subjective  Subjective: Pt reported feeling more fatigued today.   Pain Screening  Patient Currently in Pain: Denies  Vital Signs  Patient Currently in Pain: Denies       Orientation  Orientation  Overall Orientation Status: Within Normal Limits  Cognition      Objective   Bed mobility  Scooting: Stand by assistance  Transfers  Sit to Stand: Contact guard assistance  Stand to sit: Contact guard assistance  Stand Pivot Transfers: Contact guard assistance  Lateral Transfers: Contact guard assistance  Ambulation  Ambulation?: Yes  WB Status: WBAT  More Ambulation?: Yes  Ambulation 1  Surface: level tile  Device: Rolling Walker  Assistance: Contact guard assistance  Quality of Gait: cues to correct forward flexed posture, fair pt response  Gait Deviations: Decreased step length;Decreased step height; Increased FAITH  Distance: 220 feet  Ambulation 2  Surface - 2: level tile  Device 2: Single point cane  Assistance 2: Contact guard assistance  Quality of Gait 2: cues to correct forward flexed posture, fair pt response  Gait Deviations: Increased FAITH; Decreased step length;Decreased step height  Distance: 52 feet  Stairs/Curb  Stairs?: Yes  Stairs  # Steps : 10  Stairs Height: 4\" (& 6\")  Rails: Right ascending  Device: Single pt cane  Assistance: Contact guard assistance     Balance  Posture: Fair  Sitting - Static: Fair;+  Sitting - Dynamic: Fair;+  Standing - Static: Fair;+  Standing - Dynamic: Fair;+  Other exercises  Other exercises?: Yes  Other exercises 2: Seated BLE exercises: 15x (Light greenTheraband for all except LLE hip flexion)  Other exercises 3: Standing BLE exercises: 3-way hip 10x  Other exercises 4: Nustep L4 x 10 min (seat at 10; handle at 11)  Other exercises 10: Training and Practice sit->stand from a soft surface (pt reported he sinks into one of his couches and has difficulty getting out)        Goals  Short term goals  Time Frame for Short term goals: 7-10 days  Short term goal 1: bed mobility with SBA  Short term goal 2: transfers with supervision  Short term goal 3: gait with RW/SBA x 75-100ft  Short term goal 4: standing balance to Fair+ for ADLs  Long term goals  Time Frame for Long term goals : time of discharge  Long term goal 1: mod-I bed mobility  Long term goal 2: mod-I transfers  Long term goal 3: mod-I gait x 150ft with RW  Long term goal 4: flight of stairs with rail and cane mod-I  Long term goal 5: standing balance to Good for safe ADLs  Patient Goals   Patient goals : get stronger    Plan    Plan  Times per week: 1.5hrs/day x 5-7days/wk  Specific instructions for Next Treatment: seated trunk strengthening/balance activities  Current Treatment Recommendations: Strengthening, ROM, Balance Training, Functional Mobility Training, Transfer Training, Stair training, Gait Training, Endurance Training, Safety Education & Training, Patient/Caregiver Education & Training, Home Exercise Program  Safety Devices  Type of devices:  All fall risk precautions in place, Gait belt, Patient at risk for falls     Therapy Time     08/21/21 1031 08/21/21 1304   PT Individual Minutes   Time In 1031 1304   Time Out 1125 1342   Minutes [address] [facility]

## 2021-08-21 NOTE — PROGRESS NOTES
[address] [facility]    ACUTE REHABILITATION OCCUPATIONAL THERAPY  DAILY NOTE    Date: 21  Patient Name: Tammie Barrios      Room: 3675/5784-40    MRN: [id number]   : 1940  (79 y.o.)  Gender: male   Referring Practitioner: Davon Golden MD  Diagnosis: Left hip fracture, hospitalization prolonged (initial admit 8-3)  due to afib, rhabdomyolosis, GI bleed, s/p EGD, on Protonix, twice a day,  Additional Pertinent Hx: Junior Lux is a 79 y. o. male with no known past medical history admitted to [facility] 8/3/2021.  He initially presented after a fall from standing at home. He reportedly was laying on the floor for 5 days before he was able to call for help. He was found to have atrial fibrillation with RVR in the ED and was started on diltiazem and heparin drips. He was also found to have left hip fracture and rhabdomylolysis. He has multiple wounds, including buttock, sacrum, bilateral elbow, and bilateral knee wounds. He underwent left hip hemiarthroplasty on 21 (Dr. Susu Urena). Pt now presents to ARU 21     Restrictions  Restrictions/Precautions: Weight Bearing, Fall Risk  Implants present? : Metal implants (L hemiarthroplasty)  Left Lower Extremity Weight Bearing: Weight Bearing As Tolerated  Required Braces or Orthoses?: No  Equipment Used: Wheelchair, Bed    Subjective  Subjective: Pt reports he uses his fridge for storage and it does not work at this time. He has found numerous compensation methods. Will notify  regarding this info. Comments: Pt has high pain tolerance. Is very motivated and receptive to all insight. Completed laundry task in PM with  G tolerance; Patient Currently in Pain: Denies  Restrictions/Precautions: Weight Bearing; Fall Risk  Overall Orientation Status: Within Functional Limits  Patient Observation  Observations: socks pitting in ankles due to swelling  Pain Assessment  Hair-Melgar Pain Rating: No hurt  Clinical Progression: Gradually worsening  Response to Pain Intervention: Patient Satisfied    Objective  Cognition  Overall Cognitive Status: WFL  Perception  Overall Perceptual Status: WFL  Balance  Sitting Balance: Independent  Standing Balance: Stand by assistance  Bed mobility  Bridging: Minimal assistance  Rolling to Left: Minimal assistance  Rolling to Right: Minimal assistance  Supine to Sit: Stand by assistance  Sit to Supine: Stand by assistance  Scooting: Stand by assistance  Comment: increased time  Transfers  Stand Step Transfers: Stand by assistance  Stand Pivot Transfers: Stand by assistance  Sit to stand: Stand by assistance  Stand to sit: Stand by assistance  Transfer Comments: minimal cueing for proper hand placement today  Standing Balance  Time: 1-12minutes   Activity: ADLs/Transfers/IADL laundry task in PM  Comment: 1-2 UE support, VC for alteranting PRN, VC for correction of LLE compensatory positioning/flexion at hip and knee  Functional Mobility  Functional - Mobility Device: Rolling Walker  Activity: Retrieve items;Transport items; To/from bathroom  Assist Level: Stand by assistance  Functional Mobility Comments: increased time, VC for posture with fair + carryover  Toilet Transfers  Toilet Transfers Comments: BSC recommended for home; urinals for overnight voiding     Type of ROM/Therapeutic Exercise  Type of ROM/Therapeutic Exercise: Pulley  Comment: 25#  BUE simultaneous; shoulders 20x2, elbows 5x1, 4x1, min restbreaks and VC for proper breathing (x15 reps)  Exercises  Scapular Protraction: x  Scapular Retraction: x  Shoulder Depression: x  Shoulder Elevation: x  Shoulder Flexion: x  Shoulder Extension: x  Elbow Flexion: x  Elbow Extension: x  Other: propels in wc from room to PT gym in AM for UE endurance, ~4minutes with slow pace no RB             Light Housekeeping  Light Housekeeping Level:  (appliance stabilization)  Light Housekeeping Level of Assistance: Stand by assistance  Light Housekeeping: top load washer/front door dryer: Retrieves laundry from washer/did not require reacher/replaces with new load, transferred to initial linen to dryer with VC for door management and use of side step oppose to overreaching, Good return noted. Vision  Vision Comment: provided maginifying glass to improve ease with reviewing educational handouts; pt reports F+ ability to read using  this handheld magnifying glass  ADL  Equipment Provided: Long-handled sponge;Reacher;Sock aid  Feeding: Setup; Dentures (per pt report. )  Grooming: Modified independent   UE Bathing: Setup (seated)  LE Bathing: Stand by assistance (pericare/upper thighs today)  UE Dressing: Setup (seated)  LE Dressing: Minimal assistance (threads using reacher, assist to doff at ankles)  Toileting: Minimal assistance;Setup (assist to empty HHUrinal)  Additional Comments: Increased time. Difficulty doffing pants with reacher due to tight fit at ankles; reports wear mucher looser pants at home and friend brought these in. UB completed seated. Grooming seated. Recommending [facility] for home toileting; toilet is on 2nd floor. Positioning  Adaptations: socks adjusted from XL to XXL to reduce edema pitting for improved skin integrity with G result; use of adaptive LB dressing equipment for compensation of difficulty managing lower body tasks; visual compensation  Instrumental ADL's  Instrumental ADLs: Yes  Light Housekeeping  Light Housekeeping Level:  (appliance stabilization)  Light Housekeeping Level of Assistance: Stand by assistance  Light Housekeeping: top load washer/front door dryer: Retrieves laundry from washer/did not require reacher/replaces with new load, transferred to initial linen to dryer with VC for door management and use of side step oppose to overreaching, Good return noted. Voices functional use of reacher to retrieve floor levels/out of reach items    Assessment  Performance deficits / Impairments: Decreased ADL status; Decreased functional mobility ; Decreased strength;Decreased safe awareness;Decreased endurance;Decreased balance;Decreased high-level IADLs  Assessment: has no bed/bath on main and only neighbor friend for support, was using urinal and sleep on couch, neighbor mowing grass (since injury 2 months ago) OT ed pt need for inpt therapy and to obtain [facility] and twin bed for main floor and have assist with laundry, cleaning, empty BSC in addition to already order food online and have neighbor do yardwork.; improved overall task tolerance and standing tasks  Prognosis: Good  Discharge Recommendations: Continue to assess pending progress  Activity Tolerance: Patient Tolerated treatment well  Activity Tolerance: pt notes discomfort- not pain L hip.  pt with dark red skin R ankle + foot, per pt has decreased in past 2 months post injury at home as he was unable to apply hydrocortisol cream, OT alerted RN to ask MD re treatment. RN notified pt long term use of hydocortisone is not recommended, MD is ordering eucerin cream to be applied x 2 / day. RN changed bandages in am L hip, B elbows and B knees and checked bandage on bottom. Safety Devices in place: Yes  Type of devices: All fall risk precautions in place;Call light within reach;Gait belt;Patient at risk for falls; Left in chair;Left in bed;Nurse notified  Equipment Recommendations  Equipment Needed:  (TBD)       Patient Education:  Patient Goals   Patient goals : \"To be able to do all that you just mentioned. \" (in regards to independence with ADL and IADL tasks)  Learner:patient  Method: demonstration and explanation       Outcome: demonstrated understanding        Plan  Plan  Times per week: 5-7  Times per day: Twice a day  Current Treatment Recommendations: Self-Care / ADL, Strengthening, Balance Training, Functional Mobility Training, Endurance Training, Safety Education & Training, Patient/Caregiver Education & Training, Equipment Evaluation, Education, & procurement, Home Management Training  Patient Goals   Patient goals : \"To be able to do all that you just mentioned. \" (in regards to independence with ADL and IADL tasks)  Short term goals  Time Frame for Short term goals: By 1 week   Short term goal 1: Pt will complete lower body dressing/bathing with min A and good safety with use of AE as needed  Short term goal 2: Pt will complete functional mobility during self care tasks with SBA and good safety with use of least restrictive device  Short term goal 3: Pt will tolerate standing 5+ minutes during functional activity of choice with CGA and good safety  Short term goal 4: Pt will complete functional transfer during self care tasks with CGA and good safety  Short term goal 5: Pt will participate in 30+ minutes of therapeutic exercises/functional activities to increase safety and independence with self care tasks  Long term goals  Time Frame for Long term goals : By discharge  Long term goal 1: Pt will complete BADLs with modified independence and good safety with use of AE as needed  Long term goal 2: Pt will complete functional transfers and mobility with modified independence and good safety with use of least restrictive device  Long term goal 3: Pt will tolerate standing 10+ minutes during funcitonal activity of choice with good safety  Long term goal 4: Pt will verbalize/demonstrate good understanding of home safety/fall prevention strategies to increase safety and independence with self care and mobility  Long term goal 5: Pt will verbalize/demonstrate good understanding of adaptive equipment/adaptive strategies/durable medical equipment to increase safety and independence with self care and mobility  Long term goals 6: Pt will complete simple meal prep/light house keeping with supervision and good safety        08/21/21 1614 08/21/21 1615   OT Individual Minutes   Time In 0263 1345   Time Out 1037 1427   Minutes 60 42   Electronically signed by SHIRA Patterson on 8/21/21 at 4:33 PM EDT

## 2021-08-21 NOTE — PROGRESS NOTES
Physical Medicine & Rehabilitation  Progress Note      Subjective:      Nelida Chambers is a 79 y.o. male with left hip fracture. He reports doing fine today. He denies any pain in the left hip. He states that sleep has been okay - he notes that he has some trouble sleeping at home as well. He reports that his appetite is good. He denies any acute concerns. ROS:  Denies fevers, chills, sweats. No chest pain, palpitations, lightheadedness. Denies coughing, wheezing or shortness of breath. Denies abdominal pain, nausea, diarrhea or constipation. No new areas of joint pain. Denies new areas of numbness or weakness. Denies new anxiety or depression issues. No new skin problems. Rehabilitation:   Progressing in therapies. PT:  Restrictions/Precautions: Weight Bearing, Fall Risk  Implants present? : Metal implants (L hemiarthroplasty)  Left Lower Extremity Weight Bearing: Weight Bearing As Tolerated   Transfers  Sit to Stand: Contact guard assistance  Stand to sit: Contact guard assistance  Bed to Chair: Contact guard assistance (with RW)  Stand Pivot Transfers: Contact guard assistance  Lateral Transfers: Contact guard assistance  Comment: amount of assist needed to stand depends on height of surface pt sitting on. verbal cues to scoot forward and shift wt over his feet prior to standing; otherwise pt keeps wt too fat posterior  WB Status: WBAT  Ambulation 1  Surface: level tile  Device: Rolling Walker  Other Apparatus: Wheelchair follow  Assistance: Contact guard assistance  Quality of Gait: cues to correct forward flexed posture, fair pt response  Gait Deviations: Decreased step length, Decreased step height, Increased FAITH  Distance: 220 feet  Comments: Seated rest break in between.  O2 99%, HR 58 post.    Transfers  Sit to Stand: Contact guard assistance  Stand to sit: Contact guard assistance  Bed to Chair: Contact guard assistance (with RW)  Stand Pivot Transfers: Contact guard assistance  Lateral Transfers: Contact guard assistance  Comment: amount of assist needed to stand depends on height of surface pt sitting on. verbal cues to scoot forward and shift wt over his feet prior to standing; otherwise pt keeps wt too fat posterior  Ambulation  Ambulation?: Yes  WB Status: WBAT  More Ambulation?: Yes  Ambulation 1  Surface: level tile  Device: Rolling Walker  Other Apparatus: Wheelchair follow  Assistance: Contact guard assistance  Quality of Gait: cues to correct forward flexed posture, fair pt response  Gait Deviations: Decreased step length, Decreased step height, Increased FAITH  Distance: 220 feet  Comments: Seated rest break in between. O2 99%, HR 58 post.    Surface: level tile  Ambulation 1  Surface: level tile  Device: Rolling Walker  Other Apparatus: Wheelchair follow  Assistance: Contact guard assistance  Quality of Gait: cues to correct forward flexed posture, fair pt response  Gait Deviations: Decreased step length, Decreased step height, Increased FAITH  Distance: 220 feet  Comments: Seated rest break in between. O2 99%, HR 58 post.    OT:  ADL  Equipment Provided: Long-handled sponge, Reacher, Sock aid  Feeding: Setup, Dentures (per pt report. )  Grooming: Modified independent   UE Bathing: Setup (seated)  LE Bathing: Stand by assistance (pericare/upper thighs today)  UE Dressing: Setup (seated)  LE Dressing: Minimal assistance (threads using reacher, assist to doff at ankles)  Toileting: Minimal assistance, Setup (assist to empty HHUrinal)  Additional Comments: Increased time. Difficulty doffing pants with reacher due to tight fit at ankles; reports wear mucher looser pants at home and friend brought these in. UB completed seated. Grooming seated. Recommending [facility] for home toileting; toilet is on 2nd floor.     Instrumental ADL's  Instrumental ADLs: Yes     Balance  Sitting Balance: Independent  Standing Balance: Stand by assistance   Standing Balance  Time: 1-12minutes   Activity: tablet 650 mg, 650 mg, Oral, Q4H PRN  polyethylene glycol (GLYCOLAX) packet 17 g, 17 g, Oral, Daily  senna (SENOKOT) tablet 17.2 mg, 2 tablet, Oral, Daily PRN  bisacodyl (DULCOLAX) suppository 10 mg, 10 mg, Rectal, Daily PRN  amiodarone (CORDARONE) tablet 200 mg, 200 mg, Oral, Daily  dilTIAZem (CARDIZEM) tablet 30 mg, 30 mg, Oral, 2 times per day  HYDROcodone-acetaminophen (NORCO) 5-325 MG per tablet 1 tablet, 1 tablet, Oral, Q6H PRN  melatonin tablet 3 mg, 3 mg, Oral, Nightly PRN  metoprolol tartrate (LOPRESSOR) tablet 50 mg, 50 mg, Oral, BID  pantoprazole (PROTONIX) tablet 40 mg, 40 mg, Oral, BID AC  furosemide (LASIX) tablet 20 mg, 20 mg, Oral, BID      Objective:  BP (!) 113/52   Pulse 62   Temp 98.2 °F (36.8 °C) (Oral)   Resp 16   Ht 5' 7\" (1.702 m)   Wt 178 lb (80.7 kg)   SpO2 97%   BMI 27.88 kg/m²       GEN: Well developed, well nourished, no acute distress  HEENT: NCAT. EOM grossly intact. Hearing grossly intact. Mucous membranes pink and moist.  RESP: Normal breath sounds with no wheezing, rales, or rhonchi. Respirations WNL and unlabored. CV: Regular rate and rhythm. No murmurs, rubs, or gallops. ABD: Soft, non-distended, BS+ and equal.  NEURO: Alert. Speech fluent. Sensation to light touch intact. MSK:  Muscle tone and bulk are normal bilaterally. Strength 4+/5 in bilateral lower limbs. Moving bilateral upper limbs with at least antigravity strength. LIMBS:  Edema present in bilateral feet. SKIN: Warm and dry with good turgor. PSYCH: Mood WNL. Affect WNL. Appropriately interactive. Diagnostics:     CBC:   Recent Labs     08/20/21  0748   WBC 5.3   RBC 3.03*   HGB 9.9*   HCT 30.7*   .3*   RDW 17.6*        BMP:   No results for input(s): NA, K, CL, CO2, PHOS, BUN, CREATININE, GLUCOSE in the last 72 hours. Invalid input(s): CA  BNP: No results for input(s): BNP in the last 72 hours. PT/INR: No results for input(s): PROTIME, INR in the last 72 hours.   APTT: No results

## 2021-08-21 NOTE — PLAN OF CARE
Problem: Skin Integrity:  Goal: Will show no infection signs and symptoms  Description: Will show no infection signs and symptoms  Outcome: Ongoing  Goal: Absence of new skin breakdown  Description: Absence of new skin breakdown  Outcome: Ongoing     Problem: Falls - Risk of:  Goal: Will remain free from falls  Description: Will remain free from falls  Outcome: Ongoing  Goal: Absence of physical injury  Description: Absence of physical injury  Outcome: Ongoing     Problem: Pain:  Goal: Pain level will decrease  Description: Pain level will decrease  Outcome: Ongoing  Goal: Control of acute pain  Description: Control of acute pain  Outcome: Ongoing  Goal: Control of chronic pain  Description: Control of chronic pain  Outcome: Ongoing     Problem: Musculor/Skeletal Functional Status  Goal: Highest potential functional level  Outcome: Ongoing     Problem: Nutrition  Goal: Optimal nutrition therapy  Outcome: Ongoing

## 2021-08-21 NOTE — PLAN OF CARE
Problem: Skin Integrity:  Goal: Will show no infection signs and symptoms  Description: Will show no infection signs and symptoms  Outcome: Ongoing  Note: No new s/s of infection. Will continue to monitor        Problem: Skin Integrity:  Goal: Absence of new skin breakdown  Description: Absence of new skin breakdown  Outcome: Ongoing  Note: Pt skin integrity remained intact, no new alterations noted. Head to toe completed, see chart assessment. Problem: Falls - Risk of:  Goal: Will remain free from falls  Description: Will remain free from falls  Outcome: Ongoing  Note: Pt remained absent from falls. Call light within reach. Bed locked and in lowest position. Problem: Falls - Risk of:  Goal: Absence of physical injury  Description: Absence of physical injury  Outcome: Ongoing  Note: Patient remains free of injury. safe environment maintained       Problem: Pain:  Goal: Pain level will decrease  Description: Pain level will decrease  Outcome: Ongoing  Note: Acute left hip pain. Medicated as needed. Patient tolerating      Problem: Pain:  Goal: Control of acute pain  Description: Control of acute pain  Outcome: Ongoing  Note: Acute left hip pain. Medicated as needed. Patient tolerating      Problem: Pain:  Goal: Control of chronic pain  Description: Control of chronic pain  Outcome: Ongoing  Note: Acute left hip pain. Medicated as needed.  Patient tolerating      Problem: Musculor/Skeletal Functional Status  Goal: Highest potential functional level  Outcome: Ongoing  Note: Ongoing functional improvement      Problem: Nutrition  Goal: Optimal nutrition therapy  Outcome: Ongoing  Note: Adequate nutrition maintained

## 2021-08-22 PROCEDURE — 6370000000 HC RX 637 (ALT 250 FOR IP)

## 2021-08-22 PROCEDURE — 1180000000 HC REHAB R&B

## 2021-08-22 PROCEDURE — 97110 THERAPEUTIC EXERCISES: CPT

## 2021-08-22 PROCEDURE — 99232 SBSQ HOSP IP/OBS MODERATE 35: CPT | Performed by: STUDENT IN AN ORGANIZED HEALTH CARE EDUCATION/TRAINING PROGRAM

## 2021-08-22 PROCEDURE — 99232 SBSQ HOSP IP/OBS MODERATE 35: CPT | Performed by: INTERNAL MEDICINE

## 2021-08-22 PROCEDURE — 97530 THERAPEUTIC ACTIVITIES: CPT

## 2021-08-22 PROCEDURE — 6370000000 HC RX 637 (ALT 250 FOR IP): Performed by: NURSE PRACTITIONER

## 2021-08-22 PROCEDURE — 97535 SELF CARE MNGMENT TRAINING: CPT

## 2021-08-22 PROCEDURE — 6370000000 HC RX 637 (ALT 250 FOR IP): Performed by: PHYSICAL MEDICINE & REHABILITATION

## 2021-08-22 PROCEDURE — 97116 GAIT TRAINING THERAPY: CPT

## 2021-08-22 RX ADMIN — AMOXICILLIN 1000 MG: 500 CAPSULE ORAL at 22:03

## 2021-08-22 RX ADMIN — Medication: at 09:30

## 2021-08-22 RX ADMIN — FUROSEMIDE 20 MG: 20 TABLET ORAL at 16:39

## 2021-08-22 RX ADMIN — METOPROLOL TARTRATE 50 MG: 50 TABLET, FILM COATED ORAL at 21:27

## 2021-08-22 RX ADMIN — METRONIDAZOLE 500 MG: 500 TABLET ORAL at 14:22

## 2021-08-22 RX ADMIN — DILTIAZEM HYDROCHLORIDE 30 MG: 30 TABLET, FILM COATED ORAL at 21:27

## 2021-08-22 RX ADMIN — METRONIDAZOLE 500 MG: 500 TABLET ORAL at 21:27

## 2021-08-22 RX ADMIN — METOPROLOL TARTRATE 50 MG: 50 TABLET, FILM COATED ORAL at 09:28

## 2021-08-22 RX ADMIN — HYDROCODONE BITARTRATE AND ACETAMINOPHEN 1 TABLET: 5; 325 TABLET ORAL at 09:33

## 2021-08-22 RX ADMIN — TAMSULOSIN HYDROCHLORIDE 0.4 MG: 0.4 CAPSULE ORAL at 09:29

## 2021-08-22 RX ADMIN — PANTOPRAZOLE SODIUM 40 MG: 40 TABLET, DELAYED RELEASE ORAL at 16:39

## 2021-08-22 RX ADMIN — AMIODARONE HYDROCHLORIDE 200 MG: 200 TABLET ORAL at 09:29

## 2021-08-22 RX ADMIN — DILTIAZEM HYDROCHLORIDE 30 MG: 30 TABLET, FILM COATED ORAL at 09:30

## 2021-08-22 RX ADMIN — PANTOPRAZOLE SODIUM 40 MG: 40 TABLET, DELAYED RELEASE ORAL at 06:36

## 2021-08-22 RX ADMIN — AMOXICILLIN 1000 MG: 500 CAPSULE ORAL at 09:28

## 2021-08-22 RX ADMIN — METRONIDAZOLE 500 MG: 500 TABLET ORAL at 06:36

## 2021-08-22 RX ADMIN — Medication: at 21:28

## 2021-08-22 RX ADMIN — POLYETHYLENE GLYCOL 3350 17 G: 17 POWDER, FOR SOLUTION ORAL at 09:28

## 2021-08-22 RX ADMIN — FUROSEMIDE 20 MG: 20 TABLET ORAL at 09:33

## 2021-08-22 ASSESSMENT — PAIN DESCRIPTION - PAIN TYPE: TYPE: ACUTE PAIN

## 2021-08-22 ASSESSMENT — PAIN DESCRIPTION - DESCRIPTORS: DESCRIPTORS: DISCOMFORT

## 2021-08-22 ASSESSMENT — PAIN SCALES - GENERAL
PAINLEVEL_OUTOF10: 0
PAINLEVEL_OUTOF10: 0
PAINLEVEL_OUTOF10: 3

## 2021-08-22 ASSESSMENT — PAIN DESCRIPTION - PROGRESSION: CLINICAL_PROGRESSION: GRADUALLY WORSENING

## 2021-08-22 ASSESSMENT — PAIN DESCRIPTION - LOCATION: LOCATION: GENERALIZED

## 2021-08-22 ASSESSMENT — PAIN SCALES - WONG BAKER: WONGBAKER_NUMERICALRESPONSE: 0

## 2021-08-22 NOTE — PROGRESS NOTES
Physical Therapy  Facility/Department: [facility] ACUTE REHAB  Daily Treatment Note  NAME: Nikhil Wall  : 1940  MRN: [id number]    Date of Service: 2021    Discharge Recommendations:  Home with assist PRN   PT Equipment Recommendations  Equipment Needed: Yes  Walker: Rolling    Assessment   Body structures, Functions, Activity limitations: Decreased functional mobility ; Decreased ROM; Decreased strength;Decreased endurance;Decreased balance  Assessment: Impaired mobility due to L Hip Fx/ Hemiarthroplasty and decreased tolerance to activity (pt demonstrated decreased seated balance without back or UE support- needs trunck strengthening)  Specific instructions for Next Treatment: seated trunk strengthening/balance activities  Decision Making: Low Complexity  PT Education: Injury Prevention; Functional Mobility Training;Transfer Training;General Safety  Patient Education: scooting back and up to a box step, then into the wheelchair  REQUIRES PT FOLLOW UP: Yes  Activity Tolerance  Activity Tolerance: Patient limited by endurance; Patient limited by fatigue  Activity Tolerance: Pt was fatigued today and required rest breaks     Patient Diagnosis(es): There were no encounter diagnoses. has a past medical history of Atrial fibrillation ([facility] [address] has a past surgical history that includes hip surgery (Left, 2021) and Upper gastrointestinal endoscopy (N/A, 8/10/2021). Restrictions  Restrictions/Precautions  Restrictions/Precautions: Weight Bearing, Fall Risk  Required Braces or Orthoses?: No  Implants present? : Metal implants (L hemiarthroplasty)  Lower Extremity Weight Bearing Restrictions  Left Lower Extremity Weight Bearing: Weight Bearing As Tolerated  Subjective   General  Chart Reviewed: Yes  Additional Pertinent Hx: closed L hip fx  Response To Previous Treatment: Patient with no complaints from previous session.   Family / Caregiver Present: No  Subjective  Subjective: Pt reported feeling confident in his ability, but also fatigued. Pain Screening  Patient Currently in Pain: Denies  Vital Signs  Patient Currently in Pain: Denies       Orientation  Orientation  Overall Orientation Status: Within Normal Limits    Objective   Bed mobility  Rolling to Left: Stand by assistance  Rolling to Right: Stand by assistance  Supine to Sit: Stand by assistance  Sit to Supine: Stand by assistance  Scooting: Stand by assistance  Transfers  Sit to Stand: Contact guard assistance  Stand to sit: Contact guard assistance  Stand Pivot Transfers: Contact guard assistance  Lateral Transfers: Contact guard assistance  Comment: Floor transfer education and practice: pt was unable to sit on the floor and boost himself onto a box step behind him  Ambulation  Ambulation?: Yes  WB Status: WBAT  More Ambulation?: Yes  Ambulation 1  Surface: level tile  Device: Single point cane  Assistance: Contact guard assistance  Quality of Gait: reminders to correct forward flexed posture, fair pt response  Gait Deviations: Decreased step length;Decreased step height; Increased FAITH  Distance: 79 feet  Ambulation 2  Surface - 2: level tile  Device 2: Single point cane  Assistance 2: Contact guard assistance  Quality of Gait 2: reminders to correct forward flexed posture, fair pt response  Gait Deviations: Increased FAITH; Decreased step length;Decreased step height  Distance: 62 feet  Stairs/Curb  Stairs?: Yes  Stairs  # Steps : 10  Stairs Height: 4\" (& 6\")  Rails: Right ascending  Device: Single pt cane  Assistance: Contact guard assistance     Balance  Posture: Fair  Sitting - Static: Fair;+  Sitting - Dynamic: Fair;+  Standing - Static: Fair;+  Standing - Dynamic: Fair;+  Other exercises  Other exercises?: Yes  Other exercises 2: Seated BLE exercises: 15x (Light greenTheraband for all except LLE hip flexion)  Other exercises 3: Standing BLE exercises: 3-way hip 10x  Other exercises 4: Nustep L4 x 10 min (seat at 10; handle at 11)  Other exercises 11: Floor transfers: pt tried scooting back and up to box step, then to the wheelchair, but pt was unable to maintain his balance (try using chair in front of pt)         Goals  Short term goals  Time Frame for Short term goals: 7-10 days  Short term goal 1: bed mobility with SBA  Short term goal 2: transfers with supervision  Short term goal 3: gait with RW/SBA x 75-100ft  Short term goal 4: standing balance to Fair+ for ADLs  Long term goals  Time Frame for Long term goals : time of discharge  Long term goal 1: mod-I bed mobility  Long term goal 2: mod-I transfers  Long term goal 3: mod-I gait x 150ft with RW  Long term goal 4: flight of stairs with rail and cane mod-I  Long term goal 5: standing balance to Good for safe ADLs  Patient Goals   Patient goals : get stronger    Plan    Plan  Times per week: 1.5hrs/day x 5-7days/wk  Specific instructions for Next Treatment: seated trunk strengthening/balance activities  Current Treatment Recommendations: Strengthening, ROM, Balance Training, Functional Mobility Training, Transfer Training, Stair training, Gait Training, Endurance Training, Safety Education & Training, Patient/Caregiver Education & Training, Home Exercise Program  Safety Devices  Type of devices:  All fall risk precautions in place, Gait belt, Patient at risk for falls     Therapy Time     08/22/21 1030 08/22/21 1303   PT Individual Minutes   Time In 1030 1303   Time Out 1127 1339   Minutes [address]

## 2021-08-22 NOTE — PROGRESS NOTES
[facility]    ACUTE REHABILITATION OCCUPATIONAL THERAPY  DAILY NOTE    Date: 21  Patient Name: Mark Isaac      Room: 8232/7103-03    MRN: [id number]   : 1940  (79 y.o.)  Gender: male   Referring Practitioner: Claudette Bump, MD  Diagnosis: Left hip fracture, hospitalization prolonged (initial admit 8-3)  due to afib, rhabdomyolosis, GI bleed, s/p EGD, on Protonix, twice a day,  Additional Pertinent Hx: Nonda Goodell Hiris is a 79 y. o. male with no known past medical history admitted to [facility] 8/3/2021.  He initially presented after a fall from standing at home. He reportedly was laying on the floor for 5 days before he was able to call for help. He was found to have atrial fibrillation with RVR in the ED and was started on diltiazem and heparin drips. He was also found to have left hip fracture and rhabdomylolysis. He has multiple wounds, including buttock, sacrum, bilateral elbow, and bilateral knee wounds. He underwent left hip hemiarthroplasty on 21 (Dr. Scott Meier). Pt now presents to ARU 21     Restrictions  Restrictions/Precautions: Weight Bearing, Fall Risk  Implants present? : Metal implants (L hemiarthroplasty)  Left Lower Extremity Weight Bearing: Weight Bearing As Tolerated  Required Braces or Orthoses?: No  Equipment Used: Wheelchair, Bed    Subjective  Comments: Pt has high pain tolerance. Is very motivated and receptive to all insight. Demoing G use of reacher for item retrieval and ADL tasks. Continues to demo swelling in BLE R> L today; trialled ace wraps to knees date prior; pt edema resting near knees. Reviewed completing to thighs; TBD post patient discussing benefits with the Dr (per him). Patient Currently in Pain: Denies  Restrictions/Precautions: Weight Bearing; Fall Risk  Overall Orientation Status: Within Functional Limits  Patient Observation  Observations: socks pitting in ankles due to swelling  Pain Assessment  Hair-Melgar Pain Rating: No hurt  Clinical Progression: Gradually worsening  Response to Pain Intervention: Patient Satisfied    Objective  Cognition  Overall Cognitive Status: WFL  Perception  Overall Perceptual Status: WFL  Balance  Sitting Balance: Independent  Standing Balance: Stand by assistance  Bed mobility  Supine to Sit: Stand by assistance  Sit to Supine: Stand by assistance  Scooting: Stand by assistance  Transfers  Stand Step Transfers: Stand by assistance  Stand Pivot Transfers: Stand by assistance  Sit to stand: Stand by assistance  Stand to sit: Stand by assistance  Transfer Comments: single VC for hand placement for sit to stand today  Standing Balance  Time: 1-12minutes   Activity: ADLs/Mobility/PM activies  Comment: 1-2 UE support, VC for alteranting PRN, VC for correction of LLE compensatory positioning/flexion at hip and knee  Functional Mobility  Functional - Mobility Device: Rolling Walker  Activity: Retrieve items;Transport items; To/from bathroom  Assist Level: Stand by assistance  Functional Mobility Comments: increased time, VC for posture with fair + carryover; limited distance tolerance per patient  Toilet Transfers  Toilet - Technique: Ambulating  Equipment Used: Raised toilet seat without rails  Toilet Transfer: Stand by assistance     Type of ROM/Therapeutic Exercise  Type of ROM/Therapeutic Exercise: Pulley  Comment: 20#  BUE simultaneous; shoulders 20x2, elbows 10x2, min restbreaks and VC for proper breathing; increased rep tolerance today with 5# decrease in resistance (x15 reps)  Exercises  Scapular Protraction: x  Scapular Retraction: x  Shoulder Depression: x  Shoulder Elevation: x  Shoulder Flexion: x  Shoulder Extension: x  Horizontal ABduction: x  Horizontal ADduction: x  Elbow Flexion: x  Elbow Extension: x  Supination: x  Pronation: x  Wrist Flexion: x  Wrist Extension: x  Grasp/Release: BUE, hand gripper set on 3rd hole x 30 reps   Other: propels in wc from room to PT gym in AM for UE endurance, ~4minutes with slow pace no RB     Additional Activities: Dr Afua Avila assessing if appropriate for TEDs/if leg wounds have healed properly; pt would prefer to not have legs ACE wrapped to thighs if possible/to knees creating settled edema at knee area increasing difficulty with ambulation              Vision  Vision Comment: requires magnifying glass for reading; using plastic handheld option  ADL  Equipment Provided: Long-handled sponge;Reacher;Sock aid  Feeding: Dentures; Modified independent  (per pt report. )  Grooming: Modified independent   UE Bathing: Modified independent  (w/c level)  LE Bathing: Stand by assistance (using LHS)  UE Dressing: Setup (seated)  LE Dressing: Stand by assistance (threads using reacher,  looser ankle size on pants today)  Toileting: Setup;Stand by assistance  Additional Comments: G use of reacher to manange socks/pants. Trialled pants looser at ankles today with G outcome; discussed cutting pants legs/ankle area to improve ease with his tighter fitting pants. PM: foot mgt using AE due to pitting into ankles and to improve tech, VC for use of reacher PRN, and placement at heel for imrpoved ease, pt able to doff with F+ techinque. Handout provdided with demos on various sock aids, dressing stick vs reacher, hip kit; pt to review with neighbor; appears to be heavily interested in hip kit, recommendnig wide sock aid. Would like a pair of brown elastic shoe strings prior to DC. Assessment  Performance deficits / Impairments: Decreased ADL status; Decreased functional mobility ; Decreased strength;Decreased safe awareness;Decreased endurance;Decreased balance;Decreased high-level IADLs  Assessment: has no bed/bath on main and only neighbor friend for support, was using urinal and sleep on couch, neighbor mowing grass (since injury 2 months ago) OT ed pt need for inpt therapy and to obtain [facility] and twin bed for main floor and have assist with laundry, cleaning, empty BSC in addition to already order food online and have neighbor do yardwork.; improved overall task tolerance and standing tasks  Prognosis: Good  Discharge Recommendations: Continue to assess pending progress  Activity Tolerance: Patient Tolerated treatment well  Activity Tolerance: pt notes discomfort- not pain L hip.  pt with dark red skin R ankle + foot, per pt has decreased in past 2 months post injury at home as he was unable to apply hydrocortisol cream, OT alerted RN to ask MD re treatment. RN notified pt long term use of hydocortisone is not recommended, MD is ordering eucerin cream to be applied x 2 / day. RN changed bandages in am L hip, B elbows and B knees and checked bandage on bottom. Safety Devices in place: Yes  Type of devices: All fall risk precautions in place;Call light within reach;Gait belt;Patient at risk for falls; Left in chair;Left in bed;Nurse notified  Equipment Recommendations  Equipment Needed: Yes (TBD)  3-in-1 Commode: x  Reacher: x  Sock Aid: x  Long-handled Shoehorn: x  Other: phone lanyard or belt buckle for transport       Patient Education:  OT POC, AE, safety  Patient Goals   Patient goals : \"To be able to do all that you just mentioned. \" (in regards to independence with ADL and IADL tasks)  Learner:patient  Method: demonstration, explanation and handout       Outcome: demonstrated understanding        Plan  Plan  Times per week: 5-7  Times per day: Twice a day  Current Treatment Recommendations: Self-Care / ADL, Strengthening, Balance Training, Functional Mobility Training, Endurance Training, Safety Education & Training, Patient/Caregiver Education & Training, Equipment Evaluation, Education, & procurement, Home Management Training  Patient Goals   Patient goals : \"To be able to do all that you just mentioned. \" (in regards to independence with ADL and IADL tasks)  Short term goals  Time Frame for Short term goals: By 1 week   Short term goal 1: Pt will complete lower body dressing/bathing with min A and good safety with use of AE as needed  Short term goal 2: Pt will complete functional mobility during self care tasks with SBA and good safety with use of least restrictive device  Short term goal 3: Pt will tolerate standing 5+ minutes during functional activity of choice with CGA and good safety  Short term goal 4: Pt will complete functional transfer during self care tasks with CGA and good safety  Short term goal 5: Pt will participate in 30+ minutes of therapeutic exercises/functional activities to increase safety and independence with self care tasks  Long term goals  Time Frame for Long term goals : By discharge  Long term goal 1: Pt will complete BADLs with modified independence and good safety with use of AE as needed  Long term goal 2: Pt will complete functional transfers and mobility with modified independence and good safety with use of least restrictive device  Long term goal 3: Pt will tolerate standing 10+ minutes during funcitonal activity of choice with good safety  Long term goal 4: Pt will verbalize/demonstrate good understanding of home safety/fall prevention strategies to increase safety and independence with self care and mobility  Long term goal 5: Pt will verbalize/demonstrate good understanding of adaptive equipment/adaptive strategies/durable medical equipment to increase safety and independence with self care and mobility  Long term goals 6: Pt will complete simple meal prep/light house keeping with supervision and good safety        08/22/21 1207 08/22/21 1548   OT Individual Minutes   Time In 0930 1230   Time Out 1030 1300   Minutes 60 30     Electronically signed by SHIRA Middleton on 8/22/21 at 4:01 PM EDT

## 2021-08-22 NOTE — PROGRESS NOTES
[place] [address] Internal Medicine    CONSULTATION / HISTORY AND PHYSICAL EXAMINATION            Date:   8/22/2021  Patient name:  Talib Molina  Date of admission:  8/13/2021  7:39 PM  MRN:   [id number]  Account:  [de-identified]  YOB: 1940  PCP:    No primary care provider on file. Room:   [address] [place] [address]  Code Status:    Full Code    Physician Requesting Consult: Lee Mendez MD    Reason for Consult:  medical management    Chief Complaint:     No chief complaint on file. History Obtained From:     Patient medical record nursing staff    History of Present Illness:   Patient admitted to ARU for rehabilitation  He was admitted to [facility] after a mechanical fall, he was found to have left hip fracture, underwent surgery, he was found to have new onset atrial fibrillation,  During hospital stay, patient had drop in hemoglobin, stool was positive for occult blood. Patient was evaluated by GI physician, underwent EGD, patient was found to have nonbleeding duodenal ulcer, not considered a candidate for anticoagulation with history of GI bleed  Patient was started on amoxicillin and Flagyl, with positive H. pylori infection  Patient doing much better  Working with physical therapy    8/18  Patient resting comfortably  He worked with physical therapy in the morning and afternoon  Pain is improved  Past Medical History:     Past Medical History:   Diagnosis Date    Atrial fibrillation ([address]     with RVR        Past Surgical History:     Past Surgical History:   Procedure Laterality Date    HIP SURGERY Left 8/5/2021    HIP HEMIARTHROPLASTY performed by Hanane Barba MD at [address] N[address] 8/10/2021    EGD BIOPSY performed by Yi Eugene MD at [facility]        Medications Prior to Admission:     Prior to Admission medications    Medication Sig Start Date End Date Taking?  Authorizing Provider   ibuprofen (ADVIL;MOTRIN) 200 MG tablet Take 400 mg by mouth every 6 hours as needed for Pain   Yes Historical Provider, MD   Multiple Vitamins-Minerals (THERAPEUTIC MULTIVITAMIN-MINERALS) tablet Take 1 tablet by mouth daily   Yes Historical Provider, MD        Allergies:     Patient has no known allergies. Social History:     Tobacco:    reports that he has never smoked. He has never used smokeless tobacco.  Alcohol:      reports no history of alcohol use. Drug Use:  has no history on file for drug use. Family History:     No family history on file. Review of Systems:     Positive and Negative as described in HPI. CONSTITUTIONAL:  negative for fevers, chills, sweats, fatigue, weight loss  HEENT:  negative for vision, hearing changes, runny nose, throat pain  RESPIRATORY:  negative for shortness of breath, cough, congestion, wheezing. CARDIOVASCULAR:  negative for chest pain, palpitations. GASTROINTESTINAL:  negative for nausea, vomiting, diarrhea, constipation, change in bowel habits, abdominal pain   GENITOURINARY:  negative for difficulty of urination, burning with urination, frequency   INTEGUMENT:  negative for rash, skin lesions, easy bruising   HEMATOLOGIC/LYMPHATIC:  negative for swelling/edema   ALLERGIC/IMMUNOLOGIC:  negative for urticaria , itching  ENDOCRINE:  negative increase in drinking, increase in urination, hot or cold intolerance  MUSCULOSKELETAL: Left hip pain NEUROLOGICAL:  negative for headaches, dizziness, lightheadedness, numbness, pain, tingling extremities  BEHAVIOR/PSYCH:      Physical Exam:     /61   Pulse 61   Temp 97.5 °F (36.4 °C) (Oral)   Resp 14   Ht 5' 7\" (1.702 m)   Wt 178 lb (80.7 kg)   SpO2 98%   BMI 27.88 kg/m²   Temp (24hrs), Av °F (36.7 °C), Min:97.5 °F (36.4 °C), Max:98.4 °F (36.9 °C)    No results for input(s): POCGLU in the last 72 hours.     Intake/Output Summary (Last 24 hours) at 2021 1609  Last data filed at 2021 1423  Gross per 24 hour   Intake --   Output 2685 ml   Net -2685 ml       General Appearance:  alert, well appearing, and in no acute distress  Mental status: oriented to person, place, and time with normal affect  Head:  normocephalic, atraumatic. Eye: no icterus, redness, pupils equal and reactive, extraocular eye movements intact, conjunctiva clear  Ear: normal external ear, no discharge, hearing intact  Nose:  no drainage noted  Mouth: mucous membranes moist  Neck: supple, no carotid bruits, thyroid not palpable  Lungs: Bilateral equal air entry, clear to ausculation, no wheezing, rales or rhonchi, normal effort  Cardiovascular: normal rate, regular rhythm, no murmur, gallop, rub. Abdomen: Soft, nontender, nondistended, normal bowel sounds, no hepatomegaly or splenomegaly  Neurologic: There are no new focal motor or sensory deficits, normal muscle tone and bulk, no abnormal sensation, normal speech, cranial nerves II through XII grossly intact  Skin: No gross lesions, rashes, bruising or bleeding on exposed skin area  Extremities: Postsurgical changes in left hip  Psych: Investigations:      Laboratory Testing:  No results found for this or any previous visit (from the past 24 hour(s)). Consultations:   IP CONSULT TO DIETITIAN  IP CONSULT TO SOCIAL WORK  IP CONSULT TO GI  IP CONSULT TO INTERNAL MEDICINE  IP CONSULT TO GI  Assessment :      Primary Problem  <principal problem not specified>    Active Hospital Problems    Diagnosis Date Noted    H pylori ulcer [K27.9, B96.81]     PUD (peptic ulcer disease) [K27.9]     Closed left hip fracture, initial encounter ([facility] [S72.002A] 08/13/2021    Atrial fibrillation with RVR ([facility] [I48.91] 08/03/2021       Plan:     1. Left hip fracture s/p surgery, pain is controlled  2. Atrial fibrillation, rate controlled, not on anticoagulation with history of GI bleed, on amiodarone, Cardizem  3. GI bleed, s/p EGD, on Protonix, twice a day,  4. H. pylori infection, on amoxicillin and Flagyl  5.  Not on chemoprophylaxis with history of GI bleed  6. Complaints of BPH, starting patient on [address] MD  8/22/2021  4:09 PM    Copy sent to Dr. Kirby primary care provider on file. Please note that this chart was generated using voice recognition Dragon dictation software. Although every effort was made to ensure the accuracy of this automated transcription, some errors in transcription may have occurred.

## 2021-08-22 NOTE — PROGRESS NOTES
Physical Medicine & Rehabilitation  Progress Note      Subjective:      Sruthi Orozco is a 79 y.o. male with left hip fracture. He reports doing fine today. He had some pain this morning for which he was given norco, per nurse. He notes ongoing edema in the right lower limb. Occupational therapist reported that ACE wraps were tried and is asking about SUE hose. Will hold off on SUE hose for now due to lower limb wounds but will keep up with ACE wraps. He denies any other acute concerns. ROS:  Denies fevers, chills, sweats. No chest pain, palpitations, lightheadedness. Denies coughing, wheezing or shortness of breath. Denies abdominal pain, nausea, diarrhea or constipation. No new areas of joint pain. Denies new areas of numbness or weakness. Denies new anxiety or depression issues. No new skin problems. Rehabilitation:   Progressing in therapies. PT:  Restrictions/Precautions: Weight Bearing, Fall Risk  Implants present? : Metal implants (L hemiarthroplasty)  Left Lower Extremity Weight Bearing: Weight Bearing As Tolerated   Transfers  Sit to Stand: Contact guard assistance  Stand to sit: Contact guard assistance  Bed to Chair: Contact guard assistance (with RW)  Stand Pivot Transfers: Contact guard assistance  Lateral Transfers: Contact guard assistance  Comment: Floor transfer education and practice: pt was unable to sit on the floor and boost himself onto a box step behind him  WB Status: WBAT  Ambulation 1  Surface: level tile  Device: Single point cane  Other Apparatus: Wheelchair follow  Assistance: Contact guard assistance  Quality of Gait: reminders to correct forward flexed posture, fair pt response  Gait Deviations: Decreased step length, Decreased step height, Increased FAITH  Distance: 79 feet  Comments: Seated rest break in between.  O2 99%, HR 58 post.    Transfers  Sit to Stand: Contact guard assistance  Stand to sit: Contact guard assistance  Bed to Chair: Contact guard assistance (with RW)  Stand Pivot Transfers: Contact guard assistance  Lateral Transfers: Contact guard assistance  Comment: Floor transfer education and practice: pt was unable to sit on the floor and boost himself onto a box step behind him  Ambulation  Ambulation?: Yes  WB Status: WBAT  More Ambulation?: Yes  Ambulation 1  Surface: level tile  Device: Single point cane  Other Apparatus: Wheelchair follow  Assistance: Contact guard assistance  Quality of Gait: reminders to correct forward flexed posture, fair pt response  Gait Deviations: Decreased step length, Decreased step height, Increased FAITH  Distance: 79 feet  Comments: Seated rest break in between. O2 99%, HR 58 post.    Surface: level tile  Ambulation 1  Surface: level tile  Device: Single point cane  Other Apparatus: Wheelchair follow  Assistance: Contact guard assistance  Quality of Gait: reminders to correct forward flexed posture, fair pt response  Gait Deviations: Decreased step length, Decreased step height, Increased FAITH  Distance: 79 feet  Comments: Seated rest break in between. O2 99%, HR 58 post.    OT:  ADL  Equipment Provided: Long-handled sponge, Reacher, Sock aid  Feeding: Dentures, Modified independent  (per pt report. )  Grooming: Modified independent   UE Bathing: Modified independent  (w/c level)  LE Bathing: Stand by assistance (using LHS)  UE Dressing: Setup (seated)  LE Dressing: Stand by assistance (threads using reacher,  looser ankle size on pants today)  Toileting: Setup, Stand by assistance  Additional Comments: G use of reacher to manange socks/pants. Trialled pants looser at ankles today with G outcome; discussed cutting pants legs/ankle area to improve ease with his tighter fitting pants. PM: foot mgt using AE due to pitting into ankles and to improve tech, VC for use of reacher PRN, and placement at heel for imrpoved ease, pt able to doff with F+ techinque.  Handout provdided with demos on various sock aids, dressing stick vs reacher, assistance    SPEECH:      Current Medications:   Current Facility-Administered Medications: tamsulosin (FLOMAX) capsule 0.4 mg, 0.4 mg, Oral, Daily  Hydrocerin cream CREA, , Topical, BID  metroNIDAZOLE (FLAGYL) tablet 500 mg, 500 mg, Oral, 3 times per day  amoxicillin (AMOXIL) capsule 1,000 mg, 1,000 mg, Oral, 2 times per day  acetaminophen (TYLENOL) tablet 650 mg, 650 mg, Oral, Q4H PRN  polyethylene glycol (GLYCOLAX) packet 17 g, 17 g, Oral, Daily  senna (SENOKOT) tablet 17.2 mg, 2 tablet, Oral, Daily PRN  bisacodyl (DULCOLAX) suppository 10 mg, 10 mg, Rectal, Daily PRN  amiodarone (CORDARONE) tablet 200 mg, 200 mg, Oral, Daily  dilTIAZem (CARDIZEM) tablet 30 mg, 30 mg, Oral, 2 times per day  HYDROcodone-acetaminophen (NORCO) 5-325 MG per tablet 1 tablet, 1 tablet, Oral, Q6H PRN  melatonin tablet 3 mg, 3 mg, Oral, Nightly PRN  metoprolol tartrate (LOPRESSOR) tablet 50 mg, 50 mg, Oral, BID  pantoprazole (PROTONIX) tablet 40 mg, 40 mg, Oral, BID AC  furosemide (LASIX) tablet 20 mg, 20 mg, Oral, BID      Objective:  /61   Pulse 61   Temp 97.5 °F (36.4 °C) (Oral)   Resp 14   Ht 5' 7\" (1.702 m)   Wt 178 lb (80.7 kg)   SpO2 98%   BMI 27.88 kg/m²       GEN: Well developed, well nourished, no acute distress  HEENT: NCAT. EOM grossly intact. Hearing grossly intact. RESP: Normal breath sounds with no wheezing, rales, or rhonchi. Respirations WNL and unlabored. CV: Regular rate and rhythm. No murmurs, rubs, or gallops. ABD: Soft, non-distended, BS+ and equal.  NEURO: Alert. Speech fluent. Sensation to light touch intact. MSK:  Muscle tone and bulk are normal bilaterally. Strength 4+/5 in left lower limb. Moving bilateral upper limbs with at least antigravity strength. LIMBS:  Edema present in bilateral lower limbs. SKIN: Warm and dry with good turgor. PSYCH: Mood WNL. Affect WNL. Appropriately interactive.     Diagnostics:     CBC:   Recent Labs     08/20/21  0748   WBC 5.3   RBC 3.03*   HGB 9.9* HCT 30.7*   .3*   RDW 17.6*        BMP:   No results for input(s): NA, K, CL, CO2, PHOS, BUN, CREATININE, GLUCOSE in the last 72 hours. Invalid input(s): CA  BNP: No results for input(s): BNP in the last 72 hours. PT/INR: No results for input(s): PROTIME, INR in the last 72 hours. APTT: No results for input(s): APTT in the last 72 hours. CARDIAC ENZYMES: No results for input(s): CKMB, CKMBINDEX, TROPONINT in the last 72 hours. Invalid input(s): CKTOTAL;3  FASTING LIPID PANEL:No results found for: CHOL, HDL, TRIG  LIVER PROFILE: No results for input(s): AST, ALT, ALB, BILIDIR, BILITOT, ALKPHOS in the last 72 hours. Impression/Plan:   Impaired ADLs, gait, and mobility due to:    1. L hip fracture:  S/p hemiarthroplasty by Dr. Edward Sierra 8/5/21. PT/OT for gait, mobility, strengthening, endurance, ADLs, and self care. WBAT. Has Tylenol or Norco prn pain. 2. A Fib with RVR: rate controlled. On amiodarone, Cardizem, Metoprolol. No anticoagulation per GI. 3. Rhabdomyolysis: Resolved. Monitoring BMP weekly  4. Anemia secondary to GI bleed/ulcer: GI following. On PPI BID, soft diet, received 1 unit PRBCs 8/13/21. Monitoring q MWF.  5. H Pylori ulcer: on 14 days Flagyl and amoxicillin until 8/29/21 per GI. 6. Wounds/abrasions: nursing interventions, wound care consulted to follow  7. Bilateral lower limb edema: Will trial ACE wrapping. 8. Insomnia: has melatonin prn  9. Hypokalemia: Improved. On Lasix. Monitoring and will replace prn but has been stable. 10. Urinary frequency: on trial of flomax and consider checking PVRs. Some contribution from Lasix anticipated. No current signs/symptoms of UTI. 11. Pulmonary infiltrates/effusion: using incentive spirometer  12. Bowel Management: Miralax daily, senokot prn, dulcolax prn. 13. DVT Prophylaxis:  SCD's while in bed. SUE's and chemoprophylaxis contraindicated  14.  Internal medicine for medical management      Electronically signed by Dannielle Valadez Jaci Lopez MD on 8/22/2021 at 4:45 PM

## 2021-08-22 NOTE — PLAN OF CARE
Problem: Skin Integrity:  Goal: Absence of new skin breakdown  Description: Absence of new skin breakdown  Outcome: Ongoing  Note: Skin assessment completed this shift. Nutrition and Hydration status assessed. Marcos Score as charted. Pressure Relief Overlay remains intact and inflated to patient's bed throughout the shift. Bilateral heels remain elevated on pillows throughout the shift. Patient tolerates repositioning by staff at least every 2 hours for comfort and to prevent breakdown. Patient verbalizes understanding of pressure ulcer prevention measures. Skin integrity maintained. No new skin breakdown noted. Shira / Incontinence care provided as needed throughout the shift. See assessment regarding multiple wounds in various stages of healing. Incision to Lt. Hip with rob, no drainage, 1+ edema. Island dressing CDI. Problem: Falls - Risk of:  Goal: Will remain free from falls  Description: Will remain free from falls  Outcome: Ongoing  Note: No falls or injuries sustained at this time. No attempts to get out of bed without nursing assistance. Call light within reach and pt. uses appropriately for assistance. Siderails up x 2. Nonskid footwear remains on. Bed in low and locked position. Hourly nursing rounds made. Pt. Alert and oriented, aware of limitations. Pt. uses assistive devices appropriately. Pt. understands individual fall risk factors. Pt. reoriented to surroundings and reminded to use call light with each nurse/patient interaction. Bed alarm / Personal alarm remains engaged throughout the shift as a precaution. Problem: Pain:  Goal: Control of acute pain  Description: Control of acute pain  Note: Pain assessment completed. Pt. able to rest.  Pt. denies pain this shift. Pt. denies need for oral analgesic. Verbalizes understanding of nonpharmacologic strategies that provide comfort. Pt. repositioned for comfort. Nonverbal cues indicate absence of pain.

## 2021-08-22 NOTE — PROGRESS NOTES
[facility] Internal Medicine    CONSULTATION / HISTORY AND PHYSICAL EXAMINATION            Date:   8/22/2021  Patient name:  Vaibhav Del Cid  Date of admission:  8/13/2021  7:39 PM  MRN:   [id number]  Account:  [de-identified]  YOB: 1940  PCP:    No primary care provider on file. Room:   [address] [place] [address]  Code Status:    Full Code    Physician Requesting Consult: Fiona Kelly MD    Reason for Consult:  medical management    Chief Complaint:     No chief complaint on file. History Obtained From:     Patient medical record nursing staff    History of Present Illness:   Patient admitted to ARU for rehabilitation  He was admitted to [facility] after a mechanical fall, he was found to have left hip fracture, underwent surgery, he was found to have new onset atrial fibrillation,  During hospital stay, patient had drop in hemoglobin, stool was positive for occult blood. Patient was evaluated by GI physician, underwent EGD, patient was found to have nonbleeding duodenal ulcer, not considered a candidate for anticoagulation with history of GI bleed  Patient was started on amoxicillin and Flagyl, with positive H. pylori infection  Patient doing much better  Working with physical therapy    8/18  Patient resting comfortably  He worked with physical therapy in the morning and afternoon  Pain is improved  Past Medical History:     Past Medical History:   Diagnosis Date    Atrial fibrillation ([address]     with RVR        Past Surgical History:     Past Surgical History:   Procedure Laterality Date    HIP SURGERY Left 8/5/2021    HIP HEMIARTHROPLASTY performed by Leonel Mcnally MD at [facility] N/A 8/10/2021    EGD BIOPSY performed by Ric Arora MD at [address] [place] [address]        Medications Prior to Admission:     Prior to Admission medications    Medication Sig Start Date End Date Taking?  Authorizing Provider   ibuprofen (ADVIL;MOTRIN) 200 MG tablet Take 400 mg by mouth every 6 hours as needed for Pain   Yes Historical Provider, MD   Multiple Vitamins-Minerals (THERAPEUTIC MULTIVITAMIN-MINERALS) tablet Take 1 tablet by mouth daily   Yes Historical Provider, MD        Allergies:     Patient has no known allergies. Social History:     Tobacco:    reports that he has never smoked. He has never used smokeless tobacco.  Alcohol:      reports no history of alcohol use. Drug Use:  has no history on file for drug use. Family History:     No family history on file. Review of Systems:     Positive and Negative as described in HPI. CONSTITUTIONAL:  negative for fevers, chills, sweats, fatigue, weight loss  HEENT:  negative for vision, hearing changes, runny nose, throat pain  RESPIRATORY:  negative for shortness of breath, cough, congestion, wheezing. CARDIOVASCULAR:  negative for chest pain, palpitations. GASTROINTESTINAL:  negative for nausea, vomiting, diarrhea, constipation, change in bowel habits, abdominal pain   GENITOURINARY:  negative for difficulty of urination, burning with urination, frequency   INTEGUMENT:  negative for rash, skin lesions, easy bruising   HEMATOLOGIC/LYMPHATIC:  negative for swelling/edema   ALLERGIC/IMMUNOLOGIC:  negative for urticaria , itching  ENDOCRINE:  negative increase in drinking, increase in urination, hot or cold intolerance  MUSCULOSKELETAL: Left hip pain NEUROLOGICAL:  negative for headaches, dizziness, lightheadedness, numbness, pain, tingling extremities  BEHAVIOR/PSYCH:      Physical Exam:     /61   Pulse 61   Temp 97.5 °F (36.4 °C) (Oral)   Resp 14   Ht 5' 7\" (1.702 m)   Wt 178 lb (80.7 kg)   SpO2 98%   BMI 27.88 kg/m²   Temp (24hrs), Av °F (36.7 °C), Min:97.5 °F (36.4 °C), Max:98.4 °F (36.9 °C)    No results for input(s): POCGLU in the last 72 hours.     Intake/Output Summary (Last 24 hours) at 2021 1610  Last data filed at 2021 1423  Gross per 24 hour   Intake --   Output 2685 ml   Net -2685 ml       General Appearance:  alert, well appearing, and in no acute distress  Mental status: oriented to person, place, and time with normal affect  Head:  normocephalic, atraumatic. Eye: no icterus, redness, pupils equal and reactive, extraocular eye movements intact, conjunctiva clear  Ear: normal external ear, no discharge, hearing intact  Nose:  no drainage noted  Mouth: mucous membranes moist  Neck: supple, no carotid bruits, thyroid not palpable  Lungs: Bilateral equal air entry, clear to ausculation, no wheezing, rales or rhonchi, normal effort  Cardiovascular: normal rate, regular rhythm, no murmur, gallop, rub. Abdomen: Soft, nontender, nondistended, normal bowel sounds, no hepatomegaly or splenomegaly  Neurologic: There are no new focal motor or sensory deficits, normal muscle tone and bulk, no abnormal sensation, normal speech, cranial nerves II through XII grossly intact  Skin: No gross lesions, rashes, bruising or bleeding on exposed skin area  Extremities: Postsurgical changes in left hip  Psych: Investigations:      Laboratory Testing:  No results found for this or any previous visit (from the past 24 hour(s)). Consultations:   IP CONSULT TO DIETITIAN  IP CONSULT TO SOCIAL WORK  IP CONSULT TO GI  IP CONSULT TO INTERNAL MEDICINE  IP CONSULT TO GI  Assessment :      Primary Problem  <principal problem not specified>    Active Hospital Problems    Diagnosis Date Noted    H pylori ulcer [K27.9, B96.81]     PUD (peptic ulcer disease) [K27.9]     Closed left hip fracture, initial encounter ([facility] [S72.002A] 08/13/2021    Atrial fibrillation with RVR ([facility] [I48.91] 08/03/2021       Plan:     1. Left hip fracture s/p surgery, pain is controlled  2. Atrial fibrillation, rate controlled, not on anticoagulation with history of GI bleed, on amiodarone, Cardizem  3. GI bleed, s/p EGD, on Protonix, twice a day,  4. H. pylori infection, on amoxicillin and Flagyl  5.  Not on chemoprophylaxis with history of GI bleed  6. Complaints of BPH, starting patient on [address] MD  8/22/2021  4:10 PM    Copy sent to Dr. Kirby primary care provider on file. Please note that this chart was generated using voice recognition Dragon dictation software. Although every effort was made to ensure the accuracy of this automated transcription, some errors in transcription may have occurred.

## 2021-08-23 LAB
HCT VFR BLD CALC: 29.9 % (ref 41–53)
HEMOGLOBIN: 9.7 G/DL (ref 13.5–17.5)
MCH RBC QN AUTO: 33.1 PG (ref 26–34)
MCHC RBC AUTO-ENTMCNC: 32.4 G/DL (ref 31–37)
MCV RBC AUTO: 102 FL (ref 80–100)
NRBC AUTOMATED: ABNORMAL PER 100 WBC
PDW BLD-RTO: 17.8 % (ref 11.5–14.9)
PLATELET # BLD: 302 K/UL (ref 150–450)
PMV BLD AUTO: 8 FL (ref 6–12)
RBC # BLD: 2.93 M/UL (ref 4.5–5.9)
WBC # BLD: 4.8 K/UL (ref 3.5–11)

## 2021-08-23 PROCEDURE — 97535 SELF CARE MNGMENT TRAINING: CPT

## 2021-08-23 PROCEDURE — 97116 GAIT TRAINING THERAPY: CPT

## 2021-08-23 PROCEDURE — 36415 COLL VENOUS BLD VENIPUNCTURE: CPT

## 2021-08-23 PROCEDURE — 99232 SBSQ HOSP IP/OBS MODERATE 35: CPT | Performed by: STUDENT IN AN ORGANIZED HEALTH CARE EDUCATION/TRAINING PROGRAM

## 2021-08-23 PROCEDURE — 1180000000 HC REHAB R&B

## 2021-08-23 PROCEDURE — 6370000000 HC RX 637 (ALT 250 FOR IP)

## 2021-08-23 PROCEDURE — 6370000000 HC RX 637 (ALT 250 FOR IP): Performed by: NURSE PRACTITIONER

## 2021-08-23 PROCEDURE — 97530 THERAPEUTIC ACTIVITIES: CPT

## 2021-08-23 PROCEDURE — 97110 THERAPEUTIC EXERCISES: CPT

## 2021-08-23 PROCEDURE — 6370000000 HC RX 637 (ALT 250 FOR IP): Performed by: PHYSICAL MEDICINE & REHABILITATION

## 2021-08-23 PROCEDURE — 99232 SBSQ HOSP IP/OBS MODERATE 35: CPT | Performed by: INTERNAL MEDICINE

## 2021-08-23 PROCEDURE — 85027 COMPLETE CBC AUTOMATED: CPT

## 2021-08-23 RX ADMIN — TAMSULOSIN HYDROCHLORIDE 0.4 MG: 0.4 CAPSULE ORAL at 08:13

## 2021-08-23 RX ADMIN — AMOXICILLIN 1000 MG: 500 CAPSULE ORAL at 09:02

## 2021-08-23 RX ADMIN — Medication: at 09:04

## 2021-08-23 RX ADMIN — DILTIAZEM HYDROCHLORIDE 30 MG: 30 TABLET, FILM COATED ORAL at 08:13

## 2021-08-23 RX ADMIN — DILTIAZEM HYDROCHLORIDE 30 MG: 30 TABLET, FILM COATED ORAL at 19:50

## 2021-08-23 RX ADMIN — PANTOPRAZOLE SODIUM 40 MG: 40 TABLET, DELAYED RELEASE ORAL at 16:53

## 2021-08-23 RX ADMIN — AMIODARONE HYDROCHLORIDE 200 MG: 200 TABLET ORAL at 08:13

## 2021-08-23 RX ADMIN — PANTOPRAZOLE SODIUM 40 MG: 40 TABLET, DELAYED RELEASE ORAL at 05:55

## 2021-08-23 RX ADMIN — METRONIDAZOLE 500 MG: 500 TABLET ORAL at 21:00

## 2021-08-23 RX ADMIN — POLYETHYLENE GLYCOL 3350 17 G: 17 POWDER, FOR SOLUTION ORAL at 08:13

## 2021-08-23 RX ADMIN — METRONIDAZOLE 500 MG: 500 TABLET ORAL at 05:55

## 2021-08-23 RX ADMIN — METOPROLOL TARTRATE 50 MG: 50 TABLET, FILM COATED ORAL at 19:48

## 2021-08-23 RX ADMIN — FUROSEMIDE 20 MG: 20 TABLET ORAL at 16:53

## 2021-08-23 RX ADMIN — METRONIDAZOLE 500 MG: 500 TABLET ORAL at 13:15

## 2021-08-23 RX ADMIN — FUROSEMIDE 20 MG: 20 TABLET ORAL at 08:13

## 2021-08-23 RX ADMIN — AMOXICILLIN 1000 MG: 500 CAPSULE ORAL at 19:49

## 2021-08-23 NOTE — PROGRESS NOTES
Comprehensive Nutrition Assessment    Type and Reason for Visit:  Reassess    Nutrition Recommendations/Plan: Continue current diet and oral nutrition supplements. Nutrition Assessment:  Pt continues to eat well with % intake of most meals and supplements. Will continue current plan of care. Malnutrition Assessment:  Malnutrition Status: At risk for malnutrition (Comment)    Context:  Acute Illness     Findings of the 6 clinical characteristics of malnutrition:  Energy Intake:  7 - 50% or less of estimated energy requirements for 5 or more days (Previous decreased intake has greatly improved)  Weight Loss:  No significant weight loss     Body Fat Loss:  No significant body fat loss     Muscle Mass Loss:  No significant muscle mass loss    Fluid Accumulation:  1 - Mild Extremities   Strength:  Not Performed    Estimated Daily Nutrient Needs:  Energy (kcal):  1759 kcal based on [place]- [place] Jeor and 1.2 factor; Weight Used for Energy Requirements:  Admission     Protein (g):  100-135 gm based 1.5-2.0 gm/kg ideal; Weight Used for Protein Requirements:  Ideal          Nutrition Related Findings:  Edema: +1 pitting RLE; +1 perineal, sacral. Labs and meds reviewed. Wounds:  Multiple, Pressure Injury, Stage II, Surgical Incision (Traumatic wounds)       Current Nutrition Therapies:    ADULT DIET; Dysphagia - Soft and Bite Sized  Adult Oral Nutrition Supplement; Low Calorie/High Protein Oral Supplement    Anthropometric Measures:  · Height: 5' 7\" (170.2 cm)  · Current Body Weight: 178 lb (80.7 kg)   · Admission Body Weight: 178 lb (80.7 kg)    · Usual Body Weight: 177 lb (80.3 kg)     · Ideal Body Weight: 148 lbs; % Ideal Body Weight 120.3 %   · BMI: 27.9  · BMI Categories: Overweight (BMI 25.0-29. 9)       Nutrition Diagnosis:   · Increased nutrient needs related to other (comment) (Healing) as evidenced by wounds    Nutrition Interventions:   Food and/or Nutrient Delivery:  Continue Current Diet, Continue Oral Nutrition Supplement  Nutrition Education/Counseling:  No recommendation at this time   Coordination of Nutrition Care:  Continue to monitor while inpatient    Goals:  Meet over 75% of estimated nutrition needs       Nutrition Monitoring and Evaluation:   Behavioral-Environmental Outcomes:  None Identified   Food/Nutrient Intake Outcomes:  Food and Nutrient Intake, Supplement Intake  Physical Signs/Symptoms Outcomes:  Biochemical Data, Weight, Skin, Fluid Status or Edema     Discharge Planning:    Continue Oral Nutrition Supplement, Continue current diet     Some areas of assessment may be incomplete due to standard COVID-19 Precautions. Shabbir Fulton R.D., L.D.   Phone: [phone]

## 2021-08-23 NOTE — PLAN OF CARE
Problem: Falls - Risk of:  Goal: Will remain free from falls  Description: Will remain free from falls  Outcome: Met This Shift   No falls or injuries sustained at this time. No attempts to get out of bed without nursing assistance. Call light within reach and pt. uses appropriately for assistance. Siderails up x 2. Nonskid footwear remains on. Bed in low and locked position. Hourly nursing rounds made. Pt. Alert and oriented, aware of limitations, and exhibits good safety judgement. Pt. uses assistive devices appropriately. Pt. understands individual fall risk factors.     Pt. reminded to use call light with each nurse/patient interaction. Bed alarm remains engaged throughout the shift as a precaution.          Problem: Pain:  Goal: Pain level will decrease  Description: Pain level will decrease  Outcome: Met This Shift   Pain assessment completed so far this shift. Pt. able to rest without the use of pain medication. Patient denies any pain so far this shift. Will continue to monitor.        Problem: Skin Integrity:  Goal: Absence of new skin breakdown  Description: Absence of new skin breakdown  Outcome: Met This Shift   Skin assessment completed this shift. Nutrition and Hydration status assessed with adequate intake. Marcos Score as charted. Pressure Relief Overlay remains intact and inflated to patient's bed throughout the shift. Bilateral heels remain elevated on pillows throughout the shift. Chair cushion in use for when pt is up to chair. Patient able to reposition self for comfort and to prevent breakdown. Patient verbalizes understanding of pressure ulcer prevention measures. Skin integrity maintained. No new skin breakdown noted. Skin to high risk pressure areas including coccyx and heels are clear.     Shira care provided as needed throughout the shift. Aloe Vesta Moisture Barrier ointment applied to buttocks as a preventative measure.     Scattered Abrasions are noted on BUE and BLE.  All

## 2021-08-23 NOTE — PATIENT CARE CONFERENCE
[address]    ACUTE REHABILITATION  TEAM CONFERENCE NOTE  Date: 21  Patient Name: Hailee Cuevas       Room: [facility]/4016-51  MRN: [id number]       : 1940  (79 y.o.)     Gender: male       Closed left hip fracture, [facility] ([facility] [S72.002A]  Diagnosis: Left hip fracture, hospitalization prolonged (initial admit 8-3)  due to afib, rhabdomyolosis, GI bleed, s/p EGD, on Protonix, twice a day,     NURSING  Bladder  Always Continent  Bowel   Always Continent  Date of Last BM: 21  Intervention    Both Bowel & Bladder Program     Wounds/Incisions/Ulcers: Wounds present on - L hip incision , abrasions on bilateral knees and  elbow  Medication Education Program: Patient able to manage medications and being educated by nursing  Pain: Patient's pain is currently controlled with -  Norco 5-325 mg 1 tab q 6 hours prn    Fall Risk:  Falling star program initiated    PHYSICAL THERAPY  Bed mobility  Rolling to Left: Stand by assistance  Rolling to Right: Stand by assistance  Supine to Sit: Stand by assistance  Sit to Supine: Stand by assistance  Scooting: Stand by assistance    Transfers:  Sit to Stand: Contact guard assistance  Stand to sit: Contact guard assistance    WB Status: WBAT  Ambulation 1  Surface: level tile  Device: Single point cane  Assistance: Stand by assistance  Quality of Gait: reminders to correct forward flexed posture, fair pt response  Gait Deviations: Decreased step length;Decreased step height; Increased FAITH  Distance: 240 feet  Ambulation 2  Surface - 2: level tile  Device 2: Single point cane  Assistance 2: Contact guard assistance  Quality of Gait 2: reminders to correct forward flexed posture, fair pt response  Gait Deviations: Increased FAITH; Decreased step length;Decreased step height  Distance: 240 feet    # Steps : 9  Stairs Height:  (7.5\")  Rails: Right ascending  Device: Single pt cane  Assistance: Contact guard assistance  Comment: rest break at top of steps before descending    Equipment Needed: Yes  Mobility Devices: Rebecca Freshwater: Rolling    Goals  Time Frame for Short term goals: 7-10 days  Short term goal 1: bed mobility with SBA  Short term goal 2: transfers with supervision  Short term goal 3: gait with RW/SBA x 75-100ft  Short term goal 4: standing balance to Fair+ for ADLs    OCCUPATIONAL THERAPY  SELF CARE   Equipment Provided: Long-handled sponge, Reacher, Sock aid  Eating            Dentures; Modified independent    Oral Hygiene            Modified independent    Shower/Bathe Self             UE Bathing: Setup  LE Bathing: Supervision;Stand by assistance   Upper Body Dressing            Setup   Lower Body Dressing            Putting On/Taking Off Footwear             Stand by assistance (discussed possible use of elastic shoe laces, pt pleasantly )   Toilet Transfer             Toilet - Technique: Ambulating  Equipment Used: Raised toilet seat without rails  Toilet Transfer: Stand by assistance   Yvette Decker 28 by assistance (per pt report, no A with hygiene this date. )    Bed mobility  Supine to Sit: Stand by assistance  Sit to Supine: Stand by assistance  Scooting: Stand by assistance     Shower Transfers: Stand by assistance  Balance  Sitting Balance: Independent  Standing Balance: Stand by assistance (supervision to stand by )  Standing Balance  Time: AM: 1-2 min, <1 min x 2 PM: ~2 min   Activity: ADL's, mobility, therapeutic standing activity in PM with 0-1 UE support  Comment: no LOB    Equipment Recommendations  Equipment Needed: Yes  3-in-1 Commode: x  Reacher: x  Sock Aid: x  Long-handled Shoehorn: x  Other: phone lanyard or belt buckle for transport  Assessment: has no bed/bath on main and only neighbor friend for support, was using urinal and sleep on couch, neighbor mowing grass (since injury 2 months ago) OT ed pt need for inpt therapy and to obtain [facility] and twin bed for main floor and have assist with laundry, cleaning, empty BSC in addition to already order food online and have neighbor do yardwork.; improved overall task tolerance and standing tasks    Short term goals  Time Frame for Short term goals: By 1 week   Short term goal 1: Pt will complete lower body dressing/bathing with min A and good safety with use of AE as needed  Short term goal 2: Pt will complete functional mobility during self care tasks with SBA and good safety with use of least restrictive device  Short term goal 3: Pt will tolerate standing 5+ minutes during functional activity of choice with CGA and good safety  Short term goal 4: Pt will complete functional transfer during self care tasks with CGA and good safety  Short term goal 5: Pt will participate in 30+ minutes of therapeutic exercises/functional activities to increase safety and independence with self care tasks    SPEECH THERAPY      NUTRITION  Weight: 178 lb (80.7 kg) / Body mass index is 27.88 kg/m². Diet Rx: Dysphagia Soft and Bite Sized, Ensure High Protein twice daily. PO intake is adequate with % intake of most meals and supplements. Please see nutrition note for details. SOCIAL WORK ASSESSMENT  Assessment: Pt was living independently at home alone prior to hospital admission. He does not drive and walked everywhere he needed to go. He does have neighbors/friends who are willing to assist with pt needs upon discharge. Spoke with pt and Mike Carney regarding discharge plans. Pt prefers out-pt therapy at [address] [place] [address]; Mike Carney in agreement and able to provide transportation. Mike Carney stated he and the neighbors will be watching out for pt when he returns home and assist with needs. Mike Carney in agreement with discharge plan to home. List of community support services provided with a list of [facility] PCP clinics.          Pre-Admission Status:  Lives With: Alone  Type of Home: House  Home Layout: Two level, Bed/Bath upstairs, Laundry in basement  Home Access: Stairs to enter with rails  Entrance Stairs - Number of Steps: 4; 13-14 steps up to the second floor with railing on right side. Pt reports using cane on other side for stairs. Entrance Stairs - Rails: Both  Bathroom Shower/Tub: Tub only (upstairs, had stopped tub bathe,unable access- spongebath in the kitchen.)  Bathroom Toilet: Standard (upstairs- no toilet on main - Pt report voiding urine into container and emptied into the sink. Pt reports that he would give himself enough time when needing to have a bowel movement to get upstairs.)  Bathroom Equipment:  (Pt reports that friend is looking into installing grab bars in the bathroom)  Home Equipment: [employer], Standard walker  [employer] Help From: Neighbor  ADL Assistance: Independent (could not reach feet to bathe, don socks)  Homemaking Assistance: Needs assistance (Pt cooks, orders groceries, and clean )  Homemaking Responsibilities: Yes (ordered food online, did own cook, dishes, needed A laundry,)  Ambulation Assistance: Independent (with cane in house only)  Transfer Assistance: Independent  Active : No  Patient's  Info: Pt reports that he walks to close by places or his friend will drive him   IADL Comments: injury 2 months ago- prior indep all advanced adls. Pt reports that prior to injury he was sleeping on a flat bed and since injury he was sleeping on the main floor on the couch. (has track phone, keeps off)  Additional Comments: Friend lives across the street and is not working at this time. - mowed grass. Pt reports that he has a neighbor that is also able to assist as needed. Pt reports that he has siblings that live close by but his has not talked to them in years.      Family Education: No family available for education    Percentage Risk for Readmission: Low 0 - 18%   Risk of Unplanned Readmission:  11 %    Critical Items: None           Problem / Barrier Intervention / Plan  Results   Impaired fucntion ROM, strengthening, functional mobility training     Bed/bathroom 2nd floor ROm, strengthening, progress to stair training as able.     Altered ADL Status related to hip fracture  Training in use of devices, home safety strategies and use of modified care techniques to maximize self caer independence                                             Total Self Care Score    Total Mobility Score  Admission Score:  22      Admission Score:  30  Goal:  42/42         Goal:  89/90   `  Discharge Plan   Estimated Discharge Date: 8/27/21  Home evaluation needed?  Home Evaluation Indication (NO, Requires ReEval, YES/Date): No home evaluation need indicated for patient at this time  Overnight or Day Pass: No  Factors facilitating achievement of predicted outcomes: Friend support, Motivated, Cooperative and Pleasant  Barriers to the achievement of predicted outcomes: Decreased endurance, Skin Care, Wound Care and Medication managment    Functional Goals at discharge:  Predicted Outcome: Home alonePATIENT'S LEVEL OF ASSISTANCE: Modified independence  Discharge therapy goals:  PT: Long term goals  Time Frame for Long term goals : time of discharge  Long term goal 1: mod-I bed mobility  Long term goal 2: mod-I transfers  Long term goal 3: mod-I gait x 150ft with RW  Long term goal 4: flight of stairs with rail and cane mod-I  Long term goal 5: standing balance to Good for safe ADLs  OT:Long term goals  Time Frame for Long term goals : By discharge  Long term goal 1: Pt will complete BADLs with modified independence and good safety with use of AE as needed  Long term goal 2: Pt will complete functional transfers and mobility with modified independence and good safety with use of least restrictive device  Long term goal 3: Pt will tolerate standing 10+ minutes during funcitonal activity of choice with good safety  Long term goal 4: Pt will verbalize/demonstrate good understanding of home safety/fall prevention strategies to increase safety and independence with self care and mobility  Long term goal 5: Pt will verbalize/demonstrate good understanding of adaptive equipment/adaptive strategies/durable medical equipment to increase safety and independence with self care and mobility  Long term goals 6: Pt will complete simple meal prep/light house keeping with supervision and good safety  ST:     Team Members Present at Conference:  :  Nathan Wesley  Occupational Therapist: Mortimer Grimmer OT   Physical Therapist: Irina King PT  Speech Therapist:  N/A  Nurse: Addison Mustafa RN     Dietary/Nutrition: Jalen Prescott RD, LD  Pastoral Care: Johnny Carrera  CMG: Lisseth Weber RN    I approve the established interdisciplinary plan of care as documented within the medical record of Eamon Goss.     Lina Faustin MD

## 2021-08-23 NOTE — PROGRESS NOTES
Physical Therapy  Facility/Department: [facility] ACUTE REHAB  Daily Treatment Note  NAME: Paige Hannah  : 1940  MRN: [id number]    Date of Service: 2021    Discharge Recommendations:  Home with assist PRN   PT Equipment Recommendations  Equipment Needed: Yes  Walker: Rolling    Assessment   Body structures, Functions, Activity limitations: Decreased functional mobility ; Decreased ROM; Decreased strength;Decreased endurance;Decreased balance  Assessment: Impaired mobility due to L Hip Fx/ Hemiarthroplasty and decreased tolerance to activity (pt demonstrated decreased seated balance without back or UE support- needs trunck strengthening)  Specific instructions for Next Treatment: seated trunk strengthening/balance activities  Decision Making: Low Complexity  REQUIRES PT FOLLOW UP: Yes  Activity Tolerance  Activity Tolerance: Patient limited by endurance; Patient limited by fatigue  Activity Tolerance: Pt was fatigued today and required rest breaks     Patient Diagnosis(es): There were no encounter diagnoses. has a past medical history of Atrial fibrillation ([facility] has a past surgical history that includes hip surgery (Left, 2021) and Upper gastrointestinal endoscopy (N/A, 8/10/2021). Restrictions  Restrictions/Precautions  Restrictions/Precautions: Weight Bearing, Fall Risk  Required Braces or Orthoses?: No  Implants present? : Metal implants (L hemiarthroplasty)  Lower Extremity Weight Bearing Restrictions  Left Lower Extremity Weight Bearing: Weight Bearing As Tolerated     Subjective   General  Chart Reviewed: Yes  Additional Pertinent Hx: closed L hip fx  Response To Previous Treatment: Patient with no complaints from previous session. Family / Caregiver Present: No  Subjective  Subjective: Pt reported feeling confident in his ability, but is also getting fatigued.   Pain Screening  Patient Currently in Pain: Denies  Vital Signs  Patient Currently in Pain: Denies Orientation  Orientation  Overall Orientation Status: Within Normal Limits    Objective   Bed mobility  Scooting: Stand by assistance  Transfers  Sit to Stand: Contact guard assistance  Stand to sit: Contact guard assistance  Stand Pivot Transfers: Contact guard assistance  Lateral Transfers: Contact guard assistance  Comment: Floor transfer education and practice: pt was unable to sit on the floor and boost himself onto a box step behind him  Ambulation  Ambulation?: Yes  WB Status: WBAT  More Ambulation?: Yes  Ambulation 1  Surface: level tile  Device: Single point cane  Assistance: Stand by assistance  Quality of Gait: reminders to correct forward flexed posture, fair pt response  Gait Deviations: Decreased step length;Decreased step height; Increased FAITH  Distance: 240 feet  Ambulation 2  Surface - 2: level tile  Device 2: Single point cane  Assistance 2: Contact guard assistance  Quality of Gait 2: reminders to correct forward flexed posture, fair pt response  Gait Deviations: Increased FAITH; Decreased step length;Decreased step height  Distance: 240 feet  Stairs/Curb  Stairs?: Yes  Stairs  # Steps : 9  Stairs Height:  (7.5\")  Rails: Right ascending  Device: Single pt cane  Assistance: Contact guard assistance  Comment: rest break at top of steps before descending  Wheelchair Activities  Propulsion: Yes  Propulsion 1  Propulsion: Manual  Level: Level Tile  Method: RUE;STEPHEN  Description/ Details: WC management in room  Distance: 20 feet     Balance  Posture: Fair  Sitting - Static: Fair;+  Sitting - Dynamic: Fair;+  Standing - Static: Fair;+  Standing - Dynamic: Fair;+  Other exercises  Other exercises?: Yes  Other exercises 2: Seated BLE exercises: 15x (Light greenTheraband for all except LLE hip flexion)  Other exercises 3: Standing BLE exercises: 3-way hip 10x  Other exercises 4: Nustep L4 x 10 min (seat at 10; handle at 11)  Other exercises 5: UBE: 4min forward, 4min backward  Other exercises 7: Hurdles in // bars: 6 laps          Goals  Short term goals  Time Frame for Short term goals: 7-10 days  Short term goal 1: bed mobility with SBA  Short term goal 2: transfers with supervision  Short term goal 3: gait with RW/SBA x 75-100ft  Short term goal 4: standing balance to Fair+ for ADLs  Long term goals  Time Frame for Long term goals : time of discharge  Long term goal 1: mod-I bed mobility  Long term goal 2: mod-I transfers  Long term goal 3: mod-I gait x 150ft with RW  Long term goal 4: flight of stairs with rail and cane mod-I  Long term goal 5: standing balance to Good for safe ADLs  Patient Goals   Patient goals : get stronger    Plan    Plan  Times per week: 1.5hrs/day x 5-7days/wk  Specific instructions for Next Treatment: seated trunk strengthening/balance activities  Current Treatment Recommendations: Strengthening, ROM, Balance Training, Functional Mobility Training, Transfer Training, Stair training, Gait Training, Endurance Training, Safety Education & Training, Patient/Caregiver Education & Training, Home Exercise Program  Safety Devices  Type of devices:  All fall risk precautions in place, Gait belt, Patient at risk for falls     Therapy Time     08/23/21 1032 08/23/21 1430   PT Individual Minutes   Time In 1032 1430   Time Out 1115 1520   Minutes [place] Jacquelyn Bumpers, PTA

## 2021-08-23 NOTE — PROGRESS NOTES
[facility]    CONSULTATION / HISTORY AND PHYSICAL EXAMINATION            Date:   8/23/2021  Patient name:  Tammie Barrios  Date of admission:  8/13/2021  7:39 PM  MRN:   [id number]  Account:  [de-identified]  YOB: 1940  PCP:    No primary care provider on file. Room:   [address] [place] [address]  Code Status:    Full Code    Physician Requesting Consult: Davon Golden MD    Reason for Consult:  medical management    Chief Complaint:     No chief complaint on file. Hip pain    History Obtained From:     Patient medical record nursing staff    History of Present Illness:   Patient admitted to ARU for rehabilitation  He was admitted to [facility] after a mechanical fall, he was found to have left hip fracture, underwent surgery, he was found to have new onset atrial fibrillation,  During hospital stay, patient had drop in hemoglobin, stool was positive for occult blood. Patient was evaluated by GI physician, underwent EGD, patient was found to have nonbleeding duodenal ulcer, not considered a candidate for anticoagulation with history of GI bleed  Patient was started on amoxicillin and Flagyl, with positive H. pylori infection  Patient doing much better  Working with physical therapy    8/18  Patient resting comfortably  He worked with physical therapy in the morning and afternoon  Pain is improved  Past Medical History:     Past Medical History:   Diagnosis Date    Atrial fibrillation ([address]     with RVR        Past Surgical History:     Past Surgical History:   Procedure Laterality Date    HIP SURGERY Left 8/5/2021    HIP HEMIARTHROPLASTY performed by Rex Arvizu MD at [address] N/A 8/10/2021    EGD BIOPSY performed by Casa Jacobs MD at [facility]        Medications Prior to Admission:     Prior to Admission medications    Medication Sig Start Date End Date Taking?  Authorizing Provider   ibuprofen encounter ([facility] Cielo Faith 08/13/2021    Atrial fibrillation with RVR ([facility] [I48.91] 08/03/2021       Plan:     1. Left hip fracture s/p surgery, pain is controlled  2. Atrial fibrillation, rate controlled, not on anticoagulation with history of GI bleed, on amiodarone, Cardizem  3. GI bleed, s/p EGD, on Protonix, twice a day,  4. H. pylori infection, on amoxicillin and Flagyl  5. Not on chemoprophylaxis with history of GI bleed  6. Complaints of BPH, starting patient on [address] German MD  8/23/2021  3:18 PM    Copy sent to Dr. Kirby primary care provider on file. Please note that this chart was generated using voice recognition Dragon dictation software. Although every effort was made to ensure the accuracy of this automated transcription, some errors in transcription may have occurred.

## 2021-08-23 NOTE — PROGRESS NOTES
[address]   ACUTE REHABILITATION OCCUPATIONAL THERAPY  DAILY NOTE    Date: 21  Patient Name: Shy Sumner      Room: 7101/6839-45    MRN: [id number]   : 1940  (79 y.o.)  Gender: male   Referring Practitioner: Kelly Choe MD  Diagnosis: Left hip fracture, hospitalization prolonged (initial admit 8-3)  due to afib, rhabdomyolosis, GI bleed, s/p EGD, on Protonix, twice a day,  Additional Pertinent Hx: Karla Lux is a 79 y. o. male with no known past medical history admitted to [facility] 8/3/2021.  He initially presented after a fall from standing at home. He reportedly was laying on the floor for 5 days before he was able to call for help. He was found to have atrial fibrillation with RVR in the ED and was started on diltiazem and heparin drips. He was also found to have left hip fracture and rhabdomylolysis. He has multiple wounds, including buttock, sacrum, bilateral elbow, and bilateral knee wounds. He underwent left hip hemiarthroplasty on 21 (Dr. Meliza Mir). Pt now presents to ARU 21     Restrictions  Restrictions/Precautions: Weight Bearing, Fall Risk  Implants present? : Metal implants (L hemiarthroplasty)  Left Lower Extremity Weight Bearing: Weight Bearing As Tolerated  Required Braces or Orthoses?: No  Equipment Used: Wheelchair, Bed    Subjective  Comments: Pt pleasant and cooperative. Patient Currently in Pain: Denies  Restrictions/Precautions: Weight Bearing; Fall Risk  Overall Orientation Status: Within Functional Limits          Objective  Cognition  Overall Cognitive Status: WFL  Perception  Overall Perceptual Status: WFL  Balance  Sitting Balance: Independent  Standing Balance: Stand by assistance (supervision to stand by )  Transfers  Sit to stand: Supervision  Stand to sit: Supervision  Standing Balance  Time: AM: 1-2 min, <1 min x 2 PM: ~2 min   Activity: ADL's, mobility, therapeutic standing activity in PM with 0-1 UE support  Comment: no LOB  Functional Mobility  Functional - Mobility Device: Rolling Walker  Activity: To/from bathroom  Assist Level: Stand by assistance  Functional Mobility Comments: good safety noted. Shower Transfers  Shower - Transfer From: Jah Verduzco (RW)  Shower - Transfer Type: To and From  Shower - Transfer To: Transfer tub bench  Shower - Technique: Ambulating  Shower Transfers: Stand by assistance     Type of ROM/Therapeutic Exercise  Type of ROM/Therapeutic Exercise: Free weights (Pt engaged in BUE ex's with 3# dumbell x15 reps)  Exercises  Scapular Protraction: x  Scapular Retraction: x  Shoulder Flexion: x  Shoulder Extension: x  Horizontal ABduction: x  Horizontal ADduction: x  Elbow Flexion: x  Elbow Extension: x  Wrist Flexion: x  Wrist Extension: x  Other: green theraflex bar x15 reps; upward/downward bends, twisting. ADL  Equipment Provided: Long-handled sponge;Reacher;Sock aid  Feeding: Dentures; Modified independent   Grooming: Modified independent   UE Bathing: Setup  LE Bathing: Supervision;Stand by assistance  UE Dressing: Setup  LE Dressing: Stand by assistance (discussed possible use of elastic shoe laces, pt pleasantly declined due to having velcro shoes and stating these will be what he wears mostly. Pt did not wear shoes this date per request.)  Toileting: Stand by assistance (per pt report, no A with hygiene this date. )  Additional Comments: improved tolerance, compensatory techniques with AE this date. Assessment  Performance deficits / Impairments: Decreased ADL status; Decreased functional mobility ; Decreased strength;Decreased safe awareness;Decreased endurance;Decreased balance;Decreased high-level IADLs  Prognosis: Good  Discharge Recommendations: Continue to assess pending progress  Activity Tolerance: Patient Tolerated treatment well  Safety Devices in place: Yes  Equipment Recommendations  Equipment Needed: Yes  3-in-1 Commode: x  Reacher: x  Sock Aid: x  Long-handled Shoehorn: x  Other: phone lanyard or belt buckle for transport            Plan  Plan  Times per week: 5-7  Times per day: Twice a day  Current Treatment Recommendations: Self-Care / ADL, Strengthening, Balance Training, Functional Mobility Training, Endurance Training, Safety Education & Training, Patient/Caregiver Education & Training, Equipment Evaluation, Education, & procurement, Home Management Training  Patient Goals   Patient goals : \"To be able to do all that you just mentioned. \" (in regards to independence with ADL and IADL tasks)  Short term goals  Time Frame for Short term goals: By 1 week   Short term goal 1: Pt will complete lower body dressing/bathing with min A and good safety with use of AE as needed  Short term goal 2: Pt will complete functional mobility during self care tasks with SBA and good safety with use of least restrictive device  Short term goal 3: Pt will tolerate standing 5+ minutes during functional activity of choice with CGA and good safety  Short term goal 4: Pt will complete functional transfer during self care tasks with CGA and good safety  Short term goal 5: Pt will participate in 30+ minutes of therapeutic exercises/functional activities to increase safety and independence with self care tasks  Long term goals  Time Frame for Long term goals : By discharge  Long term goal 1: Pt will complete BADLs with modified independence and good safety with use of AE as needed  Long term goal 2: Pt will complete functional transfers and mobility with modified independence and good safety with use of least restrictive device  Long term goal 3: Pt will tolerate standing 10+ minutes during funcitonal activity of choice with good safety  Long term goal 4: Pt will verbalize/demonstrate good understanding of home safety/fall prevention strategies to increase safety and independence with self care and mobility  Long term goal 5: Pt will verbalize/demonstrate good understanding of adaptive equipment/adaptive strategies/durable medical equipment to increase safety and independence with self care and mobility  Long term goals 6: Pt will complete simple meal prep/light house keeping with supervision and good safety        08/23/21 1052 08/23/21 1512   OT Individual Minutes   Time In 0930 1400   Time Out 1030 1430   Minutes 60 30     Electronically signed by SHANELLE Steiner on 8/23/21 at 3:31 PM EDT

## 2021-08-23 NOTE — PLAN OF CARE
Nutrition Problem #1: Increased nutrient needs  Intervention: Food and/or Nutrient Delivery: Continue Current Diet, Continue Oral Nutrition Supplement  Nutritional Goals: Meet over 75% of estimated nutrition needs

## 2021-08-23 NOTE — PROGRESS NOTES
Physical Medicine & Rehabilitation  Progress Note      Subjective:      Jonathan Bean is a 79 y.o. male with left hip fracture. He reports doing well today. He denies any pain in the left hip and any other acute concerns. He states that therapies are going well. He reports that urinary frequency has improved after starting flomax. ROS:  Denies fevers, chills, sweats. No chest pain, palpitations, lightheadedness. Denies coughing, wheezing or shortness of breath. Denies abdominal pain, nausea, diarrhea or constipation. No new areas of joint pain. Denies new areas of numbness or weakness. Denies new anxiety or depression issues. No new skin problems. Rehabilitation:   Progressing in therapies. PT:  Restrictions/Precautions: Weight Bearing, Fall Risk  Implants present? : Metal implants (L hemiarthroplasty)  Left Lower Extremity Weight Bearing: Weight Bearing As Tolerated   Transfers  Sit to Stand: Contact guard assistance  Stand to sit: Contact guard assistance  Bed to Chair: Contact guard assistance (with RW)  Stand Pivot Transfers: Contact guard assistance  Lateral Transfers: Contact guard assistance  Comment: Floor transfer education and practice: pt was unable to sit on the floor and boost himself onto a box step behind him  WB Status: WBAT  Ambulation 1  Surface: level tile  Device: Single point cane  Other Apparatus: Wheelchair follow  Assistance: Stand by assistance  Quality of Gait: reminders to correct forward flexed posture, fair pt response  Gait Deviations: Decreased step length, Decreased step height, Increased FAITH  Distance: 240 feet  Comments: Seated rest break in between.  O2 99%, HR 58 post.    Transfers  Sit to Stand: Contact guard assistance  Stand to sit: Contact guard assistance  Bed to Chair: Contact guard assistance (with RW)  Stand Pivot Transfers: Contact guard assistance  Lateral Transfers: Contact guard assistance  Comment: Floor transfer education and practice: pt was unable to sit on the floor and boost himself onto a box step behind him  Ambulation  Ambulation?: Yes  WB Status: WBAT  More Ambulation?: Yes  Ambulation 1  Surface: level tile  Device: Single point cane  Other Apparatus: Wheelchair follow  Assistance: Stand by assistance  Quality of Gait: reminders to correct forward flexed posture, fair pt response  Gait Deviations: Decreased step length, Decreased step height, Increased FAITH  Distance: 240 feet  Comments: Seated rest break in between. O2 99%, HR 58 post.    Surface: level tile  Ambulation 1  Surface: level tile  Device: Single point cane  Other Apparatus: Wheelchair follow  Assistance: Stand by assistance  Quality of Gait: reminders to correct forward flexed posture, fair pt response  Gait Deviations: Decreased step length, Decreased step height, Increased FAITH  Distance: 240 feet  Comments: Seated rest break in between. O2 99%, HR 58 post.    OT:  ADL  Equipment Provided: Long-handled sponge, Reacher, Sock aid  Feeding: Dentures, Modified independent   Grooming: Modified independent   UE Bathing: Setup  LE Bathing: Supervision, Stand by assistance  UE Dressing: Setup  LE Dressing: Stand by assistance (discussed possible use of elastic shoe laces, pt pleasantly )  Toileting: Stand by assistance (per pt report, no A with hygiene this date. )  Additional Comments: improved tolerance, compensatory techniques with AE this date. Instrumental ADL's  Instrumental ADLs: Yes     Balance  Sitting Balance: Independent  Standing Balance: Stand by assistance (supervision to stand by )   Standing Balance  Time: AM: 1-2 min, <1 min x 2 PM: ~2 min   Activity: ADL's, mobility, therapeutic standing activity in PM with 0-1 UE support  Comment: no LOB  Functional Mobility  Functional - Mobility Device: Rolling Walker  Activity: To/from bathroom  Assist Level: Stand by assistance  Functional Mobility Comments: good safety noted.       Bed mobility  Bridging: Minimal assistance  Rolling to tartrate (LOPRESSOR) tablet 50 mg, 50 mg, Oral, BID  pantoprazole (PROTONIX) tablet 40 mg, 40 mg, Oral, BID AC  furosemide (LASIX) tablet 20 mg, 20 mg, Oral, BID      Objective:  BP (!) 121/53   Pulse 64   Temp 97.8 °F (36.6 °C) (Oral)   Resp 18   Ht 5' 7\" (1.702 m)   Wt 178 lb (80.7 kg)   SpO2 98%   BMI 27.88 kg/m²       GEN: Well developed, well nourished, no acute distress  HEENT: NCAT. EOM grossly intact. Hearing grossly intact. Mucous membranes pink and moist.  RESP: Normal breath sounds with no wheezing, rales, or rhonchi. Respirations WNL and unlabored. CV: Regular rate and rhythm. No murmurs, rubs, or gallops. ABD: Soft, non-distended, BS+ and equal.  NEURO: Alert. Speech fluent. Sensation to light touch intact. MSK:  Muscle tone and bulk are normal bilaterally. Moving bilateral upper limbs with at least antigravity strength. Strength 4+/5 in the left lower limb. LIMBS:  Edema present in bilateral lower limbs. SKIN: Warm and dry with good turgor. PSYCH: Mood WNL. Affect WNL. Appropriately interactive. Diagnostics:     CBC:   Recent Labs     08/23/21  0702   WBC 4.8   RBC 2.93*   HGB 9.7*   HCT 29.9*   .0*   RDW 17.8*        BMP:   No results for input(s): NA, K, CL, CO2, PHOS, BUN, CREATININE, GLUCOSE in the last 72 hours. Invalid input(s): CA  BNP: No results for input(s): BNP in the last 72 hours. PT/INR: No results for input(s): PROTIME, INR in the last 72 hours. APTT: No results for input(s): APTT in the last 72 hours. CARDIAC ENZYMES: No results for input(s): CKMB, CKMBINDEX, TROPONINT in the last 72 hours. Invalid input(s): CKTOTAL;3  FASTING LIPID PANEL:No results found for: CHOL, HDL, TRIG  LIVER PROFILE: No results for input(s): AST, ALT, ALB, BILIDIR, BILITOT, ALKPHOS in the last 72 hours.        Impression/Plan:   Impaired ADLs, gait, and mobility due to:    1. L hip fracture:  S/p hemiarthroplasty by Dr. Ariana Barba 8/5/21. PT/OT for gait, mobility, strengthening, endurance, ADLs, and self care. WBAT. Has Tylenol or Norco prn pain. 2. A Fib with RVR: rate controlled. On amiodarone, Cardizem, Metoprolol. No anticoagulation per GI. 3. Rhabdomyolysis: Resolved. Monitoring BMP weekly  4. Anemia secondary to GI bleed/ulcer: GI following. On PPI BID, soft diet, received 1 unit PRBCs 8/13/21. Monitoring q MWF.  5. H Pylori ulcer: on 14 days Flagyl and amoxicillin until 8/29/21 per GI. 6. Wounds/abrasions: nursing interventions, wound care consulted to follow  7. Bilateral lower limb edema:  Trialing ACE wrapping. 8. Insomnia: has melatonin prn  9. Hypokalemia: Improved. On Lasix. Monitoring and will replace prn but has been stable. 10. Urinary frequency: on trial of flomax and consider checking PVRs. Some contribution from Lasix anticipated. No current signs/symptoms of UTI. 11. Pulmonary infiltrates/effusion: using incentive spirometer  12. Bowel Management: Miralax daily, senokot prn, dulcolax prn. 13. DVT Prophylaxis:  SCD's while in bed. SUE's and chemoprophylaxis contraindicated  14.  Internal medicine for medical management      Electronically signed by Donna Schmitz MD on 8/23/2021 at 11:21 PM

## 2021-08-24 PROCEDURE — 99232 SBSQ HOSP IP/OBS MODERATE 35: CPT | Performed by: STUDENT IN AN ORGANIZED HEALTH CARE EDUCATION/TRAINING PROGRAM

## 2021-08-24 PROCEDURE — 97535 SELF CARE MNGMENT TRAINING: CPT

## 2021-08-24 PROCEDURE — 6370000000 HC RX 637 (ALT 250 FOR IP): Performed by: PHYSICAL MEDICINE & REHABILITATION

## 2021-08-24 PROCEDURE — 6370000000 HC RX 637 (ALT 250 FOR IP)

## 2021-08-24 PROCEDURE — 97116 GAIT TRAINING THERAPY: CPT

## 2021-08-24 PROCEDURE — 97110 THERAPEUTIC EXERCISES: CPT

## 2021-08-24 PROCEDURE — 6370000000 HC RX 637 (ALT 250 FOR IP): Performed by: NURSE PRACTITIONER

## 2021-08-24 PROCEDURE — 1180000000 HC REHAB R&B

## 2021-08-24 PROCEDURE — 97530 THERAPEUTIC ACTIVITIES: CPT

## 2021-08-24 PROCEDURE — 99232 SBSQ HOSP IP/OBS MODERATE 35: CPT | Performed by: INTERNAL MEDICINE

## 2021-08-24 RX ADMIN — AMOXICILLIN 1000 MG: 500 CAPSULE ORAL at 10:38

## 2021-08-24 RX ADMIN — AMIODARONE HYDROCHLORIDE 200 MG: 200 TABLET ORAL at 07:41

## 2021-08-24 RX ADMIN — METRONIDAZOLE 500 MG: 500 TABLET ORAL at 05:48

## 2021-08-24 RX ADMIN — METRONIDAZOLE 500 MG: 500 TABLET ORAL at 13:44

## 2021-08-24 RX ADMIN — PANTOPRAZOLE SODIUM 40 MG: 40 TABLET, DELAYED RELEASE ORAL at 16:35

## 2021-08-24 RX ADMIN — METOPROLOL TARTRATE 50 MG: 50 TABLET, FILM COATED ORAL at 07:41

## 2021-08-24 RX ADMIN — DILTIAZEM HYDROCHLORIDE 30 MG: 30 TABLET, FILM COATED ORAL at 21:10

## 2021-08-24 RX ADMIN — PANTOPRAZOLE SODIUM 40 MG: 40 TABLET, DELAYED RELEASE ORAL at 05:48

## 2021-08-24 RX ADMIN — POLYETHYLENE GLYCOL 3350 17 G: 17 POWDER, FOR SOLUTION ORAL at 07:41

## 2021-08-24 RX ADMIN — METOPROLOL TARTRATE 50 MG: 50 TABLET, FILM COATED ORAL at 21:08

## 2021-08-24 RX ADMIN — Medication: at 21:10

## 2021-08-24 RX ADMIN — AMOXICILLIN 1000 MG: 500 CAPSULE ORAL at 21:08

## 2021-08-24 RX ADMIN — FUROSEMIDE 20 MG: 20 TABLET ORAL at 16:35

## 2021-08-24 RX ADMIN — FUROSEMIDE 20 MG: 20 TABLET ORAL at 07:41

## 2021-08-24 RX ADMIN — Medication 3 MG: at 21:08

## 2021-08-24 RX ADMIN — TAMSULOSIN HYDROCHLORIDE 0.4 MG: 0.4 CAPSULE ORAL at 07:41

## 2021-08-24 RX ADMIN — Medication: at 07:45

## 2021-08-24 RX ADMIN — METRONIDAZOLE 500 MG: 500 TABLET ORAL at 21:09

## 2021-08-24 ASSESSMENT — PAIN SCALES - GENERAL
PAINLEVEL_OUTOF10: 0
PAINLEVEL_OUTOF10: 0

## 2021-08-24 NOTE — PLAN OF CARE
Problem: Skin Integrity:  Goal: Will show no infection signs and symptoms  Description: Will show no infection signs and symptoms  Outcome: Ongoing     Problem: Falls - Risk of:  Goal: Will remain free from falls  Description: Will remain free from falls  Outcome: Ongoing     Problem: Pain:  Goal: Pain level will decrease  Description: Pain level will decrease  Outcome: Ongoing     Problem: Musculor/Skeletal Functional Status  Goal: Highest potential functional level  Outcome: Ongoing

## 2021-08-24 NOTE — PROGRESS NOTES
[facility]: KIRSTIE MOYA   ACUTE REHABILITATION OCCUPATIONAL THERAPY  DAILY NOTE    Date: 21  Patient Name: Baron Walsh      Room: 2951/6626-53    MRN: [id number]   : 1940  (79 y.o.)  Gender: male   Referring Practitioner: Nicolle Perales MD  Diagnosis: Left hip fracture, hospitalization prolonged (initial admit 8-3)  due to afib, rhabdomyolosis, GI bleed, s/p EGD, on Protonix, twice a day,  Additional Pertinent Hx: Cintia Lux is a 79 y. o. male with no known past medical history admitted to [facility] 8/3/2021.  He initially presented after a fall from standing at home. He reportedly was laying on the floor for 5 days before he was able to call for help. He was found to have atrial fibrillation with RVR in the ED and was started on diltiazem and heparin drips. He was also found to have left hip fracture and rhabdomylolysis. He has multiple wounds, including buttock, sacrum, bilateral elbow, and bilateral knee wounds. He underwent left hip hemiarthroplasty on 21 (Dr. Carolina Mckeon). Pt now presents to ARU 21     Restrictions  Restrictions/Precautions: Weight Bearing, Fall Risk  Implants present? : Metal implants (L hemiarthroplasty )  Left Lower Extremity Weight Bearing: Weight Bearing As Tolerated  Required Braces or Orthoses?: No  Equipment Used: Wheelchair, Bed    Subjective  Comments: Pt pleasant and cooperative. Patient Currently in Pain: Denies  Restrictions/Precautions: Weight Bearing; Fall Risk  Overall Orientation Status: Within Functional Limits          Objective  Cognition  Overall Cognitive Status: WFL  Perception  Overall Perceptual Status: WFL  Balance  Sitting Balance: Independent  Standing Balance: Stand by assistance  Bed mobility  Sit to Supine: Supervision  Transfers  Sit to stand: Supervision  Stand to sit: Supervision  Standing Balance  Time: AM: 1-2 min, <1 min x 4   Activity: AM: functional tasks/transfers.    Comment: no LOB, good safety with w/c brakes and hand placement. Functional Mobility  Functional - Mobility Device: Rolling Walker  Activity: To/from bathroom; Other;Transport items; Retrieve items     Type of ROM/Therapeutic Exercise  Type of ROM/Therapeutic Exercise: Free weights (3#, x15 reps. )  Comment: Pt engaged in UB ex's this date to increase overall strength and endurance for functional tasks. Exercises  Scapular Protraction: x  Scapular Retraction: x  Shoulder Flexion: x  Shoulder Extension: x  Horizontal ABduction: x  Horizontal ADduction: x  Elbow Flexion: x  Elbow Extension: x  Wrist Flexion: x  Wrist Extension: x  Other: green theraflex bar x15 reps; upward/downward bends, twisting. Light Housekeeping  Light Housekeeping Level: Walker (RW)  Light Housekeeping Level of Assistance: Stand by assistance  Light Housekeeping: completed kitchen mobility and faciliated tasks based on discussion for daily needs at home with pt. Pt reports his oven and refrigerator are for storage only. Pt does all of his cooking on stovetop with dry goods and water. Pt faciliated filling pot of water and putting on stove as would be set up at home, this was done in good safety. Writer also educated and discussed additional EC/WS/safety technique with good feedback in return from pt. ADL  Equipment Provided: Reacher  Feeding: Dentures; Modified independent   Grooming: Modified independent  (shaving seated w/c level )  UE Bathing: Setup (seated w/c level )  LE Bathing: Supervision (standing sinkside. )  UE Dressing: Setup  LE Dressing: Minimal assistance (Assist doffing cuffed pants)  Toileting: None     Instrumental ADL's  Instrumental ADLs: Yes  Light Housekeeping  Light Housekeeping Level: Walker (RW)  Light Housekeeping Level of Assistance: Stand by assistance  Light Housekeeping: completed kitchen mobility and faciliated tasks based on discussion for daily needs at home with pt. Pt reports his oven and refrigerator are for storage only.  Pt does all of his cooking on stovetop with dry goods and water. Pt faciliated filling pot of water and putting on stove as would be set up at home, this was done in good safety. Writer also educated and discussed additional EC/WS/safety technique with good feedback in return from pt. Assessment  Performance deficits / Impairments: Decreased ADL status; Decreased functional mobility ; Decreased strength;Decreased safe awareness;Decreased endurance;Decreased balance;Decreased high-level IADLs  Prognosis: Good  Discharge Recommendations: Continue to assess pending progress  Activity Tolerance: Patient Tolerated treatment well  Safety Devices in place: Yes  Type of devices: All fall risk precautions in place;Call light within reach;Gait belt;Patient at risk for falls; Left in chair;Left in bed;Nurse notified  Equipment Recommendations  Equipment Needed: Yes  Reacher: x  Sock Aid: x  Long-handled Shoehorn: x  Other: phone lanyard or belt buckle for transport        Plan  Plan  Times per week: 5-7  Times per day: Twice a day  Current Treatment Recommendations: Self-Care / ADL, Strengthening, Balance Training, Functional Mobility Training, Endurance Training, Safety Education & Training, Patient/Caregiver Education & Training, Equipment Evaluation, Education, & procurement, Home Management Training  Patient Goals   Patient goals : \"To be able to do all that you just mentioned. \" (in regards to independence with ADL and IADL tasks)  Short term goals  Time Frame for Short term goals: By 1 week   Short term goal 1: Pt will complete lower body dressing/bathing with min A and good safety with use of AE as needed  Short term goal 2: Pt will complete functional mobility during self care tasks with SBA and good safety with use of least restrictive device  Short term goal 3: Pt will tolerate standing 5+ minutes during functional activity of choice with CGA and good safety  Short term goal 4: Pt will complete functional transfer during self care tasks with CGA and good safety  Short term goal 5: Pt will participate in 30+ minutes of therapeutic exercises/functional activities to increase safety and independence with self care tasks  Long term goals  Time Frame for Long term goals : By discharge  Long term goal 1: Pt will complete BADLs with modified independence and good safety with use of AE as needed  Long term goal 2: Pt will complete functional transfers and mobility with modified independence and good safety with use of least restrictive device  Long term goal 3: Pt will tolerate standing 10+ minutes during funcitonal activity of choice with good safety  Long term goal 4: Pt will verbalize/demonstrate good understanding of home safety/fall prevention strategies to increase safety and independence with self care and mobility  Long term goal 5: Pt will verbalize/demonstrate good understanding of adaptive equipment/adaptive strategies/durable medical equipment to increase safety and independence with self care and mobility  Long term goals 6: Pt will complete simple meal prep/light house keeping with supervision and good safety        08/24/21 1303 08/24/21 1605   OT Individual Minutes   Time In 0940 1306   Time Out 1040 1344   Minutes 60 38     Electronically signed by SHANELLE Steiner on 8/24/21 at 4:24 PM EDT

## 2021-08-24 NOTE — PROGRESS NOTES
Physical Medicine & Rehabilitation  Progress Note      Subjective:      Osmel Denise is a 79 y.o. male with left hip fracture. He reports doing well today. He is using the arm ergometer in therapy at the time of evaluation. He states that he had some mild back pain after practicing stairs today, which has already improved. Discussed that he could use ice as needed for pain; has tylenol and norco as needed for pain also. He denies any other acute concerns. ROS:  Denies fevers, chills, sweats. No chest pain, palpitations, lightheadedness. Denies coughing, wheezing or shortness of breath. Denies abdominal pain, nausea, diarrhea or constipation. No new areas of joint pain. Denies new areas of numbness or weakness. Denies new anxiety or depression issues. No new skin problems. Rehabilitation:   Progressing in therapies. PT:  Restrictions/Precautions: Weight Bearing, Fall Risk  Implants present? : Metal implants (L hemiarthroplasty )  Left Lower Extremity Weight Bearing: Weight Bearing As Tolerated   Transfers  Sit to Stand: Contact guard assistance  Stand to sit: Contact guard assistance  Bed to Chair: Contact guard assistance  Stand Pivot Transfers: Contact guard assistance  Lateral Transfers: Contact guard assistance  Comment: Floor transfer education and practice: pt was unable to sit on the floor and boost himself onto a box step behind him  WB Status: WBAT  Ambulation 1  Surface: level tile  Device: Rolling Walker  Other Apparatus: Wheelchair follow  Assistance: Supervision  Quality of Gait: Patient would benefit with use of rolling walker for gait. Gait Deviations: Decreased step length, Decreased step height, Increased FAITH  Distance: 200 ft; short distances within gym. Comments: Patient educated on the use and safety of a rolling walker. Patient demonstrated understanding.     Transfers  Sit to Stand: Contact guard assistance  Stand to sit: Contact guard assistance  Bed to Chair: Contact guard assistance  Stand Pivot Transfers: Contact guard assistance  Lateral Transfers: Contact guard assistance  Comment: Floor transfer education and practice: pt was unable to sit on the floor and boost himself onto a box step behind him  Ambulation  Ambulation?: Yes  WB Status: WBAT  More Ambulation?: Yes  Ambulation 1  Surface: level tile  Device: Rolling Walker  Other Apparatus: Wheelchair follow  Assistance: Supervision  Quality of Gait: Patient would benefit with use of rolling walker for gait. Gait Deviations: Decreased step length, Decreased step height, Increased FAITH  Distance: 200 ft; short distances within gym. Comments: Patient educated on the use and safety of a rolling walker. Patient demonstrated understanding. Surface: level tile  Ambulation 1  Surface: level tile  Device: Rolling Walker  Other Apparatus: Wheelchair follow  Assistance: Supervision  Quality of Gait: Patient would benefit with use of rolling walker for gait. Gait Deviations: Decreased step length, Decreased step height, Increased FAITH  Distance: 200 ft; short distances within gym. Comments: Patient educated on the use and safety of a rolling walker. Patient demonstrated understanding. OT:  ADL  Equipment Provided: Reacher  Feeding: Dentures, Modified independent   Grooming: Modified independent  (shaving seated w/c level )  UE Bathing: Setup (seated w/c level )  LE Bathing: Supervision (standing sinkside. )  UE Dressing: Setup  LE Dressing: Minimal assistance (Assist doffing cuffed pants)  Toileting: None  Additional Comments: improved tolerance, compensatory techniques with AE this date. Instrumental ADL's  Instrumental ADLs: Yes     Balance  Sitting Balance: Independent  Standing Balance: Stand by assistance   Standing Balance  Time: AM: 1-2 min, <1 min x 4   Activity: AM: functional tasks/transfers. Comment: no LOB, good safety with w/c brakes and hand placement.    Functional Mobility  Functional - Mobility Device: Rolling Walker  Activity: To/from bathroom, Other, Transport items, Retrieve items  Assist Level: Stand by assistance  Functional Mobility Comments: good safety noted. Bed mobility  Bridging: Minimal assistance  Rolling to Left: Stand by assistance  Rolling to Right: Stand by assistance  Supine to Sit: Stand by assistance  Sit to Supine: Supervision  Scooting: Stand by assistance  Comment: increased time  Transfers  Stand Step Transfers: Stand by assistance  Stand Pivot Transfers: Stand by assistance  Sit to stand: Supervision  Stand to sit: Supervision  Transfer Comments: single VC for hand placement for sit to stand today   Toilet Transfers  Toilet - Technique: Ambulating  Equipment Used: Raised toilet seat without rails  Toilet Transfer: Stand by assistance  Toilet Transfers Comments: BSC recommended for home; urinals for overnight voiding     Shower Transfers  Shower - Transfer From: Yon CONWAY)  Shower - Transfer Type: To and From  Shower - Transfer To: Transfer tub bench  Shower - Technique: Ambulating  Shower Transfers: Stand by assistance  Shower Transfers Comments: Verbal cues for safety and sequencing over ramp and threshold with increased time.    Wheelchair Bed Transfers  Wheelchair/Bed - Technique: Stand step  Equipment Used: Wheelchair, Bed  Level of Asssistance: Stand by assistance    SPEECH:      Current Medications:   Current Facility-Administered Medications: tamsulosin (FLOMAX) capsule 0.4 mg, 0.4 mg, Oral, Daily  Hydrocerin cream CREA, , Topical, BID  metroNIDAZOLE (FLAGYL) tablet 500 mg, 500 mg, Oral, 3 times per day  amoxicillin (AMOXIL) capsule 1,000 mg, 1,000 mg, Oral, 2 times per day  acetaminophen (TYLENOL) tablet 650 mg, 650 mg, Oral, Q4H PRN  polyethylene glycol (GLYCOLAX) packet 17 g, 17 g, Oral, Daily  senna (SENOKOT) tablet 17.2 mg, 2 tablet, Oral, Daily PRN  bisacodyl (DULCOLAX) suppository 10 mg, 10 mg, Rectal, Daily PRN  amiodarone (CORDARONE) tablet 200 mg, 200 mg, Oral, Daily  dilTIAZem (CARDIZEM) tablet 30 mg, 30 mg, Oral, 2 times per day  HYDROcodone-acetaminophen (NORCO) 5-325 MG per tablet 1 tablet, 1 tablet, Oral, Q6H PRN  melatonin tablet 3 mg, 3 mg, Oral, Nightly PRN  metoprolol tartrate (LOPRESSOR) tablet 50 mg, 50 mg, Oral, BID  pantoprazole (PROTONIX) tablet 40 mg, 40 mg, Oral, BID AC  furosemide (LASIX) tablet 20 mg, 20 mg, Oral, BID      Objective:  BP (!) 114/53   Pulse 65   Temp 98.2 °F (36.8 °C) (Oral)   Resp 16   Ht 5' 7\" (1.702 m)   Wt 178 lb (80.7 kg)   SpO2 97%   BMI 27.88 kg/m²       GEN: Well developed, well nourished, no acute distress  HEENT: NCAT. EOM grossly intact. Hearing grossly intact. RESP: Normal breath sounds with no wheezing, rales, or rhonchi. Respirations WNL and unlabored. CV: Regular rate and rhythm. No murmurs, rubs, or gallops. ABD: Soft, non-distended, BS+ and equal.  NEURO: Alert. Speech fluent. MSK:  Muscle tone and bulk are normal bilaterally. Using arm ergometer at the time of evaluation. LIMBS:  Edema present in bilateral lower limbs. SKIN: Warm and dry with good turgor. PSYCH: Mood WNL. Affect WNL. Appropriately interactive. Diagnostics:     CBC:   Recent Labs     08/23/21  0702   WBC 4.8   RBC 2.93*   HGB 9.7*   HCT 29.9*   .0*   RDW 17.8*        BMP:   No results for input(s): NA, K, CL, CO2, PHOS, BUN, CREATININE, GLUCOSE in the last 72 hours. Invalid input(s): CA  BNP: No results for input(s): BNP in the last 72 hours. PT/INR: No results for input(s): PROTIME, INR in the last 72 hours. APTT: No results for input(s): APTT in the last 72 hours. CARDIAC ENZYMES: No results for input(s): CKMB, CKMBINDEX, TROPONINT in the last 72 hours. Invalid input(s): CKTOTAL;3  FASTING LIPID PANEL:No results found for: CHOL, HDL, TRIG  LIVER PROFILE: No results for input(s): AST, ALT, ALB, BILIDIR, BILITOT, ALKPHOS in the last 72 hours.        Impression/Plan:   Impaired ADLs, gait, and mobility due to:    1. L hip fracture:  S/p hemiarthroplasty by Dr. Nora Rubio 8/5/21. PT/OT for gait, mobility, strengthening, endurance, ADLs, and self care. WBAT. Has Tylenol or Norco prn pain. Will plan to consult ortho for follow up. 2. A Fib with RVR: rate controlled. On amiodarone, Cardizem, Metoprolol. No anticoagulation per GI. 3. Rhabdomyolysis: Resolved. Monitoring BMP weekly  4. Anemia secondary to GI bleed/ulcer: GI following. On PPI BID, soft diet, received 1 unit PRBCs 8/13/21. Monitoring q MWF.  5. H Pylori ulcer: on 14 days Flagyl and amoxicillin until 8/29/21 per GI. 6. Wounds/abrasions: nursing interventions, wound care consulted to follow  7. Bilateral lower limb edema:  Trialing ACE wrapping. 8. Insomnia: has melatonin prn  9. Hypokalemia: Improved. On Lasix. Monitoring and will replace prn but has been stable. 10. Urinary frequency:  Improved. On flomax. Some contribution from Lasix anticipated. 11. Pulmonary infiltrates/effusion: using incentive spirometer  12. Bowel Management: Miralax daily, senokot prn, dulcolax prn. 13. DVT Prophylaxis:  SCD's while in bed. SUE's and chemoprophylaxis contraindicated  14. Internal medicine for medical management      Kayla Islas was evaluated today and a DME order was entered for a wheeled walker because he requires this to successfully complete daily living tasks of toileting, personal cares and ambulating. A wheeled walker is necessary due to the patient's unsteady gait, upper body weakness, and inability to  an ambulation device; and he can ambulate only by pushing a walker instead of a lesser assistive device such as a cane, crutch, or standard walker. The need for this equipment was discussed with the patient and he understands and is in agreement.       Electronically signed by Megan Mcdonald MD on 8/24/2021 at 11:46 PM

## 2021-08-24 NOTE — PROGRESS NOTES
[facility] Internal Medicine    CONSULTATION / HISTORY AND PHYSICAL EXAMINATION            Date:   8/24/2021  Patient name:  Paige Hannah  Date of admission:  8/13/2021  7:39 PM  MRN:   [id number]  Account:  [de-identified]  YOB: 1940  PCP:    No primary care provider on file. Room:   [address] [place] [address]  Code Status:    Full Code    Physician Requesting Consult: Leticia Coleman MD    Reason for Consult:  medical management    Chief Complaint:     No chief complaint on file. Hip pain    History Obtained From:     Patient medical record nursing staff    History of Present Illness:   Patient admitted to ARU for rehabilitation  He was admitted to [facility] after a mechanical fall, he was found to have left hip fracture, underwent surgery, he was found to have new onset atrial fibrillation,  During hospital stay, patient had drop in hemoglobin, stool was positive for occult blood. Patient was evaluated by GI physician, underwent EGD, patient was found to have nonbleeding duodenal ulcer, not considered a candidate for anticoagulation with history of GI bleed  Patient was started on amoxicillin and Flagyl, with positive H. pylori infection  Patient doing much better  Working with physical therapy    8/18  Patient resting comfortably  He worked with physical therapy in the morning and afternoon  Pain is improved  Past Medical History:     Past Medical History:   Diagnosis Date    Atrial fibrillation ([address]     with RVR        Past Surgical History:     Past Surgical History:   Procedure Laterality Date    HIP SURGERY Left 8/5/2021    HIP HEMIARTHROPLASTY performed by Yoel Valera MD at [address] 8/10/2021    EGD BIOPSY performed by Omid Araiza MD at [address] [place] [address]        Medications Prior to Admission:     Prior to Admission medications    Medication Sig Start Date End Date Taking?  Authorizing Provider   ibuprofen (ADVIL;MOTRIN) 200 MG tablet Take 400 mg by mouth every 6 hours as needed for Pain   Yes Historical Provider, MD   Multiple Vitamins-Minerals (THERAPEUTIC MULTIVITAMIN-MINERALS) tablet Take 1 tablet by mouth daily   Yes Historical Provider, MD        Allergies:     Patient has no known allergies. Social History:     Tobacco:    reports that he has never smoked. He has never used smokeless tobacco.  Alcohol:      reports no history of alcohol use. Drug Use:  has no history on file for drug use. Family History:     No family history on file. Review of Systems:     Positive and Negative as described in HPI. CONSTITUTIONAL:  negative for fevers, chills, sweats, fatigue, weight loss  HEENT:  negative for vision, hearing changes, runny nose, throat pain  RESPIRATORY:  negative for shortness of breath, cough, congestion, wheezing. CARDIOVASCULAR:  negative for chest pain, palpitations. GASTROINTESTINAL:  negative for nausea, vomiting, diarrhea, constipation, change in bowel habits, abdominal pain   GENITOURINARY:  negative for difficulty of urination, burning with urination, frequency   INTEGUMENT:  negative for rash, skin lesions, easy bruising   HEMATOLOGIC/LYMPHATIC:  negative for swelling/edema   ALLERGIC/IMMUNOLOGIC:  negative for urticaria , itching  ENDOCRINE:  negative increase in drinking, increase in urination, hot or cold intolerance  MUSCULOSKELETAL: Left hip pain NEUROLOGICAL:  negative for headaches, dizziness, lightheadedness, numbness, pain, tingling extremities  BEHAVIOR/PSYCH:      Physical Exam:     BP (!) 102/51   Pulse 62   Temp 98.2 °F (36.8 °C) (Oral)   Resp 16   Ht 5' 7\" (1.702 m)   Wt 178 lb (80.7 kg)   SpO2 97%   BMI 27.88 kg/m²   Temp (24hrs), Av °F (36.7 °C), Min:97.8 °F (36.6 °C), Max:98.2 °F (36.8 °C)    No results for input(s): POCGLU in the last 72 hours.     Intake/Output Summary (Last 24 hours) at 2021 1543  Last data filed at 2021 0730  Gross per 24 hour Intake 380 ml   Output 675 ml   Net -295 ml       General Appearance:  alert, well appearing, and in no acute distress  Mental status: oriented to person, place, and time with normal affect  Head:  normocephalic, atraumatic. Eye: no icterus, redness, pupils equal and reactive, extraocular eye movements intact, conjunctiva clear  Ear: normal external ear, no discharge, hearing intact  Nose:  no drainage noted  Mouth: mucous membranes moist  Neck: supple, no carotid bruits, thyroid not palpable  Lungs: Bilateral equal air entry, clear to ausculation, no wheezing, rales or rhonchi, normal effort  Cardiovascular: normal rate, regular rhythm, no murmur, gallop, rub. Abdomen: Soft, nontender, nondistended, normal bowel sounds, no hepatomegaly or splenomegaly  Neurologic: There are no new focal motor or sensory deficits, normal muscle tone and bulk, no abnormal sensation, normal speech, cranial nerves II through XII grossly intact  Skin: No gross lesions, rashes, bruising or bleeding on exposed skin area  Extremities: Postsurgical changes in left hip  Psych: Investigations:      Laboratory Testing:  No results found for this or any previous visit (from the past 24 hour(s)). Consultations:   IP CONSULT TO DIETITIAN  IP CONSULT TO SOCIAL WORK  IP CONSULT TO GI  IP CONSULT TO INTERNAL MEDICINE  IP CONSULT TO GI  Assessment :      Primary Problem  <principal problem not specified>    Active Hospital Problems    Diagnosis Date Noted    H pylori ulcer [K27.9, B96.81]     PUD (peptic ulcer disease) [K27.9]     Closed left hip fracture, initial encounter ([facility] [S72.002A] 08/13/2021    Atrial fibrillation with RVR ([facility] [I48.91] 08/03/2021       Plan:     1. Left hip fracture s/p surgery, pain is controlled  2. Atrial fibrillation, rate controlled, not on anticoagulation with history of GI bleed, on amiodarone, Cardizem  3.  GI bleed, s/p EGD, on Protonix, twice a day,  4. H. pylori infection, on amoxicillin and Flagyl  5. Not on chemoprophylaxis with history of GI bleed  6. Complaints of BPH, starting patient on [address] German MD  8/24/2021  3:48 PM    Copy sent to Dr. Kirby primary care provider on file. Please note that this chart was generated using voice recognition Dragon dictation software. Although every effort was made to ensure the accuracy of this automated transcription, some errors in transcription may have occurred.

## 2021-08-24 NOTE — PROGRESS NOTES
Physical Therapy  [address]   Acute Rehabilitation Physical Therapy Progress Note    Date: 21  Patient Name: Palak Engle       Room: 0130/6311-71  MRN: [id number]   Account: [de-identified]   : 1940  (79 y.o.) Gender: male     Referring Practitioner: Fazal Lovett MD  Diagnosis: Left hip fracture, hospitalization prolonged (initial admit 8-3)  due to afib, rhabdomyolosis, GI bleed, s/p EGD, on Protonix, twice a day,  Past Medical History:  has a past medical history of Atrial fibrillation ([facility] Past Surgical History:   has a past surgical history that includes hip surgery (Left, 2021) and Upper gastrointestinal endoscopy (N/A, 8/10/2021). Additional Pertinent Hx: closed L hip fx    Overall Orientation Status: Within Normal Limits  Restrictions/Precautions  Restrictions/Precautions: Weight Bearing; Fall Risk  Required Braces or Orthoses?: No  Implants present? : Metal implants (L hemiarthroplasty)  Lower Extremity Weight Bearing Restrictions  Left Lower Extremity Weight Bearing: Weight Bearing As Tolerated    Subjective: Patient pleasant with no new complaints. Reports he will need a rolling walker for home. Vital Signs  Patient Currently in Pain: Denies                   Bed Mobility:   Bed Mobility  Supine to Sit: Contact guard assistance  Sit to Supine: Minimal assistance (Requires assist with LE)  Scooting: Stand by assistance  Bed mobility  Scooting: Stand by assistance    Transfers:  Sit to Stand: Contact guard assistance  Stand to sit: Contact guard assistance  Bed to Chair: Contact guard assistance        Lateral Transfers: Contact guard assistance  WB Status: WBAT  Ambulation 1  Surface: level tile  Device: Rolling Walker  Assistance: Supervision  Quality of Gait: Patient would benefit with use of rolling walker for gait. Gait Deviations: Decreased step length;Decreased step height; Increased FAITH  Distance: 200 ft; short distances within gym.   Comments: Patient educated on the use and safety of a rolling walker. Patient demonstrated understanding. Ambulation 2  Surface - 2: level tile  Device 2: Single point cane  Assistance 2: Contact guard assistance  Quality of Gait 2: reminders to correct forward flexed posture, fair pt response  Gait Deviations: Increased FAITH; Decreased step length;Decreased step height  Distance: 200 ft. Stairs/Curb  Stairs?: Yes  Stairs  # Steps : 9  Stairs Height:  (7.5\")  Rails: Right ascending  Device: Single pt cane  Assistance: Contact guard assistance  Comment: rest break at top of steps before descending                        BALANCE Posture: Fair  Sitting - Static: Fair;+  Sitting - Dynamic: Fair;+  Standing - Static: Fair;+  Standing - Dynamic: Fair;+    EXERCISES    Other exercises?: Yes  Other exercises 2: Seated BLE exercises: 15x (Light greenTheraband for all except LLE hip flexion)  Other exercises 3: Standing BLE exercises: 3-way hip 10x  Other exercises 4: Nustep L4 x 10 min (seat at 10; handle at 11)  Other exercises 5: UBE: 4min forward, 4min backward  Other exercises 7: Hurdles in // bars: 6 laps           Activity Tolerance: Patient limited by endurance, Patient limited by fatigue  Activity Tolerance: Pt was fatigued today and required rest breaks  PT Equipment Recommendations  Equipment Needed: Yes  Walker: Rolling       Current Treatment Recommendations: Strengthening, ROM, Balance Training, Functional Mobility Training, Transfer Training, Stair training, Gait Training, Endurance Training, Safety Education & Training, Patient/Caregiver Education & Training, Home Exercise Program    Conditions Requiring Skilled Therapeutic Intervention  Body structures, Functions, Activity limitations: Decreased functional mobility ; Decreased ROM; Decreased strength;Decreased endurance;Decreased balance  Assessment: Impaired mobility due to L Hip Fx/ Hemiarthroplasty and decreased tolerance to activity (pt demonstrated decreased seated balance without back or UE support- needs trunck strengthening)  Decision Making: Low Complexity  REQUIRES PT FOLLOW UP: Yes  Discharge Recommendations: Home with assist PRN    Goals  Short term goals  Time Frame for Short term goals: 7-10 days  Short term goal 1: bed mobility with SBA  Short term goal 2: transfers with supervision  Short term goal 3: gait with RW/SBA x 75-100ft  Short term goal 4: standing balance to Fair+ for ADLs  Long term goals  Time Frame for Long term goals : time of discharge  Long term goal 1: mod-I bed mobility  Long term goal 2: mod-I transfers  Long term goal 3: mod-I gait x 150ft with RW  Long term goal 4: flight of stairs with rail and cane mod-I  Long term goal 5: standing balance to Good for safe ADLs       08/24/21 0815 08/24/21 1220   PT Individual Minutes   Time In 0815 1220   Time Out 0845 1305   Minutes 30 45   PT Concurrent Minutes   Time In 0845  --    Time Out 0900  --    Minutes 15  --        Electronically signed by Johnathon Fontaine PTA on 8/24/21 at 4:54 PM EDT

## 2021-08-25 LAB
ANION GAP SERPL CALCULATED.3IONS-SCNC: 6 MMOL/L (ref 9–17)
BUN BLDV-MCNC: 22 MG/DL (ref 8–23)
BUN/CREAT BLD: ABNORMAL (ref 9–20)
CALCIUM SERPL-MCNC: 8.4 MG/DL (ref 8.6–10.4)
CHLORIDE BLD-SCNC: 105 MMOL/L (ref 98–107)
CO2: 30 MMOL/L (ref 20–31)
CREAT SERPL-MCNC: 0.63 MG/DL (ref 0.7–1.2)
GFR AFRICAN AMERICAN: >60 ML/MIN
GFR NON-AFRICAN AMERICAN: >60 ML/MIN
GFR SERPL CREATININE-BSD FRML MDRD: ABNORMAL ML/MIN/{1.73_M2}
GFR SERPL CREATININE-BSD FRML MDRD: ABNORMAL ML/MIN/{1.73_M2}
GLUCOSE BLD-MCNC: 91 MG/DL (ref 70–99)
HCT VFR BLD CALC: 32.4 % (ref 41–53)
HEMOGLOBIN: 10.6 G/DL (ref 13.5–17.5)
MCH RBC QN AUTO: 33.2 PG (ref 26–34)
MCHC RBC AUTO-ENTMCNC: 32.7 G/DL (ref 31–37)
MCV RBC AUTO: 101.5 FL (ref 80–100)
NRBC AUTOMATED: ABNORMAL PER 100 WBC
PDW BLD-RTO: 17.7 % (ref 11.5–14.9)
PLATELET # BLD: 309 K/UL (ref 150–450)
PMV BLD AUTO: 8.1 FL (ref 6–12)
POTASSIUM SERPL-SCNC: 4.4 MMOL/L (ref 3.7–5.3)
RBC # BLD: 3.19 M/UL (ref 4.5–5.9)
SODIUM BLD-SCNC: 141 MMOL/L (ref 135–144)
WBC # BLD: 4.9 K/UL (ref 3.5–11)

## 2021-08-25 PROCEDURE — 6370000000 HC RX 637 (ALT 250 FOR IP): Performed by: NURSE PRACTITIONER

## 2021-08-25 PROCEDURE — 97110 THERAPEUTIC EXERCISES: CPT

## 2021-08-25 PROCEDURE — 6370000000 HC RX 637 (ALT 250 FOR IP)

## 2021-08-25 PROCEDURE — 97530 THERAPEUTIC ACTIVITIES: CPT

## 2021-08-25 PROCEDURE — 97535 SELF CARE MNGMENT TRAINING: CPT

## 2021-08-25 PROCEDURE — 97116 GAIT TRAINING THERAPY: CPT

## 2021-08-25 PROCEDURE — 36415 COLL VENOUS BLD VENIPUNCTURE: CPT

## 2021-08-25 PROCEDURE — 6370000000 HC RX 637 (ALT 250 FOR IP): Performed by: PHYSICAL MEDICINE & REHABILITATION

## 2021-08-25 PROCEDURE — 99232 SBSQ HOSP IP/OBS MODERATE 35: CPT | Performed by: STUDENT IN AN ORGANIZED HEALTH CARE EDUCATION/TRAINING PROGRAM

## 2021-08-25 PROCEDURE — 85027 COMPLETE CBC AUTOMATED: CPT

## 2021-08-25 PROCEDURE — 80048 BASIC METABOLIC PNL TOTAL CA: CPT

## 2021-08-25 PROCEDURE — 1180000000 HC REHAB R&B

## 2021-08-25 PROCEDURE — 99232 SBSQ HOSP IP/OBS MODERATE 35: CPT | Performed by: INTERNAL MEDICINE

## 2021-08-25 RX ADMIN — AMIODARONE HYDROCHLORIDE 200 MG: 200 TABLET ORAL at 09:33

## 2021-08-25 RX ADMIN — AMOXICILLIN 1000 MG: 500 CAPSULE ORAL at 21:44

## 2021-08-25 RX ADMIN — Medication: at 09:34

## 2021-08-25 RX ADMIN — METOPROLOL TARTRATE 50 MG: 50 TABLET, FILM COATED ORAL at 21:45

## 2021-08-25 RX ADMIN — METRONIDAZOLE 500 MG: 500 TABLET ORAL at 21:45

## 2021-08-25 RX ADMIN — DILTIAZEM HYDROCHLORIDE 30 MG: 30 TABLET, FILM COATED ORAL at 21:45

## 2021-08-25 RX ADMIN — Medication: at 21:45

## 2021-08-25 RX ADMIN — METRONIDAZOLE 500 MG: 500 TABLET ORAL at 05:55

## 2021-08-25 RX ADMIN — PANTOPRAZOLE SODIUM 40 MG: 40 TABLET, DELAYED RELEASE ORAL at 17:01

## 2021-08-25 RX ADMIN — METRONIDAZOLE 500 MG: 500 TABLET ORAL at 13:48

## 2021-08-25 RX ADMIN — PANTOPRAZOLE SODIUM 40 MG: 40 TABLET, DELAYED RELEASE ORAL at 05:55

## 2021-08-25 RX ADMIN — AMOXICILLIN 1000 MG: 500 CAPSULE ORAL at 09:33

## 2021-08-25 RX ADMIN — DILTIAZEM HYDROCHLORIDE 30 MG: 30 TABLET, FILM COATED ORAL at 09:34

## 2021-08-25 RX ADMIN — TAMSULOSIN HYDROCHLORIDE 0.4 MG: 0.4 CAPSULE ORAL at 09:33

## 2021-08-25 RX ADMIN — FUROSEMIDE 20 MG: 20 TABLET ORAL at 17:21

## 2021-08-25 RX ADMIN — FUROSEMIDE 20 MG: 20 TABLET ORAL at 09:33

## 2021-08-25 ASSESSMENT — PAIN SCALES - GENERAL
PAINLEVEL_OUTOF10: 0

## 2021-08-25 NOTE — PROGRESS NOTES
Physical Medicine & Rehabilitation  Progress Note      Subjective:      Vaibhav Del Cid is a 79 y.o. male with left hip fracture. He reports doing well today. He denies any left hip pain and any other acute concerns. Staples to be removed from incision today per Dr. Ariana Barba. ROS:  Denies fevers, chills, sweats. No chest pain, palpitations, lightheadedness. Denies coughing, wheezing or shortness of breath. Denies abdominal pain, nausea, diarrhea or constipation. No new areas of joint pain. Denies new areas of numbness or weakness. Denies new anxiety or depression issues. No new skin problems. Rehabilitation:   Progressing in therapies. PT:  Restrictions/Precautions: Weight Bearing, Fall Risk  Implants present? : Metal implants (L hemiarthroplasty )  Left Lower Extremity Weight Bearing: Weight Bearing As Tolerated   Transfers  Sit to Stand: Stand by assistance  Stand to sit: Stand by assistance  Bed to Chair: Stand by assistance  Stand Pivot Transfers: Stand by assistance  Lateral Transfers: Contact guard assistance  Comment: Floor transfer education and practice: pt was unable to sit on the floor and boost himself onto a box step behind him  WB Status: WBAT  Ambulation 1  Surface: level tile  Device: Rolling Walker  Other Apparatus: Wheelchair follow  Assistance: Supervision  Quality of Gait: Patient would benefit with use of rolling walker for gait. Gait Deviations: Decreased step length, Decreased step height, Increased FAITH  Distance: 200 ft; short distances within gym. Comments: Patient educated on the use and safety of a rolling walker. Patient demonstrated understanding.     Transfers  Sit to Stand: Stand by assistance  Stand to sit: Stand by assistance  Bed to Chair: Stand by assistance  Stand Pivot Transfers: Stand by assistance  Lateral Transfers: Contact guard assistance  Comment: Floor transfer education and practice: pt was unable to sit on the floor and boost himself onto a box step behind him  Ambulation  Ambulation?: Yes  WB Status: WBAT  More Ambulation?: Yes  Ambulation 1  Surface: level tile  Device: Rolling Walker  Other Apparatus: Wheelchair follow  Assistance: Supervision  Quality of Gait: Patient would benefit with use of rolling walker for gait. Gait Deviations: Decreased step length, Decreased step height, Increased FAITH  Distance: 200 ft; short distances within gym. Comments: Patient educated on the use and safety of a rolling walker. Patient demonstrated understanding. Surface: level tile  Ambulation 1  Surface: level tile  Device: Rolling Walker  Other Apparatus: Wheelchair follow  Assistance: Supervision  Quality of Gait: Patient would benefit with use of rolling walker for gait. Gait Deviations: Decreased step length, Decreased step height, Increased FAITH  Distance: 200 ft; short distances within gym. Comments: Patient educated on the use and safety of a rolling walker. Patient demonstrated understanding. OT:  ADL  Equipment Provided: Reacher  Feeding: Dentures, Modified independent   Grooming: Modified independent  (shaving seated w/c level )  UE Bathing: Setup (seated w/c level )  LE Bathing: Supervision (standing sinkside. )  UE Dressing: Setup  LE Dressing: Setup (Assist doffing cuffed pants)  Toileting: Minimal assistance (assist required for buttocks hygeine)  Additional Comments: Improved tolerance, compensatory techniques with AE this date. Educated on importance of progessing toileting to completing buttocks hygeine per self or need to ID alternative strategies. Pt agreeable on next bowel movement Educated on use of reacher/towel for drying feet with G carryover. Instrumental ADL's  Instrumental ADLs: Yes     Balance  Sitting Balance: Independent  Standing Balance: Stand by assistance   Standing Balance  Time: tolerating up to 8minutes today  Activity: ADLs, functional mobility, dressing change  Comment: no LOB, Good overall safety.   Still tolerating <10minutes  Functional Mobility  Functional - Mobility Device: Rolling Walker  Activity: To/from bathroom, Other, Transport items, Retrieve items  Assist Level: Stand by assistance  Functional Mobility Comments: Pt requesting to ambulate in barefeet for home ADL mobility assessment. Good foot placement. Good pacing. Good rolling walker safety. Bed mobility  Bridging: Minimal assistance  Rolling to Left: Stand by assistance  Rolling to Right: Stand by assistance  Supine to Sit: Modified independent  Sit to Supine: Supervision  Scooting: Stand by assistance  Comment: Hob raised  Transfers  Stand Step Transfers: Supervision  Stand Pivot Transfers: Supervision  Sit to stand: Supervision  Stand to sit: Supervision  Transfer Comments: G hand placement throughout this date; self corrected x1   Toilet Transfers  Toilet - Technique: Ambulating  Equipment Used: Raised toilet seat without rails  Toilet Transfer: Supervision  Toilet Transfers Comments: RW level     Shower Transfers  Shower - Transfer From: Nirali Matter (RW)  Shower - Transfer Type: To and From  Shower - Transfer To:  Transfer tub bench  Shower - Technique: Ambulating  Shower Transfers: Supervision  Shower Transfers Comments: VC for safety with RW at threshold and overreaching; improved techinque upon shower exit  Wheelchair Bed Transfers  Wheelchair/Bed - Technique: Stand step  Equipment Used: Wheelchair, Bed  Level of Asssistance: Stand by assistance    SPEECH:      Current Medications:   Current Facility-Administered Medications: tamsulosin (FLOMAX) capsule 0.4 mg, 0.4 mg, Oral, Daily  Hydrocerin cream CREA, , Topical, BID  metroNIDAZOLE (FLAGYL) tablet 500 mg, 500 mg, Oral, 3 times per day  amoxicillin (AMOXIL) capsule 1,000 mg, 1,000 mg, Oral, 2 times per day  acetaminophen (TYLENOL) tablet 650 mg, 650 mg, Oral, Q4H PRN  polyethylene glycol (GLYCOLAX) packet 17 g, 17 g, Oral, Daily  senna (SENOKOT) tablet 17.2 mg, 2 tablet, Oral, Daily PRN  bisacodyl (DULCOLAX) suppository 10 mg, 10 mg, Rectal, Daily PRN  amiodarone (CORDARONE) tablet 200 mg, 200 mg, Oral, Daily  dilTIAZem (CARDIZEM) tablet 30 mg, 30 mg, Oral, 2 times per day  HYDROcodone-acetaminophen (NORCO) 5-325 MG per tablet 1 tablet, 1 tablet, Oral, Q6H PRN  melatonin tablet 3 mg, 3 mg, Oral, Nightly PRN  metoprolol tartrate (LOPRESSOR) tablet 50 mg, 50 mg, Oral, BID  pantoprazole (PROTONIX) tablet 40 mg, 40 mg, Oral, BID AC  furosemide (LASIX) tablet 20 mg, 20 mg, Oral, BID      Objective:  BP (!) 112/52   Pulse 62   Temp 98.2 °F (36.8 °C) (Oral)   Resp 18   Ht 5' 7\" (1.702 m)   Wt 178 lb (80.7 kg)   SpO2 98%   BMI 27.88 kg/m²       GEN: Well developed, well nourished, no acute distress  HEENT: NCAT. EOM grossly intact. Hearing grossly intact. RESP: Respirations WNL and unlabored. ABD: Non-distended. NEURO: Alert. Speech fluent. MSK:  Muscle tone and bulk are normal bilaterally. Standing with 2-wheeled walker for assistance. SKIN: Warm and dry with good turgor. Left hip incision clean, dry, intact with staples in place - healing well, no erythema or drainage. Healing wounds on the coccyx and right hip. PSYCH: Mood WNL. Affect WNL. Appropriately interactive. Diagnostics:     CBC:   Recent Labs     08/23/21  0702 08/25/21  0704   WBC 4.8 4.9   RBC 2.93* 3.19*   HGB 9.7* 10.6*   HCT 29.9* 32.4*   .0* 101.5*   RDW 17.8* 17.7*    309     BMP:   Recent Labs     08/25/21  0704      K 4.4      CO2 30   BUN 22   CREATININE 0.63*   GLUCOSE 91     BNP: No results for input(s): BNP in the last 72 hours. PT/INR: No results for input(s): PROTIME, INR in the last 72 hours. APTT: No results for input(s): APTT in the last 72 hours. CARDIAC ENZYMES: No results for input(s): CKMB, CKMBINDEX, TROPONINT in the last 72 hours.     Invalid input(s): CKTOTAL;3  FASTING LIPID PANEL:No results found for: CHOL, HDL, TRIG  LIVER PROFILE: No results for input(s): AST, ALT, ALB, BILIDIR, BILITOT, ALKPHOS in the last 72 hours. Impression/Plan:   Impaired ADLs, gait, and mobility due to:    1. L hip fracture:  S/p hemiarthroplasty by Dr. Jeannie Alexis 8/5/21. PT/OT for gait, mobility, strengthening, endurance, ADLs, and self care. WBAT. Has Tylenol or Norco prn pain. Reached out to orthopedic surgery regarding staples - okay to remove today per Dr. Jeannie Alexis. 2. A Fib with RVR: rate controlled. On amiodarone, Cardizem, Metoprolol. No anticoagulation per GI. 3. Rhabdomyolysis: Resolved. Monitoring BMP weekly  4. Anemia secondary to GI bleed/ulcer: GI following. On PPI BID, soft diet, received 1 unit PRBCs 8/13/21. Monitoring q MWF.  5. H Pylori ulcer: on 14 days Flagyl and amoxicillin until 8/29/21 per GI. 6. Wounds/abrasions: nursing interventions, wound care consulted to follow  7. Bilateral lower limb edema:  Trialing ACE wrapping. 8. Insomnia: has melatonin prn  9. Hypokalemia: Improved. On Lasix. Monitoring and will replace prn but has been stable. 10. Urinary frequency:  Improved. On flomax. Some contribution from Lasix anticipated. 11. Pulmonary infiltrates/effusion: using incentive spirometer  12. Bowel Management: Miralax daily, senokot prn, dulcolax prn. 13. DVT Prophylaxis:  SCD's while in bed. SUE's and chemoprophylaxis contraindicated  14.  Internal medicine for medical management      Electronically signed by Heron Alcazar MD on 8/25/2021 at 10:28 PM

## 2021-08-25 NOTE — PROGRESS NOTES
Balance Training, Functional Mobility Training, Transfer Training, Stair training, Gait Training, Endurance Training, Safety Education & Training, Patient/Caregiver Education & Training, Home Exercise Program  Safety Devices  Type of devices:  All fall risk precautions in place, Gait belt, Patient at risk for falls     Therapy Time     08/25/21 0815 08/25/21 1249   PT Individual Minutes   Time In [facility]   Time Out [phone]   Minutes 57 41     Electronically signed by Tonia Logan PTA on 8/25/21 at 1:00 PM EDT

## 2021-08-25 NOTE — PROGRESS NOTES
[address] [facility]    ACUTE REHABILITATION OCCUPATIONAL THERAPY  DAILY NOTE    Date: 21  Patient Name: Talib Molina      Room: 4612/6367-75    MRN: [id number]   : 1940  (79 y.o.)  Gender: male   Referring Practitioner: Lee Mendez MD  Diagnosis: Left hip fracture, hospitalization prolonged (initial admit 8-3)  due to afib, rhabdomyolosis, GI bleed, s/p EGD, on Protonix, twice a day,  Additional Pertinent Hx: Jeyson Lux is a 79 y. o. male with no known past medical history admitted to [facility] 8/3/2021.  He initially presented after a fall from standing at home. He reportedly was laying on the floor for 5 days before he was able to call for help. He was found to have atrial fibrillation with RVR in the ED and was started on diltiazem and heparin drips. He was also found to have left hip fracture and rhabdomylolysis. He has multiple wounds, including buttock, sacrum, bilateral elbow, and bilateral knee wounds. He underwent left hip hemiarthroplasty on 21 (Dr. Chepe Reynolds). Pt now presents to ARU 21     Restrictions  Restrictions/Precautions: Weight Bearing, Fall Risk  Implants present? : Metal implants (L hemiarthroplasty )  Left Lower Extremity Weight Bearing: Weight Bearing As Tolerated  Required Braces or Orthoses?: No  Equipment Used: Wheelchair, Bed    Subjective  Comments: Pt pleasant and cooperative. Tolerating increased resistance with upper body exercises today. To have staples removed this afternoon after therapies per Dr. Cami Meier. Patient Currently in Pain: Denies  Restrictions/Precautions: Weight Bearing; Fall Risk  Overall Orientation Status: Within Functional Limits     Pain Assessment  Hair-Melgar Pain Rating: No hurt  Clinical Progression: Gradually worsening  Response to Pain Intervention: Patient Satisfied    Objective  Cognition  Overall Cognitive Status: WFL  Perception  Overall Perceptual Status: WFL  Balance  Sitting Balance: Independent  Standing Balance: Stand by assistance  Bed mobility  Supine to Sit: Modified independent  Comment: Hob raised  Transfers  Stand Step Transfers: Supervision  Stand Pivot Transfers: Supervision  Sit to stand: Supervision  Stand to sit: Supervision  Transfer Comments: G hand placement throughout this date; self corrected x1  Standing Balance  Time: tolerating up to 8minutes today  Activity: ADLs, functional mobility, dressing change  Comment: no LOB, Good overall safety. Still tolerating <10minutes  Functional Mobility  Functional - Mobility Device: Rolling Walker  Activity: To/from bathroom; Other;Transport items; Retrieve items  Assist Level: Stand by assistance  Functional Mobility Comments: Pt requesting to ambulate in barefeet for home ADL mobility assessment. Good foot placement. Good pacing. Good rolling walker safety. Toilet Transfers  Toilet - Technique: Ambulating  Equipment Used: Raised toilet seat without rails  Toilet Transfer: Supervision  Toilet Transfers Comments: RW level  Shower Transfers  Shower - Transfer From: Cabrera Padron (RW)  Shower - Transfer Type: To and From  Shower - Transfer To:  Transfer tub bench  Shower - Technique: Ambulating  Shower Transfers: Supervision  Shower Transfers Comments: VC for safety with RW at threshold and overreaching; improved techinque upon shower exit     Type of ROM/Therapeutic Exercise  Type of ROM/Therapeutic Exercise: Pulley  Comment: BUE conditioning for improved safety and tolerance to ADLs/mobility tasks  Exercises  Scapular Protraction: 10# 10reps x2 sets  Scapular Retraction: 10# 10reps x2 sets  Shoulder Depression: 10# 10reps x2 sets  Shoulder Elevation: 10# 10reps x2 sets  Shoulder Flexion: 5# 5reps x2 sets  Shoulder Extension: 5# 5reps x2 sets  Horizontal ABduction: 10# 10reps x2 sets  Horizontal ADduction: 10# 10reps x2 sets  Elbow Flexion: 20# 10reps x1 sets; 15# 15reps x1 set  Elbow Extension: 20# 10reps x1 sets; 15# 15reps x1 set  Other: w/c mobility to OT gym at moderate pace for UE endurance, 1 RB required                    Vision  Vision Comment: requires magnifying glass for reading; using plastic handheld option  ADL  Equipment Provided: Reacher  Feeding: Dentures; Modified independent   Grooming: Modified independent  (shaving seated w/c level )  UE Bathing: Setup (seated w/c level )  LE Bathing: Supervision (standing sinkside. )  UE Dressing: Setup  LE Dressing: Setup (Assist doffing cuffed pants)  Toileting: Minimal assistance (assist required for buttocks hygeine)  Additional Comments: Improved tolerance, compensatory techniques with AE this date. Educated on importance of progessing toileting to completing buttocks hygeine per self or need to ID alternative strategies. Pt agreeable on next bowel movement Educated on use of reacher/towel for drying feet with G carryover. Positioning  Adaptations: AE for LB ADLs       Assessment  Performance deficits / Impairments: Decreased ADL status; Decreased functional mobility ; Decreased strength;Decreased safe awareness;Decreased endurance;Decreased balance;Decreased high-level IADLs  Assessment: has no bed/bath on main and only neighbor friend for support, was using urinal and sleep on couch, neighbor mowing grass (since injury 2 months ago) OT ed pt progress [facility] inpt therapy and to obtain [facility] and twin bed for main floor and have assist with laundry, cleaning, empty BSC in addition to already order food online and have neighbor do yardwork.; improved overall task tolerance and standing tasks  Prognosis: Good  Discharge Recommendations: Continue to assess pending progress  Activity Tolerance: Patient Tolerated treatment well  Safety Devices in place: Yes  Type of devices: All fall risk precautions in place;Call light within reach;Gait belt;Patient at risk for falls; Left in chair;Nurse notified  Equipment Recommendations  Equipment Needed: Yes  3-in-1 Commode: x  Reacher: x  Sock Aid: x  Long-handled Shoehorn: x  Other: phone lanyard or belt buckle for transport       Patient Education:  Reacher use, need for progression of toileting  Patient Goals   Patient goals : \"To be able to do all that you just mentioned. \" (in regards to independence with ADL and IADL tasks)  Learner:patient  Method: demonstration and explanation       Outcome: needs reinforcement        Plan  Plan  Times per week: 5-7  Times per day: Twice a day  Current Treatment Recommendations: Self-Care / ADL, Strengthening, Balance Training, Functional Mobility Training, Endurance Training, Safety Education & Training, Patient/Caregiver Education & Training, Equipment Evaluation, Education, & procurement, Home Management Training  Patient Goals   Patient goals : \"To be able to do all that you just mentioned. \" (in regards to independence with ADL and IADL tasks)  Short term goals  Time Frame for Short term goals: By 1 week   Short term goal 1: Pt will complete lower body dressing/bathing with min A and good safety with use of AE as needed  Short term goal 2: Pt will complete functional mobility during self care tasks with SBA and good safety with use of least restrictive device  Short term goal 3: Pt will tolerate standing 5+ minutes during functional activity of choice with CGA and good safety  Short term goal 4: Pt will complete functional transfer during self care tasks with CGA and good safety  Short term goal 5: Pt will participate in 30+ minutes of therapeutic exercises/functional activities to increase safety and independence with self care tasks  Long term goals  Time Frame for Long term goals : By discharge  Long term goal 1: Pt will complete BADLs with modified independence and good safety with use of AE as needed  Long term goal 2: Pt will complete functional transfers and mobility with modified independence and good safety with use of least restrictive device  Long term goal 3: Pt will tolerate standing 10+ minutes during funcitonal activity of choice with good

## 2021-08-25 NOTE — CARE COORDINATION
DEM order faxed to [address]. 1:30 PM call received from Lencho Leone at [address]. Pt has a $9.48 copay and they have not been able to reach anyone. Met with pt who requested LSW speak with Jose Topete. Matthew Aparicio who agreed to arrange with [address]; number provided [phone].

## 2021-08-25 NOTE — PLAN OF CARE
Problem: Skin Integrity:  Goal: Will show no infection signs and symptoms  Description: Will show no infection signs and symptoms  8/25/2021 1238 by Brock Dong RN  Outcome: Ongoing  8/25/2021 0453 by Ever Eisenberg RN  Outcome: Ongoing  Goal: Absence of new skin breakdown  Description: Absence of new skin breakdown  8/25/2021 1238 by Brock Dong RN  Outcome: Ongoing  8/25/2021 0453 by Ever Eisenberg RN  Outcome: Ongoing     Problem: Falls - Risk of:  Goal: Will remain free from falls  Description: Will remain free from falls  8/25/2021 1238 by Brock Dong RN  Outcome: Ongoing  8/25/2021 0453 by Ever Eisenberg RN  Outcome: Ongoing  Goal: Absence of physical injury  Description: Absence of physical injury  8/25/2021 1238 by Brock Dong RN  Outcome: Ongoing  8/25/2021 0453 by Ever Eisenberg RN  Outcome: Ongoing     Problem: Pain:  Goal: Pain level will decrease  Description: Pain level will decrease  8/25/2021 1238 by Brock Dong RN  Outcome: Ongoing  8/25/2021 0453 by Ever Eisenberg RN  Outcome: Ongoing  Goal: Control of acute pain  Description: Control of acute pain  8/25/2021 1238 by Brock Dong RN  Outcome: Ongoing  8/25/2021 0453 by Ever Eisenberg RN  Outcome: Ongoing  Goal: Control of chronic pain  Description: Control of chronic pain  8/25/2021 1238 by Brock Dong RN  Outcome: Ongoing  8/25/2021 0453 by Ever Eisenberg RN  Outcome: Ongoing     Problem: Musculor/Skeletal Functional Status  Goal: Highest potential functional level  8/25/2021 1238 by Brock Dong RN  Outcome: Ongoing  8/25/2021 0453 by Ever Eisenberg RN  Outcome: Ongoing     Problem: Nutrition  Goal: Optimal nutrition therapy  Outcome: Ongoing

## 2021-08-25 NOTE — PLAN OF CARE
Problem: Skin Integrity:  Goal: Will show no infection signs and symptoms  Description: Will show no infection signs and symptoms  Outcome: Ongoing     Problem: Skin Integrity:  Goal: Absence of new skin breakdown  Description: Absence of new skin breakdown  Outcome: Ongoing     Problem: Falls - Risk of:  Goal: Will remain free from falls  Description: Will remain free from falls  Outcome: Ongoing     Problem: Falls - Risk of:  Goal: Absence of physical injury  Description: Absence of physical injury  Outcome: Ongoing     Problem: Pain:  Goal: Pain level will decrease  Description: Pain level will decrease  Outcome: Ongoing     Problem: Pain:  Goal: Control of acute pain  Description: Control of acute pain  Outcome: Ongoing     Problem: Pain:  Goal: Control of chronic pain  Description: Control of chronic pain  Outcome: Ongoing     Problem: Musculor/Skeletal Functional Status  Goal: Highest potential functional level  Outcome: Ongoing

## 2021-08-25 NOTE — PROGRESS NOTES
[facility]    CONSULTATION / HISTORY AND PHYSICAL EXAMINATION            Date:   8/25/2021  Patient name:  Eamon Goss  Date of admission:  8/13/2021  7:39 PM  MRN:   [id number]  Account:  [de-identified]  YOB: 1940  PCP:    No primary care provider on file. Room:   [address] [place] [address]  Code Status:    Full Code    Physician Requesting Consult: Theodore Cisneros MD    Reason for Consult:  medical management    Chief Complaint:     No chief complaint on file. Hip pain    History Obtained From:     Patient medical record nursing staff    History of Present Illness:   Patient admitted to ARU for rehabilitation  He was admitted to [facility] after a mechanical fall, he was found to have left hip fracture, underwent surgery, he was found to have new onset atrial fibrillation,  During hospital stay, patient had drop in hemoglobin, stool was positive for occult blood. Patient was evaluated by GI physician, underwent EGD, patient was found to have nonbleeding duodenal ulcer, not considered a candidate for anticoagulation with history of GI bleed  Patient was started on amoxicillin and Flagyl, with positive H. pylori infection  Patient doing much better  Working with physical therapy    8/18  Patient resting comfortably  He worked with physical therapy in the morning and afternoon  Pain is improved  Past Medical History:     Past Medical History:   Diagnosis Date    Atrial fibrillation ([address]     with RVR        Past Surgical History:     Past Surgical History:   Procedure Laterality Date    HIP SURGERY Left 8/5/2021    HIP HEMIARTHROPLASTY performed by Juan Rodriguez MD at [facility] N/A 8/10/2021    EGD BIOPSY performed by Arlene Durán MD at [facility]        Medications Prior to Admission:     Prior to Admission medications    Medication Sig Start Date End Date Taking?  Authorizing Provider   ibuprofen (ADVIL;MOTRIN) 200 MG tablet Take 400 mg by mouth every 6 hours as needed for Pain   Yes Historical Provider, MD   Multiple Vitamins-Minerals (THERAPEUTIC MULTIVITAMIN-MINERALS) tablet Take 1 tablet by mouth daily   Yes Historical Provider, MD        Allergies:     Patient has no known allergies. Social History:     Tobacco:    reports that he has never smoked. He has never used smokeless tobacco.  Alcohol:      reports no history of alcohol use. Drug Use:  has no history on file for drug use. Family History:     No family history on file. Review of Systems:     Positive and Negative as described in HPI. CONSTITUTIONAL:  negative for fevers, chills, sweats, fatigue, weight loss  HEENT:  negative for vision, hearing changes, runny nose, throat pain  RESPIRATORY:  negative for shortness of breath, cough, congestion, wheezing. CARDIOVASCULAR:  negative for chest pain, palpitations. GASTROINTESTINAL:  negative for nausea, vomiting, diarrhea, constipation, change in bowel habits, abdominal pain   GENITOURINARY:  negative for difficulty of urination, burning with urination, frequency   INTEGUMENT:  negative for rash, skin lesions, easy bruising   HEMATOLOGIC/LYMPHATIC:  negative for swelling/edema   ALLERGIC/IMMUNOLOGIC:  negative for urticaria , itching  ENDOCRINE:  negative increase in drinking, increase in urination, hot or cold intolerance  MUSCULOSKELETAL: Left hip pain NEUROLOGICAL:  negative for headaches, dizziness, lightheadedness, numbness, pain, tingling extremities  BEHAVIOR/PSYCH:      Physical Exam:     BP (!) 114/57   Pulse 55   Temp 97.3 °F (36.3 °C)   Resp 16   Ht 5' 7\" (1.702 m)   Wt 178 lb (80.7 kg)   SpO2 98%   BMI 27.88 kg/m²   Temp (24hrs), Av.8 °F (36.6 °C), Min:97.3 °F (36.3 °C), Max:98.2 °F (36.8 °C)    No results for input(s): POCGLU in the last 72 hours.     Intake/Output Summary (Last 24 hours) at 2021 1508  Last data filed at 2021 1358  Gross per 24 hour Intake --   Output 950 ml   Net -950 ml       General Appearance:  alert, well appearing, and in no acute distress  Mental status: oriented to person, place, and time with normal affect  Head:  normocephalic, atraumatic. Eye: no icterus, redness, pupils equal and reactive, extraocular eye movements intact, conjunctiva clear  Ear: normal external ear, no discharge, hearing intact  Nose:  no drainage noted  Mouth: mucous membranes moist  Neck: supple, no carotid bruits, thyroid not palpable  Lungs: Bilateral equal air entry, clear to ausculation, no wheezing, rales or rhonchi, normal effort  Cardiovascular: normal rate, regular rhythm, no murmur, gallop, rub. Abdomen: Soft, nontender, nondistended, normal bowel sounds, no hepatomegaly or splenomegaly  Neurologic: There are no new focal motor or sensory deficits, normal muscle tone and bulk, no abnormal sensation, normal speech, cranial nerves II through XII grossly intact  Skin: No gross lesions, rashes, bruising or bleeding on exposed skin area  Extremities: Postsurgical changes in left hip  Psych:      Investigations:      Laboratory Testing:  Recent Results (from the past 24 hour(s))   CBC    Collection Time: 08/25/21  7:04 AM   Result Value Ref Range    WBC 4.9 3.5 - 11.0 k/uL    RBC 3.19 (L) 4.5 - 5.9 m/uL    Hemoglobin 10.6 (L) 13.5 - 17.5 g/dL    Hematocrit 32.4 (L) 41 - 53 %    .5 (H) 80 - 100 fL    MCH 33.2 26 - 34 pg    MCHC 32.7 31 - 37 g/dL    RDW 17.7 (H) 11.5 - 14.9 %    Platelets 137 156 - 922 k/uL    MPV 8.1 6.0 - 12.0 fL    NRBC Automated NOT REPORTED per 100 WBC   Basic Metabolic Panel w/ Reflex to MG    Collection Time: 08/25/21  7:04 AM   Result Value Ref Range    Glucose 91 70 - 99 mg/dL    BUN 22 8 - 23 mg/dL    CREATININE 0.63 (L) 0.70 - 1.20 mg/dL    Bun/Cre Ratio NOT REPORTED 9 - 20    Calcium 8.4 (L) 8.6 - 10.4 mg/dL    Sodium 141 135 - 144 mmol/L    Potassium 4.4 3.7 - 5.3 mmol/L    Chloride 105 98 - 107 mmol/L    CO2 30 20 - 31 mmol/L    Anion Gap 6 (L) 9 - 17 mmol/L    GFR Non-African American >60 >60 mL/min    GFR African American >60 >60 mL/min    GFR Comment          GFR Staging NOT REPORTED            Consultations:   IP CONSULT TO DIETITIAN  IP CONSULT TO SOCIAL WORK  IP CONSULT TO GI  IP CONSULT TO INTERNAL MEDICINE  IP CONSULT TO GI  Assessment :      Primary Problem  <principal problem not specified>    Active Hospital Problems    Diagnosis Date Noted    H pylori ulcer [K27.9, B96.81]     PUD (peptic ulcer disease) [K27.9]     Closed left hip fracture, initial encounter ([facility] [S72.002A] 08/13/2021    Atrial fibrillation with RVR ([facility] [I48.91] 08/03/2021       Plan:     1. Left hip fracture s/p surgery, pain is controlled  2. Atrial fibrillation, rate controlled, not on anticoagulation with history of GI bleed, on amiodarone, Cardizem  3. GI bleed, s/p EGD, on Protonix, twice a day,  4. H. pylori infection, on amoxicillin and Flagyl  5. Not on chemoprophylaxis with history of GI bleed  6. Complaints of BPH, starting patient on Dyana Eric MD  8/25/2021  3:08 PM    Copy sent to Dr. Kirby primary care provider on file. Please note that this chart was generated using voice recognition Dragon dictation software. Although every effort was made to ensure the accuracy of this automated transcription, some errors in transcription may have occurred.

## 2021-08-25 NOTE — PROGRESS NOTES
Nutrition Note    Pt is on a Dysphagia Soft and Bite-Sized diet. No known history of dysphagia. However, pt states he has some difficulty chewing due to his dentures and he likes the current diet provided. Will continue diet and supplements as ordered. Jalen Prescott R.D., L.ENA.   Phone: [phone]

## 2021-08-26 PROCEDURE — 6370000000 HC RX 637 (ALT 250 FOR IP)

## 2021-08-26 PROCEDURE — 97110 THERAPEUTIC EXERCISES: CPT

## 2021-08-26 PROCEDURE — 6370000000 HC RX 637 (ALT 250 FOR IP): Performed by: PHYSICAL MEDICINE & REHABILITATION

## 2021-08-26 PROCEDURE — 1180000000 HC REHAB R&B

## 2021-08-26 PROCEDURE — 97535 SELF CARE MNGMENT TRAINING: CPT

## 2021-08-26 PROCEDURE — 97530 THERAPEUTIC ACTIVITIES: CPT

## 2021-08-26 PROCEDURE — 97116 GAIT TRAINING THERAPY: CPT

## 2021-08-26 PROCEDURE — 6370000000 HC RX 637 (ALT 250 FOR IP): Performed by: NURSE PRACTITIONER

## 2021-08-26 PROCEDURE — 99232 SBSQ HOSP IP/OBS MODERATE 35: CPT | Performed by: STUDENT IN AN ORGANIZED HEALTH CARE EDUCATION/TRAINING PROGRAM

## 2021-08-26 RX ORDER — PANTOPRAZOLE SODIUM 40 MG/1
40 TABLET, DELAYED RELEASE ORAL
Qty: 60 TABLET | Refills: 0 | Status: SHIPPED | OUTPATIENT
Start: 2021-08-27 | End: 2021-10-18 | Stop reason: SDUPTHER

## 2021-08-26 RX ORDER — FUROSEMIDE 20 MG/1
20 TABLET ORAL 2 TIMES DAILY
Qty: 60 TABLET | Refills: 0 | Status: SHIPPED | OUTPATIENT
Start: 2021-08-27 | End: 2021-10-18 | Stop reason: SDUPTHER

## 2021-08-26 RX ORDER — TAMSULOSIN HYDROCHLORIDE 0.4 MG/1
0.4 CAPSULE ORAL DAILY
Qty: 30 CAPSULE | Refills: 0 | Status: SHIPPED | OUTPATIENT
Start: 2021-08-27 | End: 2021-10-18

## 2021-08-26 RX ORDER — POLYETHYLENE GLYCOL 3350 17 G/17G
17 POWDER, FOR SOLUTION ORAL DAILY PRN
COMMUNITY
Start: 2021-08-26 | End: 2021-10-18 | Stop reason: SDUPTHER

## 2021-08-26 RX ORDER — AMOXICILLIN 500 MG/1
1000 CAPSULE ORAL EVERY 12 HOURS SCHEDULED
Qty: 8 CAPSULE | Refills: 0 | Status: SHIPPED | OUTPATIENT
Start: 2021-08-27 | End: 2021-08-29

## 2021-08-26 RX ORDER — METRONIDAZOLE 500 MG/1
500 TABLET ORAL 3 TIMES DAILY
Qty: 6 TABLET | Refills: 0 | Status: SHIPPED | OUTPATIENT
Start: 2021-08-26 | End: 2021-08-28

## 2021-08-26 RX ORDER — ACETAMINOPHEN 325 MG/1
650 TABLET ORAL EVERY 4 HOURS PRN
COMMUNITY
Start: 2021-08-26

## 2021-08-26 RX ORDER — AMIODARONE HYDROCHLORIDE 200 MG/1
200 TABLET ORAL DAILY
Qty: 30 TABLET | Refills: 0 | Status: SHIPPED | OUTPATIENT
Start: 2021-08-27 | End: 2021-10-18 | Stop reason: SDUPTHER

## 2021-08-26 RX ADMIN — DILTIAZEM HYDROCHLORIDE 30 MG: 30 TABLET, FILM COATED ORAL at 07:23

## 2021-08-26 RX ADMIN — FUROSEMIDE 20 MG: 20 TABLET ORAL at 07:24

## 2021-08-26 RX ADMIN — METRONIDAZOLE 500 MG: 500 TABLET ORAL at 07:11

## 2021-08-26 RX ADMIN — METRONIDAZOLE 500 MG: 500 TABLET ORAL at 14:40

## 2021-08-26 RX ADMIN — DILTIAZEM HYDROCHLORIDE 30 MG: 30 TABLET, FILM COATED ORAL at 20:52

## 2021-08-26 RX ADMIN — PANTOPRAZOLE SODIUM 40 MG: 40 TABLET, DELAYED RELEASE ORAL at 07:11

## 2021-08-26 RX ADMIN — AMIODARONE HYDROCHLORIDE 200 MG: 200 TABLET ORAL at 07:24

## 2021-08-26 RX ADMIN — AMOXICILLIN 1000 MG: 500 CAPSULE ORAL at 07:23

## 2021-08-26 RX ADMIN — FUROSEMIDE 20 MG: 20 TABLET ORAL at 17:07

## 2021-08-26 RX ADMIN — AMOXICILLIN 1000 MG: 500 CAPSULE ORAL at 20:51

## 2021-08-26 RX ADMIN — TAMSULOSIN HYDROCHLORIDE 0.4 MG: 0.4 CAPSULE ORAL at 07:24

## 2021-08-26 RX ADMIN — PANTOPRAZOLE SODIUM 40 MG: 40 TABLET, DELAYED RELEASE ORAL at 17:08

## 2021-08-26 RX ADMIN — Medication: at 07:24

## 2021-08-26 RX ADMIN — METRONIDAZOLE 500 MG: 500 TABLET ORAL at 20:52

## 2021-08-26 NOTE — PLAN OF CARE
Problem: Skin Integrity:  Goal: Will show no infection signs and symptoms  Description: Will show no infection signs and symptoms  8/26/2021 1335 by Genna Henry RN  Outcome: Ongoing  8/26/2021 0716 by Chelle Vogel RN  Outcome: Ongoing  Goal: Absence of new skin breakdown  Description: Absence of new skin breakdown  8/26/2021 1335 by Genna Henry RN  Outcome: Ongoing  8/26/2021 0716 by Chelle Vogel RN  Outcome: Ongoing     Problem: Falls - Risk of:  Goal: Will remain free from falls  Description: Will remain free from falls  8/26/2021 1335 by Genna Henry RN  Outcome: Ongoing  8/26/2021 0716 by Chelle Vogel RN  Outcome: Ongoing  Goal: Absence of physical injury  Description: Absence of physical injury  8/26/2021 1335 by Genna Henry RN  Outcome: Ongoing  8/26/2021 0716 by Chelle Vogel RN  Outcome: Ongoing     Problem: Pain:  Goal: Pain level will decrease  Description: Pain level will decrease  8/26/2021 1335 by Genna Henry RN  Outcome: Ongoing  8/26/2021 0716 by Chelle Vogel RN  Outcome: Ongoing  Goal: Control of acute pain  Description: Control of acute pain  8/26/2021 1335 by Genna Henry RN  Outcome: Ongoing  8/26/2021 0716 by Chelle Vogel RN  Outcome: Ongoing  Goal: Control of chronic pain  Description: Control of chronic pain  8/26/2021 1335 by Genna Henry RN  Outcome: Ongoing  8/26/2021 0716 by Chelle Vogel RN  Outcome: Ongoing     Problem: Musculor/Skeletal Functional Status  Goal: Highest potential functional level  8/26/2021 1335 by Genna Henry RN  Outcome: Ongoing  8/26/2021 0716 by Chelle Vogel RN  Outcome: Ongoing     Problem: Nutrition  Goal: Optimal nutrition therapy  8/26/2021 1335 by Genna Henry RN  Outcome: Ongoing  8/26/2021 0716 by Chelle Vogel RN  Outcome: Ongoing

## 2021-08-26 NOTE — PROGRESS NOTES
Physical Medicine & Rehabilitation  Progress Note      Subjective:      Favian Bowers is a 79 y.o. male with left hip fracture. He reports doing well today. He continues to deny any left hip pain. He is ready for discharge tomorrow. He denies any acute concerns. ROS:  Denies fevers, chills, sweats. No chest pain, palpitations, lightheadedness. Denies coughing, wheezing or shortness of breath. Denies abdominal pain, nausea, diarrhea or constipation. No new areas of joint pain. Denies new areas of numbness or weakness. Denies new anxiety or depression issues. No new skin problems. Rehabilitation:   Progressing in therapies. PT:  Restrictions/Precautions: Weight Bearing, Fall Risk  Implants present? : Metal implants (L hemiarthroplasty )  Left Lower Extremity Weight Bearing: Weight Bearing As Tolerated   Transfers  Sit to Stand: Modified independent  Stand to sit: Modified independent  Bed to Chair: Stand by assistance  Stand Pivot Transfers: Modified independent (w/RW)  Lateral Transfers: Contact guard assistance  Comment: Pt demos safe hand placement with all transfers. WB Status: WBAT  Ambulation 1  Surface: level tile  Device: Rolling Walker  Other Apparatus: Wheelchair follow  Assistance: Modified Independent  Quality of Gait: Patient would benefit with use of rolling walker for gait. Gait Deviations: Decreased step length, Decreased step height, Slow Danuta  Distance: 150ft  Comments: Patient educated on the use and safety of a rolling walker. Patient demonstrated understanding. Transfers  Sit to Stand: Modified independent  Stand to sit: Modified independent  Bed to Chair: Stand by assistance  Stand Pivot Transfers: Modified independent (w/RW)  Lateral Transfers: Contact guard assistance  Comment: Pt demos safe hand placement with all transfers.   Ambulation  Ambulation?: Yes  WB Status: WBAT  More Ambulation?: Yes  Ambulation 1  Surface: level tile  Device: Rolling Walker  Other Apparatus: Wheelchair follow  Assistance: Modified Independent  Quality of Gait: Patient would benefit with use of rolling walker for gait. Gait Deviations: Decreased step length, Decreased step height, Slow Danuta  Distance: 150ft  Comments: Patient educated on the use and safety of a rolling walker. Patient demonstrated understanding. Surface: level tile  Ambulation 1  Surface: level tile  Device: Rolling Walker  Other Apparatus: Wheelchair follow  Assistance: Modified Independent  Quality of Gait: Patient would benefit with use of rolling walker for gait. Gait Deviations: Decreased step length, Decreased step height, Slow Danuta  Distance: 150ft  Comments: Patient educated on the use and safety of a rolling walker. Patient demonstrated understanding. OT:  ADL  Equipment Provided: Reacher  Feeding: Dentures, Modified independent   Grooming: Modified independent   UE Bathing: Modified independent  (seated)  LE Bathing: Modified independent  (seated/standing sinkside. )  UE Dressing: Modified independent   LE Dressing: Modified independent   Toileting: Modified independent   Additional Comments: Improved tolerance, compensatory techniques with AE this date. Educated on importance of progessing toileting to completing buttocks hygeine per self or need to ID alternative strategies. Pt agreeable on next bowel movement Educated on use of reacher/towel for drying feet with G carryover. Instrumental ADL's  Instrumental ADLs: Yes     Balance  Sitting Balance: Independent  Standing Balance: Modified independent    Standing Balance  Time: Am/PM: 1-2 min x3, <1 min x 2   Activity: ADL's, functional transfers. Comment: no LOB, Good overall safety. Still tolerating <10minutes  Functional Mobility  Functional - Mobility Device: Rolling Walker  Activity: To/from bathroom  Assist Level: Modified independent   Functional Mobility Comments: Pt requesting to ambulate in barefeet for home ADL mobility assessment. Good foot placement. Good pacing. Good rolling walker safety. Bed mobility  Bridging: Modified independent   Rolling to Left: Stand by assistance  Rolling to Right: Stand by assistance  Supine to Sit: Modified independent  Sit to Supine: Modified independent  Scooting: Modified independent  Comment: Hob raised  Transfers  Stand Step Transfers: Supervision  Stand Pivot Transfers: Supervision  Sit to stand: Modified independent  Stand to sit: Modified independent  Transfer Comments: G hand placement throughout this date; self corrected x1   Toilet Transfers  Toilet - Technique: Ambulating  Equipment Used: Raised toilet seat without rails  Toilet Transfer: Modified independent  Toilet Transfers Comments: grab rail on L side of toilet. Shower Transfers  Shower - Transfer From: Si Ripa (RW)  Shower - Transfer Type: To and From  Shower - Transfer To:  Transfer tub bench  Shower - Technique: Ambulating  Shower Transfers: Supervision  Shower Transfers Comments: VC for safety with RW at threshold and overreaching; improved techinque upon shower exit  Wheelchair Bed Transfers  Wheelchair/Bed - Technique: Stand step  Equipment Used: Wheelchair, Bed  Level of Asssistance: Stand by assistance    SPEECH:      Current Medications:   Current Facility-Administered Medications: tamsulosin (FLOMAX) capsule 0.4 mg, 0.4 mg, Oral, Daily  Hydrocerin cream CREA, , Topical, BID  metroNIDAZOLE (FLAGYL) tablet 500 mg, 500 mg, Oral, 3 times per day  amoxicillin (AMOXIL) capsule 1,000 mg, 1,000 mg, Oral, 2 times per day  acetaminophen (TYLENOL) tablet 650 mg, 650 mg, Oral, Q4H PRN  polyethylene glycol (GLYCOLAX) packet 17 g, 17 g, Oral, Daily  senna (SENOKOT) tablet 17.2 mg, 2 tablet, Oral, Daily PRN  bisacodyl (DULCOLAX) suppository 10 mg, 10 mg, Rectal, Daily PRN  amiodarone (CORDARONE) tablet 200 mg, 200 mg, Oral, Daily  dilTIAZem (CARDIZEM) tablet 30 mg, 30 mg, Oral, 2 times per day  HYDROcodone-acetaminophen (NORCO) 5-325 MG per tablet 1 tablet, 1 tablet, Oral, Q6H PRN  melatonin tablet 3 mg, 3 mg, Oral, Nightly PRN  metoprolol tartrate (LOPRESSOR) tablet 50 mg, 50 mg, Oral, BID  pantoprazole (PROTONIX) tablet 40 mg, 40 mg, Oral, BID AC  furosemide (LASIX) tablet 20 mg, 20 mg, Oral, BID      Objective:  BP (!) 115/57   Pulse 62   Temp 97.3 °F (36.3 °C) (Oral)   Resp 18   Ht 5' 7\" (1.702 m)   Wt 178 lb (80.7 kg)   SpO2 97%   BMI 27.88 kg/m²       GEN: Well developed, well nourished, no acute distress  HEENT: NCAT. EOM grossly intact. Hearing grossly intact. RESP: Normal breath sounds with no wheezing, rales, or rhonchi. Respirations WNL and unlabored. Cardiovascular:  Regular rate and rhythm. No murmurs, rubs, or gallops. ABD: Soft, non-distended, +BS. NEURO: Alert. Speech fluent. Sensation to light touch intact in bilateral lower limbs. MSK:  Muscle tone and bulk are normal bilaterally. Strength 4+/5 in left lower limb. SKIN: Warm and dry with good turgor. PSYCH: Mood WNL. Affect WNL. Appropriately interactive. Diagnostics:     CBC:   Recent Labs     08/25/21  0704   WBC 4.9   RBC 3.19*   HGB 10.6*   HCT 32.4*   .5*   RDW 17.7*        BMP:   Recent Labs     08/25/21  0704      K 4.4      CO2 30   BUN 22   CREATININE 0.63*   GLUCOSE 91     BNP: No results for input(s): BNP in the last 72 hours. PT/INR: No results for input(s): PROTIME, INR in the last 72 hours. APTT: No results for input(s): APTT in the last 72 hours. CARDIAC ENZYMES: No results for input(s): CKMB, CKMBINDEX, TROPONINT in the last 72 hours. Invalid input(s): CKTOTAL;3  FASTING LIPID PANEL:No results found for: CHOL, HDL, TRIG  LIVER PROFILE: No results for input(s): AST, ALT, ALB, BILIDIR, BILITOT, ALKPHOS in the last 72 hours.        Impression/Plan:   Impaired ADLs, gait, and mobility due to:    1. L hip fracture:  S/p hemiarthroplasty by Dr. Corado Earing 8/5/21. PT/OT for gait, mobility, strengthening, endurance, ADLs, and self care. WBAT. Has Tylenol or Norco prn pain. Reached out to orthopedic surgery regarding staples - removed 8/25 per Dr. Scott Meier. 2. A Fib with RVR: rate controlled. On amiodarone, Cardizem, Metoprolol - IM recommended discontinuing metoprolol due to borderline low HR. No anticoagulation per GI. 3. Rhabdomyolysis: Resolved. Monitoring BMP weekly  4. Anemia secondary to GI bleed/ulcer: GI following. On PPI BID, soft diet, received 1 unit PRBCs 8/13/21. Monitoring q MWF.  5. H Pylori ulcer: on 14 days Flagyl and amoxicillin until 8/29/21 per GI. 6. Wounds/abrasions: nursing interventions, wound care consulted to follow  7. Bilateral lower limb edema:  Trialing ACE wrapping. 8. Insomnia: has melatonin prn  9. Hypokalemia: Improved. On Lasix. Monitoring and will replace prn but has been stable. 10. Urinary frequency:  Improved. On flomax. Some contribution from Lasix anticipated. 11. Pulmonary infiltrates/effusion: using incentive spirometer  12. Bowel Management: Miralax daily, senokot prn, dulcolax prn. 13. DVT Prophylaxis:  SCD's while in bed. SUE's and chemoprophylaxis contraindicated  14.  Internal medicine for medical management      Electronically signed by Brandin Morrell MD on 8/26/2021 at 10:15 PM

## 2021-08-26 NOTE — PROGRESS NOTES
[address] [facility]    ACUTE REHABILITATION OCCUPATIONAL THERAPY  DAILY NOTE    Date: 21  Patient Name: Shy Sumner      Room: 4882/3321-27    MRN: [id number]   : 1940  (79 y.o.)  Gender: male   Referring Practitioner: Kelly Choe MD  Diagnosis: Left hip fracture, hospitalization prolonged (initial admit 8-3)  due to afib, rhabdomyolosis, GI bleed, s/p EGD, on Protonix, twice a day,  Additional Pertinent Hx: Karla Lux is a 79 y. o. male with no known past medical history admitted to [facility] 8/3/2021.  He initially presented after a fall from standing at home. He reportedly was laying on the floor for 5 days before he was able to call for help. He was found to have atrial fibrillation with RVR in the ED and was started on diltiazem and heparin drips. He was also found to have left hip fracture and rhabdomylolysis. He has multiple wounds, including buttock, sacrum, bilateral elbow, and bilateral knee wounds. He underwent left hip hemiarthroplasty on 21 (Dr. Meliza Mir). Pt now presents to ARU 21     Restrictions  Restrictions/Precautions: Weight Bearing, Fall Risk  Implants present? : Metal implants (L hemiarthroplasty )  Left Lower Extremity Weight Bearing: Weight Bearing As Tolerated  Required Braces or Orthoses?: No  Equipment Used: Wheelchair, Bed    Subjective  Comments: Pt pleasant and cooperative. Patient Currently in Pain: Denies  Restrictions/Precautions: Weight Bearing; Fall Risk  Overall Orientation Status: Within Functional Limits          Objective  Cognition  Overall Cognitive Status: WFL  Perception  Overall Perceptual Status: WFL  Balance  Sitting Balance: Independent  Standing Balance: Modified independent   Bed mobility  Supine to Sit: Modified independent  Sit to Supine: Modified independent  Transfers  Sit to stand: Modified independent  Stand to sit: Modified independent  Standing Balance  Time: Am/PM: 1-2 min x3, <1 min x 2   Activity: ADL's, functional transfers. Functional Mobility  Functional - Mobility Device: Rolling Walker  Activity: To/from bathroom  Assist Level: Modified independent   Toilet Transfers  Toilet - Technique: Ambulating  Equipment Used: Raised toilet seat without rails  Toilet Transfer: Modified independent  Toilet Transfers Comments: grab rail on L side of toilet. Type of ROM/Therapeutic Exercise  Type of ROM/Therapeutic Exercise: Free weights (3#)  Comment: Pt engaged in UB ex's for improved strength and endurance to engage in functional tasks at home. Pt reported good understanding, writer recommended alternate ways to exercise without having dumbells. Pt reports good understanding of theraband ex's. Pt completes x20 reps with minimal need for rest breaks. Exercises  Scapular Protraction: x  Scapular Retraction: x  Shoulder Flexion: x  Shoulder Extension: x  Horizontal ABduction: x  Horizontal ADduction: x  Elbow Flexion: x  Elbow Extension: x  Wrist Flexion: x  Wrist Extension: x                       ADL  Equipment Provided: Reacher  Grooming: Modified independent   UE Bathing: Modified independent  (seated)  LE Bathing: Modified independent  (seated/standing sinkside. )  UE Dressing: Modified independent   LE Dressing: Modified independent   Toileting: Modified independent           Assessment  Performance deficits / Impairments: Decreased ADL status; Decreased functional mobility ; Decreased strength;Decreased safe awareness;Decreased endurance;Decreased balance;Decreased high-level IADLs  Prognosis: Good  Discharge Recommendations: Continue to assess pending progress  Activity Tolerance: Patient Tolerated treatment well  Safety Devices in place: Yes  Type of devices: All fall risk precautions in place;Call light within reach;Gait belt;Patient at risk for falls; Left in chair;Left in bed;Nurse notified  Equipment Recommendations  Equipment Needed: Yes  3-in-1 Commode: x  Reacher: x  Sock Aid: x  Long-handled [place]: minutes during funcitonal activity of choice with good safety  Long term goal 4: Pt will verbalize/demonstrate good understanding of home safety/fall prevention strategies to increase safety and independence with self care and mobility  Long term goal 5: Pt will verbalize/demonstrate good understanding of adaptive equipment/adaptive strategies/durable medical equipment to increase safety and independence with self care and mobility  Long term goals 6: Pt will complete simple meal prep/light house keeping with supervision and good safety        08/26/21 1034 08/26/21 1357   OT Individual Minutes   Time In [facility] 621   Time Out 1027 1328   Minutes 48 24     Electronically signed by SHANELLE Moreland on 8/26/21 at 2:01 PM EDT

## 2021-08-27 VITALS
SYSTOLIC BLOOD PRESSURE: 112 MMHG | HEART RATE: 61 BPM | DIASTOLIC BLOOD PRESSURE: 50 MMHG | OXYGEN SATURATION: 99 % | WEIGHT: 178 LBS | RESPIRATION RATE: 18 BRPM | TEMPERATURE: 97.1 F | HEIGHT: 67 IN | BODY MASS INDEX: 27.94 KG/M2

## 2021-08-27 LAB
HCT VFR BLD CALC: 33.6 % (ref 41–53)
HEMOGLOBIN: 11 G/DL (ref 13.5–17.5)
MCH RBC QN AUTO: 33.5 PG (ref 26–34)
MCHC RBC AUTO-ENTMCNC: 32.7 G/DL (ref 31–37)
MCV RBC AUTO: 102.4 FL (ref 80–100)
NRBC AUTOMATED: ABNORMAL PER 100 WBC
PDW BLD-RTO: 17.8 % (ref 11.5–14.9)
PLATELET # BLD: 312 K/UL (ref 150–450)
PMV BLD AUTO: 8.2 FL (ref 6–12)
RBC # BLD: 3.28 M/UL (ref 4.5–5.9)
WBC # BLD: 4.8 K/UL (ref 3.5–11)

## 2021-08-27 PROCEDURE — 97116 GAIT TRAINING THERAPY: CPT

## 2021-08-27 PROCEDURE — 6370000000 HC RX 637 (ALT 250 FOR IP): Performed by: PHYSICAL MEDICINE & REHABILITATION

## 2021-08-27 PROCEDURE — 97110 THERAPEUTIC EXERCISES: CPT

## 2021-08-27 PROCEDURE — 6370000000 HC RX 637 (ALT 250 FOR IP)

## 2021-08-27 PROCEDURE — 36415 COLL VENOUS BLD VENIPUNCTURE: CPT

## 2021-08-27 PROCEDURE — 85027 COMPLETE CBC AUTOMATED: CPT

## 2021-08-27 PROCEDURE — 99238 HOSP IP/OBS DSCHRG MGMT 30/<: CPT | Performed by: STUDENT IN AN ORGANIZED HEALTH CARE EDUCATION/TRAINING PROGRAM

## 2021-08-27 PROCEDURE — 97535 SELF CARE MNGMENT TRAINING: CPT

## 2021-08-27 PROCEDURE — 6370000000 HC RX 637 (ALT 250 FOR IP): Performed by: NURSE PRACTITIONER

## 2021-08-27 RX ADMIN — METRONIDAZOLE 500 MG: 500 TABLET ORAL at 12:35

## 2021-08-27 RX ADMIN — PANTOPRAZOLE SODIUM 40 MG: 40 TABLET, DELAYED RELEASE ORAL at 06:08

## 2021-08-27 RX ADMIN — AMIODARONE HYDROCHLORIDE 200 MG: 200 TABLET ORAL at 08:15

## 2021-08-27 RX ADMIN — FUROSEMIDE 20 MG: 20 TABLET ORAL at 08:16

## 2021-08-27 RX ADMIN — AMOXICILLIN 1000 MG: 500 CAPSULE ORAL at 08:14

## 2021-08-27 RX ADMIN — Medication: at 08:21

## 2021-08-27 RX ADMIN — TAMSULOSIN HYDROCHLORIDE 0.4 MG: 0.4 CAPSULE ORAL at 08:14

## 2021-08-27 RX ADMIN — DILTIAZEM HYDROCHLORIDE 30 MG: 30 TABLET, FILM COATED ORAL at 08:21

## 2021-08-27 RX ADMIN — METRONIDAZOLE 500 MG: 500 TABLET ORAL at 06:08

## 2021-08-27 ASSESSMENT — PAIN SCALES - GENERAL: PAINLEVEL_OUTOF10: 0

## 2021-08-27 NOTE — PROGRESS NOTES
[id number]    ACUTE REHABILITATION OCCUPATIONAL THERAPY  DAILY NOTE    Date: 21  Patient Name: John Padron      Room: 1167/7280-33    MRN: [id number]   : 1940  (79 y.o.)  Gender: male   Referring Practitioner: Simona Huang MD  Diagnosis: Left hip fracture, hospitalization prolonged (initial admit 8-3)  due to afib, rhabdomyolosis, GI bleed, s/p EGD, on Protonix, twice a day,       Restrictions  Restrictions/Precautions: Weight Bearing, Fall Risk  Implants present? : Metal implants (L hemiarthroplasty )  Left Lower Extremity Weight Bearing: Weight Bearing As Tolerated  Required Braces or Orthoses?: No  Equipment Used: Wheelchair, Bed    Subjective  Comments: Pt pleasant and cooperative. Patient Currently in Pain: Denies  Restrictions/Precautions: Weight Bearing; Fall Risk  Overall Orientation Status: Within Functional Limits          Objective  Cognition  Overall Cognitive Status: WFL  Perception  Overall Perceptual Status: WFL  Balance  Sitting Balance: Independent  Standing Balance: Modified independent   Bed mobility  Supine to Sit: Modified independent  Transfers  Sit to stand: Modified independent  Stand to sit: Modified independent  Standing Balance  Time: Am: ~5 min, 1-2 min   Activity: AM: clothing retrieval, functional mobility in room/bathroom  Functional Mobility  Functional - Mobility Device: Rolling Walker  Activity: Retrieve items;Transport items; To/from bathroom  Assist Level: Modified independent   Functional Mobility Comments: occ safety concerns  Toilet Transfers  Toilet - Technique: Ambulating  Equipment Used: Raised toilet seat without rails  Toilet Transfer: Modified independent  Toilet Transfers Comments: grab rail on L side of toilet. Shower Transfers  Shower - Transfer From: Ruddy Wen - Transfer Type: To and From  Shower - Transfer To:  Transfer tub bench  Shower - Technique: Ambulating  Shower Transfers: Supervision;Verbal cues  Shower Transfers dressing/bathing with min A and good safety with use of AE as needed  Short term goal 2: Pt will complete functional mobility during self care tasks with SBA and good safety with use of least restrictive device  Short term goal 3: Pt will tolerate standing 5+ minutes during functional activity of choice with CGA and good safety  Short term goal 4: Pt will complete functional transfer during self care tasks with CGA and good safety  Short term goal 5: Pt will participate in 30+ minutes of therapeutic exercises/functional activities to increase safety and independence with self care tasks  Long term goals  Time Frame for Long term goals : By discharge  Long term goal 1: Pt will complete BADLs with modified independence and good safety with use of AE as needed  Long term goal 2: Pt will complete functional transfers and mobility with modified independence and good safety with use of least restrictive device  Long term goal 3: Pt will tolerate standing 10+ minutes during funcitonal activity of choice with good safety  Long term goal 4: Pt will verbalize/demonstrate good understanding of home safety/fall prevention strategies to increase safety and independence with self care and mobility  Long term goal 5: Pt will verbalize/demonstrate good understanding of adaptive equipment/adaptive strategies/durable medical equipment to increase safety and independence with self care and mobility  Long term goals 6: Pt will complete simple meal prep/light house keeping with supervision and good safety        08/27/21 1245   OT Individual Minutes   Time In 1045   Time Out 1145   Minutes 60     Electronically signed by SHANELLE Strickland on 8/27/21 at 1:07 PM EDT

## 2021-08-27 NOTE — DISCHARGE SUMMARY
Physical Medicine & Rehabilitation  Discharge Summary     Patient Identification:  Olimpia Medina  : 1940  Admit date: 2021  Discharge date: 21  Attending provider: Arely Banks MD        Primary care provider: No primary care provider on file. Discharge Diagnoses:   Left hip fracture s/p hemiarthroplasty  Atrial fibrillation  Anemia  H pylori infection with ulcer  Multiple wounds/abrasions  Bilateral lower limb edema  Insomnia  Hypokalemia  Urinary frequency    Discharge Functional Status:    Physical therapy:  Bed Mobility: Rolling: Modified independent, Rolling Left, Rolling Right  Supine to Sit: Modified independent  Sit to Supine: Modified independent  Scooting: Independent  Transfers: Sit to Stand: Independent  Stand to sit: Independent  Bed to Chair: Independent, WB Status: WBAT  Ambulation 1  Surface: level tile  Device: Single point cane  Other Apparatus: Wheelchair follow  Assistance: Modified Independent  Quality of Gait: Patient would benefit with use of rolling walker for gait. Gait Deviations: Decreased step length, Decreased step height, Slow Danuta  Distance: 160ft  Comments: Patient educated on the use and safety of a rolling walker.   Patient demonstrated understanding., Stairs  # Steps : 16  Stairs Height: 8\"  Rails: Right ascending  Device: Single pt cane  Assistance: Modified independent   Comment: seated rest break at top of steps before descending  Mobility:  , PT Equipment Recommendations  Equipment Needed: Yes  Mobility Devices: Star Champagne: Rolling, Assessment: Impaired mobility due to L Hip Fx/ Hemiarthroplasty and decreased tolerance to activity (pt demonstrated decreased seated balance without back or UE support- needs trunck strengthening)    Occupational therapy:  , Equipment Recommendations  Equipment Needed: Yes  3-in-1 Commode: x  Reacher: x  Sock Aid: x  Long-handled Shoehorn: x  Other: phone lanyard or belt buckle for transport, Assessment: has no bed/bath on main and only neighbor friend for support, was using urinal and sleep on couch, neighbor mowing grass (since injury 2 months ago) OT ed pt progress wt inpt therapy and to obtain [facility] and twin bed for main floor and have assist with laundry, cleaning, empty BSC in addition to already order food online and have neighbor do yardwork.; improved overall task tolerance and standing tasks    Speech therapy:         Inpatient Rehabilitation Course:   Talib Molina is a 79 y.o. male admitted to inpatient rehabilitation on 8/13/2021 for rehab for left hip fracture. He was originally admitted to [facility] on 8/3/21 for fall from standing height at home with left hip fracture. He reportedly was laying on the floor for 5 days before he was able to call for help. Elizabeth Lombardo was found to have atrial fibrillation with RVR in the ED and was started on diltiazem and heparin drips.  He was also found to have rhabdomylolysis. Elizabeth Lombardo had multiple wounds, including buttock, sacrum, bilateral elbow, and bilateral knee wounds.  He underwent left hip hemiarthroplasty on 8/5/21 (Dr. Chepe Reynolds).  Extubated 8/6/21. Hospital course was complicated by GI bleed. EGD showed duodenal ulcer and antral erosions with biopsies positive for H pylori.     He was requiring assistance for self-care activities and mobility prompting admission to acute rehab. Rehab course: The patient participated in an aggressive multidisciplinary inpatient rehabilitation program involving 3 hours per day, 5 days per week of rehabilitation. Patient benefited from inpatient rehab and was discharged in good stable condition. Staples removed from left hip incision on 8/25/21 per Dr. Chepe Reynolds. He was maintained on amiodarone and diltiazem for atrial fibrillation. Metoprolol was discontinued due to borderline bradycardia per IM recommendations. He is not currently on anticoagulation due to recent GI bleed.   Amoxicillin and flagyl were continued for H pylori infection. He remained on PPI BID as well. He was started on flomax for urinary frequency. Patient evaluated today:  GEN: Well developed, well nourished, no acute distress  HEENT: NCAT. EOM grossly intact. Hearing grossly intact. RESP: Normal breath sounds with no wheezing, rales, or rhonchi. Respirations WNL and unlabored. Cardiovascular:  Regular rate and rhythm. No murmurs, rubs, or gallops. ABD: Soft, non-distended, BS+ and equal.  NEURO: Alert. Speech fluent. Sensation to light touch intact in bilateral lower limbs. MSK:  Muscle tone and bulk are normal bilaterally. Strength 4+/5 in all limbs. LIMBS:  Edema present in bilateral lower limbs. SKIN: Warm and dry with good turgor. PSYCH: Mood WNL. Affect WNL. Appropriately interactive.       Consults:   Internal Medicine    Significant Diagnostics:   CBC:   Lab Results   Component Value Date    WBC 4.8 08/27/2021    RBC 3.28 08/27/2021    HGB 11.0 08/27/2021    HCT 33.6 08/27/2021    .4 08/27/2021    MCH 33.5 08/27/2021    MCHC 32.7 08/27/2021    RDW 17.8 08/27/2021     08/27/2021    MPV 8.2 08/27/2021     BMP:    Lab Results   Component Value Date     08/25/2021    K 4.4 08/25/2021     08/25/2021    CO2 30 08/25/2021    BUN 22 08/25/2021    LABALBU 2.2 08/09/2021    CREATININE 0.63 08/25/2021    CALCIUM 8.4 08/25/2021    GFRAA >60 08/25/2021    LABGLOM >60 08/25/2021    GLUCOSE 91 08/25/2021         No orders to display         Patient Instructions:    No driving until cleared by a physician    Medications, precautions and follow up reviewed with patient and family    Follow-up visits: See after visit summary from hospitalization  PCP 9/29/21, Dr. Briceno Comes 9/1/21, Dr. Libby Dominguez 9/3/21, Cardiology 9/8/21      Disposition:  Home with outpatient PT      Discharge Medications:   Skip Cifuentes Medication Instructions OLM:[id number]    Printed on:08/28/21 0050   Medication Information                      acetaminophen (TYLENOL) 325 MG tablet  Take 2 tablets by mouth every 4 hours as needed for Pain             amiodarone (CORDARONE) 200 MG tablet  Take 1 tablet by mouth daily             amoxicillin (AMOXIL) 500 MG capsule  Take 2 capsules by mouth every 12 hours for 4 doses             dilTIAZem (CARDIZEM) 30 MG tablet  Take 1 tablet by mouth every 12 hours             furosemide (LASIX) 20 MG tablet  Take 1 tablet by mouth 2 times daily             metroNIDAZOLE (FLAGYL) 500 MG tablet  Take 1 tablet by mouth 3 times daily for 6 doses             Multiple Vitamins-Minerals (THERAPEUTIC MULTIVITAMIN-MINERALS) tablet  Take 1 tablet by mouth daily             pantoprazole (PROTONIX) 40 MG tablet  Take 1 tablet by mouth 2 times daily (before meals)             polyethylene glycol (GLYCOLAX) 17 g packet  Take 17 g by mouth daily as needed for Constipation             tamsulosin (FLOMAX) 0.4 MG capsule  Take 1 capsule by mouth daily                   Lakesha Ronquillo MD

## 2021-08-27 NOTE — PROGRESS NOTES
CLINICAL PHARMACY NOTE: MEDS TO BEDS    Total # of Prescriptions Filled: 7   The following medications were delivered to the patient:  · Pantoprazole 40mg  · Amiodarone HCL 200mg  · Amoxicillin 500mg  · Furosemide 20mg  · Tamsulosin HCL 0.4mg  · Metronidazole 500mg  · Diltiazem HCL 30mg    Additional Documentation:  Delivered medications to patients room

## 2021-08-27 NOTE — PROGRESS NOTES
Discharge instructions explained to and given to pt. All pt belongings, including discharge instruction, meds-to-beds medications and PT Rx home with pt. Discharged to home per w/c per [facility] PCT via private vehicle with friend/neighbor, Erin Campbell. Pt stable upon discharge.

## 2021-08-27 NOTE — PLAN OF CARE
Problem: Skin Integrity:  Goal: Will show no infection signs and symptoms  Description: Will show no infection signs and symptoms  8/27/2021 0056 by Zeke Damon RN  Outcome: Ongoing  8/26/2021 1335 by Inez Sorto RN  Outcome: Ongoing  Goal: Absence of new skin breakdown  Description: Absence of new skin breakdown  8/27/2021 0056 by Zeke Damon RN  Outcome: Ongoing  8/26/2021 1335 by Inez Sorto RN  Outcome: Ongoing     Problem: Falls - Risk of:  Goal: Will remain free from falls  Description: Will remain free from falls  8/27/2021 0056 by Zeke Damon RN  Outcome: Ongoing  8/26/2021 1335 by Inez Sorto RN  Outcome: Ongoing  Goal: Absence of physical injury  Description: Absence of physical injury  8/27/2021 0056 by Zeke Damon RN  Outcome: Ongoing  8/26/2021 1335 by Inez Sorto RN  Outcome: Ongoing     Problem: Pain:  Goal: Pain level will decrease  Description: Pain level will decrease  8/27/2021 0056 by Zeke Damon RN  Outcome: Ongoing  8/26/2021 1335 by Inez Sorto RN  Outcome: Ongoing  Goal: Control of acute pain  Description: Control of acute pain  8/27/2021 0056 by Zeke Damon RN  Outcome: Ongoing  8/26/2021 1335 by Inez Sorto RN  Outcome: Ongoing  Goal: Control of chronic pain  Description: Control of chronic pain  8/27/2021 0056 by Zeke Damon RN  Outcome: Ongoing  8/26/2021 1335 by Inez Sorto RN  Outcome: Ongoing     Problem: Musculor/Skeletal Functional Status  Goal: Highest potential functional level  8/27/2021 0056 by Zeke Damon RN  Outcome: Ongoing  8/26/2021 1335 by Inez Sorto RN  Outcome: Ongoing     Problem: Nutrition  Goal: Optimal nutrition therapy  8/27/2021 0056 by Zeke Damon RN  Outcome: Ongoing  8/26/2021 1335 by Inez Sorto RN  Outcome: Ongoing

## 2021-09-01 ENCOUNTER — OFFICE VISIT (OUTPATIENT)
Dept: ORTHOPEDIC SURGERY | Age: 81
End: 2021-09-01

## 2021-09-01 VITALS — WEIGHT: 145 LBS | HEIGHT: 67 IN | BODY MASS INDEX: 22.76 KG/M2

## 2021-09-01 DIAGNOSIS — Z96.642 S/P HIP REPLACEMENT, LEFT: Primary | ICD-10-CM

## 2021-09-01 PROCEDURE — 99024 POSTOP FOLLOW-UP VISIT: CPT | Performed by: ORTHOPAEDIC SURGERY

## 2021-09-01 NOTE — PROGRESS NOTES
Procedure: Left hip hemiarthroplasty  Date of procedure: 8/5/2021    HPI: Mr. Luisa Calles is an 53-year-old who is now approximately 1 month status post the aforementioned procedure. He was just recently discharged from acute rehab and is back at home now. He states that he is doing well. He has no pain in the hip. He is ambulating with the aid of a cane. Physical examination:  Evaluation the patient's left hip and lower extremity demonstrates his incision to be clean, dry, an intact. He is appropriately healed. Sensation is grossly intact to light touch diffusely. 2+ pedal pulses. He has painless range of motion through the left hip. Imaging studies: An AP of the pelvis and 2 view x-rays of the left hip completed on 9/1/2021 were reviewed independently demonstrating a cemented hemiarthroplasty to be in acceptable alignment and well fixed without evidence of loosening or fracture, dislocation, or subluxation. Impression and plan: Mr. Luisa Calles is an 79year-old who is approximately 1 month status post left hip hemiarthroplasty for femoral neck fracture. He is doing relatively well at this time. When asked whether or not he was set up for physical therapy upon discharge from acute rehab he states that he was but he has not gone as he would rather continue to do his exercises from acute rehab. He states that he was given a whole book of exercises. I encouraged him to certainly do so. He was also encouraged to continue using his cane for ambulation. I will see him back for reevaluation in 2 months but he was encouraged to return or call earlier with questions or concerns.

## 2021-09-03 ENCOUNTER — OFFICE VISIT (OUTPATIENT)
Dept: GASTROENTEROLOGY | Age: 81
End: 2021-09-03
Payer: MEDICARE

## 2021-09-03 VITALS
WEIGHT: 133.7 LBS | DIASTOLIC BLOOD PRESSURE: 57 MMHG | OXYGEN SATURATION: 99 % | SYSTOLIC BLOOD PRESSURE: 113 MMHG | TEMPERATURE: 98.1 F | HEIGHT: 67 IN | HEART RATE: 67 BPM | BODY MASS INDEX: 20.99 KG/M2

## 2021-09-03 DIAGNOSIS — K27.9 H PYLORI ULCER: Primary | ICD-10-CM

## 2021-09-03 DIAGNOSIS — B96.81 H PYLORI ULCER: Primary | ICD-10-CM

## 2021-09-03 DIAGNOSIS — D64.9 ANEMIA, UNSPECIFIED TYPE: ICD-10-CM

## 2021-09-03 PROCEDURE — 99214 OFFICE O/P EST MOD 30 MIN: CPT | Performed by: INTERNAL MEDICINE

## 2021-09-03 PROCEDURE — 1111F DSCHRG MED/CURRENT MED MERGE: CPT | Performed by: INTERNAL MEDICINE

## 2021-09-03 PROCEDURE — 1123F ACP DISCUSS/DSCN MKR DOCD: CPT | Performed by: INTERNAL MEDICINE

## 2021-09-03 PROCEDURE — 1036F TOBACCO NON-USER: CPT | Performed by: INTERNAL MEDICINE

## 2021-09-03 PROCEDURE — APPSS45 APP SPLIT SHARED TIME 31-45 MINUTES: Performed by: NURSE PRACTITIONER

## 2021-09-03 PROCEDURE — G8420 CALC BMI NORM PARAMETERS: HCPCS | Performed by: INTERNAL MEDICINE

## 2021-09-03 PROCEDURE — G8427 DOCREV CUR MEDS BY ELIG CLIN: HCPCS | Performed by: INTERNAL MEDICINE

## 2021-09-03 PROCEDURE — 4040F PNEUMOC VAC/ADMIN/RCVD: CPT | Performed by: INTERNAL MEDICINE

## 2021-09-03 ASSESSMENT — ENCOUNTER SYMPTOMS
VOMITING: 0
NAUSEA: 0
COUGH: 0
CONSTIPATION: 0
ANAL BLEEDING: 0
ABDOMINAL DISTENTION: 0
RECTAL PAIN: 0
CHOKING: 0
ABDOMINAL PAIN: 0
SORE THROAT: 0
VOICE CHANGE: 0
BACK PAIN: 0
TROUBLE SWALLOWING: 0
SHORTNESS OF BREATH: 0
BLOOD IN STOOL: 0
DIARRHEA: 0

## 2021-09-03 NOTE — PROGRESS NOTES
GI OFFICE FOLLOW UP    INTERVAL HISTORY:   No referring provider defined for this encounter. Chief Complaint   Patient presents with    Follow-up     Patient is here today for a hospital f/u       HISTORY OF PRESENT ILLNESS:     Patient being seen for hospital follow-up. Patient was recently at [facility] after sustaining fall in his home. Patient reportedly was laying on the floor for 5 days before he was able to call for help. Patient had prolonged hospitalization complicated by GI bleed. Patient had large duodenal ulcer and antral erosions. Bx positive for H. Pylori. He was treated for H. Pylori in the hospital.    At present patient denies any GI symptoms. No abdominal pain  No nausea, vomiting  Tolerating diet well  Bowel movements are satisfactory. No melena, hematochezia. Has good appetite. No weight loss. Last hgb 11.0    Past Medical,Family, and Social History reviewed and does contribute to the patient presenting condition. Patient's PMH/PSH,SH,PSYCH Hx, MEDs, ALLERGIES, and ROS were all reviewed and updated in the appropriate sections.  Yes      PAST MEDICAL HISTORY:  Past Medical History:   Diagnosis Date    Atrial fibrillation ([address]     with RVR       Past Surgical History:   Procedure Laterality Date    HIP SURGERY Left 8/5/2021    HIP HEMIARTHROPLASTY performed by Hyun Mcdaniels MD at [address] 8/10/2021    EGD BIOPSY performed by Kwesi Linton MD at [address]:    Current Outpatient Medications:     acetaminophen (TYLENOL) 325 MG tablet, Take 2 tablets by mouth every 4 hours as needed for Pain, Disp: , Rfl:     amiodarone (CORDARONE) 200 MG tablet, Take 1 tablet by mouth daily, Disp: 30 tablet, Rfl: 0    dilTIAZem (CARDIZEM) 30 MG tablet, Take 1 tablet by mouth every 12 hours, Disp: 60 tablet, Rfl: 0    furosemide (LASIX) 20 MG tablet, Take 1 tablet by mouth 2 times daily, Disp: 60 tablet, Rfl: 0    polyethylene glycol (GLYCOLAX) 17 g packet, Take 17 g by mouth daily as needed for Constipation, Disp: , Rfl:     tamsulosin (FLOMAX) 0.4 MG capsule, Take 1 capsule by mouth daily, Disp: 30 capsule, Rfl: 0    pantoprazole (PROTONIX) 40 MG tablet, Take 1 tablet by mouth 2 times daily (before meals), Disp: 60 tablet, Rfl: 0    Multiple Vitamins-Minerals (THERAPEUTIC MULTIVITAMIN-MINERALS) tablet, Take 1 tablet by mouth daily, Disp: , Rfl:     ALLERGIES:   No Known Allergies    FAMILY HISTORY: History reviewed. No pertinent family history. SOCIAL HISTORY:   Social History     Socioeconomic History    Marital status: Single     Spouse name: Not on file    Number of children: Not on file    Years of education: Not on file    Highest education level: Not on file   Occupational History    Not on file   Tobacco Use    Smoking status: Never Smoker    Smokeless tobacco: Never Used   Vaping Use    Vaping Use: Never used   Substance and Sexual Activity    Alcohol use: Never    Drug use: Never    Sexual activity: Not on file   Other Topics Concern    Not on file   Social History Narrative    Not on file     Social Determinants of Health     Financial Resource Strain:     Difficulty of Paying Living Expenses:    Food Insecurity:     Worried About Running Out of Food in the Last Year:     [address] in the Last Year:    Transportation Needs:     Lack of Transportation (Medical):      Lack of Transportation (Non-Medical):    Physical Activity:     Days of Exercise per Week:     Minutes of Exercise per Session:    Stress:     Feeling of Stress :    Social Connections:     Frequency of Communication with Friends and Family:     Frequency of Social Gatherings with Friends and Family:     Attends [employer] Services:     Active Member of Clubs or Organizations:     Attends Club or Organization Meetings:     Marital Status:    Intimate Partner Violence:     Fear of Current or Ex-Partner:     Emotionally Abused:     Physically Abused:     Sexually Abused:          REVIEW OF SYSTEMS:         Review of Systems   Constitutional: Negative for appetite change, fatigue and unexpected weight change. HENT: Negative for sore throat, trouble swallowing and voice change. Eyes: Positive for visual disturbance (glasses). Respiratory: Negative for cough, choking and shortness of breath. Cardiovascular: Negative for chest pain and leg swelling. Gastrointestinal: Negative for abdominal distention, abdominal pain, anal bleeding, blood in stool, constipation, diarrhea, nausea, rectal pain and vomiting. Genitourinary: Negative for difficulty urinating. Musculoskeletal: Positive for gait problem (uses a cane). Negative for back pain, joint swelling and myalgias. Neurological: Negative for dizziness, tremors, weakness, light-headedness, numbness and headaches. Hematological: Does not bruise/bleed easily. Psychiatric/Behavioral: Negative for sleep disturbance. The patient is not nervous/anxious. PHYSICAL EXAMINATION:     Vital signs reviewed per the nursing documentation. BP (!) 113/57   Pulse 67   Temp 98.1 °F (36.7 °C)   Ht 5' 7\" (1.702 m)   Wt 133 lb 11.2 oz (60.6 kg)   SpO2 99%   BMI 20.94 kg/m²   Body mass index is 20.94 kg/m². Physical Exam  Constitutional:       Appearance: Normal appearance. Eyes:      General: No scleral icterus. Pupils: Pupils are equal, round, and reactive to light. Cardiovascular:      Rate and Rhythm: Normal rate and regular rhythm. Heart sounds: Normal heart sounds. Pulmonary:      Effort: Pulmonary effort is normal.      Breath sounds: Normal breath sounds. Abdominal:      General: Bowel sounds are normal. There is no distension. Palpations: Abdomen is soft. There is no mass. Tenderness:  There is no abdominal tenderness. There is no guarding. Skin:     General: Skin is warm and dry. Coloration: Skin is not jaundiced. Neurological:      Mental Status: He is alert and oriented to person, place, and time. Mental status is at baseline. LABORATORY DATA: Reviewed  Lab Results   Component Value Date    WBC 4.8 08/27/2021    HGB 11.0 (L) 08/27/2021    HCT 33.6 (L) 08/27/2021    .4 (H) 08/27/2021     08/27/2021     08/25/2021    K 4.4 08/25/2021     08/25/2021    CO2 30 08/25/2021    BUN 22 08/25/2021    CREATININE 0.63 (L) 08/25/2021    LABALBU 2.2 (L) 08/09/2021    BILITOT 0.58 08/09/2021    ALKPHOS 102 08/09/2021    AST 26 08/09/2021    ALT 11 08/09/2021    INR 1.2 08/03/2021         Lab Results   Component Value Date    RBC 3.28 (L) 08/27/2021    HGB 11.0 (L) 08/27/2021    .4 (H) 08/27/2021    MCH 33.5 08/27/2021    MCHC 32.7 08/27/2021    RDW 17.8 (H) 08/27/2021    MPV 8.2 08/27/2021    BASOPCT 1 08/13/2021    LYMPHSABS 0.80 (L) 08/13/2021    MONOSABS 0.80 08/13/2021    NEUTROABS 7.00 08/13/2021    EOSABS 0.20 08/13/2021    BASOSABS 0.10 08/13/2021         DIAGNOSTIC TESTING:     XR HIP LEFT (1 VIEW)    Result Date: 8/5/2021  EXAMINATION: ONE XRAY VIEW OF THE LEFT HIP 8/5/2021 8:31 pm COMPARISON: 08/03/2021 HISTORY: ORDERING SYSTEM PROVIDED HISTORY: sp left hip hemiarthroplasty TECHNOLOGIST PROVIDED HISTORY: sp left hip hemiarthroplasty Reason for Exam: post op Acuity: Unknown Type of Exam: Unknown FINDINGS: There is interval left hip arthroplasty for repair of the left femoral neck fracture. There is edema and emphysema in the overlying soft tissues, consistent with recent surgery. Diffuse bone demineralization. Left hip arthroplasty for fixation of the left femoral neck fracture.      XR CHEST PORTABLE    Result Date: 8/12/2021  EXAMINATION: ONE XRAY VIEW OF THE CHEST 8/12/2021 7:59 am COMPARISON: August 10, 2021 chest exam HISTORY: ORDERING SYSTEM PROVIDED HISTORY: Follow-up CHF TECHNOLOGIST PROVIDED HISTORY: Follow-up CHF Reason for Exam: sob Acuity: Unknown Type of Exam: Unknown FINDINGS: Stable cardiopericardial silhouette/mild tortuosity of the thoracic aorta Interval increase in now moderate asymmetric, right greater than left, interstitial/alveolar infiltrates with interval increase in small left and minimal right pleural effusions     Increasing now moderate asymmetric infiltrates with small pleural effusions, related to progression in vascular congestion and/or pneumonia     XR CHEST PORTABLE    Result Date: 8/10/2021  EXAMINATION: ONE XRAY VIEW OF THE CHEST 8/10/2021 10:34 am COMPARISON: Chest radiograph performed 08/06/2021. HISTORY: ORDERING SYSTEM PROVIDED HISTORY: Dyspnea, rales on exam TECHNOLOGIST PROVIDED HISTORY: Dyspnea, rales on exam Reason for Exam: Dyspnea, rales on exam Acuity: Acute Type of Exam: Subsequent/Follow-up Additional signs and symptoms: Dyspnea, rales on exam FINDINGS: There is a right mid and lower lung infiltrate. There are small bilateral effusions. The mediastinal structures are unremarkable. The upper abdomen unremarkable. The extrathoracic soft tissues are unremarkable. Right mid and lower lung infiltrates with small bilateral effusions. XR CHEST PORTABLE    Result Date: 8/6/2021  EXAMINATION: ONE XRAY VIEW OF THE CHEST 8/6/2021 5:44 am COMPARISON: 5 August 2021 HISTORY: ORDERING SYSTEM PROVIDED HISTORY: ETT placement TECHNOLOGIST PROVIDED HISTORY: ETT placement Reason for Exam: ETT placement Acuity: Unknown Type of Exam: Ongoing Additional signs and symptoms: ETT placement Relevant Medical/Surgical History: ETT placement FINDINGS: AP portable view of the chest electronically labeled regarding sides. Tracheal tube 5 cm proximal to the kassidy. Extreme lung apices not included on this radiograph. Patchy opacity within the right lower lung. No effusions or pneumothoraces. Cardiomediastinal silhouette is within normal limits. Remaining visualized osseous and soft tissues are unchanged. Stable tracheal tube tip at the level of upper T4 vertebra. Faint right lower lung opacity, most likely infectious or inflammatory in the appropriate clinical setting. XR CHEST PORTABLE    Result Date: 8/5/2021  EXAMINATION: ONE XRAY VIEW OF THE CHEST 8/5/2021 8:31 pm COMPARISON: 08/03/2021 HISTORY: ORDERING SYSTEM PROVIDED HISTORY: ETT placement TECHNOLOGIST PROVIDED HISTORY: ETT placement Reason for Exam: ET tube placement Acuity: Unknown Type of Exam: Unknown FINDINGS: Endotracheal tube terminates 5.5 cm above the kassidy. Cardiomediastinal silhouette is unchanged in size. Aortic atherosclerosis. No large pleural effusion or pneumothorax. There are faint bibasilar pulmonary opacities. No acute osseous abnormality. 1. Endotracheal tube in expected position. 2. Faint bibasilar opacities, which may be due to atelectasis. VL Lower Extremity Bilateral Venous Duplex    Result Date: 8/13/2021    [address] [place] [address]  Vascular Lower Extremities DVT Study Procedure   Patient Name   Rick Gerardo  Date of Study           08/13/2021   Date of Birth  1940  Gender                  Male   Age            79 year(s)  Race                       Room Number    2111        Height:                 66.93 inch, 170 cm   Corporate ID # [id number]    Weight:                 141 pounds, 64 kg   Patient Acct # [de-identified]   BSA:        1.74 m^2    BMI:      22.15 kg/m^2   MR #           [id number]      Sonographer             Helen Hansen CHRISTINE   Accession #    [id number]  Interpreting Physician  Ora Duarte   Referring                  Referring Physician     Hiral Hudson  Nurse  Practitioner  Procedure Type of Study:   Veins: Lower Extremities DVT Study, Venous Scan Lower Bilateral.  Indications for Study:R/O DVT. Patient Status:Out Patient. Technical Quality:Adequate visualization.   Conclusions   Summary   Simultaneous real time imaging utilizing B-Mode, color doppler and  spectral waveform analysis was performed on the bilateral lower  extremities for venous examination of the deep and superficial systems. Findings are:   Right:  No evidence of deep or superficial venous thrombosis. Left:  No evidence of deep or superficial venous thrombosis. Signature   ----------------------------------------------------------------  Electronically signed by Cheryl Hyde RVT(Sonographer) on  08/13/2021 11:08 AM  ----------------------------------------------------------------   ----------------------------------------------------------------  Electronically signed by Joi Gross(Interpreting  physician) on 08/13/2021 11:57 AM  ----------------------------------------------------------------  Findings:   Right Impression:                    Left Impression:  The common femoral, femoral,         The common femoral, femoral,  popliteal and tibial veins           popliteal and tibial veins  demonstrate normal compressibility   demonstrate normal compressibility  and augmentation. and augmentation. Normal compressibility of the great  Normal compressibility of the great  saphenous vein. saphenous vein. Normal compressibility of the small  Normal compressibility of the small  saphenous vein. saphenous vein. Enlarged lymph nodes are noted at  the groin level. Calcific plaque noted in the right  common femoral artery. Velocities are measured in cm/s ; Diameters are measured in cm Right Lower Extremities DVT Study Measurements Right 2D Measurements +------------------------------------+----------+---------------+----------+ ! Location                            ! Visualized! Compressibility! Thrombosis! +------------------------------------+----------+---------------+----------+ ! Common Femoral                      !Yes       ! Yes            ! None      ! +------------------------------------+----------+---------------+----------+ ! Prox Femoral                        !Yes       ! Yes            ! None      ! +------------------------------------+----------+---------------+----------+ ! Mid Femoral                         !Yes       ! Yes            ! None      ! +------------------------------------+----------+---------------+----------+ ! Dist Femoral                        !Yes       ! Yes            ! None      ! +------------------------------------+----------+---------------+----------+ ! Deep Femoral                        !Yes       ! Yes            ! None      ! +------------------------------------+----------+---------------+----------+ ! Popliteal                           !Yes       ! Yes            ! None      ! +------------------------------------+----------+---------------+----------+ ! Sapheno Femoral Junction            ! Yes       ! Yes            ! None      ! +------------------------------------+----------+---------------+----------+ ! PTV                                 ! Yes       ! Yes            ! None      ! +------------------------------------+----------+---------------+----------+ ! Peroneal                            !Yes       ! Yes            ! None      ! +------------------------------------+----------+---------------+----------+ ! Gastroc                             ! Yes       ! Yes            ! None      ! +------------------------------------+----------+---------------+----------+ ! GSV Thigh                           ! Yes       ! Yes            ! None      ! +------------------------------------+----------+---------------+----------+ ! GSV Knee                            ! Yes       ! Yes            ! None      ! +------------------------------------+----------+---------------+----------+ ! GSV Ankle                           ! Yes       ! Yes            ! None      ! +------------------------------------+----------+---------------+----------+ ! SSV !Yes       !Yes            ! None      ! +------------------------------------+----------+---------------+----------+ Left Lower Extremities DVT Study Measurements Left 2D Measurements +------------------------------------+----------+---------------+----------+ ! Location                            ! Visualized! Compressibility! Thrombosis! +------------------------------------+----------+---------------+----------+ ! Common Femoral                      !Yes       ! Yes            ! None      ! +------------------------------------+----------+---------------+----------+ ! Prox Femoral                        !Yes       ! Yes            ! None      ! +------------------------------------+----------+---------------+----------+ ! Mid Femoral                         !Yes       ! Yes            ! None      ! +------------------------------------+----------+---------------+----------+ ! Dist Femoral                        !Yes       ! Yes            ! None      ! +------------------------------------+----------+---------------+----------+ ! Deep Femoral                        !Yes       ! Yes            ! None      ! +------------------------------------+----------+---------------+----------+ ! Popliteal                           !Yes       ! Yes            ! None      ! +------------------------------------+----------+---------------+----------+ ! Sapheno Femoral Junction            ! Yes       ! Yes            ! None      ! +------------------------------------+----------+---------------+----------+ ! PTV                                 ! Yes       ! Yes            ! None      ! +------------------------------------+----------+---------------+----------+ ! Peroneal                            !Yes       ! Yes            ! None      ! +------------------------------------+----------+---------------+----------+ ! Gastroc                             ! Yes       ! Yes            ! None      ! +------------------------------------+----------+---------------+----------+ ! GSV Thigh                           ! Yes       ! Yes            ! None      ! +------------------------------------+----------+---------------+----------+ ! GSV Knee                            ! Yes       ! Yes            ! None      ! +------------------------------------+----------+---------------+----------+ ! GSV Ankle                           ! Yes       ! Yes            ! None      ! +------------------------------------+----------+---------------+----------+ ! SSV                                 ! Yes       ! Yes            ! None      ! +------------------------------------+----------+---------------+----------+         Assessment  1. H pylori ulcer    2. Anemia, unspecified type        Plan    We will plan for EGD in mid-October to evaluate healing of ulcer. To continue PPI  We will check stool for occult blood and H. Pylori antigen    The Endoscopic procedure was explained to the patient in detail  The prep and NPO were explained  All the Risks, Benefits, and Alternatives were explained  Risk of Bleeding, Perforation and Cardio Respiratory risks were explained  his questions were answered  The procedure has been scheduled with the  in the office  Patient was asked to give us a call for any questions  The patient has verbalized understanding and agreement to this plan. The patient was counseled at length about the risks of dayne Covid-19 during their perioperative period and any recovery window from their procedure. The patient was made aware that dayne Covid-19  may worsen their prognosis for recovering from their procedure  and lend to a higher morbidity and/or mortality risk. All material risks, benefits, and reasonable alternatives including postponing the procedure were discussed. The patient does wish to proceed with the procedure at this time. GI attending physician note.   Patient seen with APRN     I independently performed an evaluation on Reynaldo Lux. I have reviewed the above documentation completed by the Nurse Practitioner and agree with the history, examination, and management plan. Recommendations discussed. Discussed with the patient and he is asymptomatic. Advised not to take NSAIDs and to continue PPI therapy. Discussed management plan with the patient and APRN. Thank you for allowing me to participate in the care of Mr. Abdirashid Beck. For any further questions please do not hesitate to contact me. I have reviewed and agree with the ROS entered by the MA/LPN. Note is dictated utilizing voice recognition software. Unfortunately this leads to occasional typographical errors.  Please contact our office if you have any questions        Gonzalo Erickson MD,FACP, [facility]  Board Certified in Gastroenterology and [address] [place] [address] Gastroenterology  Office #: [phone]

## 2021-09-07 ENCOUNTER — TELEPHONE (OUTPATIENT)
Dept: GASTROENTEROLOGY | Age: 81
End: 2021-09-07

## 2021-10-01 ENCOUNTER — HOSPITAL ENCOUNTER (OUTPATIENT)
Dept: PREADMISSION TESTING | Age: 81
Discharge: HOME | End: 2021-10-05
Payer: MEDICARE

## 2021-10-01 VITALS — HEIGHT: 67 IN | WEIGHT: 140 LBS | BODY MASS INDEX: 21.97 KG/M2

## 2021-10-01 NOTE — PROGRESS NOTES
Dr. Dot Valencia, anesthesia, was contacted and informed of patient's history and planned surgery. No orders received and no clearance required.

## 2021-10-01 NOTE — PROGRESS NOTES

## 2021-10-14 ENCOUNTER — ANESTHESIA EVENT (OUTPATIENT)
Dept: ENDOSCOPY | Age: 81
End: 2021-10-14
Payer: MEDICARE

## 2021-10-15 ENCOUNTER — HOSPITAL ENCOUNTER (OUTPATIENT)
Age: 81
Setting detail: OUTPATIENT SURGERY
Discharge: HOME | End: 2021-10-15
Attending: INTERNAL MEDICINE | Admitting: INTERNAL MEDICINE
Payer: MEDICARE

## 2021-10-15 ENCOUNTER — ANESTHESIA (OUTPATIENT)
Dept: ENDOSCOPY | Age: 81
End: 2021-10-15
Payer: MEDICARE

## 2021-10-15 VITALS
WEIGHT: 140 LBS | HEIGHT: 67 IN | RESPIRATION RATE: 16 BRPM | OXYGEN SATURATION: 100 % | HEART RATE: 60 BPM | BODY MASS INDEX: 21.97 KG/M2 | DIASTOLIC BLOOD PRESSURE: 69 MMHG | SYSTOLIC BLOOD PRESSURE: 141 MMHG | TEMPERATURE: 97.2 F

## 2021-10-15 VITALS — OXYGEN SATURATION: 100 % | SYSTOLIC BLOOD PRESSURE: 143 MMHG | DIASTOLIC BLOOD PRESSURE: 64 MMHG

## 2021-10-15 PROCEDURE — 3700000001 HC ADD 15 MINUTES (ANESTHESIA): Performed by: INTERNAL MEDICINE

## 2021-10-15 PROCEDURE — 2709999900 HC NON-CHARGEABLE SUPPLY: Performed by: INTERNAL MEDICINE

## 2021-10-15 PROCEDURE — 3700000000 HC ANESTHESIA ATTENDED CARE: Performed by: INTERNAL MEDICINE

## 2021-10-15 PROCEDURE — 7100000000 HC PACU RECOVERY - FIRST 15 MIN: Performed by: INTERNAL MEDICINE

## 2021-10-15 PROCEDURE — 7100000001 HC PACU RECOVERY - ADDTL 15 MIN: Performed by: INTERNAL MEDICINE

## 2021-10-15 PROCEDURE — 6360000002 HC RX W HCPCS: Performed by: NURSE ANESTHETIST, CERTIFIED REGISTERED

## 2021-10-15 PROCEDURE — 43239 EGD BIOPSY SINGLE/MULTIPLE: CPT | Performed by: INTERNAL MEDICINE

## 2021-10-15 PROCEDURE — 2500000003 HC RX 250 WO HCPCS: Performed by: NURSE ANESTHETIST, CERTIFIED REGISTERED

## 2021-10-15 PROCEDURE — 88305 TISSUE EXAM BY PATHOLOGIST: CPT

## 2021-10-15 PROCEDURE — 2580000003 HC RX 258: Performed by: ANESTHESIOLOGY

## 2021-10-15 PROCEDURE — 88342 IMHCHEM/IMCYTCHM 1ST ANTB: CPT

## 2021-10-15 PROCEDURE — 3609012400 HC EGD TRANSORAL BIOPSY SINGLE/MULTIPLE: Performed by: INTERNAL MEDICINE

## 2021-10-15 RX ORDER — ONDANSETRON 2 MG/ML
4 INJECTION INTRAMUSCULAR; INTRAVENOUS
Status: DISCONTINUED | OUTPATIENT
Start: 2021-10-15 | End: 2021-10-15 | Stop reason: HOSPADM

## 2021-10-15 RX ORDER — OXYCODONE HYDROCHLORIDE AND ACETAMINOPHEN 5; 325 MG/1; MG/1
2 TABLET ORAL PRN
Status: DISCONTINUED | OUTPATIENT
Start: 2021-10-15 | End: 2021-10-15 | Stop reason: HOSPADM

## 2021-10-15 RX ORDER — DIPHENHYDRAMINE HYDROCHLORIDE 50 MG/ML
12.5 INJECTION INTRAMUSCULAR; INTRAVENOUS
Status: DISCONTINUED | OUTPATIENT
Start: 2021-10-15 | End: 2021-10-15 | Stop reason: HOSPADM

## 2021-10-15 RX ORDER — LABETALOL HYDROCHLORIDE 5 MG/ML
5 INJECTION, SOLUTION INTRAVENOUS EVERY 10 MIN PRN
Status: DISCONTINUED | OUTPATIENT
Start: 2021-10-15 | End: 2021-10-15 | Stop reason: HOSPADM

## 2021-10-15 RX ORDER — LIDOCAINE HYDROCHLORIDE 10 MG/ML
INJECTION, SOLUTION EPIDURAL; INFILTRATION; INTRACAUDAL; PERINEURAL PRN
Status: DISCONTINUED | OUTPATIENT
Start: 2021-10-15 | End: 2021-10-15 | Stop reason: SDUPTHER

## 2021-10-15 RX ORDER — SODIUM CHLORIDE, SODIUM LACTATE, POTASSIUM CHLORIDE, CALCIUM CHLORIDE 600; 310; 30; 20 MG/100ML; MG/100ML; MG/100ML; MG/100ML
INJECTION, SOLUTION INTRAVENOUS CONTINUOUS
Status: DISCONTINUED | OUTPATIENT
Start: 2021-10-15 | End: 2021-10-15 | Stop reason: HOSPADM

## 2021-10-15 RX ORDER — SODIUM CHLORIDE 0.9 % (FLUSH) 0.9 %
10 SYRINGE (ML) INJECTION EVERY 12 HOURS SCHEDULED
Status: DISCONTINUED | OUTPATIENT
Start: 2021-10-15 | End: 2021-10-15 | Stop reason: HOSPADM

## 2021-10-15 RX ORDER — SODIUM CHLORIDE 9 MG/ML
25 INJECTION, SOLUTION INTRAVENOUS PRN
Status: DISCONTINUED | OUTPATIENT
Start: 2021-10-15 | End: 2021-10-15 | Stop reason: HOSPADM

## 2021-10-15 RX ORDER — LIDOCAINE HYDROCHLORIDE 10 MG/ML
1 INJECTION, SOLUTION EPIDURAL; INFILTRATION; INTRACAUDAL; PERINEURAL
Status: DISCONTINUED | OUTPATIENT
Start: 2021-10-15 | End: 2021-10-15 | Stop reason: HOSPADM

## 2021-10-15 RX ORDER — PROPOFOL 10 MG/ML
INJECTION, EMULSION INTRAVENOUS PRN
Status: DISCONTINUED | OUTPATIENT
Start: 2021-10-15 | End: 2021-10-15

## 2021-10-15 RX ORDER — OXYCODONE HYDROCHLORIDE AND ACETAMINOPHEN 5; 325 MG/1; MG/1
1 TABLET ORAL PRN
Status: DISCONTINUED | OUTPATIENT
Start: 2021-10-15 | End: 2021-10-15 | Stop reason: HOSPADM

## 2021-10-15 RX ORDER — PROPOFOL 10 MG/ML
INJECTION, EMULSION INTRAVENOUS PRN
Status: DISCONTINUED | OUTPATIENT
Start: 2021-10-15 | End: 2021-10-15 | Stop reason: SDUPTHER

## 2021-10-15 RX ORDER — SODIUM CHLORIDE 0.9 % (FLUSH) 0.9 %
10 SYRINGE (ML) INJECTION PRN
Status: DISCONTINUED | OUTPATIENT
Start: 2021-10-15 | End: 2021-10-15 | Stop reason: HOSPADM

## 2021-10-15 RX ADMIN — LIDOCAINE HYDROCHLORIDE 50 MG: 10 INJECTION, SOLUTION EPIDURAL; INFILTRATION; INTRACAUDAL; PERINEURAL at 08:45

## 2021-10-15 RX ADMIN — PROPOFOL 100 MG: 10 INJECTION, EMULSION INTRAVENOUS at 08:45

## 2021-10-15 RX ADMIN — SODIUM CHLORIDE, POTASSIUM CHLORIDE, SODIUM LACTATE AND CALCIUM CHLORIDE: 600; 310; 30; 20 INJECTION, SOLUTION INTRAVENOUS at 08:17

## 2021-10-15 ASSESSMENT — ENCOUNTER SYMPTOMS
APNEA: 0
SHORTNESS OF BREATH: 0
RHINORRHEA: 0
NAUSEA: 0
SINUS PAIN: 0
COUGH: 0
SORE THROAT: 0
DIARRHEA: 0
BACK PAIN: 0
ABDOMINAL PAIN: 0
ABDOMINAL DISTENTION: 0
WHEEZING: 0
CHEST TIGHTNESS: 0
SINUS PRESSURE: 0
TROUBLE SWALLOWING: 0
CONSTIPATION: 0
VOMITING: 0

## 2021-10-15 ASSESSMENT — PULMONARY FUNCTION TESTS
PIF_VALUE: 1
PIF_VALUE: 1
PIF_VALUE: 0
PIF_VALUE: 1
PIF_VALUE: 0

## 2021-10-15 ASSESSMENT — PAIN - FUNCTIONAL ASSESSMENT: PAIN_FUNCTIONAL_ASSESSMENT: 0-10

## 2021-10-15 ASSESSMENT — PAIN SCALES - GENERAL
PAINLEVEL_OUTOF10: 0

## 2021-10-15 NOTE — ANESTHESIA PRE PROCEDURE
Allergies    Problem List:    Patient Active Problem List   Diagnosis Code    Atrial fibrillation with RVR (HCC) I48.91    Closed fracture of left hip with routine healing S72.002D    Anemia D64.9    Heart failure ([facility] I50.9    Paroxysmal atrial fibrillation (HCC) I48.0    Closed left hip fracture, initial encounter ([facility] S72.002A    H pylori ulcer K27.9, B96.81    PUD (peptic ulcer disease) K27.9       Past Medical History:        Diagnosis Date    Abnormal EKG 08/03/2021    A-FIB WITH RAPID VENTRICULAR RESPONSE, SEPTAL INFARCT,REPOLARIZATION ABNORMALITIES    Atrial fibrillation ([facility]     with RVR       Past Surgical History:        Procedure Laterality Date    HIP SURGERY Left 8/5/2021    HIP HEMIARTHROPLASTY performed by Damian Osorio MD at [facility] N/A 8/10/2021    EGD BIOPSY performed by Lesli Landeros MD at [facility]       Social History:    Social History     Tobacco Use    Smoking status: Former Smoker    Smokeless tobacco: Never Used    Tobacco comment: quit 1973   Substance Use Topics    Alcohol use: Never                                Counseling given: Not Answered  Comment: quit 1973      Vital Signs (Current):   Vitals:    10/15/21 0741   BP: 137/65   Pulse: 60   Resp: 16   Temp: 97.1 °F (36.2 °C)   TempSrc: Infrared   SpO2: 100%   Weight: 140 lb (63.5 kg)   Height: 5' 7\" (1.702 m)                                              BP Readings from Last 3 Encounters:   10/15/21 137/65   09/03/21 (!) 113/57   08/27/21 (!) 112/50       NPO Status: Time of last liquid consumption: 2000                        Time of last solid consumption: 2000                        Date of last liquid consumption: 10/14/21                        Date of last solid food consumption: 10/14/21    BMI:   Wt Readings from Last 3 Encounters:   10/15/21 140 lb (63.5 kg)   10/01/21 140 lb (63.5 kg)   09/03/21 133 lb 11.2 oz (60.6 kg)     Body mass index is 21.93 kg/m².    CBC:   Lab Results   Component Value Date    WBC 4.8 08/27/2021    RBC 3.28 08/27/2021    HGB 11.0 08/27/2021    HCT 33.6 08/27/2021    .4 08/27/2021    RDW 17.8 08/27/2021     08/27/2021       CMP:   Lab Results   Component Value Date     08/25/2021    K 4.4 08/25/2021     08/25/2021    CO2 30 08/25/2021    BUN 22 08/25/2021    CREATININE 0.63 08/25/2021    GFRAA >60 08/25/2021    LABGLOM >60 08/25/2021    GLUCOSE 91 08/25/2021    PROT 4.5 08/09/2021    CALCIUM 8.4 08/25/2021    BILITOT 0.58 08/09/2021    ALKPHOS 102 08/09/2021    AST 26 08/09/2021    ALT 11 08/09/2021       POC Tests: No results for input(s): POCGLU, POCNA, POCK, POCCL, POCBUN, POCHEMO, POCHCT in the last 72 hours.     Coags:   Lab Results   Component Value Date    PROTIME 15.6 08/03/2021    INR 1.2 08/03/2021    APTT >150.0 08/03/2021       HCG (If Applicable): No results found for: PREGTESTUR, PREGSERUM, HCG, HCGQUANT     ABGs:   Lab Results   Component Value Date    PHART 7.412 08/06/2021    PO2ART 117.0 08/06/2021    BHG1BEB 39.1 08/06/2021    OQX4IXL 24.9 08/06/2021    E9EZOPIV 97.2 08/06/2021        Type & Screen (If Applicable):  No results found for: LABABO, LABRH    Drug/Infectious Status (If Applicable):  No results found for: HIV, HEPCAB    COVID-19 Screening (If Applicable):   Lab Results   Component Value Date    COVID19 Not Detected 08/03/2021           Anesthesia Evaluation  Patient summary reviewed and Nursing notes reviewed no history of anesthetic complications:   Airway: Mallampati: II  TM distance: >3 FB   Neck ROM: full  Mouth opening: > = 3 FB Dental: normal exam         Pulmonary:Negative Pulmonary ROS and normal exam  breath sounds clear to auscultation                             Cardiovascular:    (+) dysrhythmias: atrial fibrillation, CHF:,       ECG reviewed  Rhythm: regular  Rate: normal  Echocardiogram reviewed               ROS comment: Normal left ventricle size and function with an estimated EF > 55%. (8/2021)     Neuro/Psych:   Negative Neuro/Psych ROS              GI/Hepatic/Renal:   (+) PUD,           Endo/Other:                     Abdominal:             Vascular: negative vascular ROS. Other Findings:             Anesthesia Plan      general     ASA 3       Induction: intravenous. MIPS: Prophylactic antiemetics administered. Anesthetic plan and risks discussed with patient. Plan discussed with CRNA.             Patient admitted to not taking his medication for a long time; patient advised to follow up with his PCP and encouraged to be more compliant      Cleopatra Muse MD   10/15/2021

## 2021-10-15 NOTE — PROGRESS NOTES
Pt home meds discussed with pt and pt friend. Pt had meds filled prior at the [facility] pt pharm. RN to [facility]. They stated that the Rxs had no refills and the pt was to contact his Doctor for refills. Situation dicussed with pt and friend. Friend states he will take pt directly to Dr. Cole Curtis to see what needs to be done.

## 2021-10-15 NOTE — H&P
HISTORY and [address]       NAME:  Morgan Talamantes  MRN: [id number]   YOB: 1940   Date: 10/15/2021   Age: 79 y.o. Gender: male       COMPLAINT AND PRESENT HISTORY:                Morgan Talamantes is 79 y.o.,  male, undergoing EGD ESOPHAGOGASTRODUODENOSCOPY R/T  H PYLORI ULCER ANEMIA. Pt states he fell and broke his left hip and had it repaired on 8/05/2021, routine blood work showed he was anemic at that time. GI was then consulted and an EGD performed on 8/10/21. He had bx taken which showed H. Pylori. He denies any gi symptoms, no bloody or tarry stools, no n/v/d. No wt loss, no change in appetite, no hematemesis. Pt was dx with AFIB at the time of admission however he states it has resolved completely. He states he was on the floor for at least 5 days from his fall and was told that was the cause of the abnormality. He denies any chest pain or palpitations, no dizziness, headache or SOB. Pt states he does not take any medications. He finished a course of PO abx including Flagyl, Amoxicillin and protonix. Pt was encouraged to f/u with pcp, he states he does not have one. Writer did encourage him to find one asap. His BLE are very edematous, his R>L. Left leg is red and warm to touch, scaly, and notable skin breakdown to dorsal aspect of foot and in between toes, the edema is weeping slightly. Pt states he has had this for a number of years and just uses Eucerin cream.    I did speak with anesthesia regarding this pt having an irregular HR, non compliance with medications and his active infections. I was instructed by Dr. Suzanna Manzanares to [place] him come back\"    EKG 8/3/21  Atrial fibrillation with rapid ventricular response  Repolarization abnormalities  Septal infarct , age undetermined  Abnormal ECG  No previous ECGs available    NPO since midnight. Pt does have full upper and lower dentures.    Pt denies any hx of MRSA infection  Pt not currently taking any blood thinners or anticoagulants  Pt denies any personal or FHx of complications with anesthesia. Pt denies any acute symptoms of illness at this time including no SOB, CP, fever, URI or UTI symptoms. RECENT IMAGING    No results found. PAST MEDICAL HISTORY     Past Medical History:   Diagnosis Date    Abnormal EKG 08/03/2021    A-FIB WITH RAPID VENTRICULAR RESPONSE, SEPTAL INFARCT,REPOLARIZATION ABNORMALITIES    Atrial fibrillation (HCC)     with RVR       SURGICAL HISTORY       Past Surgical History:   Procedure Laterality Date    HIP SURGERY Left 8/5/2021    HIP HEMIARTHROPLASTY performed by Chung Holland MD at [address] N/A 8/10/2021    EGD BIOPSY performed by Catie Mclean MD at [address] [place] [address]     No family history on file. SOCIAL HISTORY       Social History     Socioeconomic History    Marital status: Single     Spouse name: Not on file    Number of children: Not on file    Years of education: Not on file    Highest education level: Not on file   Occupational History    Not on file   Tobacco Use    Smoking status: Former Smoker    Smokeless tobacco: Never Used    Tobacco comment: quit 1973   Vaping Use    Vaping Use: Never used   Substance and Sexual Activity    Alcohol use: Never    Drug use: Never    Sexual activity: Not on file   Other Topics Concern    Not on file   Social History Narrative    Not on file     Social Determinants of Health     Financial Resource Strain:     Difficulty of Paying Living Expenses:    Food Insecurity:     Worried About [address] in the Last Year:     [address] in the Last Year:    Transportation Needs:     Lack of Transportation (Medical):      Lack of Transportation (Non-Medical):    Physical Activity:     Days of Exercise per Week:     Minutes of Exercise per Session:    Stress:     Feeling of Stress :    Social Connections:     Frequency of Communication with Friends and Family:     Frequency of Social Gatherings with Friends and Family:     Attends Baptism Services:     Active Member of Clubs or Organizations:     Attends Club or Organization Meetings:     Marital Status:    Intimate Partner Violence:     Fear of Current or Ex-Partner:     Emotionally Abused:     Physically Abused:     Sexually Abused:            REVIEW OF SYSTEMS      No Known Allergies    No current facility-administered medications on file prior to encounter. Current Outpatient Medications on File Prior to Encounter   Medication Sig Dispense Refill    acetaminophen (TYLENOL) 325 MG tablet Take 2 tablets by mouth every 4 hours as needed for Pain      amiodarone (CORDARONE) 200 MG tablet Take 1 tablet by mouth daily 30 tablet 0    dilTIAZem (CARDIZEM) 30 MG tablet Take 1 tablet by mouth every 12 hours 60 tablet 0    furosemide (LASIX) 20 MG tablet Take 1 tablet by mouth 2 times daily 60 tablet 0    polyethylene glycol (GLYCOLAX) 17 g packet Take 17 g by mouth daily as needed for Constipation      tamsulosin (FLOMAX) 0.4 MG capsule Take 1 capsule by mouth daily 30 capsule 0    pantoprazole (PROTONIX) 40 MG tablet Take 1 tablet by mouth 2 times daily (before meals) 60 tablet 0    Multiple Vitamins-Minerals (THERAPEUTIC MULTIVITAMIN-MINERALS) tablet Take 1 tablet by mouth daily          Review of Systems   Constitutional: Negative for chills, diaphoresis, fatigue and fever. HENT: Negative for congestion, dental problem, ear pain, postnasal drip, rhinorrhea, sinus pressure, sinus pain, sore throat and trouble swallowing. Respiratory: Negative for apnea, cough, chest tightness, shortness of breath and wheezing. Cardiovascular: Negative for chest pain, palpitations and leg swelling. Gastrointestinal: Negative for abdominal distention, abdominal pain, constipation, diarrhea, nausea and vomiting. Genitourinary: Negative for dysuria, flank pain, frequency and hematuria. Musculoskeletal: Negative for back pain, joint swelling and myalgias. Skin: Negative for rash and wound. Neurological: Negative for dizziness, weakness, numbness and headaches. Hematological: Does not bruise/bleed easily. Psychiatric/Behavioral: Negative for agitation and confusion. The patient is not nervous/anxious. See HPI. GENERAL PHYSICAL EXAM:     Vitals: There were no vitals taken for this visit. There is no height or weight on file to calculate BMI. Physical Exam  Constitutional:       General: He is not in acute distress. Appearance: Normal appearance. He is well-developed and normal weight. He is not ill-appearing or toxic-appearing. HENT:      Head: Normocephalic and atraumatic. Ears:      Comments: [place]     Mouth/Throat:      Mouth: Mucous membranes are moist.      Pharynx: Oropharynx is clear. No oropharyngeal exudate or posterior oropharyngeal erythema. Eyes:      Extraocular Movements: Extraocular movements intact. Conjunctiva/sclera: Conjunctivae normal.      Pupils: Pupils are equal, round, and reactive to light. Cardiovascular:      Rate and Rhythm: Normal rate. Rhythm irregular. Pulses: Normal pulses. Heart sounds: Normal heart sounds. No murmur heard. No friction rub. No gallop. Pulmonary:      Effort: Pulmonary effort is normal.      Breath sounds: Normal breath sounds. No wheezing. Abdominal:      General: Bowel sounds are normal. There is no distension. Palpations: Abdomen is soft. Tenderness: There is no abdominal tenderness. There is no guarding or rebound. Musculoskeletal:         General: Swelling present. Normal range of motion. Cervical back: Normal range of motion and neck supple. No rigidity or tenderness. Right lower leg: Edema present. Left lower leg: Edema present. Comments: BLE +2 pitting edema     Skin:     General: Skin is warm and dry. Coloration: Skin is pale. Findings: Lesion and rash present. No erythema. Comments: Bilateral hands and wrist had red rash with some opened pinpoint spots. Pt states its from washing his hands. Right leg red, warm, swollen, appeared to have skin breakdown between toes, foul odor. Neurological:      General: No focal deficit present. Mental Status: He is alert and oriented to person, place, and time. Mental status is at baseline. Sensory: No sensory deficit. Motor: Weakness present. Comments: Uses cane     Psychiatric:         Mood and Affect: Mood normal.         Behavior: Behavior normal.         Thought Content:  Thought content normal.         Judgment: Judgment normal.                                                                                         PROVISIONAL DIAGNOSES / SURGERY:    EGD ESOPHAGOGASTRODUODENOSCOPY    H PYLORI ULCER ANEMIA       Patient Active Problem List    Diagnosis Date Noted    H pylori ulcer     PUD (peptic ulcer disease)     Closed left hip fracture, initial encounter ([facility] 08/13/2021    Paroxysmal atrial fibrillation ([facility]     Heart failure ([facility] 08/10/2021    Anemia 08/08/2021    Closed fracture of left hip with routine healing 08/04/2021    Atrial fibrillation with RVR ([facility] 08/03/2021               ANDRES Cloud - CNP on 10/15/2021 at 7:11 AM

## 2021-10-15 NOTE — OP NOTE
ESOPHAGOGASTRODUODENOSCOPY   ( EGD )  DATE OF PROCEDURE: 10/15/2021     SURGEON: Gavin Johnson MD    ASSISTANT: None    PREOPERATIVE DIAGNOSIS: Patient has history of acute duodenal ulcer with a significant bleeding. Procedure performed to evaluate healing of ulcer, rule out malignancy. Also I was positive for a hex pylori at that time. The ulcer was suspicious. POSTOPERATIVE DIAGNOSIS: Ulcer appears to be almost healed. Still has remnances of the ulcer noted. OPERATION: Upper GI endoscopy with Biopsy    ANESTHESIA: MAC    ESTIMATED BLOOD LOSS: None    COMPLICATIONS: None. SPECIMENS:  Was Obtained: Random biopsies from the body and antrum to evaluate pylori. HISTORY: The patient is a 79y.o. year old male with history of above preop diagnosis. I recommended esophagogastroduodenoscopy with possible biopsy and I explained the risk, benefits, expected outcome, and alternatives to the procedure. Risks included but are not limited to bleeding, infection, respiratory distress, hypotension, and perforation of the esophagus, stomach, or duodenum. Patient understands and is in agreement. PROCEDURE: The patient was given IV conscious sedation. The patient's SPO2 remained above 90% throughout the procedure. Cetacaine spray given. Patient placed in left lateral position. Olympus  videogastroscope was inserted orally under vision into the esophagus without difficulty and advanced into the stomach then through the pylorus up to the second part of duodenum. Findings:    Retropharyngeal area was grossly normal appearing    Esophagus: normal    Stomach:    Fundus and Cardia Examined in Retroflexed View: normal    Body: normal    Antrum: normal  Random biopsies taken to evaluate hex pylori. Duodenum:     Descending: normal    Bulb: abnormal: Mild inflammation seen in the duodenum. At the previous ulcer site there is remanance of previous ulcer. No signs of malignancy.     While withdrawing the scope the above findings were verified and the scope was removed. The patient has tolerated the procedure without unusual events. Recommendations/Plan:   1. F/U Biopsies  2. F/U In Office as instructed  3. Discussed with the family  4. To continue PPI therapy.     To see in the office in the next 4 to 6 weeks                   Electronically signed by Lei Chi MD  on 10/15/2021 at 8:53 AM

## 2021-10-15 NOTE — ANESTHESIA POSTPROCEDURE EVALUATION
POST- ANESTHESIA EVALUATION       Pt Name: Odalis Jeter  MRN: [id number]  YOB: 1940  Date of evaluation: 10/15/2021  Time:  10:52 AM      BP (!) 141/69   Pulse 60   Temp 97.2 °F (36.2 °C)   Resp 16   Ht 5' 7\" (1.702 m)   Wt 140 lb (63.5 kg)   SpO2 100%   BMI 21.93 kg/m²      Consciousness Level  Awake  Cardiopulmonary Status  Stable  Pain Adequately Treated YES  Nausea / Vomiting  NO  Adequate Hydration  YES  Anesthesia Related Complications NONE      Electronically signed by Bassam Gomez MD on 10/15/2021 at 10:52 AM       Department of Anesthesiology  Postprocedure Note    Patient: Odalis Jeter  MRN: [id number]  YOB: 1940  Date of evaluation: 10/15/2021  Time:  10:52 AM     Procedure Summary     Date: 10/15/21 Room / Location: [address] [place] [address] 02 / [address] [place] [address] ENDO    Anesthesia Start: 6901 Anesthesia Stop: [place]    Procedure: EGD BIOPSY (N/A Esophagus) Diagnosis: (H PYLORI ULCER ANEMIA (PT HAS HAD COVID VACCINE DONE))    Surgeons: Nestor Pineda MD Responsible Provider: Corine Elizalde MD    Anesthesia Type: general ASA Status: 3          Anesthesia Type: general    Carina Phase I: Carina Score: 10    Carina Phase II:      Last vitals: Reviewed and per EMR flowsheets.        Anesthesia Post Evaluation

## 2021-10-18 ENCOUNTER — OFFICE VISIT (OUTPATIENT)
Dept: INTERNAL MEDICINE CLINIC | Age: 81
End: 2021-10-18
Payer: MEDICARE

## 2021-10-18 VITALS
WEIGHT: 140 LBS | SYSTOLIC BLOOD PRESSURE: 122 MMHG | OXYGEN SATURATION: 99 % | HEART RATE: 74 BPM | DIASTOLIC BLOOD PRESSURE: 60 MMHG | BODY MASS INDEX: 21.93 KG/M2

## 2021-10-18 DIAGNOSIS — Z91.81 AT HIGH RISK FOR FALLS: Primary | ICD-10-CM

## 2021-10-18 DIAGNOSIS — I48.91 ATRIAL FIBRILLATION WITH RVR (HCC): ICD-10-CM

## 2021-10-18 DIAGNOSIS — K27.9 H PYLORI ULCER: ICD-10-CM

## 2021-10-18 DIAGNOSIS — B96.81 H PYLORI ULCER: ICD-10-CM

## 2021-10-18 DIAGNOSIS — I48.0 PAROXYSMAL ATRIAL FIBRILLATION (HCC): ICD-10-CM

## 2021-10-18 LAB — SURGICAL PATHOLOGY REPORT: NORMAL

## 2021-10-18 PROCEDURE — 1123F ACP DISCUSS/DSCN MKR DOCD: CPT | Performed by: NURSE PRACTITIONER

## 2021-10-18 PROCEDURE — G8420 CALC BMI NORM PARAMETERS: HCPCS | Performed by: NURSE PRACTITIONER

## 2021-10-18 PROCEDURE — 4040F PNEUMOC VAC/ADMIN/RCVD: CPT | Performed by: NURSE PRACTITIONER

## 2021-10-18 PROCEDURE — G8484 FLU IMMUNIZE NO ADMIN: HCPCS | Performed by: NURSE PRACTITIONER

## 2021-10-18 PROCEDURE — G8427 DOCREV CUR MEDS BY ELIG CLIN: HCPCS | Performed by: NURSE PRACTITIONER

## 2021-10-18 PROCEDURE — 99214 OFFICE O/P EST MOD 30 MIN: CPT | Performed by: NURSE PRACTITIONER

## 2021-10-18 PROCEDURE — 1036F TOBACCO NON-USER: CPT | Performed by: NURSE PRACTITIONER

## 2021-10-18 RX ORDER — POLYETHYLENE GLYCOL 3350 17 G/17G
17 POWDER, FOR SOLUTION ORAL DAILY PRN
Qty: 527 G | Refills: 0 | Status: SHIPPED | OUTPATIENT
Start: 2021-10-18

## 2021-10-18 RX ORDER — AMIODARONE HYDROCHLORIDE 200 MG/1
200 TABLET ORAL DAILY
Qty: 30 TABLET | Refills: 0 | Status: SHIPPED | OUTPATIENT
Start: 2021-10-18 | End: 2021-11-15

## 2021-10-18 RX ORDER — PANTOPRAZOLE SODIUM 40 MG/1
40 TABLET, DELAYED RELEASE ORAL
Qty: 60 TABLET | Refills: 0 | Status: SHIPPED | OUTPATIENT
Start: 2021-10-18 | End: 2021-11-10 | Stop reason: ALTCHOICE

## 2021-10-18 RX ORDER — TAMSULOSIN HYDROCHLORIDE 0.4 MG/1
0.4 CAPSULE ORAL DAILY
Qty: 30 CAPSULE | Refills: 0 | Status: CANCELLED | OUTPATIENT
Start: 2021-10-18

## 2021-10-18 RX ORDER — FUROSEMIDE 20 MG/1
20 TABLET ORAL 2 TIMES DAILY
Qty: 60 TABLET | Refills: 0 | Status: SHIPPED | OUTPATIENT
Start: 2021-10-18 | End: 2021-11-15

## 2021-10-18 RX ORDER — BLOOD PRESSURE TEST KIT
1 KIT MISCELLANEOUS DAILY
Qty: 1 KIT | Refills: 0 | Status: SHIPPED | OUTPATIENT
Start: 2021-10-18 | End: 2021-10-18

## 2021-10-18 RX ORDER — BLOOD PRESSURE TEST KIT
1 KIT MISCELLANEOUS DAILY
Qty: 1 KIT | Refills: 0 | Status: SHIPPED | OUTPATIENT
Start: 2021-10-18

## 2021-10-18 SDOH — ECONOMIC STABILITY: FOOD INSECURITY: WITHIN THE PAST 12 MONTHS, YOU WORRIED THAT YOUR FOOD WOULD RUN OUT BEFORE YOU GOT MONEY TO BUY MORE.: NEVER TRUE

## 2021-10-18 SDOH — ECONOMIC STABILITY: TRANSPORTATION INSECURITY
IN THE PAST 12 MONTHS, HAS THE LACK OF TRANSPORTATION KEPT YOU FROM MEDICAL APPOINTMENTS OR FROM GETTING MEDICATIONS?: NO

## 2021-10-18 SDOH — ECONOMIC STABILITY: FOOD INSECURITY: WITHIN THE PAST 12 MONTHS, THE FOOD YOU BOUGHT JUST DIDN'T LAST AND YOU DIDN'T HAVE MONEY TO GET MORE.: NEVER TRUE

## 2021-10-18 SDOH — ECONOMIC STABILITY: TRANSPORTATION INSECURITY
IN THE PAST 12 MONTHS, HAS LACK OF TRANSPORTATION KEPT YOU FROM MEETINGS, WORK, OR FROM GETTING THINGS NEEDED FOR DAILY LIVING?: NO

## 2021-10-18 ASSESSMENT — ENCOUNTER SYMPTOMS
DIARRHEA: 0
COUGH: 0
SORE THROAT: 0
BLOOD IN STOOL: 0
VOMITING: 0
CHEST TIGHTNESS: 0
SHORTNESS OF BREATH: 0
CONSTIPATION: 0
NAUSEA: 0
WHEEZING: 0
ABDOMINAL PAIN: 0
SINUS PAIN: 0
RHINORRHEA: 0
TROUBLE SWALLOWING: 0

## 2021-10-18 ASSESSMENT — SOCIAL DETERMINANTS OF HEALTH (SDOH): HOW HARD IS IT FOR YOU TO PAY FOR THE VERY BASICS LIKE FOOD, HOUSING, MEDICAL CARE, AND HEATING?: NOT HARD AT ALL

## 2021-10-18 ASSESSMENT — LIFESTYLE VARIABLES: HOW OFTEN DO YOU HAVE A DRINK CONTAINING ALCOHOL: NEVER

## 2021-10-18 ASSESSMENT — PATIENT HEALTH QUESTIONNAIRE - PHQ9
SUM OF ALL RESPONSES TO PHQ QUESTIONS 1-9: 0
SUM OF ALL RESPONSES TO PHQ9 QUESTIONS 1 & 2: 0
SUM OF ALL RESPONSES TO PHQ QUESTIONS 1-9: 0
1. LITTLE INTEREST OR PLEASURE IN DOING THINGS: 0
SUM OF ALL RESPONSES TO PHQ QUESTIONS 1-9: 0
2. FEELING DOWN, DEPRESSED OR HOPELESS: 0

## 2021-10-18 NOTE — PROGRESS NOTES
Visit Information    Have you changed or started any medications since your last visit including any over-the-counter medicines, vitamins, or herbal medicines? no   Are you having any side effects from any of your medications? -  no  Have you stopped taking any of your medications? Is so, why? -  no    Have you seen any other physician or provider since your last visit? Yes - Records Obtained  Have you had any other diagnostic tests since your last visit? Yes - Records Obtained  Have you been seen in the emergency room and/or had an admission to a hospital since we last saw you? Yes - Records Obtained  Have you had your routine dental cleaning in the past 6 months? no    Have you activated your [employer] account? If not, what are your barriers? No:      Patient Care Team:  ANDRES Gilbert CNP as PCP - General (Nurse Practitioner)    Medical History Review  Past Medical, Family, and Social History reviewed and does not contribute to the patient presenting condition    Health Maintenance   Topic Date Due    DTaP/Tdap/Td vaccine (1 - Tdap) Never done    Shingles Vaccine (1 of 2) Never done    Pneumococcal 65+ years Vaccine (2 of 2 - PPSV23) 03/26/2004    Annual Wellness Visit (AWV)  Never done    TSH testing  08/03/2022    Potassium monitoring  08/25/2022    Creatinine monitoring  08/25/2022    Flu vaccine  Completed    COVID-19 Vaccine  Completed    Hepatitis A vaccine  Aged Out    Hepatitis B vaccine  Aged Out    Hib vaccine  Aged Out    Meningococcal (ACWY) vaccine  Aged Out         [address] Patient Note/History and Physical    Date of patient's visit: 10/18/2021    Name:  Clif Cheatham      YOB: 1940    Patient Care Team:  ANDRES Gilbert CNP as PCP - General (Nurse Practitioner)    REASON FOR VISIT:   Establish care. HISTORY OF PRESENTING ILLNESS:    History was obtained from the patient. Clif Cheatham is a 79 y.o. male here to establish care.    Patient has never previously seen PCP. Patient reports past medical history as below. Patient positive history of tobacco use. Smoked 1 ppd x 10 years. Quit 1973.     8/3/21: Anemia, Afib RVR, and leg pain. 8/5/21: Left hemiarthroplasty with Dr. Stephany Jerez; EGD showed nonbleeding duodenal ulcer, pt started on PPI. 8/13/21: Patient was transferred to [facility]U at [facility] for rehab  10/15/21: repeat EGD with biopsy preformed with Dr. Marino Fields. Follow up scheduled for 11/12/21    Patient does follow with cardiology, next appointment scheduled for December  Patient is not on [facility] due to fall risk and hx of anemia. PAST MEDICAL HISTORY:          Diagnosis Date    Abnormal EKG 08/03/2021    A-FIB WITH RAPID VENTRICULAR RESPONSE, SEPTAL INFARCT,REPOLARIZATION ABNORMALITIES    Atrial fibrillation (HCC)     with RVR       PAST SURGICAL HISTORY:          Procedure Laterality Date    HIP SURGERY Left 8/5/2021    HIP HEMIARTHROPLASTY performed by Emilia Daniels MD at [facility] N/A 8/10/2021    EGD BIOPSY performed by Manuela Cabral MD at [facility] N/A 10/15/2021    EGD BIOPSY performed by Manuela Cabral MD at [address]:    No Known Allergies      MEDICATION:      Current Outpatient Medications on File Prior to Visit   Medication Sig Dispense Refill    acetaminophen (TYLENOL) 325 MG tablet Take 2 tablets by mouth every 4 hours as needed for Pain      Multiple Vitamins-Minerals (THERAPEUTIC MULTIVITAMIN-MINERALS) tablet Take 1 tablet by mouth daily        No current facility-administered medications on file prior to visit. FAMILY HISTORY:    No family history on file.     SOCIAL HISTORY:      Social History     Socioeconomic History    Marital status: Single     Spouse name: None    Number of children: None    Years of education: None    Highest education level: None   Occupational History    None   Tobacco Use    Smoking status: Former Smoker     Packs/day: 1.00     Years: 10.00     Pack years: 10.00     Quit date: 80     Years since quittin.9    Smokeless tobacco: Never Used    Tobacco comment: quit 1973   Vaping Use    Vaping Use: Never used   Substance and Sexual Activity    Alcohol use: Never    Drug use: Never    Sexual activity: None   Other Topics Concern    None   Social History Narrative    None     Social Determinants of Health     Financial Resource Strain: Low Risk     Difficulty of Paying Living Expenses: Not hard at all   Food Insecurity: No Food Insecurity    Worried About Running Out of Food in the Last Year: Never true    Candace of Food in the Last Year: Never true   Transportation Needs: No Transportation Needs    Lack of Transportation (Medical): No    Lack of Transportation (Non-Medical): No   Physical Activity:     Days of Exercise per Week:     Minutes of Exercise per Session:    Stress:     Feeling of Stress :    Social Connections:     Frequency of Communication with Friends and Family:     Frequency of Social Gatherings with Friends and Family:     Attends Restoration Services:     Active Member of Clubs or Organizations:     Attends Club or Organization Meetings:     Marital Status:    Intimate Partner Violence:     Fear of Current or Ex-Partner:     Emotionally Abused:     Physically Abused:     Sexually Abused:          REVIEW OF SYSTEMS:   Review of Systems   Constitutional: Negative for appetite change, chills, diaphoresis, fatigue, fever and unexpected weight change. HENT: Negative for ear pain, postnasal drip, rhinorrhea, sinus pain, sore throat and trouble swallowing. Eyes: Negative for visual disturbance. Respiratory: Negative for cough, chest tightness, shortness of breath and wheezing. Cardiovascular: Negative for chest pain, palpitations and leg swelling.    Gastrointestinal: Negative for abdominal pain, blood in stool, constipation, diarrhea, nausea and vomiting. Endocrine: Negative for polydipsia, polyphagia and polyuria. Genitourinary: Negative for difficulty urinating, dysuria and flank pain. Musculoskeletal: Positive for gait problem (ambulates with a cane). Negative for arthralgias. Skin: Negative for rash and wound. Neurological: Negative for dizziness, syncope and weakness. All other systems reviewed and are negative. PHYSICAL EXAM:      Vitals:    10/18/21 0808   BP: 122/60   Site: Left Upper Arm   Pulse: 74   SpO2: 99%   Weight: 140 lb (63.5 kg)       Physical Exam  Vitals reviewed. Constitutional:       Appearance: Normal appearance. HENT:      Head: Normocephalic. Cardiovascular:      Rate and Rhythm: Normal rate and regular rhythm. Pulses: Normal pulses. Heart sounds: Normal heart sounds. Pulmonary:      Effort: Pulmonary effort is normal.      Breath sounds: Normal breath sounds. Abdominal:      General: Bowel sounds are normal.      Palpations: Abdomen is soft. Musculoskeletal:         General: No swelling, tenderness or deformity. Normal range of motion. Skin:     General: Skin is warm and dry. Neurological:      General: No focal deficit present. Mental Status: He is alert and oriented to person, place, and time. Psychiatric:         Mood and Affect: Mood normal.         Behavior: Behavior normal.         Thought Content:  Thought content normal.         Judgment: Judgment normal.          LABORATORY FINDINGS:    CBC:   Lab Results   Component Value Date    WBC 4.8 08/27/2021    HGB 11.0 08/27/2021     08/27/2021     BMP:    Lab Results   Component Value Date     08/25/2021    K 4.4 08/25/2021     08/25/2021    CO2 30 08/25/2021    BUN 22 08/25/2021    CREATININE 0.63 08/25/2021    GLUCOSE 91 08/25/2021     Hemoglobin A1C: No results found for: LABA1C  Lipid profile: No results found for: CHOL, TRIG, HDL  Thyroid functions:   Lab Results   Component Value Date    TSH 1.44 08/03/2021      Hepatic functions:   Lab Results   Component Value Date    ALT 11 08/09/2021    AST 26 08/09/2021    PROT 4.5 08/09/2021    BILITOT 0.58 08/09/2021    BILIDIR 0.19 08/09/2021    LABALBU 2.2 08/09/2021       ASSESSMENT AND PLAN:     1. Atrial fibrillation with RVR (HCC)/ 2. Paroxysmal atrial fibrillation (HCC)  - follow up with cardiology in December  - patient is currently NSR  - is not a candidate for [facility] due to fall risk  - amiodarone (CORDARONE) 200 MG tablet; Take 1 tablet by mouth daily  Dispense: 30 tablet; Refill: 0  - dilTIAZem (CARDIZEM) 30 MG tablet; Take 1 tablet by mouth every 12 hours  Dispense: 60 tablet; Refill: 0  - furosemide (LASIX) 20 MG tablet; Take 1 tablet by mouth 2 times daily  Dispense: 60 tablet; Refill: 0  - Basic Metabolic Panel; Future  - Blood Pressure KIT; 1 kit by Does not apply route daily  Dispense: 1 kit; Refill: 0    3. H pylori ulcer  - continue PPI and follow up with GI in 4 weeks  - pantoprazole (PROTONIX) 40 MG tablet; Take 1 tablet by mouth 2 times daily (before meals)  Dispense: 60 tablet; Refill: 0      INSTRUCTIONS:   Return in about 3 months (around 1/18/2022). Tobias Jacobson received counseling onthe following healthy behaviors: nutrition, exercise and medication adherence    Reviewed prior labs and healthmaintenance. Discussed use, benefit, and side effects of prescribed medications. Barriers tomedication compliance addressed. All patient questions answered. Patient voiced understanding. Patient given educational materials - see patient instructions    ANDRES Quiros - CNP   [facility]  10/18/2021, 9:07 AM    Please note that this chart was generated using voice recognition Dragon dictation software. Although every effort was made to ensure the accuracy of this automatedtranscription, some errors in transcription may have occurred.   On the basis of positive falls risk screening, assessment and plan is as follows: home safety tips

## 2021-11-10 ENCOUNTER — OFFICE VISIT (OUTPATIENT)
Dept: GASTROENTEROLOGY | Age: 81
End: 2021-11-10
Payer: MEDICARE

## 2021-11-10 VITALS
HEIGHT: 67 IN | DIASTOLIC BLOOD PRESSURE: 60 MMHG | SYSTOLIC BLOOD PRESSURE: 100 MMHG | WEIGHT: 141.2 LBS | BODY MASS INDEX: 22.16 KG/M2 | OXYGEN SATURATION: 100 % | HEART RATE: 64 BPM

## 2021-11-10 DIAGNOSIS — K27.9 PUD (PEPTIC ULCER DISEASE): ICD-10-CM

## 2021-11-10 DIAGNOSIS — B96.81 H PYLORI ULCER: Primary | ICD-10-CM

## 2021-11-10 DIAGNOSIS — K27.9 H PYLORI ULCER: Primary | ICD-10-CM

## 2021-11-10 PROCEDURE — G8484 FLU IMMUNIZE NO ADMIN: HCPCS | Performed by: INTERNAL MEDICINE

## 2021-11-10 PROCEDURE — APPSS30 APP SPLIT SHARED TIME 16-30 MINUTES: Performed by: NURSE PRACTITIONER

## 2021-11-10 PROCEDURE — 4040F PNEUMOC VAC/ADMIN/RCVD: CPT | Performed by: INTERNAL MEDICINE

## 2021-11-10 PROCEDURE — G8427 DOCREV CUR MEDS BY ELIG CLIN: HCPCS | Performed by: INTERNAL MEDICINE

## 2021-11-10 PROCEDURE — 99213 OFFICE O/P EST LOW 20 MIN: CPT | Performed by: INTERNAL MEDICINE

## 2021-11-10 PROCEDURE — 1123F ACP DISCUSS/DSCN MKR DOCD: CPT | Performed by: INTERNAL MEDICINE

## 2021-11-10 PROCEDURE — 1036F TOBACCO NON-USER: CPT | Performed by: INTERNAL MEDICINE

## 2021-11-10 PROCEDURE — G8420 CALC BMI NORM PARAMETERS: HCPCS | Performed by: INTERNAL MEDICINE

## 2021-11-10 RX ORDER — PANTOPRAZOLE SODIUM 20 MG/1
20 TABLET, DELAYED RELEASE ORAL DAILY
Qty: 30 TABLET | Refills: 3 | Status: SHIPPED | OUTPATIENT
Start: 2021-11-10 | End: 2021-11-10

## 2021-11-10 RX ORDER — PANTOPRAZOLE SODIUM 20 MG/1
20 TABLET, DELAYED RELEASE ORAL DAILY
Qty: 90 TABLET | Refills: 1 | Status: SHIPPED | OUTPATIENT
Start: 2021-11-10

## 2021-11-10 ASSESSMENT — ENCOUNTER SYMPTOMS
SORE THROAT: 0
ABDOMINAL PAIN: 0
ANAL BLEEDING: 0
NAUSEA: 0
ABDOMINAL DISTENTION: 0
EYES NEGATIVE: 1
CONSTIPATION: 0
RECTAL PAIN: 0
ALLERGIC/IMMUNOLOGIC NEGATIVE: 1
CHEST TIGHTNESS: 0
VOMITING: 0
BACK PAIN: 0
BLOOD IN STOOL: 0
COUGH: 0
COLOR CHANGE: 1
CHOKING: 0
TROUBLE SWALLOWING: 0
VOICE CHANGE: 0
SHORTNESS OF BREATH: 0
DIARRHEA: 0

## 2021-11-10 NOTE — PROGRESS NOTES
GI OFFICE FOLLOW UP    INTERVAL HISTORY:   No referring provider defined for this encounter. Chief Complaint   Patient presents with    Follow-Up from Hospital     discuss EGD , h pylori ulcer        HISTORY OF PRESENT ILLNESS:     Patient being seen for follow-up EGD. Patient has history of large duodenal ulcer, H. pylori in the past.    EGD revealed ulcer appears to be nearly healed. Still has some remnants of ulcer noted. Biopsies negative for H. pylori. No dysplasia. At present patient denies any abdominal pain, nausea, vomiting. Has good appetite. There is no weight loss. He is taking Protonix as ordered. Patient has good bowel movements. Denies any significant constipation or diarrhea. No melena, hematochezia. Past Medical,Family, and Social History reviewed and does contribute to the patient presenting condition. Patient's PMH/PSH,SH,PSYCH Hx, MEDs, ALLERGIES, and ROS were all reviewed and updated in the appropriate sections.  Yes      PAST MEDICAL HISTORY:  Past Medical History:   Diagnosis Date    Abnormal EKG 08/03/2021    A-FIB WITH RAPID VENTRICULAR RESPONSE, SEPTAL INFARCT,REPOLARIZATION ABNORMALITIES    Atrial fibrillation (HCC)     with RVR       Past Surgical History:   Procedure Laterality Date    HIP SURGERY Left 8/5/2021    HIP HEMIARTHROPLASTY performed by Lexi Cabrera MD at [facility] N/A 8/10/2021    EGD BIOPSY performed by Francoise La MD at [facility] N/A 10/15/2021    EGD BIOPSY performed by Francoise La MD at [address]:    Current Outpatient Medications:     pantoprazole (PROTONIX) 20 MG tablet, Take 1 tablet by mouth daily, Disp: 30 tablet, Rfl: 3    dilTIAZem (CARDIZEM) 30 MG tablet, TAKE 1 TABLET BY MOUTH EVERY 12 HOURS, Disp: 180 tablet, Rfl: 1    amiodarone (CORDARONE) 200 MG tablet, Take 1 tablet by mouth daily, Disp: 30 tablet, Rfl: 0    furosemide (LASIX) 20 MG tablet, Take 1 tablet by mouth 2 times daily, Disp: 60 tablet, Rfl: 0    Multiple Vitamins-Minerals (THERAPEUTIC MULTIVITAMIN-MINERALS) tablet, Take 1 tablet by mouth daily , Disp: , Rfl:     polyethylene glycol (GLYCOLAX) 17 g packet, Take 17 g by mouth daily as needed for Constipation (Patient not taking: Reported on 11/10/2021), Disp: 527 g, Rfl: 0    Blood Pressure KIT, 1 kit by Does not apply route daily (Patient not taking: Reported on 11/10/2021), Disp: 1 kit, Rfl: 0    acetaminophen (TYLENOL) 325 MG tablet, Take 2 tablets by mouth every 4 hours as needed for Pain (Patient not taking: Reported on 11/10/2021), Disp: , Rfl:     ALLERGIES:   No Known Allergies    FAMILY HISTORY: History reviewed. No pertinent family history.       SOCIAL HISTORY:   Social History     Socioeconomic History    Marital status: Single     Spouse name: Not on file    Number of children: Not on file    Years of education: Not on file    Highest education level: Not on file   Occupational History    Not on file   Tobacco Use    Smoking status: Former Smoker     Packs/day: 1.00     Years: 10.00     Pack years: 10.00     Quit date: 80     Years since quittin.9    Smokeless tobacco: Never Used    Tobacco comment: quit 1973   Vaping Use    Vaping Use: Never used   Substance and Sexual Activity    Alcohol use: Never    Drug use: Never    Sexual activity: Not on file   Other Topics Concern    Not on file   Social History Narrative    Not on file     Social Determinants of Health     Financial Resource Strain: Low Risk     Difficulty of Paying Living Expenses: Not hard at all   Food Insecurity: No Food Insecurity    Worried About [address] [employer] in the Last Year: Never true    Candace of Food in the Last Year: Never true   Transportation Needs: No Transportation Needs    Lack of Transportation (Medical): No    Lack of Transportation (Non-Medical): No   Physical Activity:     Days of Exercise per Week: Not on file    Minutes of Exercise per Session: Not on file   Stress:     Feeling of Stress : Not on file   Social Connections:     Frequency of Communication with Friends and Family: Not on file    Frequency of Social Gatherings with Friends and Family: Not on file    Attends Cheondoism Services: Not on file    Active Member of [address] [place] [address] [employer] or Organizations: Not on file    Attends Club or Organization Meetings: Not on file    Marital Status: Not on file   Intimate Partner Violence:     Fear of Current or Ex-Partner: Not on file    Emotionally Abused: Not on file    Physically Abused: Not on file    Sexually Abused: Not on file   Housing Stability:     Unable to Pay for Housing in the Last Year: Not on file    Number of [place] in the Last Year: Not on file    Unstable Housing in the Last Year: Not on file         REVIEW OF SYSTEMS:         Review of Systems   Constitutional: Negative for appetite change, chills, fatigue, fever and unexpected weight change. HENT: Negative for congestion, hearing loss, sneezing, sore throat, trouble swallowing and voice change. Eyes: Negative. Respiratory: Negative for cough, choking, chest tightness and shortness of breath. Gastrointestinal: Negative for abdominal distention, abdominal pain, anal bleeding, blood in stool, constipation, diarrhea, nausea, rectal pain and vomiting. Genitourinary: Negative for difficulty urinating. Musculoskeletal: Negative for back pain, joint swelling, neck pain and neck stiffness. Skin: Positive for color change. Allergic/Immunologic: Negative. Neurological: Positive for tremors. Negative for dizziness and weakness. Psychiatric/Behavioral: Positive for confusion. Negative for sleep disturbance. The patient is not hyperactive.         PHYSICAL EXAMINATION:     Vital signs reviewed per the nursing documentation. /60   Pulse 64   Ht 5' 7\" (1.702 m)   Wt 141 lb 3.2 oz (64 kg)   SpO2 100%   BMI 22.12 kg/m²   Body mass index is 22.12 kg/m². Physical Exam  Constitutional:       Appearance: Normal appearance. Eyes:      General: No scleral icterus. Pupils: Pupils are equal, round, and reactive to light. Cardiovascular:      Rate and Rhythm: Normal rate and regular rhythm. Heart sounds: Normal heart sounds. Pulmonary:      Effort: Pulmonary effort is normal.      Breath sounds: Normal breath sounds. Abdominal:      General: Bowel sounds are normal. There is no distension. Palpations: Abdomen is soft. There is no mass. Tenderness: There is no abdominal tenderness. There is no guarding. Skin:     General: Skin is warm and dry. Coloration: Skin is not jaundiced. Neurological:      Mental Status: He is alert and oriented to person, place, and time. Mental status is at baseline. LABORATORY DATA: Reviewed  Lab Results   Component Value Date    WBC 4.8 08/27/2021    HGB 11.0 (L) 08/27/2021    HCT 33.6 (L) 08/27/2021    .4 (H) 08/27/2021     08/27/2021     08/25/2021    K 4.4 08/25/2021     08/25/2021    CO2 30 08/25/2021    BUN 22 08/25/2021    CREATININE 0.63 (L) 08/25/2021    LABALBU 2.2 (L) 08/09/2021    BILITOT 0.58 08/09/2021    ALKPHOS 102 08/09/2021    AST 26 08/09/2021    ALT 11 08/09/2021    INR 1.2 08/03/2021         Lab Results   Component Value Date    RBC 3.28 (L) 08/27/2021    HGB 11.0 (L) 08/27/2021    .4 (H) 08/27/2021    MCH 33.5 08/27/2021    MCHC 32.7 08/27/2021    RDW 17.8 (H) 08/27/2021    MPV 8.2 08/27/2021    BASOPCT 1 08/13/2021    LYMPHSABS 0.80 (L) 08/13/2021    MONOSABS 0.80 08/13/2021    NEUTROABS 7.00 08/13/2021    EOSABS 0.20 08/13/2021    BASOSABS 0.10 08/13/2021         DIAGNOSTIC TESTING:     No results found. Assessment  1. H pylori ulcer    2.  PUD (peptic ulcer disease) Plan    EGD reviewed with patient. Ulcer nearly healed. Still has some small remnants of ulcer noted. Biopsies negative for H. pylori. No dysplasia. At present patient is doing well. Denies any abdominal pain, nausea, vomiting. Tolerating diet. There is no weight loss. To continue Protonix 20 mg daily. Follow-up spring 2022    Thank you for allowing me to participate in the care of Mr. Ronnie Brennan. For any further questions please do not hesitate to contact me. I have reviewed and agree with the ROS entered by the MA/LPN. Note is dictated utilizing voice recognition software. Unfortunately this leads to occasional typographical errors. Please contact our office if you have any questions    Discussed with the patient regarding EGD findings. Advised to decrease PPI to 20 mg a day. Also advised not to take NSAIDs. Patient understood and agreed. We will see him on intermittent basis.     Isabella Litten, MD,FACP, [facility]  Board Certified in Gastroenterology and [address] [place] [address] Gastroenterology  Office #: [phone]

## 2021-11-13 DIAGNOSIS — I48.0 PAROXYSMAL ATRIAL FIBRILLATION (HCC): ICD-10-CM

## 2021-11-14 DIAGNOSIS — I48.0 PAROXYSMAL ATRIAL FIBRILLATION (HCC): ICD-10-CM

## 2021-11-14 RX ORDER — AMIODARONE HYDROCHLORIDE 200 MG/1
200 TABLET ORAL DAILY
Qty: 30 TABLET | Refills: 0 | Status: CANCELLED | OUTPATIENT
Start: 2021-11-14

## 2021-11-15 DIAGNOSIS — I48.0 PAROXYSMAL ATRIAL FIBRILLATION (HCC): ICD-10-CM

## 2021-11-15 RX ORDER — AMIODARONE HYDROCHLORIDE 200 MG/1
200 TABLET ORAL DAILY
Qty: 30 TABLET | Refills: 0 | Status: SHIPPED | OUTPATIENT
Start: 2021-11-15 | End: 2021-11-15

## 2021-11-15 RX ORDER — FUROSEMIDE 20 MG/1
TABLET ORAL
Qty: 60 TABLET | Refills: 0 | Status: SHIPPED | OUTPATIENT
Start: 2021-11-15 | End: 2021-11-15

## 2021-11-19 RX ORDER — FUROSEMIDE 20 MG/1
TABLET ORAL
Qty: 180 TABLET | Refills: 3 | Status: SHIPPED | OUTPATIENT
Start: 2021-11-19 | End: 2021-12-16

## 2021-11-19 RX ORDER — AMIODARONE HYDROCHLORIDE 200 MG/1
200 TABLET ORAL DAILY
Qty: 90 TABLET | Refills: 3 | Status: SHIPPED | OUTPATIENT
Start: 2021-11-19 | End: 2021-12-16

## 2021-12-01 ENCOUNTER — OFFICE VISIT (OUTPATIENT)
Dept: ORTHOPEDIC SURGERY | Age: 81
End: 2021-12-01

## 2021-12-01 VITALS — HEIGHT: 67 IN | BODY MASS INDEX: 22.13 KG/M2 | WEIGHT: 141 LBS

## 2021-12-01 DIAGNOSIS — Z96.642 S/P HIP REPLACEMENT, LEFT: Primary | ICD-10-CM

## 2021-12-01 PROCEDURE — 99024 POSTOP FOLLOW-UP VISIT: CPT | Performed by: ORTHOPAEDIC SURGERY

## 2021-12-01 NOTE — PROGRESS NOTES
Procedure: Left hip hemiarthroplasty  Date of Procedure: 8/5/21    HPI: Lakesha Nicole is a 79 y.o. old male who is approximately 12 weeks sp the aforementioned procedure. he reports that he continues to do well. he denies having any pain at this time. he also denies having any numbness/tingling, fevers, chills, or sweats. He ambulates with a cane mostly outside of the home. Physical Exam:  Ht 5' 7\" (1.702 m)   Wt 141 lb (64 kg)   BMI 22.08 kg/m²   General Appearance: alert, well appearing, and in no distress  Mental Status: alert, oriented to person, place, and time  Evaluation of the left hip and lower extremity demonstrates his incision to be appropriately healed. he has a negative log roll and good pain free range of motion through the hip. Sensation is grossly intact to light touch in all dermatomes and he has 2+ pedal pulses distally. Imaging Studies: An AP of the pelvis and AP and lateral views of the left hip were obtained and reviewed independently on 12/1/2021 demonstrating a cemented hemiarthroplasty to be in acceptable alignment without any obvious loosening, migration, subluxation, or dislocation. Impression and plan: Lakesha Nicole is a 79 y.o. old male who is approximately 12 weeks sp a left hip hemiarthroplasty. he continues to do well clinically. he is to continue weight bearing as tolerated and may increase activities gradually as tolerated and continuing with his home exercise program from physical therapy. he is to follow up in 3 months for re-evaluation but was instructed to return or call earlier with any questions or concerns.

## 2021-12-14 DIAGNOSIS — I48.0 PAROXYSMAL ATRIAL FIBRILLATION (HCC): ICD-10-CM

## 2021-12-16 RX ORDER — AMIODARONE HYDROCHLORIDE 200 MG/1
200 TABLET ORAL DAILY
Qty: 90 TABLET | Refills: 3 | Status: SHIPPED | OUTPATIENT
Start: 2021-12-16

## 2021-12-16 RX ORDER — FUROSEMIDE 20 MG/1
TABLET ORAL
Qty: 180 TABLET | Refills: 3 | Status: SHIPPED | OUTPATIENT
Start: 2021-12-16

## 2022-01-18 ENCOUNTER — OFFICE VISIT (OUTPATIENT)
Dept: INTERNAL MEDICINE CLINIC | Age: 82
End: 2022-01-18
Payer: MEDICARE

## 2022-01-18 VITALS
HEART RATE: 61 BPM | OXYGEN SATURATION: 98 % | HEIGHT: 67 IN | BODY MASS INDEX: 21.82 KG/M2 | DIASTOLIC BLOOD PRESSURE: 74 MMHG | SYSTOLIC BLOOD PRESSURE: 130 MMHG | WEIGHT: 139 LBS

## 2022-01-18 DIAGNOSIS — S72.002A CLOSED LEFT HIP FRACTURE, INITIAL ENCOUNTER (HCC): ICD-10-CM

## 2022-01-18 DIAGNOSIS — K27.9 PUD (PEPTIC ULCER DISEASE): ICD-10-CM

## 2022-01-18 DIAGNOSIS — I48.91 ATRIAL FIBRILLATION WITH RVR (HCC): Primary | ICD-10-CM

## 2022-01-18 DIAGNOSIS — I48.0 PAROXYSMAL ATRIAL FIBRILLATION (HCC): ICD-10-CM

## 2022-01-18 PROCEDURE — G8427 DOCREV CUR MEDS BY ELIG CLIN: HCPCS | Performed by: NURSE PRACTITIONER

## 2022-01-18 PROCEDURE — G8484 FLU IMMUNIZE NO ADMIN: HCPCS | Performed by: NURSE PRACTITIONER

## 2022-01-18 PROCEDURE — 99213 OFFICE O/P EST LOW 20 MIN: CPT | Performed by: NURSE PRACTITIONER

## 2022-01-18 PROCEDURE — 4040F PNEUMOC VAC/ADMIN/RCVD: CPT | Performed by: NURSE PRACTITIONER

## 2022-01-18 PROCEDURE — 1123F ACP DISCUSS/DSCN MKR DOCD: CPT | Performed by: NURSE PRACTITIONER

## 2022-01-18 PROCEDURE — G8420 CALC BMI NORM PARAMETERS: HCPCS | Performed by: NURSE PRACTITIONER

## 2022-01-18 PROCEDURE — 1036F TOBACCO NON-USER: CPT | Performed by: NURSE PRACTITIONER

## 2022-01-18 ASSESSMENT — ENCOUNTER SYMPTOMS
CHEST TIGHTNESS: 0
TROUBLE SWALLOWING: 0
DIARRHEA: 0
RHINORRHEA: 0
NAUSEA: 0
WHEEZING: 0
VOMITING: 0
ABDOMINAL PAIN: 0
CONSTIPATION: 0
SHORTNESS OF BREATH: 0
COUGH: 0
SORE THROAT: 0
BLOOD IN STOOL: 0
SINUS PAIN: 0

## 2022-01-18 NOTE — PROGRESS NOTES
Visit Information    Have you changed or started any medications since your last visit including any over-the-counter medicines, vitamins, or herbal medicines? no   Are you having any side effects from any of your medications? -  no  Have you stopped taking any of your medications? Is so, why? -  no    Have you seen any other physician or provider since your last visit? No  Have you had any other diagnostic tests since your last visit? No  Have you been seen in the emergency room and/or had an admission to a hospital since we last saw you? No  Have you had your routine dental cleaning in the past 6 months? no    Have you activated your [employer] account? If not, what are your barriers?  No:      Patient Care Team:  ANDRES Segura CNP as PCP - General (Nurse Practitioner)  ANDRES Segura CNP as PCP - [facility] Empaneled Provider    Medical History Review  Past Medical, Family, and Social History reviewed and does contribute to the patient presenting condition    Health Maintenance   Topic Date Due    DTaP/Tdap/Td vaccine (1 - Tdap) Never done    Shingles Vaccine (1 of 2) Never done    Annual Wellness Visit (AWV)  Never done    Pneumococcal 65+ years Vaccine (2 of 2 - PPSV23) 09/10/2021    TSH testing  08/03/2022    Potassium monitoring  08/25/2022    Creatinine monitoring  08/25/2022    Depression Screen  10/18/2022    Flu vaccine  Completed    COVID-19 Vaccine  Completed    Hepatitis A vaccine  Aged Out    Hepatitis B vaccine  Aged Out    Hib vaccine  Aged Out    Meningococcal (ACWY) vaccine  Aged Out         [address] [address]  Dept: [phone]  Dept Fax: [phone]    OfficeProgress/Follow Up Note  Date of patient's visit: 1/18/2022  Patient's Name:  Anastasia Ortez YOB: 1940            Patient Care Team:  ANDRES Segura CNP as PCP - General (Nurse Practitioner)  ANDRES Segura CNP as PCP - [facility] Empaneled Provider    REASON FOR VISIT:  Routine outpatient follow up    HISTORY OF PRESENT ILLNESS:        History was obtained from the patient. Adrianne Key is a 79 y.o. male here today for Atrial Fibrillation (follow up )    He presents to our office for routine medical follow up. No complaints at this time. Patient Active Problem List   Diagnosis    [facility]    Closed fracture of left hip with routine healing    Anemia    Heart failure ([facility]    Paroxysmal atrial fibrillation (HCC)    Closed left hip fracture, initial encounter ([facility]    H pylori ulcer    PUD (peptic ulcer disease)       Health Maintenance Due   Topic Date Due    DTaP/Tdap/Td vaccine (1 - Tdap) Never done    Shingles Vaccine (1 of 2) Never done   Tiaaron Mendosa Annual Wellness Visit (AWV)  Never done    Pneumococcal 65+ years Vaccine (2 of 2 - PPSV23) 09/10/2021       MEDICATIONS:      Current Outpatient Medications   Medication Sig Dispense Refill    amiodarone (CORDARONE) 200 MG tablet TAKE 1 TABLET BY MOUTH DAILY 90 tablet 3    furosemide (LASIX) 20 MG tablet TAKE 1 TABLET BY MOUTH TWICE DAILY 180 tablet 3    dilTIAZem (CARDIZEM) 30 MG tablet TAKE 1 TABLET BY MOUTH EVERY 12 HOURS 180 tablet 1    pantoprazole (PROTONIX) 20 MG tablet TAKE 1 TABLET BY MOUTH DAILY 90 tablet 1    Blood Pressure KIT 1 kit by Does not apply route daily 1 kit 0    Multiple Vitamins-Minerals (THERAPEUTIC MULTIVITAMIN-MINERALS) tablet Take 1 tablet by mouth daily       polyethylene glycol (GLYCOLAX) 17 g packet Take 17 g by mouth daily as needed for Constipation (Patient not taking: Reported on 11/10/2021) 527 g 0    acetaminophen (TYLENOL) 325 MG tablet Take 2 tablets by mouth every 4 hours as needed for Pain (Patient not taking: Reported on 11/10/2021)       No current facility-administered medications for this visit. ALLERGIES:    No Known Allergies      SOCIAL HISTORY    Reviewed and no change from previous record. Rj Reaves  reports that he quit smoking about 49 years ago. He has a 10.00 pack-year smoking history. He has never used smokeless tobacco.    FAMILY HISTORY:    Reviewed and No change from previous visit    REVIEW OF SYSTEMS:    Review of Systems   Constitutional: Negative for appetite change, chills, diaphoresis, fatigue, fever and unexpected weight change. HENT: Negative for ear pain, postnasal drip, rhinorrhea, sinus pain, sore throat and trouble swallowing. Eyes: Negative for visual disturbance. Respiratory: Negative for cough, chest tightness, shortness of breath and wheezing. Cardiovascular: Negative for chest pain, palpitations and leg swelling. Gastrointestinal: Negative for abdominal pain, blood in stool, constipation, diarrhea, nausea and vomiting. Endocrine: Negative for polydipsia, polyphagia and polyuria. Genitourinary: Negative for difficulty urinating, dysuria and flank pain. Musculoskeletal: Negative for arthralgias and myalgias. Skin: Negative for rash and wound. Neurological: Negative for dizziness, syncope and weakness. All other systems reviewed and are negative. PHYSICAL EXAM:      Vitals:    01/18/22 0900   BP: 130/74   Pulse: 61   SpO2: 98%   Weight: 139 lb (63 kg)   Height: 5' 7\" (1.702 m)     BP Readings from Last 3 Encounters:   01/18/22 130/74   11/10/21 100/60   10/18/21 122/60      Wt Readings from Last 3 Encounters:   01/18/22 139 lb (63 kg)   12/01/21 141 lb (64 kg)   11/10/21 141 lb 3.2 oz (64 kg)       Physical Exam  Vitals reviewed. Constitutional:       Appearance: Normal appearance. HENT:      Head: Normocephalic. Cardiovascular:      Rate and Rhythm: Normal rate and regular rhythm. Pulses: Normal pulses. Heart sounds: Normal heart sounds. Pulmonary:      Effort: Pulmonary effort is normal.      Breath sounds: Normal breath sounds. Abdominal:      General: Bowel sounds are normal.      Palpations: Abdomen is soft.    Musculoskeletal: General: No swelling, tenderness or deformity. Normal range of motion. Skin:     General: Skin is warm and dry. Neurological:      General: No focal deficit present. Mental Status: He is alert and oriented to person, place, and time. Psychiatric:         Mood and Affect: Mood normal.         Behavior: Behavior normal.         Thought Content: Thought content normal.         Judgment: Judgment normal.          LABORATORY FINDINGS:    CBC:  Lab Results   Component Value Date    WBC 4.8 08/27/2021    HGB 11.0 08/27/2021     08/27/2021       BMP:    Lab Results   Component Value Date     08/25/2021    K 4.4 08/25/2021     08/25/2021    CO2 30 08/25/2021    BUN 22 08/25/2021    CREATININE 0.63 08/25/2021    GLUCOSE 91 08/25/2021       HEMOGLOBIN A1C: No results found for: LABA1C    FASTING LIPID PANEL:No results found for: CHOL, HDL, TRIG    ASSESSMENT AND PLAN:      1. Atrial fibrillation with RVR (HCC)/ 2. Paroxysmal atrial fibrillation (HCC)  - currently in NSR, compliant with cardizem and amiodarone   - follows with [facility] cardiology  - encouraged to complete BMP    3. [facility]  -s/p L hip replacement in August, follows with ortho,  ambulates without difficulty   - uses cane PRN for stability     4. PUD (peptic ulcer disease)  - complaint with PPI and avoids NSAIDs      FOLLOW UP AND INSTRUCTIONS:   Return in about 6 months (around 7/18/2022), continue to keep follow up with specialists, or if symptoms worsen or fail to improve. Vanessa Marinelli received counseling on the following healthy behaviors: nutrition, exercise and medication adherence    Discussed use, benefit, and side effects of prescribed medications. Barriers to medication compliance addressed. All patient questions answered. Patient voiced understanding.      Patient given educational materials - see patient instructions    ANDRES Villalobos - [address] [facility]  1/18/2022, 1:36 PM    Please note that this chart was generated using voice recognition Dragon dictation software. Although everyeffort was made to ensure the accuracy of this automated transcription, some errors in transcription may have occurred.

## 2022-03-02 ENCOUNTER — OFFICE VISIT (OUTPATIENT)
Dept: ORTHOPEDIC SURGERY | Age: 82
End: 2022-03-02
Payer: MEDICARE

## 2022-03-02 VITALS — WEIGHT: 139 LBS | BODY MASS INDEX: 21.82 KG/M2 | HEIGHT: 67 IN

## 2022-03-02 DIAGNOSIS — Z96.642 S/P HIP REPLACEMENT, LEFT: Primary | ICD-10-CM

## 2022-03-02 PROCEDURE — 1123F ACP DISCUSS/DSCN MKR DOCD: CPT | Performed by: ORTHOPAEDIC SURGERY

## 2022-03-02 PROCEDURE — G8484 FLU IMMUNIZE NO ADMIN: HCPCS | Performed by: ORTHOPAEDIC SURGERY

## 2022-03-02 PROCEDURE — 1036F TOBACCO NON-USER: CPT | Performed by: ORTHOPAEDIC SURGERY

## 2022-03-02 PROCEDURE — G8420 CALC BMI NORM PARAMETERS: HCPCS | Performed by: ORTHOPAEDIC SURGERY

## 2022-03-02 PROCEDURE — 99213 OFFICE O/P EST LOW 20 MIN: CPT | Performed by: ORTHOPAEDIC SURGERY

## 2022-03-02 PROCEDURE — 4040F PNEUMOC VAC/ADMIN/RCVD: CPT | Performed by: ORTHOPAEDIC SURGERY

## 2022-03-02 PROCEDURE — G8427 DOCREV CUR MEDS BY ELIG CLIN: HCPCS | Performed by: ORTHOPAEDIC SURGERY

## 2022-03-02 NOTE — PROGRESS NOTES
Procedure: Left hip hemiarthroplasty  Date of Procedure: 8/5/21    HPI / Chief Complaint  Nicolás Parr is a 79 y.o. male who is approximately 7 months status post the aforementioned procedure. He indicates that he is doing well.  2 days ago he states that he developed a charley horse in his left quads. No pain in the hip and no issues there. Past Medical History  Dylan Rey  has a past medical history of Abnormal EKG and Atrial fibrillation ([facility] Past Surgical History  Dylan Rey  has a past surgical history that includes hip surgery (Left, 8/5/2021); Upper gastrointestinal endoscopy (N/A, 8/10/2021); and Upper gastrointestinal endoscopy (N/A, 10/15/2021). Current Medications  Current Outpatient Medications   Medication Sig Dispense Refill    amiodarone (CORDARONE) 200 MG tablet TAKE 1 TABLET BY MOUTH DAILY 90 tablet 3    furosemide (LASIX) 20 MG tablet TAKE 1 TABLET BY MOUTH TWICE DAILY 180 tablet 3    dilTIAZem (CARDIZEM) 30 MG tablet TAKE 1 TABLET BY MOUTH EVERY 12 HOURS 180 tablet 1    pantoprazole (PROTONIX) 20 MG tablet TAKE 1 TABLET BY MOUTH DAILY 90 tablet 1    polyethylene glycol (GLYCOLAX) 17 g packet Take 17 g by mouth daily as needed for Constipation (Patient not taking: Reported on 11/10/2021) 527 g 0    Blood Pressure KIT 1 kit by Does not apply route daily 1 kit 0    acetaminophen (TYLENOL) 325 MG tablet Take 2 tablets by mouth every 4 hours as needed for Pain (Patient not taking: Reported on 11/10/2021)      Multiple Vitamins-Minerals (THERAPEUTIC MULTIVITAMIN-MINERALS) tablet Take 1 tablet by mouth daily        No current facility-administered medications for this visit. Allergies  Allergies have been reviewed. Dylan Rey has No Known Allergies. Social History  Dylan Rey  reports that he quit smoking about 49 years ago. He has a 10.00 pack-year smoking history. He has never used smokeless tobacco. He reports that he does not drink alcohol and does not use drugs.     Family History  Reynaldo's family history is not on file. Review of Systems   History obtained from the patient. REVIEW OF SYSTEMS:   Constitution: negative for fever, chills, weight loss or malaise   Musculoskeletal: As noted in the HPI   Neurologic: As noted in the HPI    Physical Exam  Ht 5' 7\" (1.702 m)   Wt 139 lb (63 kg)   BMI 21.77 kg/m²    General Appearance: alert, well appearing, and in no distress  Mental Status: alert, oriented to person, place, and time  Evaluation patient's left hip and lower extremity demonstrates intact skin without warmth, erythema, or notable swelling. He is nontender to palpation diffusely about the hip and has full pain-free range of motion. He is mildly tender describes feeling pressure on palpation of his quadriceps above the knee. He has full range of motion through the knee as well. He does ambulate today with the aid of a cane. He has good pedal pulses distally. Imaging Studies  An AP of the pelvis and 2 x-ray views of the left hip demonstrates a cemented hemiarthroplasty. It appears to be well fixed and in good alignment without obvious fracture, dislocation or subluxation. Assessment and Plan  Robbie Dubois is a 79 y.o. old male approximately 7 months status post a left hip hemiarthroplasty. He is doing fairly well at this time. He was encouraged to keep up with his home exercise program and may continue weightbearing as tolerated. I will see him back for reevaluation in 5 months but he may return or call earlier with questions or concerns. This note is created with the assistance of a speech recognition program.  While intending to generate adocument that actually reflects the content of the visit, the document can still have some errors including those of syntax and sound a like substitutions which may escape proof reading. It such instances, actual meaningcan be extrapolated by contextual diversion.

## 2022-04-25 ENCOUNTER — TELEPHONE (OUTPATIENT)
Dept: GASTROENTEROLOGY | Age: 82
End: 2022-04-25

## 2022-04-25 NOTE — TELEPHONE ENCOUNTER
Pt was scheduled for an OV for f/u on 5/11/2022. LVM for pt advising that due to a change in providers schedule appt will need to be cancelled. Mailing letter to pt as well.

## 2022-05-21 DIAGNOSIS — I48.0 PAROXYSMAL ATRIAL FIBRILLATION (HCC): ICD-10-CM

## 2023-01-03 DIAGNOSIS — I48.0 PAROXYSMAL ATRIAL FIBRILLATION (HCC): ICD-10-CM

## 2023-01-03 RX ORDER — FUROSEMIDE 20 MG/1
TABLET ORAL
Qty: 180 TABLET | Refills: 3 | OUTPATIENT
Start: 2023-01-03

## 2023-05-02 NOTE — PROGRESS NOTES
ICU Progress Note (Non-Vent)  [facility]    Patient - Charlie Biggs,  Age - 79 y.o.    - 1940      Room Number -    MRN -  [id number]   Acct # - [de-identified]  Date of Admission -  8/3/2021 10:59 AM    Events of Past 24 Hours   He is alert and oriented, on room air with saturations around 93%. Remains on Cardizem drip at 5 mg/h and amiodarone drip at 0.5 mg/h  Denies any chest pain or dyspnea    Vitals    height is 5' 7\" (1.702 m) and weight is 148 lb 5.9 oz (67.3 kg). His oral temperature is 97.6 °F (36.4 °C). His blood pressure is 94/41 (abnormal) and his pulse is 96. His respiration is 25 and oxygen saturation is 91%.        Temperature Range: Temp: 97.6 °F (36.4 °C) Temp  Av.1 °F (36.7 °C)  Min: 97.5 °F (36.4 °C)  Max: 99 °F (37.2 °C)  BP Range:  Systolic (95SFF), LED:560 , Min:93 , UYY:869     Diastolic (18EBG), OCW:14, Min:38, Max:85    Pulse Range: Pulse  Av  Min: 83  Max: 142  Respiration Range: Resp  Av.1  Min: 12  Max: 33  Current Pulse Ox[de-identified]  SpO2: 91 %  24HR Pulse Ox Range:  SpO2  Av.1 %  Min: 88 %  Max: 98 %  Oxygen Amount and Delivery: O2 Flow Rate (L/min): 2 L/min    Wt Readings from Last 3 Encounters:   21 148 lb 5.9 oz (67.3 kg)     I/O       Intake/Output Summary (Last 24 hours) at 2021 1237  Last data filed at 2021 0656  Gross per 24 hour   Intake 2033.38 ml   Output 2600 ml   Net -566.62 ml     DRAIN/TUBE OUTPUT       Invasive Lines   ICP PRESSURE RANGE  No data recorded  CVP PRESSURE RANGE  No data recorded      Medications      polyethylene glycol  17 g Oral Daily    pantoprazole  40 mg Oral QAM AC    ceFAZolin  1,000 mg Intravenous Q8H    acetaminophen  1,000 mg Oral 4 times per day    digoxin  125 mcg Oral Daily    metoprolol tartrate  50 mg Oral BID    sodium chloride flush  5-40 mL Intravenous 2 times per day     melatonin, magnesium sulfate, oxyCODONE, morphine, perflutren lipid microspheres, sodium chloride flush, sodium chloride, ondansetron **OR** ondansetron, polyethylene glycol, acetaminophen **OR** acetaminophen, potassium chloride **OR** potassium alternative oral replacement **OR** potassium chloride  IV Drips/Infusions   amiodarone 0.5 mg/min (08/09/21 1042)    dilTIAZem (CARDIZEM) 125 mg in dextrose 5% 125 mL infusion 5 mg/hr (08/09/21 1123)    sodium chloride      sodium chloride 125 mL/hr at 08/09/21 1040       Diet/Nutrition   ADULT DIET; Easy to Chew  Adult Oral Nutrition Supplement; Low Calorie/High Protein Oral Supplement    Exam      Constitutional - Alert, arousable  General Appearance  well developed, well nourished  HEENT -normocephalic, atraumatic. PERRLA  Lungs - Chest expands equally, no wheezes, rales or rhonchi. Cardiovascular - Heart sounds are normal.  normal rate and rhythm regular, no murmur, gallop or rub. Abdomen - soft, nontender, nondistended, no masses or organomegaly  Neurologic - CN II-XII are grossly intact.  There are no focal motor deficits  Skin - no bruising or bleeding  Extremities - no cyanosis, clubbing or edema    Lab Results   CBC     Lab Results   Component Value Date    WBC 11.5 08/09/2021    RBC 2.29 08/09/2021    HGB 7.5 08/09/2021    HCT 23.1 08/09/2021     08/09/2021    .6 08/09/2021    MCH 32.7 08/09/2021    MCHC 32.5 08/09/2021    RDW 15.7 08/09/2021    LYMPHOPCT 9 08/09/2021    MONOPCT 6 08/09/2021    BASOPCT 1 08/09/2021    MONOSABS 0.69 08/09/2021    LYMPHSABS 1.04 08/09/2021    EOSABS 0.23 08/09/2021    BASOSABS 0.12 08/09/2021    DIFFTYPE NOT REPORTED 08/09/2021       BMP   Lab Results   Component Value Date     08/09/2021    K 3.8 08/09/2021     08/09/2021    CO2 23 08/09/2021    BUN 15 08/09/2021    CREATININE 0.46 08/09/2021    GLUCOSE 102 08/09/2021       LFTS  Lab Results   Component Value Date    ALKPHOS 110 08/03/2021     08/03/2021    AST 85 08/03/2021    PROT 6.8 08/03/2021    BILITOT 1.74 08/03/2021    LABALBU 3.8 08/03/2021       ABG ABGs:   Lab Results   Component Value Date    PHART 7.412 08/06/2021    PO2ART 117.0 08/06/2021    MJX7JHL 39.1 08/06/2021       Lab Results   Component Value Date    MODE PRVC 08/06/2021         INR  No results for input(s): PROTIME, INR in the last 72 hours. APTT  No results for input(s): APTT in the last 72 hours. Lactic Acid  Lab Results   Component Value Date    LACTA 1.7 08/03/2021    LACTA 2.3 08/03/2021    LACTA 2.3 08/03/2021        BNP   No results for input(s): BNP in the last 72 hours.      Cultures       Radiology     CXR      CT Scans    (See actual reports for details)      SYSTEMS ASSESSMENT    Status post left hip hemiarthroplasty  Atrial fibrillation with rapid ventricular response  Anemia with stool positive for Hemoccult blood  No history of underlying lung disease  Non-smoker      Appears to be hemodynamically stable, no longer in rapid ventricular response  Hemoglobin has been holding steady, transfuse if less than 7; change frequency to every 12 hours  GI prophylaxis  Off heparin drip  Urged to use incentive spirometry  Follow-up x-ray tomorrow  Okay to move out of ICU from critical care standpoint, awaiting further cardiology input      Critical Care Time   0 min    Electronically signed by Tessie Myles MD on 8/9/2021 at 12:37 PM Xolair Pregnancy And Lactation Text: This medication is Pregnancy Category B and is considered safe during pregnancy. This medication is excreted in breast milk.

## 2024-11-04 NOTE — PROGRESS NOTES
[address]  Acute Rehabilitation Physical Therapy Progress Note    Date: 21  Patient Name: Tammie Barrios       Room: /2314-88  MRN: [id number]   Account: [de-identified]   : 1940  (79 y.o.) Gender: male     Referring Practitioner: Davon Golden MD  Diagnosis: Left hip fracture, hospitalization prolonged (initial admit 8-3)  due to afib, rhabdomyolosis, GI bleed, s/p EGD, on Protonix, twice a day,  Past Medical History:  has a past medical history of Atrial fibrillation ([facility] Past Surgical History:   has a past surgical history that includes hip surgery (Left, 2021) and Upper gastrointestinal endoscopy (N/A, 8/10/2021). Additional Pertinent Hx: closed L hip fx    Overall Orientation Status: Within Normal Limits  Restrictions/Precautions  Restrictions/Precautions: Weight Bearing; Fall Risk  Required Braces or Orthoses?: No  Implants present? : Metal implants (L hemiarthroplasty )  Lower Extremity Weight Bearing Restrictions  Left Lower Extremity Weight Bearing: Weight Bearing As Tolerated    Subjective: Pt reported no pain. Pt reported that he feels good and ready to go home. Pt reported that the ARU staff helped him a lot. He said his L leg is much better and \"the difference is night and day. \"     Vital Signs  Patient Currently in Pain: Denies     Bed Mobility:   Bed Mobility  Scooting: Independent  Bed mobility  Sit to Supine: Independent  Scooting: Independent    Transfers:  Sit to Stand: Independent  Stand to sit:  Independent  Bed to Chair: Independent      WB Status: WBAT  Ambulation 1  Surface: level tile  Device: Single point cane  Assistance: Modified Independent  Gait Deviations: Decreased step length;Decreased step height;Slow Danuta  Distance: 160ft        Stairs/Curb  Stairs?: Yes  Stairs  # Steps : 17  Stairs Height: 8\"  Rails: Right ascending  Device: Single pt cane  Assistance: Modified independent   Comment: seated rest break at top of steps before descending     EXERCISES    Other exercises?: Yes  Other exercises 1: Seated (B) LE ex with 2# wt, 15 reps  Other exercises 2: NuStep: level 4, 11.5 minutes      Activity Tolerance: Patient limited by endurance  Activity Tolerance: Pt was fatigued today and required rest breaks      Current Treatment Recommendations: Strengthening, ROM, Balance Training, Functional Mobility Training, Transfer Training, Stair training, Gait Training, Endurance Training, Safety Education & Training, Patient/Caregiver Education & Training, Home Exercise Program     Goals  Short term goals  Time Frame for Short term goals: 7-10 days  Short term goal 1: bed mobility with SBA  Short term goal 2: transfers with supervision  Short term goal 3: gait with RW/SBA x 75-100ft  Short term goal 4: standing balance to Fair+ for ADLs  Long term goals  Time Frame for Long term goals : time of discharge  Long term goal 1: mod-I bed mobility  Long term goal 2: mod-I transfers  Long term goal 3: mod-I gait x 150ft with RW  Long term goal 4: flight of stairs with rail and cane mod-I  Long term goal 5: standing balance to Good for safe ADLs       08/27/21 0745   PT Individual Minutes   Time In 0745   Time Out 0833   Minutes 48       Electronically signed by Virginia Wall PTA on 8/27/21 at 8:46 AM EDT ,DirectAddress_Unknown,DirectAddress_Unknown,DirectAddress_Unknown

## (undated) DEVICE — FORCEPS BX L240CM WRK CHN 2.8MM STD CAP W/ NDL MIC MESH

## (undated) DEVICE — SUTURE VCRL + SZ 1 L36IN ABSRB UD L36MM CT-1 1/2 CIR VCP947H

## (undated) DEVICE — DRESSING TRNSPAR W5XL4.5IN FLM SHT SEMIPERMEABLE WIND

## (undated) DEVICE — GLOVE ORANGE PI 7   MSG9070

## (undated) DEVICE — ENDO KIT W/SYRINGE: Brand: MEDLINE INDUSTRIES, INC.

## (undated) DEVICE — SOLUTION IRRIG 1000ML 0.9% SOD CHL USP POUR PLAS BTL

## (undated) DEVICE — 4-PORT MANIFOLD: Brand: NEPTUNE 2

## (undated) DEVICE — SOLUTION IRRIG 3000ML 0.9% SOD CHL USP UROMATIC PLAS CONT

## (undated) DEVICE — Device

## (undated) DEVICE — BANDAGE COBAN 6 IN WND 6INX5YD FOAM

## (undated) DEVICE — GAUZE,SPONGE,FLUFF,6"X6.75",STRL,5/TRAY: Brand: MEDLINE

## (undated) DEVICE — BITEBLOCK 54FR W/ DENT RIM BLOX

## (undated) DEVICE — 1010 S-DRAPE TOWEL DRAPE 10/BX: Brand: STERI-DRAPE™

## (undated) DEVICE — DUAL CUT SAGITTAL BLADE

## (undated) DEVICE — SUTURE VCRL SZ 0 L36IN ABSRB UD CT-1 L36MM 1/2 CIR TAPR PNT VCP946H

## (undated) DEVICE — YANKAUER,OPEN TIP,W/O VENT,STERILE: Brand: MEDLINE INDUSTRIES, INC.

## (undated) DEVICE — DEFENDO AIR WATER SUCTION AND BIOPSY VALVE KIT FOR  OLYMPUS: Brand: DEFENDO AIR/WATER/SUCTION AND BIOPSY VALVE

## (undated) DEVICE — GLOVE ORANGE PI 7 1/2   MSG9075

## (undated) DEVICE — MERCY HEALTH ST CHARLES: Brand: MEDLINE INDUSTRIES, INC.

## (undated) DEVICE — SOLUTION IRRIG 1000ML STRL H2O USP PLAS POUR BTL

## (undated) DEVICE — DRAPE,HIP,W/POUCHES,STERILE: Brand: MEDLINE

## (undated) DEVICE — TOTAL TRAY, 16FR 10ML SIL FOLEY, URN: Brand: MEDLINE

## (undated) DEVICE — KIT BNE CEM PREP FEM QUIK-USE 1 PER CA

## (undated) DEVICE — ETHIBOND EXCEL SUT 30 INCHES75CM 2 GRN

## (undated) DEVICE — CEMENT MIXING SYSTEM WITH FEMORAL BREAKWAY NOZZLE: Brand: REVOLUTION

## (undated) DEVICE — DRESSING,GAUZE,XEROFORM,CURAD,1"X8",ST: Brand: CURAD

## (undated) DEVICE — SUTURE VCRL + SZ 2-0 L27IN ABSRB CLR CT-1 1/2 CIR TAPERCUT VCP259H

## (undated) DEVICE — 3M™ STERI-DRAPE™ U-DRAPE 1015: Brand: STERI-DRAPE™